# Patient Record
Sex: FEMALE | Race: WHITE | Employment: OTHER | URBAN - METROPOLITAN AREA
[De-identification: names, ages, dates, MRNs, and addresses within clinical notes are randomized per-mention and may not be internally consistent; named-entity substitution may affect disease eponyms.]

---

## 2017-09-05 ENCOUNTER — HOSPITAL ENCOUNTER (OUTPATIENT)
Dept: PHYSICAL THERAPY | Age: 77
Discharge: HOME | End: 2017-09-05

## 2017-09-05 ENCOUNTER — HOSPITAL ENCOUNTER (OUTPATIENT)
Dept: PREADMISSION TESTING | Age: 77
Discharge: HOME | End: 2017-09-05
Payer: MEDICARE

## 2017-09-05 VITALS
WEIGHT: 151 LBS | SYSTOLIC BLOOD PRESSURE: 160 MMHG | TEMPERATURE: 98.3 F | HEIGHT: 64 IN | DIASTOLIC BLOOD PRESSURE: 69 MMHG | BODY MASS INDEX: 25.78 KG/M2 | OXYGEN SATURATION: 97 % | RESPIRATION RATE: 18 BRPM

## 2017-09-05 LAB
ABO + RH BLD: NORMAL
ALBUMIN SERPL-MCNC: 3.9 G/DL (ref 3.5–5)
ALBUMIN/GLOB SERPL: 1 {RATIO} (ref 1.1–2.2)
ALP SERPL-CCNC: 99 U/L (ref 45–117)
ALT SERPL-CCNC: 22 U/L (ref 12–78)
ANION GAP SERPL CALC-SCNC: 5 MMOL/L (ref 5–15)
APPEARANCE UR: ABNORMAL
AST SERPL-CCNC: 20 U/L (ref 15–37)
ATRIAL RATE: 74 BPM
BACTERIA URNS QL MICRO: ABNORMAL /HPF
BILIRUB SERPL-MCNC: 0.3 MG/DL (ref 0.2–1)
BILIRUB UR QL: NEGATIVE
BLOOD GROUP ANTIBODIES SERPL: NORMAL
BUN SERPL-MCNC: 10 MG/DL (ref 6–20)
BUN/CREAT SERPL: 16 (ref 12–20)
CALCIUM SERPL-MCNC: 8.8 MG/DL (ref 8.5–10.1)
CALCULATED P AXIS, ECG09: 91 DEGREES
CALCULATED R AXIS, ECG10: 31 DEGREES
CALCULATED T AXIS, ECG11: 38 DEGREES
CHLORIDE SERPL-SCNC: 98 MMOL/L (ref 97–108)
CO2 SERPL-SCNC: 30 MMOL/L (ref 21–32)
COLOR UR: ABNORMAL
CREAT SERPL-MCNC: 0.62 MG/DL (ref 0.55–1.02)
DIAGNOSIS, 93000: NORMAL
EPITH CASTS URNS QL MICRO: ABNORMAL /LPF
ERYTHROCYTE [DISTWIDTH] IN BLOOD BY AUTOMATED COUNT: 13.7 % (ref 11.5–14.5)
EST. AVERAGE GLUCOSE BLD GHB EST-MCNC: 143 MG/DL
GLOBULIN SER CALC-MCNC: 3.9 G/DL (ref 2–4)
GLUCOSE SERPL-MCNC: 121 MG/DL (ref 65–100)
GLUCOSE UR STRIP.AUTO-MCNC: NEGATIVE MG/DL
HBA1C MFR BLD: 6.6 % (ref 4.2–6.3)
HCT VFR BLD AUTO: 34.7 % (ref 35–47)
HGB BLD-MCNC: 12 G/DL (ref 11.5–16)
HGB UR QL STRIP: NEGATIVE
HYALINE CASTS URNS QL MICRO: ABNORMAL /LPF (ref 0–5)
INR PPP: 1.1 (ref 0.9–1.1)
KETONES UR QL STRIP.AUTO: NEGATIVE MG/DL
LEUKOCYTE ESTERASE UR QL STRIP.AUTO: ABNORMAL
MCH RBC QN AUTO: 32.2 PG (ref 26–34)
MCHC RBC AUTO-ENTMCNC: 34.6 G/DL (ref 30–36.5)
MCV RBC AUTO: 93 FL (ref 80–99)
NITRITE UR QL STRIP.AUTO: NEGATIVE
P-R INTERVAL, ECG05: 166 MS
PH UR STRIP: 6.5 [PH] (ref 5–8)
PLATELET # BLD AUTO: 315 K/UL (ref 150–400)
POTASSIUM SERPL-SCNC: 3.9 MMOL/L (ref 3.5–5.1)
PROT SERPL-MCNC: 7.8 G/DL (ref 6.4–8.2)
PROT UR STRIP-MCNC: NEGATIVE MG/DL
PROTHROMBIN TIME: 10.8 SEC (ref 9–11.1)
Q-T INTERVAL, ECG07: 386 MS
QRS DURATION, ECG06: 74 MS
QTC CALCULATION (BEZET), ECG08: 428 MS
RBC # BLD AUTO: 3.73 M/UL (ref 3.8–5.2)
RBC #/AREA URNS HPF: ABNORMAL /HPF (ref 0–5)
SODIUM SERPL-SCNC: 133 MMOL/L (ref 136–145)
SP GR UR REFRACTOMETRY: 1.02 (ref 1–1.03)
SPECIMEN EXP DATE BLD: NORMAL
UA: UC IF INDICATED,UAUC: ABNORMAL
UROBILINOGEN UR QL STRIP.AUTO: 0.2 EU/DL (ref 0.2–1)
VENTRICULAR RATE, ECG03: 74 BPM
WBC # BLD AUTO: 7.9 K/UL (ref 3.6–11)
WBC URNS QL MICRO: >100 /HPF (ref 0–4)

## 2017-09-05 PROCEDURE — G8978 MOBILITY CURRENT STATUS: HCPCS

## 2017-09-05 PROCEDURE — 36415 COLL VENOUS BLD VENIPUNCTURE: CPT | Performed by: ORTHOPAEDIC SURGERY

## 2017-09-05 PROCEDURE — 87186 SC STD MICRODIL/AGAR DIL: CPT | Performed by: ORTHOPAEDIC SURGERY

## 2017-09-05 PROCEDURE — 83036 HEMOGLOBIN GLYCOSYLATED A1C: CPT | Performed by: ORTHOPAEDIC SURGERY

## 2017-09-05 PROCEDURE — G8979 MOBILITY GOAL STATUS: HCPCS

## 2017-09-05 PROCEDURE — 81001 URINALYSIS AUTO W/SCOPE: CPT | Performed by: ORTHOPAEDIC SURGERY

## 2017-09-05 PROCEDURE — G8980 MOBILITY D/C STATUS: HCPCS

## 2017-09-05 PROCEDURE — 93005 ELECTROCARDIOGRAM TRACING: CPT

## 2017-09-05 PROCEDURE — 97530 THERAPEUTIC ACTIVITIES: CPT

## 2017-09-05 PROCEDURE — 86900 BLOOD TYPING SEROLOGIC ABO: CPT | Performed by: ORTHOPAEDIC SURGERY

## 2017-09-05 PROCEDURE — 80053 COMPREHEN METABOLIC PANEL: CPT | Performed by: ORTHOPAEDIC SURGERY

## 2017-09-05 PROCEDURE — 97161 PT EVAL LOW COMPLEX 20 MIN: CPT

## 2017-09-05 PROCEDURE — 85027 COMPLETE CBC AUTOMATED: CPT | Performed by: ORTHOPAEDIC SURGERY

## 2017-09-05 PROCEDURE — 85610 PROTHROMBIN TIME: CPT | Performed by: ORTHOPAEDIC SURGERY

## 2017-09-05 PROCEDURE — 87077 CULTURE AEROBIC IDENTIFY: CPT | Performed by: ORTHOPAEDIC SURGERY

## 2017-09-05 PROCEDURE — 87086 URINE CULTURE/COLONY COUNT: CPT | Performed by: ORTHOPAEDIC SURGERY

## 2017-09-05 RX ORDER — ACETAMINOPHEN 500 MG
1000 TABLET ORAL ONCE
Status: CANCELLED | OUTPATIENT
Start: 2017-09-11 | End: 2017-09-11

## 2017-09-05 RX ORDER — CELECOXIB 200 MG/1
400 CAPSULE ORAL ONCE
Status: CANCELLED | OUTPATIENT
Start: 2017-09-11 | End: 2017-09-11

## 2017-09-05 RX ORDER — SODIUM CHLORIDE, SODIUM LACTATE, POTASSIUM CHLORIDE, CALCIUM CHLORIDE 600; 310; 30; 20 MG/100ML; MG/100ML; MG/100ML; MG/100ML
25 INJECTION, SOLUTION INTRAVENOUS CONTINUOUS
Status: CANCELLED | OUTPATIENT
Start: 2017-09-11

## 2017-09-05 RX ORDER — PREGABALIN 75 MG/1
75 CAPSULE ORAL ONCE
Status: CANCELLED | OUTPATIENT
Start: 2017-09-11 | End: 2017-09-11

## 2017-09-05 NOTE — PERIOP NOTES
Pre op labs faxed to Dr. Giuliana Jackson' office, noting Na 133 (nares results and urine culture pending), asking if any follow up indicated. Fax confirmed.

## 2017-09-05 NOTE — PERIOP NOTES
[facility]  Preoperative Instructions        Surgery Date 9/11/2017          Time of Arrival : to be called    1. On the day of your surgery, please report to the Surgical Services Registration Desk and sign in at your designated time. The Surgery Center is located to the right of the Emergency Room. 2. You must have someone with you to drive you home. You should not drive a car for 24 hours following surgery. Please make arrangements for a friend or family member to stay with you for the first 24 hours after your surgery. 3. Do not have anything to eat or drink (including water, gum, mints, coffee, juice) after midnight . ? This may not apply to medications prescribed by your physician. ?(Please note below the special instructions with medications to take the morning of your procedure.)    4. We recommend you do not drink any alcoholic beverages for 24 hours before and after your surgery. 5. Stop all Aspirin, non-steroidal anti-inflammatory drugs (i.e. Advil, Aleve), vitamins, and supplements?as directed by your surgeon's office. ? **If you are currently taking Plavix, Coumadin, or other blood-thinning agents, contact your surgeon for instructions. **    6. Wear comfortable clothes. Wear glasses instead of contacts. Do not bring any money or jewelry. Please bring picture ID, insurance card, and any prearranged co-payment or hospital payment. Do not wear make-up, particularly mascara the morning of your surgery. Do not wear nail polish, particularly if you are having foot /hand surgery. Wear your hair loose or down, no ponytails, buns, jairon pins or clips. All body piercings must be removed. Please shower with antibacterial soap for three consecutive days before and on the morning of surgery, but do not apply any lotions, powders or deodorants after the shower on the day of surgery. Please use a fresh towels after each shower.  Please sleep in clean clothes and change bed linens the night before surgery. Please do not shave for 48 hours prior to surgery. Shaving of the face is acceptable. 7. You should understand that if you do not follow these instructions your surgery may be cancelled. If your physical condition changes (I.e. fever, cold or flu) please contact your surgeon as soon as possible. 8. It is important that you be on time. If a situation occurs where you may be late, please call [phone] (OR Holding Area). 9. If you have any questions and or problems, please call [phone] (Pre-admission Testing). 10. Your surgery time may be subject to change. You will receive a phone call the evening prior if your time changes. 11.  If having outpatient surgery, you must have someone to drive you here, stay with you during the duration of your stay, and to drive you home at time of discharge. 12.   In an effort to improve the efficiency, privacy, and safety for all of our Pre-op patients visitors are not allowed in the Holding area. Once you arrive and are registered your family/visitors will be asked to remain in the waiting room. The Pre-op staff will get you from the Surgical Waiting Area and will explain to you and your family/visitors that the Pre-op phase is beginning. The staff will answer any questions and provide instructions for tracking of the patient, by use of the existing tracking number and color-coded status board in the waiting room. At this time the staff will also ask for your designated spokesperson information in the event that the physician or staff need to provide an update or obtain any pertinent information. The designated spokesperson will be notified if the physician needs to speak to family during the pre-operative phase. If at any time your family/visitors has questions or concerns they may approach the volunteer desk in the waiting area for assistance.          Special Instructions:    MEDICATIONS TO TAKE THE MORNING OF SURGERY WITH A SIP OF WATER: eye drops, tylenol if needed      I understand a pre-operative phone call will be made to verify my surgery time. In the event that I am not available, I give permission for a message to be left on my answering service and/or with another person?   Yes [phone]             ___________________      __________   _________    (Signature of Patient)             (Witness)                (Date and Time)

## 2017-09-05 NOTE — PROGRESS NOTES
[facility]  Physical Therapy Pre-surgery evaluation  [address]  [place], [address] [facility]    physical Therapy pre THR surgery EVALUATION  Patient: Esperanza Gracia [de-identified]78 y.o. 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 Pt is independent for mobility and ADLs at baseline. Lives with  who will provide support at discharge. Does have history of falls. Drives. Is retired. H/O R partial TKA and L TKA in 2015 and 2016 respectively. Discussed anticipated disposition to home with possible discharge within a 1 to 2 day time frame post-surgery. Patient and  in agreement. Pt will have support from  at discharge. GOALS: (Goals have been discussed and agreed upon with patient.)  DISCHARGE GOALS: Time Frame: 1 DAY  1. Patient will demonstrate increased strength, range of motion, and pain control via a home exercise program in order to minimize functional deficits in preparation for their upcoming surgery. This will be achieved by using education, demonstration and through the use of an informational handout including a home exercise program.  REHABILITATION POTENTIAL FOR STATED GOALS: Good     RECOMMENDATIONS AND PLANNED INTERVENTIONS: (Benefits and precautions of physical therapy have been discussed with the patient.)  1. Home Exercise Program  TREATMENT PLAN EFFECTIVE DATES: 9/5/2017 TO 9/5/2017  FREQUENCY/DURATION: Patient to continue to perform home exercise program at least twice daily until her surgery. SUBJECTIVE:   Patient stated It just gave out on me.     OBJECTIVE DATA SUMMARY:   HISTORY:    Past Medical History:   Diagnosis Date    Arthritis     Chronic pain     arthritic    Diabetes ([address]     Hypertension     Ill-defined condition     shingles 1.5 years ago 8/2014    Ill-defined condition     cholesterol    Other ill-defined conditions(799.89)     high cholesterol    Other ill-defined conditions(799.89)     shingles     Past Surgical History:   Procedure Laterality Date    COLONOSCOPY N/A 10/25/2016    COLONOSCOPY performed by Kwame Edwards MD at [facility] ENDOSCOPY    HX APPENDECTOMY      HX HYSTERECTOMY      HX ORTHOPAEDIC  6/18/15    RIGHT UNICONDYLAR VERSUS TOTAL KNEE ARTHROPLASTY, LEFT KNEE INJECTION    HX TONSILLECTOMY      HX TUBAL LIGATION      HX UROLOGICAL      bladder tacking     Prior Level of Function/Home Situation: Pt is independent for mobility and ADLs at baseline. Lives with  who will provide support at discharge. Does have history of falls. Drives. Is retired. H/O R partial TKA and L TKA in 2015 and 2016 respectively. Does ride stationary bike for exercise. Personal factors and/or comorbidities impacting plan of care: H/O R partial TKA and L TKA    Patient []   does  [x]   does not state signs/symptoms of shortness of breath/dyspnea on exertion/respiratory distress. Home Situation  Home Environment: Private residence  # Steps to Enter: 4  Rails to Enter: Yes  Hand Rails : Bilateral  Wheelchair Ramp: No  One/Two Story Residence: One story ([facility] with railings)  Living Alone: No  Support Systems: Spouse/Significant Other/Partner  Patient Expects to be Discharged to[de-identified] Private residence  Current DME Used/Available at Home: Cane, straight, Walker, rolling, Grab bars, Raised toilet seat, Adaptive dressing aides  Tub or Shower Type: Shower    EXAMINATION/PRESENTATION/DECISION MAKING:     ADLs (Current Functional Status):    Bathing/Showering:   [x] Independent  [] Requires Assistance from Someone  [] Sponge Bath Only   Ambulation:  [x] Independent  [] Walk Indoors Only  [] Walk Outdoors  [] Use Assistive Device  [] Use Wheelchair Only     Dressing:  [x] Independent    Requires Assistance from Someone for:  [] Sock/Shoes  [] Pants  [] Everything   Household Activities:  [] Routine house and yard work  [x] Light Housework Only  [] None       Critical Behavior:  Neurologic State: Alert     Cognition: Appropriate decision making, Appropriate for age attention/concentration, Appropriate safety awareness       Strength:    Strength: Within functional limits                    Tone & Sensation:   Tone: Normal              Sensation: Intact               Range Of Motion:  AROM: Within functional limits                       Coordination:  Coordination: Within functional limits    Functional Mobility:  Transfers:  Sit to Stand: Independent  Stand to Sit: Independent                       Balance:   Sitting: Intact  Standing: Intact  Ambulation/Gait Training:  Distance (ft): 40 Feet (ft)     Ambulation - Level of Assistance: Independent        Gait Abnormalities: Antalgic              Speed/Beverly: Slow  Step Length: Left shortened, Right shortened  Therapeutic Exercises:   The patient was educated in, has demonstrated, and has received written instructions to complete for their home exercise program per total hip replacement protocol. Functional Measure:  Lower Extremity Functional Scale (LEFS):      Score 24/80     Percentage of impairment CH  0% CI  1-19% CJ  20-39% CK  40-59% CL  60-79% CM  80-99% CN  100%   LEFS score:  0-80 80 64-79 47-63 31-46 16-30 1-15 0     Cutt-offs: None established  TKA and LILLIAN:   (Anitra et al, 2000)  MDC = 9 points   MCID = 9 points           G codes: In compliance with CMSs Claims Based Outcome Reporting, the following G-code set was chosen for this patient based on their primary functional limitation being treated: The outcome measure chosen to determine the severity of the functional limitation was the LEFS with a score of 24/80 which was correlated with the impairment scale.     ? Mobility - Walking and Moving Around:     - CURRENT STATUS: CL - 60%-79% impaired, limited or restricted    - GOAL STATUS: CL - 60%-79% impaired, limited or restricted    - D/C STATUS:  CL - 60%-79% impaired, limited or restricted       Pain:Pain Scale 1: Numeric (0 - 10)  Pain Intensity 1: 7  Pain Location 1: Hip  Pain Orientation 1: Right          Activity Tolerance: WNLs   Patient []   does  [x]   does not demonstrate signs/symptoms of shortness of breath/dyspnea on exertion/respiratory distress. COMMUNICATION/EDUCATION:   The patient was educated on:  [x]         Early post operative mobility is imperative to achieve a patient's desired outcomes and to restore biological function. [x]         Post operative hip precautions may/may not be applicable. These precautions are based on the patient's physician and the procedure(s) performed. [x]         Anterior hip precautions including:  ·       No hip extension  ·       No external rotation  ·       No crossing of the legs/midline    []         Posterior hip precautions including:  ·       No hip flexion >90 degrees  ·       No internal rotation  ·       No crossing of the legs/midline    The patients plan of care was discussed as follows:   [x]         The patient verbalized understanding of her plan in preparation for their upcoming surgery  [x]         The patient's  was present for this session  []        The patient reports that he/she does not have a  identified at this time  [x]         The  verbalized understanding of the education regarding the patient's upcoming surgery  [x]         Patient/family agree to work toward stated goals and plan of care. []         Patient understands intent and goals of therapy, but is neutral about his/her participation. []         Patient is unable to participate in goal setting and plan of care.       Thank you for this referral.  Brandon Ibarra, PT, DPT   Time Calculation: 25 mins

## 2017-09-06 LAB
BACTERIA SPEC CULT: NORMAL
BACTERIA SPEC CULT: NORMAL
SERVICE CMNT-IMP: NORMAL

## 2017-09-06 NOTE — PERIOP NOTES
Received fax from Dr. Tesha Cullen, no further orders at this time. Cultures to be faxed once resulted.

## 2017-09-07 LAB
BACTERIA SPEC CULT: ABNORMAL
CC UR VC: ABNORMAL
SERVICE CMNT-IMP: ABNORMAL

## 2017-09-08 RX ORDER — CIPROFLOXACIN 500 MG/1
500 TABLET ORAL 2 TIMES DAILY
COMMUNITY
Start: 2017-09-07 | End: 2017-09-12

## 2017-09-08 NOTE — PERIOP NOTES
Urine culture results treated by PCP, Dr. Bc Valladares. Patient prescribed Cipro 500 mg BID for 7 days starting on 9/7/17.

## 2017-09-08 NOTE — PERIOP NOTES
Pre-op call made and patient given TOA. Patient instructed may have 8 ozs. water up to 6:30 am and then NPO.  Patient verbalized understanding

## 2017-09-11 ENCOUNTER — HOSPITAL ENCOUNTER (INPATIENT)
Age: 77
LOS: 1 days | Discharge: HOME HEALTH | DRG: 470 | End: 2017-09-12
Attending: ORTHOPAEDIC SURGERY | Admitting: ORTHOPAEDIC SURGERY
Payer: MEDICARE

## 2017-09-11 ENCOUNTER — APPOINTMENT (OUTPATIENT)
Dept: GENERAL RADIOLOGY | Age: 77
DRG: 470 | End: 2017-09-11
Attending: ORTHOPAEDIC SURGERY
Payer: MEDICARE

## 2017-09-11 ENCOUNTER — ANESTHESIA (OUTPATIENT)
Dept: SURGERY | Age: 77
DRG: 470 | End: 2017-09-11
Payer: MEDICARE

## 2017-09-11 ENCOUNTER — ANESTHESIA EVENT (OUTPATIENT)
Dept: SURGERY | Age: 77
DRG: 470 | End: 2017-09-11
Payer: MEDICARE

## 2017-09-11 PROBLEM — M16.9 OSTEOARTHRITIS OF HIP: Status: ACTIVE | Noted: 2017-09-11

## 2017-09-11 LAB
ANION GAP BLD CALC-SCNC: 17 MMOL/L (ref 5–15)
BUN BLD-MCNC: 10 MG/DL (ref 9–20)
CA-I BLD-MCNC: 1.17 MMOL/L (ref 1.12–1.32)
CHLORIDE BLD-SCNC: 98 MMOL/L (ref 98–107)
CO2 BLD-SCNC: 24 MMOL/L (ref 21–32)
CREAT BLD-MCNC: 0.7 MG/DL (ref 0.6–1.3)
GLUCOSE BLD STRIP.AUTO-MCNC: 166 MG/DL (ref 65–100)
GLUCOSE BLD STRIP.AUTO-MCNC: 180 MG/DL (ref 65–100)
GLUCOSE BLD STRIP.AUTO-MCNC: 183 MG/DL (ref 65–100)
GLUCOSE BLD-MCNC: 161 MG/DL (ref 65–100)
HCT VFR BLD CALC: 38 % (ref 35–47)
HGB BLD-MCNC: 12.9 GM/DL (ref 11.5–16)
POTASSIUM BLD-SCNC: 3.9 MMOL/L (ref 3.5–5.1)
SERVICE CMNT-IMP: ABNORMAL
SODIUM BLD-SCNC: 134 MMOL/L (ref 136–145)

## 2017-09-11 PROCEDURE — 73501 X-RAY EXAM HIP UNI 1 VIEW: CPT

## 2017-09-11 PROCEDURE — 74011000250 HC RX REV CODE- 250: Performed by: PHYSICIAN ASSISTANT

## 2017-09-11 PROCEDURE — 77030014647 HC SEAL FBRN TISSL BAXT -D: Performed by: ORTHOPAEDIC SURGERY

## 2017-09-11 PROCEDURE — 77030020061 HC IV BLD WRMR ADMIN SET 3M -B: Performed by: ANESTHESIOLOGY

## 2017-09-11 PROCEDURE — 74011250637 HC RX REV CODE- 250/637: Performed by: PHYSICIAN ASSISTANT

## 2017-09-11 PROCEDURE — 3E0U33Z INTRODUCTION OF ANTI-INFLAMMATORY INTO JOINTS, PERCUTANEOUS APPROACH: ICD-10-PCS | Performed by: ORTHOPAEDIC SURGERY

## 2017-09-11 PROCEDURE — G8978 MOBILITY CURRENT STATUS: HCPCS

## 2017-09-11 PROCEDURE — 77030026438 HC STYL ET INTUB CARD -A: Performed by: NURSE ANESTHETIST, CERTIFIED REGISTERED

## 2017-09-11 PROCEDURE — 97162 PT EVAL MOD COMPLEX 30 MIN: CPT

## 2017-09-11 PROCEDURE — 77030018846 HC SOL IRR STRL H20 ICUM -A: Performed by: ORTHOPAEDIC SURGERY

## 2017-09-11 PROCEDURE — C1776 JOINT DEVICE (IMPLANTABLE): HCPCS | Performed by: ORTHOPAEDIC SURGERY

## 2017-09-11 PROCEDURE — 77030020782 HC GWN BAIR PAWS FLX 3M -B

## 2017-09-11 PROCEDURE — 76000 FLUOROSCOPY <1 HR PHYS/QHP: CPT

## 2017-09-11 PROCEDURE — 76210000006 HC OR PH I REC 0.5 TO 1 HR: Performed by: ORTHOPAEDIC SURGERY

## 2017-09-11 PROCEDURE — 74011000258 HC RX REV CODE- 258: Performed by: ORTHOPAEDIC SURGERY

## 2017-09-11 PROCEDURE — 8E0YXBZ COMPUTER ASSISTED PROCEDURE OF LOWER EXTREMITY: ICD-10-PCS | Performed by: ORTHOPAEDIC SURGERY

## 2017-09-11 PROCEDURE — 74011250636 HC RX REV CODE- 250/636: Performed by: PHYSICIAN ASSISTANT

## 2017-09-11 PROCEDURE — 77010033678 HC OXYGEN DAILY

## 2017-09-11 PROCEDURE — 3E0U3BZ INTRODUCTION OF ANESTHETIC AGENT INTO JOINTS, PERCUTANEOUS APPROACH: ICD-10-PCS | Performed by: ORTHOPAEDIC SURGERY

## 2017-09-11 PROCEDURE — 77030008467 HC STPLR SKN COVD -B: Performed by: ORTHOPAEDIC SURGERY

## 2017-09-11 PROCEDURE — G8979 MOBILITY GOAL STATUS: HCPCS

## 2017-09-11 PROCEDURE — 74011250637 HC RX REV CODE- 250/637: Performed by: ORTHOPAEDIC SURGERY

## 2017-09-11 PROCEDURE — 0SR90J9 REPLACEMENT OF RIGHT HIP JOINT WITH SYNTHETIC SUBSTITUTE, CEMENTED, OPEN APPROACH: ICD-10-PCS | Performed by: ORTHOPAEDIC SURGERY

## 2017-09-11 PROCEDURE — 77030018836 HC SOL IRR NACL ICUM -A: Performed by: ORTHOPAEDIC SURGERY

## 2017-09-11 PROCEDURE — 77030022704 HC SUT VLOC COVD -B: Performed by: ORTHOPAEDIC SURGERY

## 2017-09-11 PROCEDURE — 77030033138 HC SUT PGA STRATFX J&J -B: Performed by: ORTHOPAEDIC SURGERY

## 2017-09-11 PROCEDURE — 74011250636 HC RX REV CODE- 250/636

## 2017-09-11 PROCEDURE — 77030019908 HC STETH ESOPH SIMS -A: Performed by: NURSE ANESTHETIST, CERTIFIED REGISTERED

## 2017-09-11 PROCEDURE — 77030032490 HC SLV COMPR SCD KNE COVD -B: Performed by: ORTHOPAEDIC SURGERY

## 2017-09-11 PROCEDURE — 77030020788: Performed by: ORTHOPAEDIC SURGERY

## 2017-09-11 PROCEDURE — 77030035236 HC SUT PDS STRATFX BARB J&J -B: Performed by: ORTHOPAEDIC SURGERY

## 2017-09-11 PROCEDURE — 74011250636 HC RX REV CODE- 250/636: Performed by: ORTHOPAEDIC SURGERY

## 2017-09-11 PROCEDURE — 74011250636 HC RX REV CODE- 250/636: Performed by: ANESTHESIOLOGY

## 2017-09-11 PROCEDURE — 65270000029 HC RM PRIVATE

## 2017-09-11 PROCEDURE — 77030006789 HC BLD SAW OSC STRY -C: Performed by: ORTHOPAEDIC SURGERY

## 2017-09-11 PROCEDURE — 76010000162 HC OR TIME 1.5 TO 2 HR INTENSV-TIER 1: Performed by: ORTHOPAEDIC SURGERY

## 2017-09-11 PROCEDURE — 51798 US URINE CAPACITY MEASURE: CPT

## 2017-09-11 PROCEDURE — 77030034479 HC ADH SKN CLSR PRINEO J&J -B: Performed by: ORTHOPAEDIC SURGERY

## 2017-09-11 PROCEDURE — 77030011640 HC PAD GRND REM COVD -A: Performed by: ORTHOPAEDIC SURGERY

## 2017-09-11 PROCEDURE — 74011636637 HC RX REV CODE- 636/637: Performed by: ORTHOPAEDIC SURGERY

## 2017-09-11 PROCEDURE — 77030013079 HC BLNKT BAIR HGGR 3M -A: Performed by: ANESTHESIOLOGY

## 2017-09-11 PROCEDURE — 74011000250 HC RX REV CODE- 250: Performed by: ORTHOPAEDIC SURGERY

## 2017-09-11 PROCEDURE — 77030036722: Performed by: ORTHOPAEDIC SURGERY

## 2017-09-11 PROCEDURE — 77030003666 HC NDL SPINAL BD -A: Performed by: ORTHOPAEDIC SURGERY

## 2017-09-11 PROCEDURE — 76060000034 HC ANESTHESIA 1.5 TO 2 HR: Performed by: ORTHOPAEDIC SURGERY

## 2017-09-11 PROCEDURE — 77030008684 HC TU ET CUF COVD -B: Performed by: NURSE ANESTHETIST, CERTIFIED REGISTERED

## 2017-09-11 PROCEDURE — 74011000250 HC RX REV CODE- 250

## 2017-09-11 PROCEDURE — 80047 BASIC METABLC PNL IONIZED CA: CPT

## 2017-09-11 PROCEDURE — 82962 GLUCOSE BLOOD TEST: CPT

## 2017-09-11 PROCEDURE — 77030018723 HC ELCTRD BLD COVD -A: Performed by: ORTHOPAEDIC SURGERY

## 2017-09-11 DEVICE — IMPLANTABLE DEVICE: Type: IMPLANTABLE DEVICE | Site: HIP | Status: FUNCTIONAL

## 2017-09-11 DEVICE — IMPLANTABLE DEVICE
Type: IMPLANTABLE DEVICE | Site: HIP | Status: FUNCTIONAL
Brand: NOVATION

## 2017-09-11 DEVICE — IMPLANTABLE DEVICE
Type: IMPLANTABLE DEVICE | Site: HIP | Status: FUNCTIONAL
Brand: ALTEON

## 2017-09-11 DEVICE — LINER ACET PRSS FT HIP POROUS POLYETH MTL: Type: IMPLANTABLE DEVICE | Status: FUNCTIONAL

## 2017-09-11 RX ORDER — AMOXICILLIN 250 MG
1 CAPSULE ORAL 2 TIMES DAILY
Status: DISCONTINUED | OUTPATIENT
Start: 2017-09-11 | End: 2017-09-12 | Stop reason: HOSPADM

## 2017-09-11 RX ORDER — GLYCOPYRROLATE 0.2 MG/ML
INJECTION INTRAMUSCULAR; INTRAVENOUS AS NEEDED
Status: DISCONTINUED | OUTPATIENT
Start: 2017-09-11 | End: 2017-09-11 | Stop reason: HOSPADM

## 2017-09-11 RX ORDER — SUCCINYLCHOLINE CHLORIDE 20 MG/ML
INJECTION INTRAMUSCULAR; INTRAVENOUS AS NEEDED
Status: DISCONTINUED | OUTPATIENT
Start: 2017-09-11 | End: 2017-09-11 | Stop reason: HOSPADM

## 2017-09-11 RX ORDER — LATANOPROST 50 UG/ML
1 SOLUTION/ DROPS OPHTHALMIC
Status: DISCONTINUED | OUTPATIENT
Start: 2017-09-11 | End: 2017-09-12 | Stop reason: HOSPADM

## 2017-09-11 RX ORDER — HYDROMORPHONE HYDROCHLORIDE 2 MG/ML
INJECTION, SOLUTION INTRAMUSCULAR; INTRAVENOUS; SUBCUTANEOUS AS NEEDED
Status: DISCONTINUED | OUTPATIENT
Start: 2017-09-11 | End: 2017-09-11 | Stop reason: HOSPADM

## 2017-09-11 RX ORDER — SODIUM CHLORIDE 9 MG/ML
125 INJECTION, SOLUTION INTRAVENOUS CONTINUOUS
Status: DISPENSED | OUTPATIENT
Start: 2017-09-11 | End: 2017-09-12

## 2017-09-11 RX ORDER — TRANEXAMIC ACID 100 MG/ML
INJECTION, SOLUTION INTRAVENOUS
Status: DISPENSED
Start: 2017-09-11 | End: 2017-09-12

## 2017-09-11 RX ORDER — CEFAZOLIN SODIUM IN 0.9 % NACL 2 G/100 ML
2 PLASTIC BAG, INJECTION (ML) INTRAVENOUS EVERY 8 HOURS
Status: COMPLETED | OUTPATIENT
Start: 2017-09-11 | End: 2017-09-12

## 2017-09-11 RX ORDER — KETAMINE HYDROCHLORIDE 10 MG/ML
INJECTION, SOLUTION INTRAMUSCULAR; INTRAVENOUS AS NEEDED
Status: DISCONTINUED | OUTPATIENT
Start: 2017-09-11 | End: 2017-09-11 | Stop reason: HOSPADM

## 2017-09-11 RX ORDER — MIDAZOLAM HYDROCHLORIDE 1 MG/ML
INJECTION, SOLUTION INTRAMUSCULAR; INTRAVENOUS AS NEEDED
Status: DISCONTINUED | OUTPATIENT
Start: 2017-09-11 | End: 2017-09-11 | Stop reason: HOSPADM

## 2017-09-11 RX ORDER — HYDROMORPHONE HYDROCHLORIDE 1 MG/ML
0.2 INJECTION, SOLUTION INTRAMUSCULAR; INTRAVENOUS; SUBCUTANEOUS
Status: DISCONTINUED | OUTPATIENT
Start: 2017-09-11 | End: 2017-09-11 | Stop reason: HOSPADM

## 2017-09-11 RX ORDER — ACYCLOVIR 800 MG/1
800 TABLET ORAL
Status: DISCONTINUED | OUTPATIENT
Start: 2017-09-11 | End: 2017-09-12 | Stop reason: HOSPADM

## 2017-09-11 RX ORDER — TIMOLOL MALEATE 5 MG/ML
1 SOLUTION/ DROPS OPHTHALMIC DAILY
Status: DISCONTINUED | OUTPATIENT
Start: 2017-09-12 | End: 2017-09-12 | Stop reason: HOSPADM

## 2017-09-11 RX ORDER — MAGNESIUM SULFATE 100 %
4 CRYSTALS MISCELLANEOUS AS NEEDED
Status: DISCONTINUED | OUTPATIENT
Start: 2017-09-11 | End: 2017-09-11

## 2017-09-11 RX ORDER — PHENYLEPHRINE HCL IN 0.9% NACL 0.4MG/10ML
SYRINGE (ML) INTRAVENOUS AS NEEDED
Status: DISCONTINUED | OUTPATIENT
Start: 2017-09-11 | End: 2017-09-11 | Stop reason: HOSPADM

## 2017-09-11 RX ORDER — MAGNESIUM SULFATE 100 %
4 CRYSTALS MISCELLANEOUS AS NEEDED
Status: DISCONTINUED | OUTPATIENT
Start: 2017-09-11 | End: 2017-09-12 | Stop reason: HOSPADM

## 2017-09-11 RX ORDER — ASPIRIN 325 MG
325 TABLET, DELAYED RELEASE (ENTERIC COATED) ORAL 2 TIMES DAILY
Status: DISCONTINUED | OUTPATIENT
Start: 2017-09-11 | End: 2017-09-12 | Stop reason: HOSPADM

## 2017-09-11 RX ORDER — POLYETHYLENE GLYCOL 3350 17 G/17G
17 POWDER, FOR SOLUTION ORAL DAILY
Status: DISCONTINUED | OUTPATIENT
Start: 2017-09-12 | End: 2017-09-12 | Stop reason: HOSPADM

## 2017-09-11 RX ORDER — FENTANYL CITRATE 50 UG/ML
INJECTION, SOLUTION INTRAMUSCULAR; INTRAVENOUS AS NEEDED
Status: DISCONTINUED | OUTPATIENT
Start: 2017-09-11 | End: 2017-09-11 | Stop reason: HOSPADM

## 2017-09-11 RX ORDER — FENTANYL CITRATE 50 UG/ML
25 INJECTION, SOLUTION INTRAMUSCULAR; INTRAVENOUS
Status: DISCONTINUED | OUTPATIENT
Start: 2017-09-11 | End: 2017-09-11 | Stop reason: HOSPADM

## 2017-09-11 RX ORDER — PROPOFOL 10 MG/ML
INJECTION, EMULSION INTRAVENOUS AS NEEDED
Status: DISCONTINUED | OUTPATIENT
Start: 2017-09-11 | End: 2017-09-11 | Stop reason: HOSPADM

## 2017-09-11 RX ORDER — FAMOTIDINE 20 MG/1
20 TABLET, FILM COATED ORAL 2 TIMES DAILY
Status: DISCONTINUED | OUTPATIENT
Start: 2017-09-11 | End: 2017-09-12 | Stop reason: HOSPADM

## 2017-09-11 RX ORDER — OXYCODONE HYDROCHLORIDE 5 MG/1
10 TABLET ORAL
Status: DISCONTINUED | OUTPATIENT
Start: 2017-09-11 | End: 2017-09-12 | Stop reason: HOSPADM

## 2017-09-11 RX ORDER — DEXAMETHASONE SODIUM PHOSPHATE 4 MG/ML
10 INJECTION, SOLUTION INTRA-ARTICULAR; INTRALESIONAL; INTRAMUSCULAR; INTRAVENOUS; SOFT TISSUE ONCE
Status: DISCONTINUED | OUTPATIENT
Start: 2017-09-12 | End: 2017-09-12 | Stop reason: HOSPADM

## 2017-09-11 RX ORDER — NALOXONE HYDROCHLORIDE 0.4 MG/ML
0.4 INJECTION, SOLUTION INTRAMUSCULAR; INTRAVENOUS; SUBCUTANEOUS AS NEEDED
Status: DISCONTINUED | OUTPATIENT
Start: 2017-09-11 | End: 2017-09-12 | Stop reason: HOSPADM

## 2017-09-11 RX ORDER — SODIUM CHLORIDE, SODIUM LACTATE, POTASSIUM CHLORIDE, CALCIUM CHLORIDE 600; 310; 30; 20 MG/100ML; MG/100ML; MG/100ML; MG/100ML
25 INJECTION, SOLUTION INTRAVENOUS CONTINUOUS
Status: DISCONTINUED | OUTPATIENT
Start: 2017-09-11 | End: 2017-09-11 | Stop reason: HOSPADM

## 2017-09-11 RX ORDER — SODIUM CHLORIDE 0.9 % (FLUSH) 0.9 %
5-10 SYRINGE (ML) INJECTION EVERY 8 HOURS
Status: DISCONTINUED | OUTPATIENT
Start: 2017-09-11 | End: 2017-09-11 | Stop reason: HOSPADM

## 2017-09-11 RX ORDER — DEXTROSE MONOHYDRATE 100 MG/ML
125-250 INJECTION, SOLUTION INTRAVENOUS AS NEEDED
Status: DISCONTINUED | OUTPATIENT
Start: 2017-09-11 | End: 2017-09-12 | Stop reason: HOSPADM

## 2017-09-11 RX ORDER — DEXTROSE MONOHYDRATE 100 MG/ML
125-250 INJECTION, SOLUTION INTRAVENOUS AS NEEDED
Status: DISCONTINUED | OUTPATIENT
Start: 2017-09-11 | End: 2017-09-11

## 2017-09-11 RX ORDER — MORPHINE SULFATE 4 MG/ML
2 INJECTION, SOLUTION INTRAMUSCULAR; INTRAVENOUS
Status: ACTIVE | OUTPATIENT
Start: 2017-09-11 | End: 2017-09-12

## 2017-09-11 RX ORDER — DEXTROSE 50 % IN WATER (D50W) INTRAVENOUS SYRINGE
12.5-25 AS NEEDED
Status: DISCONTINUED | OUTPATIENT
Start: 2017-09-11 | End: 2017-09-11 | Stop reason: SDUPTHER

## 2017-09-11 RX ORDER — OXYCODONE HYDROCHLORIDE 5 MG/1
5 TABLET ORAL
Status: DISCONTINUED | OUTPATIENT
Start: 2017-09-11 | End: 2017-09-12 | Stop reason: HOSPADM

## 2017-09-11 RX ORDER — PREDNISOLONE ACETATE 10 MG/ML
1 SUSPENSION/ DROPS OPHTHALMIC DAILY
Status: DISCONTINUED | OUTPATIENT
Start: 2017-09-12 | End: 2017-09-12 | Stop reason: HOSPADM

## 2017-09-11 RX ORDER — DEXAMETHASONE SODIUM PHOSPHATE 4 MG/ML
INJECTION, SOLUTION INTRA-ARTICULAR; INTRALESIONAL; INTRAMUSCULAR; INTRAVENOUS; SOFT TISSUE AS NEEDED
Status: DISCONTINUED | OUTPATIENT
Start: 2017-09-11 | End: 2017-09-11 | Stop reason: HOSPADM

## 2017-09-11 RX ORDER — LIDOCAINE HYDROCHLORIDE 20 MG/ML
INJECTION, SOLUTION EPIDURAL; INFILTRATION; INTRACAUDAL; PERINEURAL AS NEEDED
Status: DISCONTINUED | OUTPATIENT
Start: 2017-09-11 | End: 2017-09-11 | Stop reason: HOSPADM

## 2017-09-11 RX ORDER — INSULIN LISPRO 100 [IU]/ML
INJECTION, SOLUTION INTRAVENOUS; SUBCUTANEOUS
Status: DISCONTINUED | OUTPATIENT
Start: 2017-09-11 | End: 2017-09-12 | Stop reason: HOSPADM

## 2017-09-11 RX ORDER — SODIUM CHLORIDE 0.9 % (FLUSH) 0.9 %
5-10 SYRINGE (ML) INJECTION AS NEEDED
Status: DISCONTINUED | OUTPATIENT
Start: 2017-09-11 | End: 2017-09-12 | Stop reason: HOSPADM

## 2017-09-11 RX ORDER — ATORVASTATIN CALCIUM 10 MG/1
10 TABLET, FILM COATED ORAL
Status: DISCONTINUED | OUTPATIENT
Start: 2017-09-11 | End: 2017-09-12 | Stop reason: HOSPADM

## 2017-09-11 RX ORDER — FACIAL-BODY WIPES
10 EACH TOPICAL DAILY PRN
Status: DISCONTINUED | OUTPATIENT
Start: 2017-09-13 | End: 2017-09-12 | Stop reason: HOSPADM

## 2017-09-11 RX ORDER — INSULIN LISPRO 100 [IU]/ML
INJECTION, SOLUTION INTRAVENOUS; SUBCUTANEOUS
Status: DISCONTINUED | OUTPATIENT
Start: 2017-09-11 | End: 2017-09-11 | Stop reason: SDUPTHER

## 2017-09-11 RX ORDER — ONDANSETRON 2 MG/ML
INJECTION INTRAMUSCULAR; INTRAVENOUS AS NEEDED
Status: DISCONTINUED | OUTPATIENT
Start: 2017-09-11 | End: 2017-09-11 | Stop reason: HOSPADM

## 2017-09-11 RX ORDER — VECURONIUM BROMIDE FOR INJECTION 1 MG/ML
INJECTION, POWDER, LYOPHILIZED, FOR SOLUTION INTRAVENOUS AS NEEDED
Status: DISCONTINUED | OUTPATIENT
Start: 2017-09-11 | End: 2017-09-11 | Stop reason: HOSPADM

## 2017-09-11 RX ORDER — ACETAMINOPHEN 500 MG
1000 TABLET ORAL ONCE
Status: COMPLETED | OUTPATIENT
Start: 2017-09-11 | End: 2017-09-11

## 2017-09-11 RX ORDER — SODIUM CHLORIDE 0.9 % (FLUSH) 0.9 %
5-10 SYRINGE (ML) INJECTION EVERY 8 HOURS
Status: DISCONTINUED | OUTPATIENT
Start: 2017-09-12 | End: 2017-09-12 | Stop reason: HOSPADM

## 2017-09-11 RX ORDER — ACETAMINOPHEN 325 MG/1
650 TABLET ORAL EVERY 6 HOURS
Status: DISCONTINUED | OUTPATIENT
Start: 2017-09-11 | End: 2017-09-12 | Stop reason: HOSPADM

## 2017-09-11 RX ORDER — DIPHENHYDRAMINE HCL 12.5MG/5ML
12.5 ELIXIR ORAL
Status: ACTIVE | OUTPATIENT
Start: 2017-09-11 | End: 2017-09-12

## 2017-09-11 RX ORDER — LIDOCAINE HYDROCHLORIDE 10 MG/ML
0.1 INJECTION, SOLUTION EPIDURAL; INFILTRATION; INTRACAUDAL; PERINEURAL AS NEEDED
Status: DISCONTINUED | OUTPATIENT
Start: 2017-09-11 | End: 2017-09-11 | Stop reason: HOSPADM

## 2017-09-11 RX ORDER — NEOSTIGMINE METHYLSULFATE 1 MG/ML
INJECTION INTRAVENOUS AS NEEDED
Status: DISCONTINUED | OUTPATIENT
Start: 2017-09-11 | End: 2017-09-11 | Stop reason: HOSPADM

## 2017-09-11 RX ORDER — SODIUM CHLORIDE 0.9 % (FLUSH) 0.9 %
5-10 SYRINGE (ML) INJECTION AS NEEDED
Status: DISCONTINUED | OUTPATIENT
Start: 2017-09-11 | End: 2017-09-11 | Stop reason: HOSPADM

## 2017-09-11 RX ORDER — PREGABALIN 75 MG/1
75 CAPSULE ORAL ONCE
Status: COMPLETED | OUTPATIENT
Start: 2017-09-11 | End: 2017-09-11

## 2017-09-11 RX ORDER — ONDANSETRON 2 MG/ML
4 INJECTION INTRAMUSCULAR; INTRAVENOUS
Status: DISCONTINUED | OUTPATIENT
Start: 2017-09-11 | End: 2017-09-12 | Stop reason: HOSPADM

## 2017-09-11 RX ORDER — ADHESIVE BANDAGE
30 BANDAGE TOPICAL DAILY PRN
Status: DISCONTINUED | OUTPATIENT
Start: 2017-09-12 | End: 2017-09-12 | Stop reason: HOSPADM

## 2017-09-11 RX ORDER — CELECOXIB 100 MG/1
200 CAPSULE ORAL DAILY
Status: DISCONTINUED | OUTPATIENT
Start: 2017-09-12 | End: 2017-09-12 | Stop reason: HOSPADM

## 2017-09-11 RX ORDER — CELECOXIB 100 MG/1
400 CAPSULE ORAL ONCE
Status: COMPLETED | OUTPATIENT
Start: 2017-09-11 | End: 2017-09-11

## 2017-09-11 RX ORDER — DIPHENHYDRAMINE HYDROCHLORIDE 50 MG/ML
12.5 INJECTION, SOLUTION INTRAMUSCULAR; INTRAVENOUS AS NEEDED
Status: DISCONTINUED | OUTPATIENT
Start: 2017-09-11 | End: 2017-09-11 | Stop reason: HOSPADM

## 2017-09-11 RX ADMIN — OXYCODONE HYDROCHLORIDE 10 MG: 5 TABLET ORAL at 16:31

## 2017-09-11 RX ADMIN — HYDROMORPHONE HYDROCHLORIDE 0.25 MG: 2 INJECTION, SOLUTION INTRAMUSCULAR; INTRAVENOUS; SUBCUTANEOUS at 13:36

## 2017-09-11 RX ADMIN — CEFAZOLIN 2 G: 10 INJECTION, POWDER, FOR SOLUTION INTRAVENOUS; PARENTERAL at 21:22

## 2017-09-11 RX ADMIN — PREGABALIN 75 MG: 75 CAPSULE ORAL at 09:47

## 2017-09-11 RX ADMIN — SODIUM CHLORIDE 125 ML/HR: 900 INJECTION, SOLUTION INTRAVENOUS at 21:33

## 2017-09-11 RX ADMIN — DEXAMETHASONE SODIUM PHOSPHATE 10 MG: 4 INJECTION, SOLUTION INTRA-ARTICULAR; INTRALESIONAL; INTRAMUSCULAR; INTRAVENOUS; SOFT TISSUE at 12:19

## 2017-09-11 RX ADMIN — DOCUSATE SODIUM AND SENNOSIDES 1 TABLET: 8.6; 5 TABLET, FILM COATED ORAL at 17:50

## 2017-09-11 RX ADMIN — Medication 40 MCG: at 13:11

## 2017-09-11 RX ADMIN — FENTANYL CITRATE 100 MCG: 50 INJECTION, SOLUTION INTRAMUSCULAR; INTRAVENOUS at 12:09

## 2017-09-11 RX ADMIN — VECURONIUM BROMIDE FOR INJECTION 5 MG: 1 INJECTION, POWDER, LYOPHILIZED, FOR SOLUTION INTRAVENOUS at 12:18

## 2017-09-11 RX ADMIN — CELECOXIB 400 MG: 200 CAPSULE ORAL at 09:47

## 2017-09-11 RX ADMIN — SODIUM CHLORIDE, SODIUM LACTATE, POTASSIUM CHLORIDE, AND CALCIUM CHLORIDE 25 ML/HR: 600; 310; 30; 20 INJECTION, SOLUTION INTRAVENOUS at 09:42

## 2017-09-11 RX ADMIN — ROPIVACAINE HYDROCHLORIDE: 5 INJECTION, SOLUTION EPIDURAL; INFILTRATION; PERINEURAL at 13:51

## 2017-09-11 RX ADMIN — Medication 120 MCG: at 12:17

## 2017-09-11 RX ADMIN — ONDANSETRON 4 MG: 2 INJECTION INTRAMUSCULAR; INTRAVENOUS at 12:50

## 2017-09-11 RX ADMIN — ATORVASTATIN CALCIUM 10 MG: 10 TABLET, FILM COATED ORAL at 21:21

## 2017-09-11 RX ADMIN — SODIUM CHLORIDE, SODIUM LACTATE, POTASSIUM CHLORIDE, AND CALCIUM CHLORIDE: 600; 310; 30; 20 INJECTION, SOLUTION INTRAVENOUS at 13:05

## 2017-09-11 RX ADMIN — FAMOTIDINE 20 MG: 20 TABLET ORAL at 17:50

## 2017-09-11 RX ADMIN — HYDROMORPHONE HYDROCHLORIDE 0.5 MG: 2 INJECTION, SOLUTION INTRAMUSCULAR; INTRAVENOUS; SUBCUTANEOUS at 13:28

## 2017-09-11 RX ADMIN — ACYCLOVIR 800 MG: 800 TABLET ORAL at 21:28

## 2017-09-11 RX ADMIN — TRANEXAMIC ACID 1000 MG: 100 INJECTION, SOLUTION INTRAVENOUS at 14:40

## 2017-09-11 RX ADMIN — KETAMINE HYDROCHLORIDE 30 MG: 10 INJECTION, SOLUTION INTRAMUSCULAR; INTRAVENOUS at 12:18

## 2017-09-11 RX ADMIN — HYDROMORPHONE HYDROCHLORIDE 0.25 MG: 2 INJECTION, SOLUTION INTRAMUSCULAR; INTRAVENOUS; SUBCUTANEOUS at 13:47

## 2017-09-11 RX ADMIN — GLYCOPYRROLATE 0.4 MG: 0.2 INJECTION INTRAMUSCULAR; INTRAVENOUS at 13:33

## 2017-09-11 RX ADMIN — ROPIVACAINE HYDROCHLORIDE: 5 INJECTION, SOLUTION EPIDURAL; INFILTRATION; PERINEURAL at 13:30

## 2017-09-11 RX ADMIN — OXYCODONE HYDROCHLORIDE 10 MG: 5 TABLET ORAL at 21:21

## 2017-09-11 RX ADMIN — LATANOPROST 1 DROP: 50 SOLUTION OPHTHALMIC at 21:28

## 2017-09-11 RX ADMIN — PROPOFOL 130 MG: 10 INJECTION, EMULSION INTRAVENOUS at 12:10

## 2017-09-11 RX ADMIN — LIDOCAINE HYDROCHLORIDE 80 MG: 20 INJECTION, SOLUTION EPIDURAL; INFILTRATION; INTRACAUDAL; PERINEURAL at 12:09

## 2017-09-11 RX ADMIN — ACETAMINOPHEN 1000 MG: 500 TABLET ORAL at 09:47

## 2017-09-11 RX ADMIN — SUCCINYLCHOLINE CHLORIDE 100 MG: 20 INJECTION INTRAMUSCULAR; INTRAVENOUS at 12:10

## 2017-09-11 RX ADMIN — MIDAZOLAM HYDROCHLORIDE 1 MG: 1 INJECTION, SOLUTION INTRAMUSCULAR; INTRAVENOUS at 11:56

## 2017-09-11 RX ADMIN — INSULIN LISPRO 2 UNITS: 100 INJECTION, SOLUTION INTRAVENOUS; SUBCUTANEOUS at 17:50

## 2017-09-11 RX ADMIN — NEOSTIGMINE METHYLSULFATE 2 MG: 1 INJECTION INTRAVENOUS at 13:33

## 2017-09-11 RX ADMIN — ACETAMINOPHEN 650 MG: 325 TABLET ORAL at 17:50

## 2017-09-11 RX ADMIN — GLYCOPYRROLATE 0.4 MG: 0.2 INJECTION INTRAMUSCULAR; INTRAVENOUS at 12:37

## 2017-09-11 RX ADMIN — SODIUM CHLORIDE 125 ML/HR: 900 INJECTION, SOLUTION INTRAVENOUS at 14:30

## 2017-09-11 RX ADMIN — REGULAR STRENGTH 325 MG: 325 TABLET ORAL at 17:50

## 2017-09-11 RX ADMIN — CEFAZOLIN 2 G: 10 INJECTION, POWDER, FOR SOLUTION INTRAVENOUS; PARENTERAL at 12:20

## 2017-09-11 NOTE — PROGRESS NOTES
Problem: Mobility Impaired (Adult and Pediatric)  Goal: *Acute Goals and Plan of Care (Insert Text)  Physical Therapy Goals  Initiated 9/11/2017    1. Patient will move from supine to sit and sit to supine , scoot up and down and roll side to side in bed with independence within 4 days. 2. Patient will perform sit to stand with modified independence within 4 days. 3. Patient will ambulate with modified independence for 150 feet with the least restrictive device within 4 days. 4. Patient will ascend/descend 4 stairs with 2 handrail(s) with modified independence within 4 days. 5. Patient will perform supine LE home exercise program per protocol with independence within 4 days. PHYSICAL THERAPY EVALUATION  Patient: Alfredo Das (78 y.o. female)  Date: 9/11/2017  Primary Diagnosis: BILATERAL HIP OSTEOARTHRITIS  Osteoarthritis of hip  Procedure(s) (LRB):  RIGHT TOTAL HIP ARTHROPLASTY WITH NAVIGATION ANTERIOR APPROACH, LEFT HIP INJECTION [employer] [employer]) (Right) Day of Surgery   Precautions:  Fall, WBAT      ASSESSMENT :  Based on the objective data described below, the patient presents with impaired functional mobility secondary to impaired balance, RLE numbness, decreased R hip ROM and strength, and decreased activity tolerance. Pt received supine in bed and agreeable to PT evaluation. Pt cleared by nursing for mobility. VSS throughout therapy session. Initially, pt reported no N/T in BLEs. She performed bed mobility with min A x 1 and with intact sitting balance while sitting EOB ~ 2 minutes. Upon sitting and putting her feet on the ground, she reported numbness in her R foot. Attempted standing x 2, however pt needed max A x 2 and was unable to achieve standing due to BLE weakness/knee buckling secondary to numbness in R thigh, which pt reported following standing trials. Pt was returned supine in bed and left with all needs met,  present, and RN aware following therapy session.  Further mobility deferred due to RLE numbness and inability to safely mobilize. Anticipate pt will progress well in acute PT and return home with family support, RW use, and HHPT. Patient will benefit from skilled intervention to address the above impairments. Patients rehabilitation potential is considered to be Good  Factors which may influence rehabilitation potential include:   [ ]         None noted  [ ]         Mental ability/status  [X]         Medical condition  [ ]         Home/family situation and support systems  [ ]         Safety awareness  [X]         Pain tolerance/management  [ ]         Other:        PLAN :  Recommendations and Planned Interventions:  [X]           Bed Mobility Training             [ ]    Neuromuscular Re-Education  [X]           Transfer Training                   [ ]    Orthotic/Prosthetic Training  [X]           Gait Training                         [ ]    Modalities  [X]           Therapeutic Exercises           [ ]    Edema Management/Control  [X]           Therapeutic Activities            [X]    Patient and Family Training/Education  [ ]           Other (comment):     Frequency/Duration: Patient will be followed by physical therapy  twice daily to address goals. Discharge Recommendations: Home Health  Further Equipment Recommendations for Discharge: TBD       SUBJECTIVE:   Patient stated My thigh feels numb.       OBJECTIVE DATA SUMMARY:   HISTORY:    Past Medical History:   Diagnosis Date    Arthritis      Chronic pain       arthritic    Diabetes ([facility] [address]      Hypertension      Ill-defined condition       shingles 1.5 years ago 8/2014    Other ill-defined conditions       high cholesterol    Other ill-defined conditions       shingles     Past Surgical History:   Procedure Laterality Date    COLONOSCOPY N/A 10/25/2016     COLONOSCOPY performed by Jennifer Akhtar MD at [facility] ENDOSCOPY    HX APPENDECTOMY        HX CATARACT REMOVAL Left      HX HYSTERECTOMY        HX KNEE REPLACEMENT Right       parital    HX KNEE REPLACEMENT Left      HX TONSILLECTOMY        HX TUBAL LIGATION        HX UROLOGICAL         bladder tacking     Prior Level of Function/Home Situation: Pt is independent for mobility and ADLs at baseline. Lives with  who will provide support at discharge. Does have history of falls. Drives. Is retired. H/O R partial TKA and L TKA in 2015 and 2016 respectively. Does ride stationary bike for exercise. Personal factors and/or comorbidities impacting plan of care:  H/O R partial TKA and L TKA     Home Situation  Home Environment: Private residence  # Steps to Enter: 4  Rails to Enter: Yes  Hand Rails : Bilateral  Wheelchair Ramp: No  One/Two Story Residence: One story  Living Alone: No  Support Systems: Spouse/Significant Other/Partner  Patient Expects to be Discharged to[de-identified] Private residence  Current DME Used/Available at Home: Cane, straight, Adaptive dressing aides, Grab bars, Walker, rolling  Tub or Shower Type: Shower     EXAMINATION/PRESENTATION/DECISION MAKING:   Critical Behavior:  Neurologic State: Alert  Orientation Level: Oriented X4  Cognition: Follows commands     Hearing: Auditory  Auditory Impairment: None  Hearing Aids/Status: Does not own  Skin:  R hip incision  Edema: None  Range Of Motion:  AROM: Generally decreased, functional           PROM: Generally decreased, functional           Strength:    Strength: Generally decreased, functional                    Tone & Sensation:   Tone: Normal              Sensation: Impaired (RLE)               Coordination:  Coordination: Within functional limits  Vision:      Functional Mobility:  Bed Mobility:  Rolling: Minimum assistance  Supine to Sit: Minimum assistance  Sit to Supine: Minimum assistance  Scooting: Minimum assistance  Transfers:  Sit to Stand: Maximum assistance;Assist x2 (unable to achieve standing d/t RLE numbness)                          Balance:   Sitting: Intact  Standing: Impaired; With support  Standing - Static: Poor  Standing - Dynamic : Poor  Ambulation/Gait Training:   Unable due to RLE numbness           Therapeutic Exercises:   Educated pt on performing ankle pumps 10x/hour. Functional Measure:  Barthel Index:      Bathin  Bladder: 10  Bowels: 10  Groomin  Dressin  Feeding: 10  Mobility: 0  Stairs: 0  Toilet Use: 0  Transfer (Bed to Chair and Back): 0  Total: 35         Barthel and G-code impairment scale:  Percentage of impairment CH  0% CI  1-19% CJ  20-39% CK  40-59% CL  60-79% CM  80-99% CN  100%   Barthel Score 0-100 100 99-80 79-60 59-40 20-39 1-19    0   Barthel Score 0-20 20 17-19 13-16 9-12 5-8 1-4 0      The Barthel ADL Index: Guidelines  1. The index should be used as a record of what a patient does, not as a record of what a patient could do. 2. The main aim is to establish degree of independence from any help, physical or verbal, however minor and for whatever reason. 3. The need for supervision renders the patient not independent. 4. A patient's performance should be established using the best available evidence. Asking the patient, friends/relatives and nurses are the usual sources, but direct observation and common sense are also important. However direct testing is not needed. 5. Usually the patient's performance over the preceding 24-48 hours is important, but occasionally longer periods will be relevant. 6. Middle categories imply that the patient supplies over 50 per cent of the effort. 7. Use of aids to be independent is allowed. Patty Jade., Barthel, D.W. ([id number]). Functional evaluation: the Barthel Index. [address] [facility] (14)2. WON Coppola Allene Guardian., Dahlia Garrido.Rishi, [address] [place] [address] Ave (). Measuring the change indisability after inpatient rehabilitation; comparison of the responsiveness of the Barthel Index and Functional Sanders Measure. Journal of Neurology, Neurosurgery, and Psychiatry, 66(4), 148-497.   PACO Cesar, Bryant Madrid M.A. (2004.) Assessment of post-stroke quality of life in cost-effectiveness studies: The usefulness of the Barthel Index and the EuroQoL-5D. Quality of Life Research, 13, 657-84         G codes: In compliance with CMSs Claims Based Outcome Reporting, the following G-code set was chosen for this patient based on their primary functional limitation being treated: The outcome measure chosen to determine the severity of the functional limitation was the Barthel Index with a score of 35/100 which was correlated with the impairment scale. · Mobility - Walking and Moving Around:               - CURRENT STATUS:    CL - 60%-79% impaired, limited or restricted               - GOAL STATUS:           CI - 1%-19% impaired, limited or restricted               - D/C STATUS:                       ---------------To be determined---------------      Physical Therapy Evaluation Charge Determination   History Examination Presentation Decision-Making   MEDIUM  Complexity : 1-2 comorbidities / personal factors will impact the outcome/ POC  HIGH Complexity : 4+ Standardized tests and measures addressing body structure, function, activity limitation and / or participation in recreation  MEDIUM Complexity : Evolving with changing characteristics  Other outcome measures Barthel Index MEDIUM      Based on the above components, the patient evaluation is determined to be of the following complexity level: MEDIUM     Pain:  Pain Scale 1: Numeric (0 - 10)  Pain Intensity 1: 3  Pain Location 1: Hip  Pain Orientation 1: Right;Left  Pain Description 1: Aching     Activity Tolerance:   Fair - VSS on RA; mild drowsiness; RLE numbness limited mobility  Please refer to the flowsheet for vital signs taken during this treatment.   After treatment:   [ ]         Patient left in no apparent distress sitting up in chair  [X]         Patient left in no apparent distress in bed  [X] Call bell left within reach  [X]         Nursing notified  [X]         Caregiver present  [ ]         Bed alarm activated      COMMUNICATION/EDUCATION:   The patients plan of care was discussed with: Physical Therapist and Registered Nurse.  [X]         Fall prevention education was provided and the patient/caregiver indicated understanding. [X]         Patient/family have participated as able in goal setting and plan of care. [X]         Patient/family agree to work toward stated goals and plan of care. [ ]         Patient understands intent and goals of therapy, but is neutral about his/her participation. [ ]         Patient is unable to participate in goal setting and plan of care.      Thank you for this referral.  Freddy Hampton, PT, DPT   Time Calculation: 13 mins

## 2017-09-11 NOTE — H&P
Date of Surgery Update:  Lyle Whitney was seen and examined on the day of surgery prior to the procedure. There were no significant clinical changes since the completion of the History and Physical.    Exam today prior to surgery showed no acute cardiac findings, no respiratory difficulty, and no abdominal complaints or pain. This patient is a candidate for TXA. Documentation of Medical Necessity:    Symptoms: pain with activity and at rest, antalgia, interferes with ADLs  Conservative Treatment: activity modification, multiple medications, injection  Physical Findings: limited painful ROM, antalgia on ambulation, crepitation  Imaging: significant OA, sclerosis and osteophytes    Indications:   Failure of conservative treatments with daily pain and functional limitations. Appropriate imaging demonstrating significant disease. Appropriate physical findings consistent with significant degenerative joint disease. All pertinent risks, benefits, and alternatives to operative management including continued conservative care were explained at length. The patient has elected to proceed with appropriately indicated and medically necessary total joint arthroplasty. They understand no guarantees can be given about the outcome.     Signed By: MANJIT Bettencourt     September 11, 2017 10:46 AM

## 2017-09-11 NOTE — H&P
Caryle Kaiser MD - Adult Reconstruction and Total Joint Replacement     Orthopaedic History and Physical        NAME: Marbella Mace       :  1940       MRN:  [id number]        Subjective:   Patient ID: Herminio Cabrera is a 78 y.o. female.     Chief Complaint: Pain of the Left Hip and Pain of the Right Hip     She endorses bilateral R>L hip pain. She localizes the pain in her groin. She has daily hip pain and pain at rest. She has tried prescription strength NSAIDs without improvement. Patient Active Problem List    Diagnosis Date Noted    Osteoarthrosis, localized, primary, knee 2016    Knee arthropathy 2015     Past Medical History:   Diagnosis Date    Arthritis     Chronic pain     arthritic    Diabetes ([facility] [address] [address]     Hypertension     Ill-defined condition     shingles 1.5 years ago 2014    Other ill-defined conditions     high cholesterol    Other ill-defined conditions     shingles      Past Surgical History:   Procedure Laterality Date    COLONOSCOPY N/A 10/25/2016    COLONOSCOPY performed by Norman Molina MD at [facility] ENDOSCOPY    HX APPENDECTOMY      HX CATARACT REMOVAL Left     HX HYSTERECTOMY      HX KNEE REPLACEMENT Right     parital    HX KNEE REPLACEMENT Left     HX TONSILLECTOMY      HX TUBAL LIGATION      HX UROLOGICAL      bladder tacking      Prior to Admission medications    Medication Sig Start Date End Date Taking? Authorizing Provider   ciprofloxacin HCl (CIPRO) 500 mg tablet Take 500 mg by mouth two (2) times a day. Indications: KLEBSIELLA URINARY TRACT INFECTION 17  Historical Provider   atorvastatin (LIPITOR) 10 mg tablet Take 10 mg by mouth nightly. Historical Provider   timolol (TIMOPTIC) 0.5 % ophthalmic solution Administer 1 Drop to both eyes daily. Historical Provider   acetaminophen (TYLENOL) 325 mg tablet Take 2 Tabs by mouth every six (6) hours.  6/19/15   Harjinder Tabor NP   latanoprost (XALATAN) 0.005 % ophthalmic solution Administer 1 Drop to both eyes nightly. Indications: OPEN ANGLE GLAUCOMA    Historical Provider   prednisoLONE acetate (PRED FORTE) 1 % ophthalmic suspension Administer 1 Drop to left eye daily. Indications: SEVERE OCULAR INFLAMMATION    Historical Provider   hydrochlorothiazide (HYDRODIURIL) 25 mg tablet Take 25 mg by mouth daily. Indications: EDEMA, HYPERTENSION    Historical Provider   metFORMIN (GLUCOPHAGE) 500 mg tablet Take 500 mg by mouth daily (with breakfast). Indications: TYPE 2 DIABETES MELLITUS    Historical Provider   lisinopril (PRINIVIL, ZESTRIL) 5 mg tablet Take 5 mg by mouth daily. Indications: HYPERTENSION    Historical Provider   acyclovir (ZOVIRAX) 800 mg tablet Take 800 mg by mouth nightly. Indications: shingles prevention    Historical Provider   multivitamin with iron tablet Take 1 Tab by mouth daily. Historical Provider     No Known Allergies   Social History   Substance Use Topics    Smoking status: Never Smoker    Smokeless tobacco: Never Used    Alcohol use No      Family History   Problem Relation Age of Onset    Cancer Father      leukemia    Stroke Sister         REVIEW OF SYSTEMS: A comprehensive review of systems was negative except for that written in the HPI. Objective:   Constitutional: She appears stated age. Pt is cooperative and is in no acute distress. Well nourished. Well developed. Body habitus is normal. Body mass index is 25.4 kg/(m^2). Gait is antalgic. Assistive devices: none. Eyes:  Sclera are nonicteric. Respiratory:  No labored breathing. Cardiovascular:  No marked edema. Well perfused extremities bilaterally. Skin:  No marked skin ulcers. No lymphedema or skin abnormalities. Neurological:  No marked sensory loss noted. Grossly neurovascularly intact. Both lower extremities are intact to distal sensory and motor function. Psychiatric: Alert and oriented x3.     MUSCULOSKELETAL: Lumbar spine is nonfocal. No paraspinal tenderness.  Painless trunk ROM.  Nontender at her trochanter bilaterally. No obvious LLD. 5/5 BLE strength. Reasonable hip flexion and extension bilaterally. She has about 15 degrees of internal rotation and no external rotation ROM, R hip. She has 15 degrees of internal rotation and about 20 degrees of external rotation ROM, L hip. Passive rotational ROM is painful bilaterally. She has a L TKA and R UKA that are benign. Well healed surgical knee incisions bilaterally. No knee swelling bilaterally. Normal knee ROM bilaterally.        Radiographs:        X-ray Hip Left 2+ Views ([id number])     Result Date: 8/31/2017  Supine. AP, Frog Lateral. Notes  Indication(s): L hip pain. Impression: She has severe degenerative changes throughout the L hip with obliteration of her joint space. Aspherical femoral head and acetabulum. Significant osteophyte formation with multiple subchondral cysts.      X-ray Hip Right 2+ Views ([id number])     Result Date: 8/31/2017  Supine. AP, Frog Lateral. Notes  Indication(s): R hip pain. Impression: She has severe degenerative changes throughout the R hip with obliteration of her joint space. Aspherical femoral head and acetabulum. Significant osteophyte formation with multiple subchondral cysts. Her R hip is slightly worse radiographically.          Assessment:      1. Primary osteoarthritis of left hip    2. Primary osteoarthritis of right hip    3. Status post total left knee replacement    4. S/P right unicompartmental knee replacement            Plan: At this time, I recommended a R LILLIAN with an intraarticular L hip corticosteroid injection. Conservative treatments including activity modification, medication, and steroid injections have not successfully relieved pain. Surgery was discussed at length with the pt today. We went over all the pertinent risks, benefits, and alternatives to the procedure. They understand no guarantees can be made about the outcome and they would like to proceed.  I discussed the pre-op total joint class and general recovery timeline with the pt. The pt understands the need for medical clearance.  We will plan for the R LILLIAN in 3-6 weeks.             Scribed for Dr. Olga Grossman by MANJIT Dia\

## 2017-09-11 NOTE — IP AVS SNAPSHOT
[address] [facility] 
[phone] Patient: Raisa Daniels MRN: [id number] JPR:[birth year] You are allergic to the following No active allergies Recent Documentation Height Weight Breastfeeding? BMI OB Status Smoking Status 1.626 m 67 kg No 25.35 kg/m2 Hysterectomy Never Smoker Emergency Contacts Name Discharge Info Relation Home Work Mobile Gautam Hand DISCHARGE CAREGIVER [3] Spouse [3] [phone] About your hospitalization You were admitted on:  September 11, 2017 You last received care in the:  [facility] You were discharged on:  September 12, 2017 Unit phone number:  [phone] Why you were hospitalized Your primary diagnosis was:  Not on File Your diagnoses also included:  Osteoarthritis Of Hip Providers Seen During Your Hospitalizations Provider Role Specialty Primary office phone Luis Becker MD Attending Provider Orthopedic Surgery [phone] Your Primary Care Physician (PCP) Primary Care Physician Office Phone Office Fax Alicia Joseph [phone] Follow-up Information Follow up With Details Comments [place]   [address] [address] 
[phone] [address] [place] [address] On 9/12/2017 This is the provider of your home health needs. If you do not hear from them within 24 hours post discharge please give them a call. [address] 
1st Floor [facility] [address] 
[phone] Current Discharge Medication List  
  
START taking these medications Dose & Instructions Dispensing Information Comments Morning Noon Evening Bedtime  
 aspirin delayed-release 325 mg tablet Your last dose was:     
   
Your next dose is:    
   
   
 Dose:  325 mg  
 Take 1 Tab by mouth two (2) times a day for 30 days. Quantity:  60 Tab Refills:  0  
     
   
   
   
  
 famotidine 20 mg tablet Commonly known as:  PEPCID Your last dose was: Your next dose is:    
   
   
 Dose:  20 mg Take 1 Tab by mouth two (2) times a day for 30 days. Quantity:  60 Tab Refills:  0  
     
   
   
   
  
 oxyCODONE IR 5 mg immediate release tablet Commonly known as:  Lynn Wilder Your last dose was: Your next dose is:    
   
   
 Dose:  5-10 mg Take 1-2 Tabs by mouth every three (3) hours as needed. Max Daily Amount: 80 mg.  
 Quantity:  80 Tab Refills:  0  
     
   
   
   
  
 polyethylene glycol 17 gram packet Commonly known as:  Jurgen All Your last dose was: Your next dose is:    
   
   
 Dose:  17 g Take 1 Packet by mouth daily as needed (constipation) for up to 15 days. Quantity:  15 Packet Refills:  0  
     
   
   
   
  
 senna-docusate 8.6-50 mg per tablet Commonly known as:  Jamir Ra Your last dose was: Your next dose is:    
   
   
 Dose:  1 Tab Take 1 Tab by mouth daily. Quantity:  30 Tab Refills:  0 CONTINUE these medications which have NOT CHANGED Dose & Instructions Dispensing Information Comments Morning Noon Evening Bedtime  
 acetaminophen 325 mg tablet Commonly known as:  TYLENOL Your last dose was: Your next dose is:    
   
   
 Dose:  650 mg Take 2 Tabs by mouth every six (6) hours for 14 days. Quantity:  112 Tab Refills:  0  
     
   
   
   
  
 acyclovir 800 mg tablet Commonly known as:  ZOVIRAX Your last dose was: Your next dose is:    
   
   
 Dose:  800 mg Take 800 mg by mouth nightly. Indications: shingles prevention Refills:  0  
     
   
   
   
  
 atorvastatin 10 mg tablet Commonly known as:  LIPITOR Your last dose was:     
   
Your next dose is:    
   
   
 Dose:  10 mg  
 Take 10 mg by mouth nightly. Refills:  0  
     
   
   
   
  
 hydroCHLOROthiazide 25 mg tablet Commonly known as:  HYDRODIURIL Your last dose was: Your next dose is:    
   
   
 Dose:  25 mg Take 25 mg by mouth daily. Indications: EDEMA, HYPERTENSION Refills:  0  
     
   
   
   
  
 latanoprost 0.005 % ophthalmic solution Commonly known as:  Nonnie Bucy Your last dose was: Your next dose is:    
   
   
 Dose:  1 Drop Administer 1 Drop to both eyes nightly. Indications: OPEN ANGLE GLAUCOMA Refills:  0  
     
   
   
   
  
 lisinopril 5 mg tablet Commonly known as:  Tildon Richard Your last dose was: Your next dose is:    
   
   
 Dose:  5 mg Take 5 mg by mouth daily. Indications: HYPERTENSION Refills:  0  
     
   
   
   
  
 metFORMIN 500 mg tablet Commonly known as:  GLUCOPHAGE Your last dose was: Your next dose is:    
   
   
 Dose:  500 mg Take 500 mg by mouth daily (with breakfast). Indications: TYPE 2 DIABETES MELLITUS Refills:  0  
     
   
   
   
  
 multivitamin with iron tablet Your last dose was: Your next dose is:    
   
   
 Dose:  1 Tab Take 1 Tab by mouth daily. Refills:  0  
     
   
   
   
  
 prednisoLONE acetate 1 % ophthalmic suspension Commonly known as:  PRED FORTE Your last dose was: Your next dose is:    
   
   
 Dose:  1 Drop Administer 1 Drop to left eye daily. Indications: SEVERE OCULAR INFLAMMATION Refills:  0  
     
   
   
   
  
 timolol 0.5 % ophthalmic solution Commonly known as:  TIMOPTIC Your last dose was: Your next dose is:    
   
   
 Dose:  1 Drop Administer 1 Drop to both eyes daily. Refills:  0 STOP taking these medications CIPRO 500 mg tablet Generic drug:  ciprofloxacin HCl Where to Get Your Medications Information on where to get these meds will be given to you by the nurse or doctor. ! Ask your nurse or doctor about these medications  
  acetaminophen 325 mg tablet  
 aspirin delayed-release 325 mg tablet  
 famotidine 20 mg tablet  
 oxyCODONE IR 5 mg immediate release tablet  
 polyethylene glycol 17 gram packet  
 senna-docusate 8.6-50 mg per tablet Discharge Instructions [facility] Surgery: Total Hip Replacement Surgeon:   Jess Mcgarry MD 
Surgery Date:  9/11/2017 ? To relieve pain: ? Use ice/gel packs. ? Put the ice pack directly over the wound, or anywhere you are hurting or swollen. ? To control pain and swelling, keep ice on regularly, especially after physical activity. ? The packs should stay cold for 3-4 hours. When it is not cold anymore, rotate with the packs in the freezer. ? Elevate your leg. This will also keep swelling down. ? Rest for at least 20 minutes between activity or exercises. ? To keep track of your pain medications, write down what you take and when you take it. ? The last dose of pain medication you got in the hospital was:    
 
Medication Dose Date & Time ? Choose your medications based on the pain scale below: ? To keep your pain under control, take Tylenol every 6 hours for 14 days - even if you feel like you dont need it. ? For mild to moderate pain (1-6 on pain scale), take one pain pill every 3-4 hours as needed. ? For severe pain (7-10 on pain scale), take two pain pills every 3-4 hours as needed. ? To prevent nausea, take your pain medications with food. Pain Scale ? As your pain lessens: 
 
? Slowly start taking less pain medication. You may do this by waiting longer between doses or by taking smaller doses. ? Stop using the pain medications as soon as you no longer need it, usually in 2-3 weeks. ? Aspirin ? To prevent blood clots, you will need to take Aspirin 325 mg twice a day for 30 days. ? To prevent stomach upset or bleeding: ? Do not take non-steroidal anti-inflammatory medications (Ibuprofen, Advil, Motrin, Naproxen, etc.) ? Take Pepcid 20 mg twice a day, or a similar home medication, while you are taking a blood thinner. OPSITE (Honeycomb dressing) ? Keep your clear, waterproof dressing in place for 5-7 days after your leave the hospital. 
 
? If you are still having drainage, you will need to change your dressing in 5-7 days. You will be given one extra dressing to use at home. ? If there is no more drainage from the wound, you may leave it open to the air. OPSITE DRESSING INSTRUCTIONS PRINEO DRESSING INSTRUCTIONS ? Loi Sly is a type of skin glue and mesh that is keeping your wound together. ? Leave this covering alone. Do not peel it off.  
 
? Do not apply oils or lotions over it. ? You may shower with this dressing but do not soak it. Gently pat your wound dry when you get out of the shower. ? DO NOTs: 
? Do not rub your surgical wound ? Do not put lotion or oils on your wound. ? Do not take a tub bath or go swimming until your doctor says it is ok. 
 
? You may shower with this dressing over your wound. ? After showering pat the dressing dry. ? If you have staples a home health nurse will remove them in about 10 days. ? To increase and promote healing: 
 
? Stop Smoking (or at least cut back on 
     Smoking). ? Eat a well-balanced diet (high in protein 
     and vitamin C). ? If you have a poor appetite, drink Ensure, Glucerna, or [place] Instant Breakfast for the next 30 days.  
 
? If you are diabetic, you should control your blood  
      sugars to prevent infection and help your wound  
      to heal. 
               
 
 
 
 ? To prevent constipation, stay active and drink plenty of fluid. ? While using pain medications, you should also take stool softeners and laxatives, such as Pericolace 
     and Miralax. ? If you are having too many bowel Movements, then you may need 
     to stop taking the laxatives. ? You should have a bowel movement 3-4 days  
     after surgery and then at least every other day while 
     on pain medication. ? To improve your recovery, you must be active! ? Use your walker and take short walks (in your home). about every 2 hours during the day. ? Try to increase how far you walk each day. ? You can put as much weight on your leg as you can tolerate while walking. ? Home health physical therapy will come to your 
      home a few times per week to teach you how to 
      get out of bed, to safely walk in your home, and 
      to do your exercises. ? NO DRIVING until your surgeon tells you it is ok. 
 
? You can return to work when cleared by a physician. ? Please call your physician immediately if you have: 
 
? Constant bleeding from your wound. ? Increasing redness or swelling around your wound (Some warmth, bruising and swelling is normal). ? Change in wound drainage (increase in amount, color, or bad smell). ? Change in mental status (unusual behavior ? Temperature over 101.5 degrees Fahrenheit ? Pain or redness in the calf (back of your lower leg  see picture) ? Increased swelling of the thigh, ankle, calf, or foot. ? Emergency: CALL 911 if you have: 
 
? Shortness of breath ? Chest pain when you cough or taking a deep breath ? Please call your surgeons office at [phone]  for a follow up appointment. _ 
? You should call as soon as you get home or the next day after discharge. Ask to make an appointment in 2 weeks. ? If you have questions or concerns during normal business hours, you may reach Dr. Yue Sousa' Team at [phone]. Discharge Orders None [employer] Announcement We are excited to announce that we are making your provider's discharge notes available to you in [employer]. You will see these notes when they are completed and signed by the physician that discharged you from your recent hospital stay. If you have any questions or concerns about any information you see in [employer], please call the Health Information Department where you were seen or reach out to your Primary Care Provider for more information about your plan of care. Introducing [facility]! [address] introduces [employer] patient portal. Now you can access parts of your medical record, email your doctor's office, and request medication refills online. 1. In your internet browser, go to [email or web address] 2. Click on the First Time User? Click Here link in the Sign In box. You will see the New Member Sign Up page. 3. Enter your [employer] Access Code exactly as it appears below. You will not need to use this code after youve completed the sign-up process. If you do not sign up before the expiration date, you must request a new code. · [employer] Access Code: [email or web address] Expires: 11/29/2017  4:13 PM 
 
4. Enter the last four digits of your Social Security Number (xxxx) and Date of Birth (mm/dd/yyyy) as indicated and click Submit. You will be taken to the next sign-up page. 5. Create a [employer] ID. This will be your [employer] login ID and cannot be changed, so think of one that is secure and easy to remember. 6. Create a [employer] password. You can change your password at any time. 7. Enter your Password Reset Question and Answer. This can be used at a later time if you forget your password. 8. Enter your e-mail address. You will receive e-mail notification when new information is available in [address] 9. Click Sign Up. You can now view and download portions of your medical record. 10. Click the Download Summary menu link to download a portable copy of your medical information. If you have questions, please visit the Frequently Asked Questions section of the [employer] website. Remember, [employer] is NOT to be used for urgent needs. For medical emergencies, dial 911. Now available from your iPhone and Android! General Information Please provide this summary of care documentation to your next provider. Patient Signature:  ____________________________________________________________ Date:  ____________________________________________________________  
  
Marielena Floresamento Provider Signature:  ____________________________________________________________ Date:  ____________________________________________________________

## 2017-09-11 NOTE — ANESTHESIA PREPROCEDURE EVALUATION
Anesthetic History   No history of anesthetic complications            Review of Systems / Medical History  Patient summary reviewed, nursing notes reviewed and pertinent labs reviewed    Pulmonary  Within defined limits                 Neuro/Psych   Within defined limits           Cardiovascular    Hypertension: well controlled          Hyperlipidemia    Exercise tolerance: >4 METS     GI/Hepatic/Renal  Within defined limits              Endo/Other    Diabetes: well controlled, type 2    Arthritis     Other Findings              Physical Exam    Airway  Mallampati: I  TM Distance: > 6 cm  Neck ROM: normal range of motion   Mouth opening: Normal     Cardiovascular  Regular rate and rhythm,  S1 and S2 normal,  no murmur, click, rub, or gallop             Dental  No notable dental hx       Pulmonary  Breath sounds clear to auscultation               Abdominal  GI exam deferred       Other Findings            Anesthetic Plan    ASA: 3  Anesthesia type: general          Induction: Intravenous  Anesthetic plan and risks discussed with: Patient

## 2017-09-11 NOTE — PERIOP NOTES
Pt  in waiting area updated of pt status, informed of ortho bed assignment at this time, Michael Pantoja RN to update him as soon as bed is posted

## 2017-09-11 NOTE — ANESTHESIA POSTPROCEDURE EVALUATION
Post-Anesthesia Evaluation and Assessment    Patient: Kimmie Justice MRN: [id number]  SSN: [id number]    YOB: 1940  Age: 78 y.o. Sex: female       Cardiovascular Function/Vital Signs  Visit Vitals    /49    Pulse (!) 55    Temp 36.4 °C (97.5 °F)    Resp 10    Ht 5' 4\" (1.626 m)    Wt 67 kg (147 lb 11.3 oz)    SpO2 99%    BMI 25.35 kg/m2       Patient is status post general anesthesia for Procedure(s):  RIGHT TOTAL HIP ARTHROPLASTY WITH NAVIGATION ANTERIOR APPROACH, LEFT HIP INJECTION Chantelle Standing Solution). Nausea/Vomiting: None    Postoperative hydration reviewed and adequate. Pain:  Pain Scale 1: Numeric (0 - 10) (09/11/17 1430)  Pain Intensity 1: 0 (09/11/17 1430)   Managed    Neurological Status:   Neuro (WDL): Within Defined Limits (09/11/17 0948)  Neuro  Neurologic State: Alert (09/11/17 6099)  Orientation Level: Oriented X4 (09/11/17 9407)  Cognition: Appropriate decision making; Appropriate for age attention/concentration; Appropriate safety awareness (09/11/17 0948)  Speech: Clear (09/11/17 0948)  LUE Motor Response: Purposeful (09/11/17 0948)  LLE Motor Response: Purposeful (09/11/17 0948)  RUE Motor Response: Purposeful (09/11/17 0948)  RLE Motor Response: Purposeful (09/11/17 0948)   At baseline    Mental Status and Level of Consciousness: Arousable    Pulmonary Status:   O2 Device: Nasal cannula (09/11/17 1430)   Adequate oxygenation and airway patent    Complications related to anesthesia: None    Post-anesthesia assessment completed.  No concerns    Signed By: Barry Rojas MD     September 11, 2017

## 2017-09-11 NOTE — PERIOP NOTES
1400-Handoff Report from Operating Room to PACU    Report received from [facility] and Jules Agosto CRNA regarding Get Abrams. Surgeon(s):  Jose Maria Anglin MD  And Procedure(s) (LRB):  RIGHT TOTAL HIP ARTHROPLASTY WITH NAVIGATION ANTERIOR APPROACH, LEFT HIP INJECTION Doneen Patience Solution) (Right)  confirmed   with allergies and dressings discussed. Anesthesia type, drugs, patient history, complications, estimated blood loss, vital signs, intake and output, and last pain medication, lines, reversal medications and temperature were reviewed. 1520-TRANSFER - OUT REPORT:    Verbal report given to Naveen Keenan RN(name) on Get Abrams  being transferred to gen surg(unit) for routine post - op       Report consisted of patients Situation, Background, Assessment and   Recommendations(SBAR). Information from the following report(s) SBAR, Kardex, OR Summary, Procedure Summary, Intake/Output, MAR and Recent Results was reviewed with the receiving nurse. Opportunity for questions and clarification was provided.       Patient transported with:   O2 @ 2 liters  Tech     updated on pt's room number

## 2017-09-12 ENCOUNTER — HOME HEALTH ADMISSION (OUTPATIENT)
Dept: HOME HEALTH SERVICES | Facility: HOME HEALTH | Age: 77
End: 2017-09-12
Payer: MEDICARE

## 2017-09-12 VITALS
SYSTOLIC BLOOD PRESSURE: 163 MMHG | HEIGHT: 64 IN | OXYGEN SATURATION: 96 % | DIASTOLIC BLOOD PRESSURE: 72 MMHG | BODY MASS INDEX: 25.22 KG/M2 | TEMPERATURE: 98.2 F | WEIGHT: 147.71 LBS | HEART RATE: 86 BPM | RESPIRATION RATE: 16 BRPM

## 2017-09-12 LAB
ANION GAP SERPL CALC-SCNC: 7 MMOL/L (ref 5–15)
BUN SERPL-MCNC: 11 MG/DL (ref 6–20)
BUN/CREAT SERPL: 22 (ref 12–20)
CALCIUM SERPL-MCNC: 8.1 MG/DL (ref 8.5–10.1)
CHLORIDE SERPL-SCNC: 101 MMOL/L (ref 97–108)
CO2 SERPL-SCNC: 24 MMOL/L (ref 21–32)
CREAT SERPL-MCNC: 0.49 MG/DL (ref 0.55–1.02)
GLUCOSE BLD STRIP.AUTO-MCNC: 171 MG/DL (ref 65–100)
GLUCOSE BLD STRIP.AUTO-MCNC: 193 MG/DL (ref 65–100)
GLUCOSE SERPL-MCNC: 167 MG/DL (ref 65–100)
HGB BLD-MCNC: 10 G/DL (ref 11.5–16)
POTASSIUM SERPL-SCNC: 4.6 MMOL/L (ref 3.5–5.1)
SERVICE CMNT-IMP: ABNORMAL
SERVICE CMNT-IMP: ABNORMAL
SODIUM SERPL-SCNC: 132 MMOL/L (ref 136–145)

## 2017-09-12 PROCEDURE — 97116 GAIT TRAINING THERAPY: CPT

## 2017-09-12 PROCEDURE — 36415 COLL VENOUS BLD VENIPUNCTURE: CPT | Performed by: PHYSICIAN ASSISTANT

## 2017-09-12 PROCEDURE — 82962 GLUCOSE BLOOD TEST: CPT

## 2017-09-12 PROCEDURE — 97535 SELF CARE MNGMENT TRAINING: CPT

## 2017-09-12 PROCEDURE — 74011636637 HC RX REV CODE- 636/637: Performed by: ORTHOPAEDIC SURGERY

## 2017-09-12 PROCEDURE — 74011250637 HC RX REV CODE- 250/637: Performed by: PHYSICIAN ASSISTANT

## 2017-09-12 PROCEDURE — 74011250636 HC RX REV CODE- 250/636: Performed by: PHYSICIAN ASSISTANT

## 2017-09-12 PROCEDURE — 51798 US URINE CAPACITY MEASURE: CPT

## 2017-09-12 PROCEDURE — 80048 BASIC METABOLIC PNL TOTAL CA: CPT | Performed by: PHYSICIAN ASSISTANT

## 2017-09-12 PROCEDURE — 77010033678 HC OXYGEN DAILY

## 2017-09-12 PROCEDURE — 97165 OT EVAL LOW COMPLEX 30 MIN: CPT

## 2017-09-12 PROCEDURE — 85018 HEMOGLOBIN: CPT | Performed by: PHYSICIAN ASSISTANT

## 2017-09-12 RX ORDER — AMOXICILLIN 250 MG
1 CAPSULE ORAL DAILY
Qty: 30 TAB | Refills: 0 | Status: SHIPPED | OUTPATIENT
Start: 2017-09-12 | End: 2018-01-08

## 2017-09-12 RX ORDER — POLYETHYLENE GLYCOL 3350 17 G/17G
17 POWDER, FOR SOLUTION ORAL
Qty: 15 PACKET | Refills: 0 | Status: SHIPPED | OUTPATIENT
Start: 2017-09-12 | End: 2017-09-27

## 2017-09-12 RX ORDER — ASPIRIN 325 MG
325 TABLET, DELAYED RELEASE (ENTERIC COATED) ORAL 2 TIMES DAILY
Qty: 60 TAB | Refills: 0 | Status: SHIPPED | OUTPATIENT
Start: 2017-09-12 | End: 2017-10-12

## 2017-09-12 RX ORDER — ACETAMINOPHEN 325 MG/1
650 TABLET ORAL EVERY 6 HOURS
Qty: 112 TAB | Refills: 0 | Status: SHIPPED | OUTPATIENT
Start: 2017-09-12 | End: 2017-09-26

## 2017-09-12 RX ORDER — OXYCODONE HYDROCHLORIDE 5 MG/1
5-10 TABLET ORAL
Qty: 80 TAB | Refills: 0 | Status: SHIPPED | OUTPATIENT
Start: 2017-09-12 | End: 2018-01-08

## 2017-09-12 RX ORDER — FAMOTIDINE 20 MG/1
20 TABLET, FILM COATED ORAL 2 TIMES DAILY
Qty: 60 TAB | Refills: 0 | Status: SHIPPED | OUTPATIENT
Start: 2017-09-12 | End: 2017-10-12

## 2017-09-12 RX ADMIN — CEFAZOLIN 2 G: 10 INJECTION, POWDER, FOR SOLUTION INTRAVENOUS; PARENTERAL at 05:21

## 2017-09-12 RX ADMIN — INSULIN LISPRO 2 UNITS: 100 INJECTION, SOLUTION INTRAVENOUS; SUBCUTANEOUS at 09:06

## 2017-09-12 RX ADMIN — POLYETHYLENE GLYCOL 3350 17 G: 17 POWDER, FOR SOLUTION ORAL at 09:03

## 2017-09-12 RX ADMIN — OXYCODONE HYDROCHLORIDE 10 MG: 5 TABLET ORAL at 09:03

## 2017-09-12 RX ADMIN — ACETAMINOPHEN 650 MG: 325 TABLET ORAL at 13:26

## 2017-09-12 RX ADMIN — DOCUSATE SODIUM AND SENNOSIDES 1 TABLET: 8.6; 5 TABLET, FILM COATED ORAL at 09:03

## 2017-09-12 RX ADMIN — REGULAR STRENGTH 325 MG: 325 TABLET ORAL at 09:03

## 2017-09-12 RX ADMIN — ACETAMINOPHEN 650 MG: 325 TABLET ORAL at 05:20

## 2017-09-12 RX ADMIN — Medication 10 ML: at 06:29

## 2017-09-12 RX ADMIN — CELECOXIB 200 MG: 100 CAPSULE ORAL at 09:03

## 2017-09-12 RX ADMIN — OXYCODONE HYDROCHLORIDE 10 MG: 5 TABLET ORAL at 01:07

## 2017-09-12 RX ADMIN — OXYCODONE HYDROCHLORIDE 10 MG: 5 TABLET ORAL at 05:21

## 2017-09-12 RX ADMIN — OXYCODONE HYDROCHLORIDE 10 MG: 5 TABLET ORAL at 13:07

## 2017-09-12 RX ADMIN — INSULIN LISPRO 2 UNITS: 100 INJECTION, SOLUTION INTRAVENOUS; SUBCUTANEOUS at 13:07

## 2017-09-12 RX ADMIN — ACETAMINOPHEN 650 MG: 325 TABLET ORAL at 01:07

## 2017-09-12 RX ADMIN — FAMOTIDINE 20 MG: 20 TABLET ORAL at 09:03

## 2017-09-12 RX ADMIN — OXYCODONE HYDROCHLORIDE 10 MG: 5 TABLET ORAL at 16:05

## 2017-09-12 RX ADMIN — SODIUM CHLORIDE 125 ML/HR: 900 INJECTION, SOLUTION INTRAVENOUS at 06:28

## 2017-09-12 NOTE — ROUTINE PROCESS
Primary Nurse Dane Majano RN and Rayo Hanna RN performed a dual skin assessment on this patient No impairment noted. Scars. Edin score is 19.

## 2017-09-12 NOTE — PROGRESS NOTES
Problem: Mobility Impaired (Adult and Pediatric)  Goal: *Acute Goals and Plan of Care (Insert Text)  Physical Therapy Goals  Initiated 9/11/2017    1. Patient will move from supine to sit and sit to supine , scoot up and down and roll side to side in bed with independence within 4 days. 2. Patient will perform sit to stand with modified independence within 4 days. 3. Patient will ambulate with modified independence for 150 feet with the least restrictive device within 4 days. 4. Patient will ascend/descend 4 stairs with 2 handrail(s) with modified independence within 4 days. 5. Patient will perform supine LE home exercise program per protocol with independence within 4 days. PHYSICAL THERAPY TREATMENT  Patient: Wali Linares (78 y.o. female)  Date: 9/12/2017  Diagnosis: BILATERAL HIP OSTEOARTHRITIS  Osteoarthritis of hip <principal problem not specified>  Procedure(s) (LRB):  RIGHT TOTAL HIP ARTHROPLASTY WITH NAVIGATION ANTERIOR APPROACH, LEFT HIP INJECTION Princess Promise Solution) (Right) 1 Day Post-Op  Precautions: Fall, WBAT  ASSESSMENT:  Patient received supine in bed and eager to participate in therapy. Patient tolerated session well and making good progress towards goals. Patient completed supine to sit with standby assist, sit<>stand with RW with standby assist and increased gait distance ambulated. Patient completed stair training 4 steps x 2 with CGA and verbal cues for proper technique. Pt required reinforcement during stair training, but able to complete second trial without cues. Patient is cleared from a PT standpoint to d/c home with HHPT. Progression toward goals:  [X]      Improving appropriately and progressing toward goals  [ ]      Improving slowly and progressing toward goals  [ ]      Not making progress toward goals and plan of care will be adjusted       PLAN:  Patient continues to benefit from skilled intervention to address the above impairments.   Continue treatment per established plan of care.  Discharge Recommendations:  Home Health  Further Equipment Recommendations for Discharge:  Pt owns RW       SUBJECTIVE:   \"I am ready to go home\"      OBJECTIVE DATA SUMMARY:   Functional Mobility Training:  Bed Mobility:     Supine to Sit: Independent              Transfers:  Sit to Stand: Stand-by asssistance  Stand to Sit: Stand-by asssistance                             Ambulation/Gait Training:  Distance (ft): 250 Feet (ft)  Assistive Device: Gait belt;Walker, rolling  Ambulation - Level of Assistance: Stand-by asssistance;Contact guard assistance        Gait Abnormalities: Decreased step clearance        Base of Support: Narrowed     Speed/Beverly: Pace decreased (<100 feet/min)  Step Length: Right shortened;Left shortened                               Stairs:  Number of Stairs Trained: 8 (4 x 2)  Stairs - Level of Assistance: Contact guard assistance  Rail Use: Both     Therapeutic Exercises:   Exercises performed per protocol. See morning treatment note for description. Pain:                    Activity Tolerance:   Good. VSS  Please refer to the flowsheet for vital signs taken during this treatment.   After treatment:   [X] Patient left in no apparent distress sitting up in chair  [ ] Patient left in no apparent distress in bed  [X] Call bell left within reach  [X] Nursing notified  [X] Caregiver present  [ ] Bed alarm activated      COMMUNICATION/COLLABORATION:   The patients plan of care was discussed with: Occupational Therapist and Registered Nurse     Arpita Selby, PT, DPT   Time Calculation: 15 mins

## 2017-09-12 NOTE — PROGRESS NOTES
Ortho / Neurosurgery NP Note    POD# 1  s/p RIGHT TOTAL HIP ARTHROPLASTY WITH NAVIGATION ANTERIOR APPROACH, LEFT HIP INJECTION Marek Left Solution)     Pt resting in bed. No complaints. Pain well controlled. Aware to ask for PRN pain medications    VSS Afebrile. Voiding status: straight cath last night, needs to void today    Labs  Lab Results   Component Value Date/Time    HGB 10.0 09/12/2017 05:36 AM      Lab Results   Component Value Date/Time    INR 1.1 09/05/2017 02:17 PM        Body mass index is 25.35 kg/(m^2). : A BMI > 30 is classified as obesity and > 40 is classified as morbid obesity. Dressing c.d.i  Cryotherapy in place over incision  Calves soft and supple; No pain with passive stretch  Sensation and motor intact  SCDs for mechanical DVT proph while in bed     PLAN:  1) PT BID, attempted yesterday but leg numb  2) Aspirin 325 mg PO BID for DVT Prophylaxis   3) GI Prophylaxis    4) Plan d/c home with  when voids and clears PT.     Geoff Mantilla NP    Addendum:  + void in [facility]  Pt states she completed her course of Cipro prior to surgery (Johnnie morning)  Geoff Mantilla NP

## 2017-09-12 NOTE — ROUTINE PROCESS
Bedside and Verbal shift change report given to Blossom Vang (oncoming nurse) by Mikel Hu (offgoing nurse). Report included the following information SBAR, Kardex, Intake/Output, MAR and Recent Results.

## 2017-09-12 NOTE — DISCHARGE SUMMARY
Ortho Discharge Summary    Patient ID:  Mara Rosas  [id number]  female  78 y.o.  1940    Admit date: 9/11/2017    Discharge date: 9/12/2017    Admitting Physician: Torsten Everett MD     Consulting Physician(s):   Treatment Team: Attending Provider: Torsten Everett MD; Utilization Review: Sang Alston RN    Date of Surgery:   9/11/2017     Preoperative Diagnosis:  BILATERAL HIP OSTEOARTHRITIS    Postoperative Diagnosis:   BILATERAL HIP OSTEOARTHRITIS    Procedure(s):     RIGHT TOTAL HIP ARTHROPLASTY WITH NAVIGATION ANTERIOR APPROACH, LEFT HIP INJECTION Margrett Rosamond Solution)     Anesthesia Type:   General     Surgeon: Torsten Everett MD                            HPI:  Pt is a 78 y.o. female who has a history of [address]  with pain and limitations of activities of daily living who presents at this time for a right LILLIAN following the failure of conservative management. PMH:   Past Medical History:   Diagnosis Date    Arthritis     Chronic pain     arthritic    Diabetes ([facility] [address]     Hypertension     Ill-defined condition     shingles 1.5 years ago 8/2014    Other ill-defined conditions     high cholesterol    Other ill-defined conditions     shingles       Body mass index is 25.35 kg/(m^2). : A BMI > 30 is classified as obesity and > 40 is classified as morbid obesity. Medications upon admission :   Prior to Admission Medications   Prescriptions Last Dose Informant Patient Reported? Taking?   acetaminophen (TYLENOL) 325 mg tablet Not Taking at Unknown time  Yes No   Sig: Take 2 Tabs by mouth every six (6) hours. acyclovir (ZOVIRAX) 800 mg tablet 9/10/2017 at Unknown time  Yes Yes   Sig: Take 800 mg by mouth nightly. Indications: shingles prevention   atorvastatin (LIPITOR) 10 mg tablet 9/10/2017 at Unknown time  Yes Yes   Sig: Take 10 mg by mouth nightly. ciprofloxacin HCl (CIPRO) 500 mg tablet 9/10/2017  Yes No   Sig: Take 500 mg by mouth two (2) times a day.  Indications: KLEBSIELLA URINARY TRACT INFECTION   hydrochlorothiazide (HYDRODIURIL) 25 mg tablet 9/10/2017 at Unknown time  Yes Yes   Sig: Take 25 mg by mouth daily. Indications: EDEMA, HYPERTENSION   latanoprost (XALATAN) 0.005 % ophthalmic solution 9/10/2017 at Unknown time  Yes Yes   Sig: Administer 1 Drop to both eyes nightly. Indications: OPEN ANGLE GLAUCOMA   lisinopril (PRINIVIL, ZESTRIL) 5 mg tablet 9/10/2017 at Unknown time  Yes Yes   Sig: Take 5 mg by mouth daily. Indications: HYPERTENSION   metFORMIN (GLUCOPHAGE) 500 mg tablet 9/10/2017 at Unknown time  Yes Yes   Sig: Take 500 mg by mouth daily (with breakfast). Indications: TYPE 2 DIABETES MELLITUS   multivitamin with iron tablet 9/10/2017 at Unknown time  Yes Yes   Sig: Take 1 Tab by mouth daily. prednisoLONE acetate (PRED FORTE) 1 % ophthalmic suspension 9/11/2017 at Unknown time  Yes Yes   Sig: Administer 1 Drop to left eye daily. Indications: SEVERE OCULAR INFLAMMATION   timolol (TIMOPTIC) 0.5 % ophthalmic solution 9/11/2017 at Unknown time  Yes Yes   Sig: Administer 1 Drop to both eyes daily. Facility-Administered Medications: None        Allergies:  No Known Allergies     Hospital Course: The patient underwent surgery. Complications:  None; patient tolerated the procedure well. Was taken to the PACU in stable condition and then transferred to the ortho floor. Perioperative Antibiotics:  Ancef     Postoperative Pain Management:  Oxycodone      DVT Prophylaxis: Aspirin 325    Postoperative transfusions:    Number of units banked PRBCs =   none     Post Op complications: none    Hemoglobin at discharge:    Lab Results   Component Value Date/Time    HGB 10.0 (L) 09/12/2017 05:36 AM    INR 1.1 09/05/2017 02:17 PM       Dressing was changed on POD # 1. Incision - clean, dry and intact. No significant erythema or swelling. Neurovascular exam found to be within normal limits. Wound appears to be healing without any evidence of infection.  Pt had a HVAC drain that was removed on POD# 1. Physical Therapy started on the day following surgery and participated in bed mobility, transfers and ambulation. Gait:  Gait  Base of Support: Narrowed  Speed/Beverly: Pace decreased (<100 feet/min)  Step Length: Right shortened, Left shortened  Gait Abnormalities: Decreased step clearance  Ambulation - Level of Assistance: Stand-by asssistance, Contact guard assistance  Distance (ft): 250 Feet (ft)  Assistive Device: Gait belt, Walker, rolling  Rail Use: Both  Stairs - Level of Assistance: Contact guard assistance  Number of Stairs Trained: 8 (4 x 2)                   Discharged to: Home. Condition on Discharge:   stable    Discharge instructions:  - Anticoagulate with Aspirin 325 BID  - Take pain medications as prescribed  - Resume pre hospital diet      - Discharge activity: activity as tolerated  - Ambulate with Walker;    - Weight bearing status WBAT  - Wound Care Keep wound clean and dry. See discharge instruction sheet.  - Staples to be removed 10 days after surgery            -DISCHARGE MEDICATION LIST     Current Discharge Medication List      START taking these medications    Details   aspirin delayed-release 325 mg tablet Take 1 Tab by mouth two (2) times a day for 30 days. Qty: 60 Tab, Refills: 0      famotidine (PEPCID) 20 mg tablet Take 1 Tab by mouth two (2) times a day for 30 days. Qty: 60 Tab, Refills: 0      oxyCODONE IR (ROXICODONE) 5 mg immediate release tablet Take 1-2 Tabs by mouth every three (3) hours as needed. Max Daily Amount: 80 mg.  Qty: 80 Tab, Refills: 0      polyethylene glycol (MIRALAX) 17 gram packet Take 1 Packet by mouth daily as needed (constipation) for up to 15 days. Qty: 15 Packet, Refills: 0      senna-docusate (PERICOLACE) 8.6-50 mg per tablet Take 1 Tab by mouth daily.   Qty: 30 Tab, Refills: 0         CONTINUE these medications which have CHANGED    Details   acetaminophen (TYLENOL) 325 mg tablet Take 2 Tabs by mouth every six (6) hours for 14 days. Qty: 112 Tab, Refills: 0         CONTINUE these medications which have NOT CHANGED    Details   atorvastatin (LIPITOR) 10 mg tablet Take 10 mg by mouth nightly. timolol (TIMOPTIC) 0.5 % ophthalmic solution Administer 1 Drop to both eyes daily. latanoprost (XALATAN) 0.005 % ophthalmic solution Administer 1 Drop to both eyes nightly. Indications: OPEN ANGLE GLAUCOMA      prednisoLONE acetate (PRED FORTE) 1 % ophthalmic suspension Administer 1 Drop to left eye daily. Indications: SEVERE OCULAR INFLAMMATION      hydrochlorothiazide (HYDRODIURIL) 25 mg tablet Take 25 mg by mouth daily. Indications: EDEMA, HYPERTENSION      metFORMIN (GLUCOPHAGE) 500 mg tablet Take 500 mg by mouth daily (with breakfast). Indications: TYPE 2 DIABETES MELLITUS      lisinopril (PRINIVIL, ZESTRIL) 5 mg tablet Take 5 mg by mouth daily. Indications: HYPERTENSION      acyclovir (ZOVIRAX) 800 mg tablet Take 800 mg by mouth nightly. Indications: shingles prevention      multivitamin with iron tablet Take 1 Tab by mouth daily. STOP taking these medications       ciprofloxacin HCl (CIPRO) 500 mg tablet Comments:   Reason for Stopping:            per medical continuation form      -Follow up in office in 2 weeks      Signed:  Kateryna Talamantes.  Yi Brooke, MSN, ACNP, ONP-C  Orthopaedic Nurse Practitioner    9/12/2017  2:39 PM

## 2017-09-12 NOTE — PROGRESS NOTES
Problem: Mobility Impaired (Adult and Pediatric)  Goal: *Acute Goals and Plan of Care (Insert Text)  Physical Therapy Goals  Initiated 9/11/2017    1. Patient will move from supine to sit and sit to supine , scoot up and down and roll side to side in bed with independence within 4 days. 2. Patient will perform sit to stand with modified independence within 4 days. 3. Patient will ambulate with modified independence for 150 feet with the least restrictive device within 4 days. 4. Patient will ascend/descend 4 stairs with 2 handrail(s) with modified independence within 4 days. 5. Patient will perform supine LE home exercise program per protocol with independence within 4 days. PHYSICAL THERAPY TREATMENT  Patient: Kain Preciado (78 y.o. female)  Date: 9/12/2017  Diagnosis: BILATERAL HIP OSTEOARTHRITIS  Osteoarthritis of hip <principal problem not specified>  Procedure(s) (LRB):  RIGHT TOTAL HIP ARTHROPLASTY WITH NAVIGATION ANTERIOR APPROACH, LEFT HIP INJECTION Ulysees Bald Solution) (Right) 1 Day Post-Op  Precautions: Fall, WBAT      ASSESSMENT:  Patient received supine in bed and eager to participate in therapy. Patient tolerated session well and making good progress towards goals. Patient completed supine to sit with standby assist, sit<>stand with RW with standby assist/CGA. Patient increased gait distance ambulate with RW with CGA and demonstrated step to gait pattern, fair gait speed, no LOB. Will assess stair negotiation this afternoon in prep for discharge home with HHPT. Will notify ortho NP if patient clears stairs today. Progression toward goals:  [X]      Improving appropriately and progressing toward goals  [ ]      Improving slowly and progressing toward goals  [ ]      Not making progress toward goals and plan of care will be adjusted       PLAN:  Patient continues to benefit from skilled intervention to address the above impairments. Continue treatment per established plan of care.   Discharge Recommendations:  Home Health  Further Equipment Recommendations for Discharge:  Pt owns RW       SUBJECTIVE:   Patient stated I don't want to over do it so I can't go home.       OBJECTIVE DATA SUMMARY:   Critical Behavior:  Neurologic State: Alert  Orientation Level: Oriented X4  Cognition: Follows commands     Functional Mobility Training:  Bed Mobility:     Supine to Sit: Stand-by asssistance              Transfers:  Sit to Stand: Stand-by asssistance;Contact guard assistance  Stand to Sit: Stand-by asssistance                             Balance:  Sitting: Intact  Standing: Intact; With support  Ambulation/Gait Training:  Distance (ft): 100 Feet (ft)  Assistive Device: Gait belt;Walker, rolling  Ambulation - Level of Assistance: Contact guard assistance        Gait Abnormalities: Decreased step clearance        Base of Support: Narrowed     Speed/Beverly: Pace decreased (<100 feet/min)  Step Length: Right shortened;Left shortened                 Pain:  Pain Scale 1: Numeric (0 - 10)  Pain Intensity 1: 8  Pain Location 1: Hip  Pain Orientation 1: Right  Pain Description 1: Aching  Pain Intervention(s) 1: Medication (see MAR); Cold pack  Activity Tolerance:   Good. VSS  Please refer to the flowsheet for vital signs taken during this treatment.   After treatment:   [X] Patient left in no apparent distress sitting up in chair  [ ] Patient left in no apparent distress in bed  [X] Call bell left within reach  [X] Nursing notified  [ ] Caregiver present  [ ] Bed alarm activated      COMMUNICATION/COLLABORATION:   The patients plan of care was discussed with: Occupational Therapist and Registered Nurse     Patrick Masterson, PT, DPT   Time Calculation: 18 mins

## 2017-09-12 NOTE — ROUTINE PROCESS
Bedside and Verbal shift change report given to Cassandra De Guzman RN (oncoming nurse) by Amrik Pimentel RN (offgoing nurse). Report included the following information SBAR, Kardex, Intake/Output, MAR, Recent Results and Med Rec Status.

## 2017-09-12 NOTE — PROGRESS NOTES
Discharge instructions discussed with the patient. IV removed. Prescriptions provided. Extra dressing provided. Questions answered. Patient discharged by GRAY Torre.

## 2017-09-12 NOTE — ROUTINE PROCESS
Bladder scanned patient. She has 825 ml in her bladder. Paging on call MANJIT Jackson. He wants to wait a while longer to see if the patient has the urge to void before catheterization. Will continue to monitor patient for discomfort/urgency.

## 2017-09-12 NOTE — PROGRESS NOTES
Pt is a 77 y/o  female admitted with bilateral hip OA. Pt resides with her spouse and is independent to ADL's and IADL's to include driving. Pt has access to a RW and had HH in the past.  [facility] Referral sent and they have accepted. Pt spouse to transport home today at discharge. Info entered on pt AVS.    Care Management Interventions  PCP Verified by CM: Yes  Mode of Transport at Discharge:  Other (see comment) (Pt family to transport)  Transition of Care Consult (CM Consult): [address]: Yes  Discharge Durable Medical Equipment: No  Physical Therapy Consult: Yes  Occupational Therapy Consult: Yes  Current Support Network: Lives with Spouse  Confirm Follow Up Transport: Self  Plan discussed with Pt/Family/Caregiver: Yes  Freedom of Choice Offered: Yes  Discharge Location  Discharge Placement: Home with home health    SAUL Goldsmith  Ext [phone]

## 2017-09-12 NOTE — CDMP QUERY
Please clarify if this patient is being treated/managed for:    =>Mild Hyponatremia in the setting of Serum ; Receiving IVFs /h  =>Other Explanation of clinical findings  =>Unable to Determine (no explanation of clinical findings)    The medical record reflects the following clinical findings, treatment, and risk factors:    Risk Factors:     Clinical Indicators: 9/12  (Preop 9/5 )     Treatment: Monitor labs; NS /h    Please clarify and document your clinical opinion in the progress notes and discharge summary including the definitive and/or presumptive diagnosis, (suspected or probable), related to the above clinical findings. Please include clinical findings supporting your diagnosis.     Thank you,  James Segal, [place], [place] [phone]

## 2017-09-12 NOTE — DISCHARGE INSTRUCTIONS
[facility]  Surgery: Total Hip Replacement  Surgeon:   Sandra Moyer MD  Surgery Date:  9/11/2017     To relieve pain:   Use ice/gel packs.  Put the ice pack directly over the wound, or anywhere you are hurting or swollen.  To control pain and swelling, keep ice on regularly, especially after physical activity.  The packs should stay cold for 3-4 hours. When it is not cold anymore, rotate with the packs in the freezer.  Elevate your leg. This will also keep swelling down.  Rest for at least 20 minutes between activity or exercises.  To keep track of your pain medications, write down what you take and when you take it.  The last dose of pain medication you got in the hospital was:       Medication    Dose    Date & Time             Choose your medications based on the pain scale below:     To keep your pain under control, take Tylenol every 6 hours for 14 days - even if you feel like you dont need it.  For mild to moderate pain (1-6 on pain scale), take one pain pill every 3-4 hours as needed.  For severe pain (7-10 on pain scale), take two pain pills every 3-4 hours as needed.  To prevent nausea, take your pain medications with food. Pain Scale                   As your pain lessens:     Slowly start taking less pain medication. You may do this by waiting longer between doses or by taking smaller doses.  Stop using the pain medications as soon as you no longer need it, usually in 2-3 weeks.  Aspirin   To prevent blood clots, you will need to take Aspirin 325 mg twice a day for 30 days.  To prevent stomach upset or bleeding:   Do not take non-steroidal anti-inflammatory medications (Ibuprofen, Advil, Motrin, Naproxen, etc.)    Take Pepcid 20 mg twice a day, or a similar home medication, while you are taking a blood thinner.              OPSITE (Honeycomb dressing)     Keep your clear, waterproof dressing in place for 5-7 days after your leave the hospital.     If you are still having drainage, you will need to change your dressing in 5-7 days. You will be given one extra dressing to use at home.  If there is no more drainage from the wound, you may leave it open to the air. OPSITE DRESSING INSTRUCTIONS                  PRINEO DRESSING INSTRUCTIONS       Prineo is a type of skin glue and mesh that is keeping your wound together.  Leave this covering alone. Do not peel it off.  Do not apply oils or lotions over it.  You may shower with this dressing but do not soak it. Gently pat your wound dry when you get out of the shower.  DO NOTs:   Do not rub your surgical wound   Do not put lotion or oils on your wound.  Do not take a tub bath or go swimming until your doctor says it is ok.  You may shower with this dressing over your wound.  After showering pat the dressing dry.  If you have staples a home health nurse will remove them in about 10 days.  To increase and promote healing:     Stop Smoking (or at least cut back on       Smoking).  Eat a well-balanced diet (high in protein       and vitamin C).  If you have a poor appetite, drink Ensure, Glucerna, or [place] Instant Breakfast for the next 30 days.  If you are diabetic, you should control your blood         sugars to prevent infection and help your wound         to heal.                         To prevent constipation, stay active and drink plenty of fluid.  While using pain medications, you should also take stool softeners and laxatives, such as Pericolace       and Miralax.  If you are having too many bowel       Movements, then you may need       to stop taking the laxatives.      You should have a bowel movement 3-4 days        after surgery and then at least every other day while       on pain medication.  To improve your recovery, you must be active!  Use your walker and take short walks         (in your home). about every 2 hours during the day.  Try to increase how far you walk each day.  You can put as much weight on your leg as you can tolerate while walking.  Home health physical therapy will come to your        home a few times per week to teach you how to        get out of bed, to safely walk in your home, and        to do your exercises.  NO DRIVING until your surgeon tells you it is ok.  You can return to work when cleared by a physician.  Please call your physician immediately if you have:     Constant bleeding from your wound.  Increasing redness or swelling around your wound (Some warmth, bruising and swelling is normal).  Change in wound drainage (increase in amount, color, or bad smell).  Change in mental status (unusual behavior   Temperature over 101.5 degrees Fahrenheit      Pain or redness in the calf (back of your lower leg - see picture)     Increased swelling of the thigh, ankle, calf, or foot.  Emergency: CALL 911 if you have:     Shortness of breath     Chest pain when you cough or taking a deep breath     Please call your surgeons office at [phone]  for a follow up appointment. _   You should call as soon as you get home or the next day after discharge. Ask to make an appointment in 2 weeks.  If you have questions or concerns during normal business hours, you may reach Dr. Robbin Cool' Team at [phone].

## 2017-09-12 NOTE — PROGRESS NOTES
Occupational Therapy Goals  Initiated 9/12/2017  1. Patient will perform lower body ADLs with AE modified independence using AE within 7 day(s). 2. Patient will perform upper body ADLs standing 5 mins without fatigue or LOB with modified independence within 7 day(s). 3. Patient will perform toilet transfer with modified independence within 7 day(s). 4. Patient will perform all aspects of toileting with modified independence within 7 day(s). 5. Patient will gather materials for ADL task with least restrictive DME and modified independence within 7 days. Occupational Therapy EVALUATION  Patient: Vern Cushing (71 y.o. female)  Date: 9/12/2017  Primary Diagnosis: BILATERAL HIP OSTEOARTHRITIS  Osteoarthritis of hip  Procedure(s) (LRB):  RIGHT TOTAL HIP ARTHROPLASTY WITH NAVIGATION ANTERIOR APPROACH, LEFT HIP INJECTION Arva Balint Solution) (Right) 1 Day Post-Op   Precautions:   Fall, WBAT    ASSESSMENT :  Based on the objective data described below, the patient presents with decreased R hip strength and ROM, impaired functional mobility and slight functional decline in ADLs s/p R LILLIAN, anterior approach. Pt was SBA and additional time for supine to sit, completed sit to stand with CGA progressing to SBA for sit <> stand transfers, toilet transfers. Pt did require verbal cues for hand placement. Pt issued hip kit due to decreased functional reach R LE, educated on use of hip kit items. Pt completed full body dressing with supervision and cues. Expect to progress well, will likely need one OT session follow up to ensure safety with RW use during ADL tasks. Recommend HHPT, pt owns a RW. Patient will benefit from skilled intervention to address the above impairments.   Patients rehabilitation potential is considered to be Good  Factors which may influence rehabilitation potential include:   [x]             None noted  []             Mental ability/status  []             Medical condition  []             Home/family situation and support systems  []             Safety awareness  []             Pain tolerance/management  []             Other:      PLAN :  Recommendations and Planned Interventions:  [x]               Self Care Training                  [x]        Therapeutic Activities  [x]               Functional Mobility Training    []        Cognitive Retraining  [x]               Therapeutic Exercises           [x]        Endurance Activities  [x]               Balance Training                   []        Neuromuscular Re-Education  []               Visual/Perceptual Training     [x]   Home Safety Training  []               Patient Education                 [x]        Family Training/Education  []               Other (comment):    Frequency/Duration: Patient will be followed by occupational therapy 5 times a week to address goals. Discharge Recommendations: HHPT  Further Equipment Recommendations for Discharge: none- own a RW     SUBJECTIVE:   Patient stated so what do I do next?     OBJECTIVE DATA SUMMARY:   HISTORY:   Past Medical History:   Diagnosis Date    Arthritis     Chronic pain     arthritic    Diabetes ([facility] [address]     Hypertension     Ill-defined condition     shingles 1.5 years ago 8/2014    Other ill-defined conditions     high cholesterol    Other ill-defined conditions     shingles     Past Surgical History:   Procedure Laterality Date    COLONOSCOPY N/A 10/25/2016    COLON[facility] Ruddy [facility]ill [facility] at [facility] ENDOSCOPY    HX APPENDECTOMY      HX CATARACT REMOVAL Left     HX HYSTERECTOMY      HX KNEE REPLACEMENT Right     parital    HX KNEE REPLACEMENT Left     HX TONSILLECTOMY      HX TUBAL LIGATION      HX UROLOGICAL      bladder tacking       Prior Level of Function/Home Situation: lives with , difficulty with socks at baseline.  Has reacher at home  Expanded or extensive additional review of patient history:     Home Situation  Home Environment: Private residence  # Steps to Enter: 4  Rails to [employer]: Yes  Hand Rails : Bilateral  Wheelchair Ramp: No  One/Two Story Residence: One story  Living Alone: No  Support Systems: Spouse/Significant Other/Partner  Patient Expects to be Discharged to[de-identified] Private residence  Current DME Used/Available at Home: Cane, straight, Adaptive dressing aides, Walker, rolling  Tub or Shower Type: Shower  [x]  Right hand dominant   []  Left hand dominant    EXAMINATION OF PERFORMANCE DEFICITS:  Cognitive/Behavioral Status:  Neurologic State: Alert  Orientation Level: Oriented X4  Cognition: Follows commands  Perception: Appears intact  Perseveration: No perseveration noted  Safety/Judgement: Awareness of environment; Fall prevention;Home safety;Good awareness of safety precautions; Insight into deficits    Skin: R hip bandage C/D/I    Edema: R hip, ice placed end of session    Hearing: Auditory  Auditory Impairment: None  Hearing Aids/Status: Does not own    Vision/Perceptual:    Tracking: Able to track stimulus in all quadrants w/o difficulty                                Range of Motion:  B UEs WFL, decreased R hip s/p LILLIAN                            Strength:  Generally decreased, functional                    Coordination:     Fine Motor Skills-Upper: Left Intact; Right Intact    Gross Motor Skills-Upper: Left Intact; Right Intact    Tone & Sensation:  Intact, denied numbness to R foot                            Balance:  Sitting: Intact  Standing: Intact; With support    Functional Mobility and Transfers for ADLs:  Bed Mobility:  Supine to Sit: Stand-by asssistance    Transfers:  Sit to Stand: Stand-by asssistance;Contact guard assistance  Stand to Sit: Stand-by asssistance  Toilet Transfer : Stand-by asssistance    ADL Assessment:  Feeding: Independent    Oral Facial Hygiene/Grooming: Stand-by assistance    Bathing: Minimum assistance    Upper Body Dressing: Supervision    Lower Body Dressing: Stand-by assistance; Additional time; Adaptive equipment    Toileting: Stand by assistance                ADL Intervention and task modifications:    Patient instructed and indicated understanding when bathing do not submerge wound in water, stand to shower or sponge bathe. Avoid pools, hot tubs until cleared by MD. Jarek Conteh is water proof so pat dry area after showering. Patient recalled don/doff R LE 1st/last without cues. Patient instructed and indicated understanding technique of don all clothing sitting prior to standing, doff all clothing to knees standing, then sit to doff off knees - feet for fall prevention, pain management, energy conservation. Pt used sock aid and dressing stick to assist with donning and doffing socks. Grooming  Washing Hands: Stand-by assistance (cues to square RW off to sink surface)              Upper Body Dressing Assistance  Pullover Shirt: Supervision/set-up    Lower Body Dressing Assistance  Underpants: Supervision/set-up  Pants With Elastic Waist: Supervision/set-up  Position Performed: Seated in chair  Adaptive Equipment Used: Dressing stick; Sock aid; Walker         Cognitive Retraining  Safety/Judgement: Awareness of environment; Fall prevention;Home safety;Good awareness of safety precautions; Insight into deficits      Functional Measure:  Barthel Index:    Bathin  Bladder: 10  Bowels: 10  Groomin  Dressin  Feeding: 10  Mobility: 10  Stairs: 0  Toilet Use: 5  Transfer (Bed to Chair and Back): 10  Total: 65       Barthel and G-code impairment scale:  Percentage of impairment CH  0% CI  1-19% CJ  20-39% CK  40-59% CL  60-79% CM  80-99% CN  100%   Barthel Score 0-100 100 99-80 79-60 59-40 20-39 1-19   0   Barthel Score 0-20 20 17-19 13-16 9-12 5-8 1-4 0      The Barthel ADL Index: Guidelines  1. The index should be used as a record of what a patient does, not as a record of what a patient could do. 2. The main aim is to establish degree of independence from any help, physical or verbal, however minor and for whatever reason.   3. The need for supervision renders the patient not independent. 4. A patient's performance should be established using the best available evidence. Asking the patient, friends/relatives and nurses are the usual sources, but direct observation and common sense are also important. However direct testing is not needed. 5. Usually the patient's performance over the preceding 24-48 hours is important, but occasionally longer periods will be relevant. 6. Middle categories imply that the patient supplies over 50 per cent of the effort. 7. Use of aids to be independent is allowed. Gen Bonds., Barthel, DBarbaraW. (9505). Functional evaluation: the Barthel Index. [address] [facility] (14)2. David Almaraz bartolo WON Flores, Clay Vargas., Josiane Herring., Adam Caba, [facility] (1999). Measuring the change indisability after inpatient rehabilitation; comparison of the responsiveness of the Barthel Index and Functional [place] Measure. Journal of Neurology, Neurosurgery, and Psychiatry, 66(4), 537-372. PACO Enciso, GUILHERME Jarrett, & Stanley Hwang M.A. (2004.) Assessment of post-stroke quality of life in cost-effectiveness studies: The usefulness of the Barthel Index and the EuroQoL-5D. Quality of Life Research, 13, 479-87         G codes: In compliance with CMSs Claims Based Outcome Reporting, the following G-code set was chosen for this patient based on their primary functional limitation being treated: The outcome measure chosen to determine the severity of the functional limitation was the barthel index with a score of 65/100 which was correlated with the impairment scale. ?  Self Care:     - CURRENT STATUS: CJ - 20%-39% impaired, limited or restricted    - GOAL STATUS: CI - 1%-19% impaired, limited or restricted    - D/C STATUS:  ---------------To be determined---------------       Occupational Therapy Evaluation Charge Determination   History Examination Decision-Making   LOW Complexity : Brief history review  LOW Complexity : 1-3 performance deficits relating to physical, cognitive , or psychosocial skils that result in activity limitations and / or participation restrictions  LOW Complexity : No comorbidities that affect functional and no verbal or physical assistance needed to complete eval tasks       Based on the above components, the patient evaluation is determined to be of the following complexity level: LOW   Pain:Pain Scale 1: Numeric (0 - 10)  Pain Intensity 1: 8  Pain Location 1: Hip  Pain Orientation 1: Right  Pain Description 1: Aching  Pain Intervention(s) 1: Medication (see MAR); Cold pack  Activity Tolerance:   VSS  Please refer to the flowsheet for vital signs taken during this treatment. After treatment:   [x] Patient left in no apparent distress sitting up in chair  [] Patient left in no apparent distress in bed  [x] Call bell left within reach  [x] Nursing notified  [] Caregiver present  [] Bed alarm activated    COMMUNICATION/EDUCATION:   The patients plan of care was discussed with: Physical Therapist and Registered Nurse. [x] Home safety education was provided and the patient/caregiver indicated understanding. [x] Patient/family have participated as able in goal setting and plan of care. [x] Patient/family agree to work toward stated goals and plan of care. [] Patient understands intent and goals of therapy, but is neutral about his/her participation. [] Patient is unable to participate in goal setting and plan of care. This patients plan of care is appropriate for delegation to [facility].     Thank you for this referral.  Annel Barron, OT

## 2017-09-13 ENCOUNTER — HOME CARE VISIT (OUTPATIENT)
Dept: HOME HEALTH SERVICES | Facility: HOME HEALTH | Age: 77
End: 2017-09-13
Payer: MEDICARE

## 2017-09-13 ENCOUNTER — HOME CARE VISIT (OUTPATIENT)
Dept: SCHEDULING | Facility: HOME HEALTH | Age: 77
End: 2017-09-13
Payer: MEDICARE

## 2017-09-13 VITALS
HEART RATE: 89 BPM | OXYGEN SATURATION: 97 % | DIASTOLIC BLOOD PRESSURE: 77 MMHG | SYSTOLIC BLOOD PRESSURE: 160 MMHG | TEMPERATURE: 98.4 F | RESPIRATION RATE: 15 BRPM

## 2017-09-13 PROCEDURE — 3331090002 HH PPS REVENUE DEBIT

## 2017-09-13 PROCEDURE — 400013 HH SOC

## 2017-09-13 PROCEDURE — G0151 HHCP-SERV OF PT,EA 15 MIN: HCPCS

## 2017-09-13 PROCEDURE — 3331090001 HH PPS REVENUE CREDIT

## 2017-09-14 PROCEDURE — 3331090002 HH PPS REVENUE DEBIT

## 2017-09-14 PROCEDURE — 3331090001 HH PPS REVENUE CREDIT

## 2017-09-15 ENCOUNTER — HOME CARE VISIT (OUTPATIENT)
Dept: SCHEDULING | Facility: HOME HEALTH | Age: 77
End: 2017-09-15
Payer: MEDICARE

## 2017-09-15 VITALS
SYSTOLIC BLOOD PRESSURE: 153 MMHG | HEART RATE: 83 BPM | RESPIRATION RATE: 14 BRPM | OXYGEN SATURATION: 96 % | TEMPERATURE: 97.8 F | DIASTOLIC BLOOD PRESSURE: 82 MMHG

## 2017-09-15 PROCEDURE — 3331090001 HH PPS REVENUE CREDIT

## 2017-09-15 PROCEDURE — G0151 HHCP-SERV OF PT,EA 15 MIN: HCPCS

## 2017-09-15 PROCEDURE — 3331090002 HH PPS REVENUE DEBIT

## 2017-09-15 NOTE — OP NOTES
Jose Armando Hamilton MD - Adult Reconstruction and Total Joint Replacement    Vern Cushing - MRN [id number] - : 1940 (78 y.o.)      Date: 2017    Pre-operative Diagnosis: Right Hip DJD     Post-operative Diagnosis: same     Procedure:  (1) Right Total Hip Arthroplasty, Direct Anterior Approach    (2) Intraoperative Fluoroscopy    (3) Imageless Computer Navigation     Implants Used:     Implant Name Type Inv. Item Serial No.  Lot No. LRB No. Used Action   SHELL ACET M-HLE 54MM HUYEN -- INTEGRIP REV - [id number]  SHELL ACET M-HLE 54MM HUYEN -- INTEGRIP REV [id number] [employer] N/A Right 1 Implanted   novation crown cup connexion gxl , neutral liner group 2, 36mm I.D.   [id number]  N/A Right 1 Implanted   alteon bone screw 6.5 diameter x 25 mm length   [id number]  N/A Right 1 Implanted   STEM TAPR SO HA  SZ 12 -- NOVATION - [id number]  STEM TAPR SO HA  12 -- NOVATION [id number] [employer] N/A Right 1 Implanted   SmartSet MV bone cement   N/A  [id number] Right 1 Implanted   HEAD FEM COCR 36MM -3.5 -- OFFSET  - [id number]   HEAD FEM COCR 36MM -3.5 -- OFFSET  [id number] [employer] N/A Right 1 Implanted       Anesthesia: GETA + local    Surgeon: Jose Armando Hamilton     Assist: MANJIT Donovan    EBL: 300cc     Drains: none     Specimens: none     Complications: none     Condition: stable to PACU     Brief History: Longstanding hip pain, unresponsive to conservative treatment. The risks, benefits, and alternatives to total hip replacement were thoroughly explained. The patient understood no guarantees could be given about the outcome of the procedure and wished to proceed. Description of Procedure: After being identified in the preoperative holding area and having their operative site marked, the patient was brought back to the operating room where they underwent anesthesia to good effect. They were  placed in the supine position with a bump under the hip.  The operative extremity was then prepped and draped in the usual fashion using sterile technique. Preoperative antibiotics had been administered. An appropriate time-out was performed. We began by placing the pelvic tracker with two percutaneous Shanz pins into the ipsilateral ilium. The pelvis was then registered with the navigation system. A curvilinear incision was made 2 fingerbreadths lateral and distal to the ASIS. The skin flaps were developed down to the tensor fascia. This was divided sharply. Blunt dissection was taken medially over the tensor muscle. Retractors were placed superior and inferior to the femoral neck. The anterior fat pad was debrided. Circumflex vessels were identified and cauterized. A provisional neck cut was performed. The head was removed from the acetabulum. We then excised the labrum. We sequentially reamed for the acetabular shell. This was placed in the appropriate abduction and version. Position was confirmed by fluoroscopy and navigation. The liner was then impacted into the locking mechanism. We then turned our attention to the femur. Elevators were used to gain access to the proximal femur. Soft tissue releases were performed as necessary. The lateralizer was utilized. We then sequentially broached up to the final size trial. We placed a trial neck and head and had excellent restoration of leg length and offset with good soft tissue balance. We selected these as our final implants. Final femur was cemented, head placed, and the hip was reduced after thorough lavage. Restoration of appropriate leg length was confirmed by navigation. The tensor fascia was closed. Periarticular injection was performed. Skin closure was performed in layers. A sterile dressing was applied. The patient was awakened, moved to the stretcher, and taken to the recovery room in stable condition. At the conclusion of the procedure, all counts were correct. There were no immediate complications.     Additional Procedure:   Date: 9/11/2017    Preoperative diagnosis: BILATERAL HIP OSTEOARTHRITIS    Postoperative diagnosis: BILATERAL HIP OSTEOARTHRITIS    Procedure performed: Procedure(s):  RIGHT TOTAL HIP ARTHROPLASTY WITH NAVIGATION ANTERIOR APPROACH, LEFT HIP INJECTION Marek Left Solution)    Anesthesia: General    Surgeon(s) and Role:     * Tammy Calderon MD - Primary    Assistant: MANJIT Orantes      This describes the use of intraoperative fluoroscopy. Fluoroscopic imaging was utilized in orthogonal planes as well as using live technique for all phases of the procedure, described separately in the operative report, including hardware placement.     Tammy Calderon MD

## 2017-09-15 NOTE — OP NOTES
Tammy Calderon MD - Adult Reconstruction and Total Joint Replacement    Nesha Sharma - MRN [id number] - : 1940 (78 y.o.)      Date: 2017    POSTOPERATIVE DIAGNOSIS: Hip degenerative joint disease. OPERATIONS:   1. Left intraarticular hip joint injection (CPT 80291). 2. Fluoroscopic needle guidance (CPT 69139). INJECTION:  8cc Ropivicaine + 2cc (80mg) Depo-Medrol    PROCEDURE IN DETAIL:  Informed consent was obtained. Operative site was marked in the holding area. The patient was then taken to the procedure room and placed in a supine position on the table. Skin was prepped with alcohol  in a sterile fashion. A time-out was performed. Using fluoroscopy, the hip was examined. Using an anterolateral approach, 20 gauge 5-inch spinal needle was directed with intermittent fluoroscopic guidance into the joint capsule. The injection mixture was then injected during live fluoroscopy after confirming correct needle placement. Distension of the joint was noted. The needle was then removed from the skin. Band-Aid was placed over the skin entry site. There were no apparent complications. The patient tolerated the procedure well.     Tammy Calderon MD

## 2017-09-16 PROCEDURE — 3331090002 HH PPS REVENUE DEBIT

## 2017-09-16 PROCEDURE — 3331090001 HH PPS REVENUE CREDIT

## 2017-09-17 PROCEDURE — 3331090001 HH PPS REVENUE CREDIT

## 2017-09-17 PROCEDURE — 3331090002 HH PPS REVENUE DEBIT

## 2017-09-18 PROCEDURE — 3331090001 HH PPS REVENUE CREDIT

## 2017-09-18 PROCEDURE — 3331090002 HH PPS REVENUE DEBIT

## 2017-09-19 ENCOUNTER — HOME CARE VISIT (OUTPATIENT)
Dept: SCHEDULING | Facility: HOME HEALTH | Age: 77
End: 2017-09-19
Payer: MEDICARE

## 2017-09-19 PROCEDURE — 3331090001 HH PPS REVENUE CREDIT

## 2017-09-19 PROCEDURE — 3331090002 HH PPS REVENUE DEBIT

## 2017-09-19 PROCEDURE — G0151 HHCP-SERV OF PT,EA 15 MIN: HCPCS

## 2017-09-20 VITALS
SYSTOLIC BLOOD PRESSURE: 154 MMHG | RESPIRATION RATE: 14 BRPM | HEART RATE: 83 BPM | TEMPERATURE: 98.7 F | DIASTOLIC BLOOD PRESSURE: 71 MMHG | OXYGEN SATURATION: 98 %

## 2017-09-20 PROCEDURE — 3331090001 HH PPS REVENUE CREDIT

## 2017-09-20 PROCEDURE — 3331090002 HH PPS REVENUE DEBIT

## 2017-09-21 ENCOUNTER — HOME CARE VISIT (OUTPATIENT)
Dept: SCHEDULING | Facility: HOME HEALTH | Age: 77
End: 2017-09-21
Payer: MEDICARE

## 2017-09-21 VITALS
HEART RATE: 74 BPM | OXYGEN SATURATION: 97 % | RESPIRATION RATE: 15 BRPM | TEMPERATURE: 98.5 F | DIASTOLIC BLOOD PRESSURE: 77 MMHG | SYSTOLIC BLOOD PRESSURE: 136 MMHG

## 2017-09-21 PROCEDURE — G0151 HHCP-SERV OF PT,EA 15 MIN: HCPCS

## 2017-09-21 PROCEDURE — 3331090001 HH PPS REVENUE CREDIT

## 2017-09-21 PROCEDURE — 3331090002 HH PPS REVENUE DEBIT

## 2017-09-21 PROCEDURE — 3331090003 HH PPS REVENUE ADJ

## 2017-09-22 PROCEDURE — 3331090002 HH PPS REVENUE DEBIT

## 2017-09-22 PROCEDURE — 3331090001 HH PPS REVENUE CREDIT

## 2017-09-23 PROCEDURE — 3331090002 HH PPS REVENUE DEBIT

## 2017-09-23 PROCEDURE — 3331090001 HH PPS REVENUE CREDIT

## 2017-09-24 PROCEDURE — 3331090002 HH PPS REVENUE DEBIT

## 2017-09-24 PROCEDURE — 3331090001 HH PPS REVENUE CREDIT

## 2017-09-25 PROCEDURE — 3331090002 HH PPS REVENUE DEBIT

## 2017-09-25 PROCEDURE — 3331090001 HH PPS REVENUE CREDIT

## 2017-09-26 PROCEDURE — 3331090002 HH PPS REVENUE DEBIT

## 2017-09-26 PROCEDURE — 3331090001 HH PPS REVENUE CREDIT

## 2017-09-27 PROCEDURE — 3331090001 HH PPS REVENUE CREDIT

## 2017-09-27 PROCEDURE — 3331090002 HH PPS REVENUE DEBIT

## 2017-09-28 PROCEDURE — 3331090002 HH PPS REVENUE DEBIT

## 2017-09-28 PROCEDURE — 3331090001 HH PPS REVENUE CREDIT

## 2017-09-29 PROCEDURE — 3331090002 HH PPS REVENUE DEBIT

## 2017-09-29 PROCEDURE — 3331090001 HH PPS REVENUE CREDIT

## 2017-09-30 PROCEDURE — 3331090002 HH PPS REVENUE DEBIT

## 2017-09-30 PROCEDURE — 3331090001 HH PPS REVENUE CREDIT

## 2018-01-08 ENCOUNTER — HOSPITAL ENCOUNTER (OUTPATIENT)
Dept: PREADMISSION TESTING | Age: 78
Discharge: HOME | End: 2018-01-08
Attending: ORTHOPAEDIC SURGERY
Payer: MEDICARE

## 2018-01-08 VITALS
BODY MASS INDEX: 26.2 KG/M2 | HEART RATE: 78 BPM | RESPIRATION RATE: 20 BRPM | DIASTOLIC BLOOD PRESSURE: 76 MMHG | OXYGEN SATURATION: 99 % | WEIGHT: 153.44 LBS | SYSTOLIC BLOOD PRESSURE: 176 MMHG | HEIGHT: 64 IN | TEMPERATURE: 98.6 F

## 2018-01-08 LAB
ABO + RH BLD: NORMAL
ALBUMIN SERPL-MCNC: 4 G/DL (ref 3.5–5)
ALBUMIN/GLOB SERPL: 1.1 {RATIO} (ref 1.1–2.2)
ALP SERPL-CCNC: 102 U/L (ref 45–117)
ALT SERPL-CCNC: 25 U/L (ref 12–78)
ANION GAP SERPL CALC-SCNC: 2 MMOL/L (ref 5–15)
APPEARANCE UR: ABNORMAL
AST SERPL-CCNC: 21 U/L (ref 15–37)
BACTERIA URNS QL MICRO: ABNORMAL /HPF
BILIRUB SERPL-MCNC: 0.5 MG/DL (ref 0.2–1)
BILIRUB UR QL: NEGATIVE
BLOOD GROUP ANTIBODIES SERPL: NORMAL
BUN SERPL-MCNC: 13 MG/DL (ref 6–20)
BUN/CREAT SERPL: 22 (ref 12–20)
CALCIUM SERPL-MCNC: 9.1 MG/DL (ref 8.5–10.1)
CHLORIDE SERPL-SCNC: 98 MMOL/L (ref 97–108)
CO2 SERPL-SCNC: 32 MMOL/L (ref 21–32)
COLOR UR: ABNORMAL
CREAT SERPL-MCNC: 0.6 MG/DL (ref 0.55–1.02)
EPITH CASTS URNS QL MICRO: ABNORMAL /LPF
ERYTHROCYTE [DISTWIDTH] IN BLOOD BY AUTOMATED COUNT: 18.4 % (ref 11.5–14.5)
EST. AVERAGE GLUCOSE BLD GHB EST-MCNC: 157 MG/DL
GLOBULIN SER CALC-MCNC: 3.6 G/DL (ref 2–4)
GLUCOSE SERPL-MCNC: 104 MG/DL (ref 65–100)
GLUCOSE UR STRIP.AUTO-MCNC: NEGATIVE MG/DL
HBA1C MFR BLD: 7.1 % (ref 4.2–6.3)
HCT VFR BLD AUTO: 37.4 % (ref 35–47)
HGB BLD-MCNC: 12.3 G/DL (ref 11.5–16)
HGB UR QL STRIP: NEGATIVE
HYALINE CASTS URNS QL MICRO: ABNORMAL /LPF (ref 0–5)
INR PPP: 1.1 (ref 0.9–1.1)
KETONES UR QL STRIP.AUTO: NEGATIVE MG/DL
LEUKOCYTE ESTERASE UR QL STRIP.AUTO: ABNORMAL
MCH RBC QN AUTO: 26.8 PG (ref 26–34)
MCHC RBC AUTO-ENTMCNC: 32.9 G/DL (ref 30–36.5)
MCV RBC AUTO: 81.5 FL (ref 80–99)
NITRITE UR QL STRIP.AUTO: POSITIVE
PH UR STRIP: 7.5 [PH] (ref 5–8)
PLATELET # BLD AUTO: 295 K/UL (ref 150–400)
POTASSIUM SERPL-SCNC: 4.5 MMOL/L (ref 3.5–5.1)
PROT SERPL-MCNC: 7.6 G/DL (ref 6.4–8.2)
PROT UR STRIP-MCNC: NEGATIVE MG/DL
PROTHROMBIN TIME: 10.8 SEC (ref 9–11.1)
RBC # BLD AUTO: 4.59 M/UL (ref 3.8–5.2)
RBC #/AREA URNS HPF: ABNORMAL /HPF (ref 0–5)
SODIUM SERPL-SCNC: 132 MMOL/L (ref 136–145)
SP GR UR REFRACTOMETRY: 1.01 (ref 1–1.03)
SPECIMEN EXP DATE BLD: NORMAL
UA: UC IF INDICATED,UAUC: ABNORMAL
UROBILINOGEN UR QL STRIP.AUTO: 1 EU/DL (ref 0.2–1)
WBC # BLD AUTO: 8.2 K/UL (ref 3.6–11)
WBC URNS QL MICRO: >100 /HPF (ref 0–4)

## 2018-01-08 PROCEDURE — 81001 URINALYSIS AUTO W/SCOPE: CPT | Performed by: ORTHOPAEDIC SURGERY

## 2018-01-08 PROCEDURE — 86900 BLOOD TYPING SEROLOGIC ABO: CPT | Performed by: ORTHOPAEDIC SURGERY

## 2018-01-08 PROCEDURE — 36415 COLL VENOUS BLD VENIPUNCTURE: CPT | Performed by: ORTHOPAEDIC SURGERY

## 2018-01-08 PROCEDURE — 85610 PROTHROMBIN TIME: CPT | Performed by: ORTHOPAEDIC SURGERY

## 2018-01-08 PROCEDURE — 83036 HEMOGLOBIN GLYCOSYLATED A1C: CPT | Performed by: ORTHOPAEDIC SURGERY

## 2018-01-08 PROCEDURE — 80053 COMPREHEN METABOLIC PANEL: CPT | Performed by: ORTHOPAEDIC SURGERY

## 2018-01-08 PROCEDURE — 87186 SC STD MICRODIL/AGAR DIL: CPT | Performed by: ORTHOPAEDIC SURGERY

## 2018-01-08 PROCEDURE — 87086 URINE CULTURE/COLONY COUNT: CPT | Performed by: ORTHOPAEDIC SURGERY

## 2018-01-08 PROCEDURE — 87077 CULTURE AEROBIC IDENTIFY: CPT | Performed by: ORTHOPAEDIC SURGERY

## 2018-01-08 PROCEDURE — 85027 COMPLETE CBC AUTOMATED: CPT | Performed by: ORTHOPAEDIC SURGERY

## 2018-01-08 RX ORDER — NAPROXEN 500 MG/1
500 TABLET ORAL 2 TIMES DAILY WITH MEALS
Status: ON HOLD | COMMUNITY
End: 2018-01-16

## 2018-01-08 RX ORDER — CEFAZOLIN SODIUM 1 G/3ML
2 INJECTION, POWDER, FOR SOLUTION INTRAMUSCULAR; INTRAVENOUS ONCE
Status: CANCELLED | OUTPATIENT
Start: 2018-01-15 | End: 2018-01-15

## 2018-01-08 NOTE — PERIOP NOTES
[facility]  Preoperative Instructions        Surgery Date 01/15/18        Time of Arrival to be called    1. On the day of your surgery, please report to the Surgical Services Registration Desk and sign in at your designated time. The Surgery Center is located to the right of the Emergency Room. 2. You must have someone with you to drive you home. You should not drive a car for 24 hours following surgery. Please make arrangements for a friend or family member to stay with you for the first 24 hours after your surgery. 3. Do not have anything to eat or drink (including water, gum, mints, coffee, juice) after midnight ?01/14/18? Lela Cabrera ? This may not apply to medications prescribed by your physician. ?(Please note below the special instructions with medications to take the morning of your procedure.)    4. We recommend you do not drink any alcoholic beverages for 24 hours before and after your surgery. 5. Contact your surgeons office for instructions on the following medications: non-steroidal anti-inflammatory drugs (i.e. Advil, Aleve), vitamins, and supplements. (Some surgeons will want you to stop these medications prior to surgery and others may allow you to take them)  **If you are currently taking Plavix, Coumadin, Aspirin and/or other blood-thinning agents, contact your surgeon for instructions. ** Your surgeon will partner with the physician prescribing these medications to determine if it is safe to stop or if you need to continue taking. Please do not stop taking these medications without instructions from your surgeon    6. Wear comfortable clothes. Wear glasses instead of contacts. Do not bring any money or jewelry. Please bring picture ID, insurance card, and any prearranged co-payment or hospital payment. Do not wear make-up, particularly mascara the morning of your surgery. Do not wear nail polish, particularly if you are having foot /hand surgery.   Wear your hair loose or down, no ponytails, buns, jairon pins or clips. All body piercings must be removed. Please shower with antibacterial soap for three consecutive days before and on the morning of surgery, but do not apply any lotions, powders or deodorants after the shower on the day of surgery. Please use a fresh towels after each shower. Please sleep in clean clothes and change bed linens the night before surgery. Please do not shave for 48 hours prior to surgery. Shaving of the face is acceptable. 7. You should understand that if you do not follow these instructions your surgery may be cancelled. If your physical condition changes (I.e. fever, cold or flu) please contact your surgeon as soon as possible. 8. It is important that you be on time. If a situation occurs where you may be late, please call [phone] (OR Holding Area). 9. If you have any questions and or problems, please call [phone] (Pre-admission Testing). 10. Your surgery time may be subject to change. You will receive a phone call the evening prior if your time changes. 11.  If having outpatient surgery, you must have someone to drive you here, stay with you during the duration of your stay, and to drive you home at time of discharge. 12.   In an effort to improve the efficiency, privacy, and safety for all of our Pre-op patients visitors are not allowed in the Holding area. Once you arrive and are registered your family/visitors will be asked to remain in the waiting room. The Pre-op staff will get you from the Surgical Waiting Area and will explain to you and your family/visitors that the Pre-op phase is beginning. The staff will answer any questions and provide instructions for tracking of the patient, by use of the existing tracking number and color-coded status board in the waiting room.   At this time the staff will also ask for your designated spokesperson information in the event that the physician or staff need to provide an update or obtain any pertinent information. The designated spokesperson will be notified if the physician needs to speak to family during the pre-operative phase. If at any time your family/visitors has questions or concerns they may approach the volunteer desk in the waiting area for assistance. Special Instructions:May have up to 8 ounces of water after midnight but stop drinking 2 hours prior to your time of arrival to hospital.May use eye drops morning of surgery. MEDICATIONS TO TAKE THE MORNING OF SURGERY WITH A SIP OF WATER:none      I understand a pre-operative phone call will be made to verify my surgery time. In the event that I am not available, I give permission for a message to be left on my answering service and/or with another person? {yes [phone]         ___________________      __________   _________    (Signature of Patient)             (Witness)                (Date and Time)Preventing Infections Before  and After  Your Surgery  IMPORTANT INSTRUCTIONS    Please read and follow these instructions carefully. Every Night for Three (3) nights before your surgery:  1. Shower with an antibacterial soap, such as Dial, or the soap provided at your preassessment appointment. A shower is better than a bath for cleaning your skin. 2. If needed, ask someone to help you reach all areas of your body. Dont forget to clean your belly button with every shower. The night before your surgery:  1. On the night before your surgery, shower with an antibacterial soap, such as Dial, or the soap provided at your preassessment appointment. 2. With one packet of Hibiclens in hand, turn water off.  3. Apply Hibiclens antiseptic skin cleanser with a clean, freshly washed washcloth. ? Gently apply to your body from chin to toes (except the genital area) and especially the area(s) where your incision(s) will be. ? Leave Hibiclens on your skin for at least 20 seconds.     CAUTION: If needed, Hibiclens may be used to clean the folds of skin of the legs (such as in the area of the groin) and on your buttocks and hips. However, do not use Hibiclens above the neck or in the genital area (your bottom) or put inside any area of your body. 4. Turn the water back on and rinse. 5. Dry gently with a clean, freshly washed towel. 6. After your shower, do not use any powder, deodorant, perfumes or lotion. 7. Use clean, freshly washed towels and washcloths every time you shower. 8. Wear clean, freshly washed pajamas to bed the night before surgery. 9. Sleep on clean, freshly washed sheets. 10. Do not allow pets to sleep in your bed with you. The Morning of your surgery:  1. Shower again thoroughly with an antibacterial soap, such as Dial or the soap provided at your preassessment appointment. If needed, ask someone for help to reach all areas of your body. Dont forget to clean your belly button! Rinse. 2. Dry gently with a clean, freshly washed towel. 3. After your shower, do not use any powder, deodorant, perfumes or lotion prior to surgery. 4. Put on clean, freshly washed clothing. Tips to help prevent infections after your surgery:  1. Protect your surgical wound from germs:  ? Hand washing is the most important thing you and your caregivers can do to prevent infections. ? Keep your bandage clean and dry! ? Do not touch your surgical wound. 2. Use clean, freshly washed towels and washcloths every time you shower; do not share bath linens with others. 3. Until your surgical wound is healed, wear clothing and sleep on bed linens each day that are clean and freshly washed. 4. Do not allow pets to sleep in your bed with you or touch your surgical wound. 5. Do not smoke  smoking delays wound healing. This may be a good time to stop smoking. 6. If you have diabetes, it is important for you to manage your blood sugar levels properly before your surgery as well as after your surgery. Poorly managed blood sugar levels slow down wound healing and prevent you from healing completely. Incentive Spirometer        Using the incentive spirometer helps expand the small air sacs of your lungs, helps you breathe deeply, and helps improve your lung function. Use your incentive spirometer twice a day (10 breaths each time) prior to surgery. How to Use Your Incentive Spirometer:  1. Hold the incentive spirometer in an upright position. 2. Breathe out as usual.   3. Place the mouthpiece in your mouth and seal your lips tightly around it. 4. Take a deep breath. Breathe in slowly and as deeply as possible. Keep the blue flow rate guide between the arrows. 5. Hold your breath as long as possible. Then exhale slowly and allow the piston to fall to the bottom of the column. 6. Rest for a few seconds and repeat steps one through five at least 10 times. PAT Tidal Volume___1500_______________  x_____2___________  Date___01/18/18____________________    Kina Band THE INCENTIVE SPIROMETER WITH YOU TO THE HOSPITAL ON THE DAY OF YOUR SURGERY. Opportunity given to ask and answer questions as well as to observe return demonstration.     Patient signature_____________________________    Witness____Li Juan RN________________________

## 2018-01-09 LAB
BACTERIA SPEC CULT: NORMAL
BACTERIA SPEC CULT: NORMAL
SERVICE CMNT-IMP: NORMAL

## 2018-01-10 LAB
BACTERIA SPEC CULT: ABNORMAL
CC UR VC: ABNORMAL
SERVICE CMNT-IMP: ABNORMAL

## 2018-01-10 RX ORDER — CEPHALEXIN 500 MG/1
500 CAPSULE ORAL 2 TIMES DAILY
COMMUNITY
Start: 2018-01-10 | End: 2018-01-16

## 2018-01-10 RX ORDER — CEPHALEXIN 500 MG/1
500 CAPSULE ORAL 2 TIMES DAILY
Qty: 14 CAP | Refills: 0 | Status: SHIPPED | OUTPATIENT
Start: 2018-01-10 | End: 2018-01-16

## 2018-01-10 NOTE — ADVANCED PRACTICE NURSE
Pt advised to drink electrolyte solution such as Gatorade/Powerade 50/50 solution for Na level. Will recheck POC chem 8 on DOS.

## 2018-01-10 NOTE — ADVANCED PRACTICE NURSE
Urine cx results received. Pt notified of results and need for tx. Denies fever, chills, myalgias, dysuria or frequency. Rx for Keflex escribed to pharmacy of choice. Elizabeth at Dr. Cross Nurse' office notified.

## 2018-01-10 NOTE — PROGRESS NOTES
Order received, pt recently received prehab education in Sept of 2017 and had a total joint. Does not need another education/evaluation session from PT at this time. Will sign off.

## 2018-01-12 NOTE — H&P
Robin Saeed MD - Adult Reconstruction and Total Joint Replacement     Orthopaedic History and Physical        NAME: Omar Vivas       :  1940       MRN:  [id number]            Subjective:   Patient ID: Rosalind Rodas is a 78 y.o. female.     Surgery: Recent R LILLIAN      Overall, the pt is doing well 2 months out from her R LILLIAN. She has been working with PT regularly. She is not using assistive devices and routinely does whatever she feels like. She denies significant R hip pain. She endorses arthritic L hip pain. This is her primary limitation at this point and bothers her daily. She has longstanding hx of L hip pain. Her corticosteroid injection given at the time of the surgery has worn off.      Patient Active Problem List    Diagnosis Date Noted    Osteoarthritis of hip 2017    Osteoarthrosis, localized, primary, knee 2016    Knee arthropathy 2015     Past Medical History:   Diagnosis Date    Arthritis     Chronic pain     arthritic    Diabetes ([address]     Hypertension     Ill-defined condition     shingles 1.5 years ago 2014    Other ill-defined conditions(799.89)     high cholesterol    Other ill-defined conditions(799.89)     shingles      Past Surgical History:   Procedure Laterality Date    COLONOSCOPY N/A 10/25/2016    COLONOS[facility]isses Momin [facility] at [facility] ENDOSCOPY    HX APPENDECTOMY      HX CATARACT REMOVAL Left     HX HYSTERECTOMY      HX KNEE REPLACEMENT Right     parital    HX KNEE REPLACEMENT Left     HX ORTHOPAEDIC      right hip replacement    HX TONSILLECTOMY      HX TUBAL LIGATION      HX UROLOGICAL      bladder tacking      Prior to Admission medications    Medication Sig Start Date End Date Taking? Authorizing Provider   cephALEXin (KEFLEX) 500 mg capsule Take 1 Cap by mouth two (2) times a day for 7 days.  Indications: E. COLI URINARY TRACT INFECTION 1/10/18 1/17/18  Kristen Montoya NP   cephALEXin (KEFLEX) 500 mg capsule Take 500 mg by mouth two (2) times a day. Indications: E. COLI URINARY TRACT INFECTION 1/10/18 1/17/18  Historical Provider   naproxen (NAPROSYN) 500 mg tablet Take 500 mg by mouth two (2) times daily (with meals). Historical Provider   atorvastatin (LIPITOR) 10 mg tablet Take 10 mg by mouth nightly. Historical Provider   timolol (TIMOPTIC) 0.5 % ophthalmic solution Administer 1 Drop to both eyes daily. Historical Provider   latanoprost (XALATAN) 0.005 % ophthalmic solution Administer 1 Drop to both eyes nightly. Indications: OPEN ANGLE GLAUCOMA    Historical Provider   prednisoLONE acetate (PRED FORTE) 1 % ophthalmic suspension Administer 1 Drop to left eye daily. Indications: SEVERE OCULAR INFLAMMATION    Historical Provider   hydrochlorothiazide (HYDRODIURIL) 25 mg tablet Take 25 mg by mouth daily. Indications: EDEMA, HYPERTENSION    Historical Provider   metFORMIN (GLUCOPHAGE) 500 mg tablet Take 500 mg by mouth daily (with breakfast). Indications: TYPE 2 DIABETES MELLITUS    Historical Provider   lisinopril (PRINIVIL, ZESTRIL) 5 mg tablet Take 5 mg by mouth daily. Indications: HYPERTENSION    Historical Provider   acyclovir (ZOVIRAX) 800 mg tablet Take 800 mg by mouth nightly. Indications: shingles prevention    Historical Provider   multivitamin with iron tablet Take 1 Tab by mouth daily. Historical Provider     No Known Allergies   Social History   Substance Use Topics    Smoking status: Never Smoker    Smokeless tobacco: Never Used    Alcohol use No      Family History   Problem Relation Age of Onset    Cancer Father      leukemia    Stroke Sister         REVIEW OF SYSTEMS: A comprehensive review of systems was negative except for that written in the HPI. Objective:   Pt is cooperative and is in no acute distress. Well nourished. Well developed. Incision is benign. No instability. Mild edema. Motor & sensory exam is intact. No signs or symptoms of DVT or infection.  Gait is antalgic around her L hip with no assistive devices. Painful limited ROM of her L hip. No LLD and no contracture.      Radiographs:       X-ray Hip Right 2+ Views (03241)     Result Date: 11/6/2017  AP, Frog Lateral. Notes  Indication(s): R LILLIAN. Impression: Films show the pt's R LILLIAN prosthesis in good clinical alignment with no evidence of loosening or wear.      Previous films of her L hip revealed severe degenerative changes and joint space narrowing in the L hip. Aspherical femoral head and acetabulum. Assessment:   Patient is doing well s/p R LILLIAN  Severe L hip OA.         Plan:   Continue to work on range of motion and strengthening. She already has dental prophylaxis. I also recommended a L LILLIAN. Conservative treatments including activity modification, medication, and steroid injections have not successfully relieved pain. Surgery was discussed at length with the pt today. We went over all the pertinent risks, benefits, and alternatives to the procedure. They understand no guarantees can be made about the outcome and they would like to proceed. I discussed the pre-op total joint class and general recovery timeline with the pt. The pt understands the need for medical clearance. We will plan for the L LILLIAN in 2 months.  I prescribed her naproxen to be taken regularly until then.             Scribed for Mary Mcclellan PA-C by MANJIT Sandoval

## 2018-01-12 NOTE — PERIOP NOTES
Pre-op call made and patient given TOA. Patient instructed may have up to 8 ounces of water up to 4am and then NPO. Patient verbalized understanding.

## 2018-01-15 ENCOUNTER — APPOINTMENT (OUTPATIENT)
Dept: GENERAL RADIOLOGY | Age: 78
DRG: 470 | End: 2018-01-15
Attending: ORTHOPAEDIC SURGERY
Payer: MEDICARE

## 2018-01-15 ENCOUNTER — HOSPITAL ENCOUNTER (INPATIENT)
Age: 78
LOS: 1 days | Discharge: HOME HEALTH | DRG: 470 | End: 2018-01-16
Attending: ORTHOPAEDIC SURGERY | Admitting: ORTHOPAEDIC SURGERY
Payer: MEDICARE

## 2018-01-15 ENCOUNTER — ANESTHESIA (OUTPATIENT)
Dept: SURGERY | Age: 78
DRG: 470 | End: 2018-01-15
Payer: MEDICARE

## 2018-01-15 ENCOUNTER — ANESTHESIA EVENT (OUTPATIENT)
Dept: SURGERY | Age: 78
DRG: 470 | End: 2018-01-15
Payer: MEDICARE

## 2018-01-15 DIAGNOSIS — Z96.642 STATUS POST TOTAL REPLACEMENT OF LEFT HIP: Primary | ICD-10-CM

## 2018-01-15 PROBLEM — Z96.649 S/P HIP REPLACEMENT: Status: ACTIVE | Noted: 2018-01-15

## 2018-01-15 LAB
ANION GAP BLD CALC-SCNC: 14 MMOL/L (ref 5–15)
ANION GAP BLD CALC-SCNC: 18 MMOL/L (ref 5–15)
BUN BLD-MCNC: 10 MG/DL (ref 9–20)
BUN BLD-MCNC: 14 MG/DL (ref 9–20)
CA-I BLD-MCNC: 1.04 MMOL/L (ref 1.12–1.32)
CA-I BLD-MCNC: 1.13 MMOL/L (ref 1.12–1.32)
CHLORIDE BLD-SCNC: 99 MMOL/L (ref 98–107)
CHLORIDE BLD-SCNC: 99 MMOL/L (ref 98–107)
CO2 BLD-SCNC: 24 MMOL/L (ref 21–32)
CO2 BLD-SCNC: 27 MMOL/L (ref 21–32)
CREAT BLD-MCNC: 0.5 MG/DL (ref 0.6–1.3)
CREAT BLD-MCNC: 0.5 MG/DL (ref 0.6–1.3)
GLUCOSE BLD STRIP.AUTO-MCNC: 152 MG/DL (ref 65–100)
GLUCOSE BLD STRIP.AUTO-MCNC: 171 MG/DL (ref 65–100)
GLUCOSE BLD STRIP.AUTO-MCNC: 215 MG/DL (ref 65–100)
GLUCOSE BLD-MCNC: 159 MG/DL (ref 65–100)
GLUCOSE BLD-MCNC: 159 MG/DL (ref 65–100)
HCT VFR BLD CALC: 40 % (ref 35–47)
HCT VFR BLD CALC: 41 % (ref 35–47)
HGB BLD-MCNC: 13.6 GM/DL (ref 11.5–16)
HGB BLD-MCNC: 13.9 GM/DL (ref 11.5–16)
POTASSIUM BLD-SCNC: 3.8 MMOL/L (ref 3.5–5.1)
POTASSIUM BLD-SCNC: 6.8 MMOL/L (ref 3.5–5.1)
SERVICE CMNT-IMP: ABNORMAL
SODIUM BLD-SCNC: 133 MMOL/L (ref 136–145)
SODIUM BLD-SCNC: 136 MMOL/L (ref 136–145)

## 2018-01-15 PROCEDURE — 8E0WXBF COMPUTER ASSISTED PROCEDURE OF TRUNK REGION, WITH FLUOROSCOPY: ICD-10-PCS | Performed by: ORTHOPAEDIC SURGERY

## 2018-01-15 PROCEDURE — 77030035236 HC SUT PDS STRATFX BARB J&J -B: Performed by: ORTHOPAEDIC SURGERY

## 2018-01-15 PROCEDURE — 77030026438 HC STYL ET INTUB CARD -A: Performed by: ANESTHESIOLOGY

## 2018-01-15 PROCEDURE — 76001 XR FLUOROSCOPY OVER 60 MINUTES: CPT

## 2018-01-15 PROCEDURE — 77030019908 HC STETH ESOPH SIMS -A: Performed by: ANESTHESIOLOGY

## 2018-01-15 PROCEDURE — 74011250636 HC RX REV CODE- 250/636

## 2018-01-15 PROCEDURE — 77030011640 HC PAD GRND REM COVD -A: Performed by: ORTHOPAEDIC SURGERY

## 2018-01-15 PROCEDURE — 80047 BASIC METABLC PNL IONIZED CA: CPT

## 2018-01-15 PROCEDURE — 73501 X-RAY EXAM HIP UNI 1 VIEW: CPT

## 2018-01-15 PROCEDURE — 77030020788: Performed by: ORTHOPAEDIC SURGERY

## 2018-01-15 PROCEDURE — 77030003666 HC NDL SPINAL BD -A: Performed by: ORTHOPAEDIC SURGERY

## 2018-01-15 PROCEDURE — 76010000162 HC OR TIME 1.5 TO 2 HR INTENSV-TIER 1: Performed by: ORTHOPAEDIC SURGERY

## 2018-01-15 PROCEDURE — 74011250637 HC RX REV CODE- 250/637: Performed by: PHYSICIAN ASSISTANT

## 2018-01-15 PROCEDURE — 74011636637 HC RX REV CODE- 636/637: Performed by: NURSE PRACTITIONER

## 2018-01-15 PROCEDURE — 77030018723 HC ELCTRD BLD COVD -A: Performed by: ORTHOPAEDIC SURGERY

## 2018-01-15 PROCEDURE — 76060000034 HC ANESTHESIA 1.5 TO 2 HR: Performed by: ORTHOPAEDIC SURGERY

## 2018-01-15 PROCEDURE — C1776 JOINT DEVICE (IMPLANTABLE): HCPCS | Performed by: ORTHOPAEDIC SURGERY

## 2018-01-15 PROCEDURE — 77030036722: Performed by: ORTHOPAEDIC SURGERY

## 2018-01-15 PROCEDURE — 97116 GAIT TRAINING THERAPY: CPT

## 2018-01-15 PROCEDURE — 74011000258 HC RX REV CODE- 258

## 2018-01-15 PROCEDURE — 77030006789 HC BLD SAW OSC STRY -C: Performed by: ORTHOPAEDIC SURGERY

## 2018-01-15 PROCEDURE — 74011250636 HC RX REV CODE- 250/636: Performed by: ORTHOPAEDIC SURGERY

## 2018-01-15 PROCEDURE — 97161 PT EVAL LOW COMPLEX 20 MIN: CPT

## 2018-01-15 PROCEDURE — 74011000258 HC RX REV CODE- 258: Performed by: ORTHOPAEDIC SURGERY

## 2018-01-15 PROCEDURE — 77030033138 HC SUT PGA STRATFX J&J -B: Performed by: ORTHOPAEDIC SURGERY

## 2018-01-15 PROCEDURE — 74011000250 HC RX REV CODE- 250

## 2018-01-15 PROCEDURE — 77030018846 HC SOL IRR STRL H20 ICUM -A: Performed by: ORTHOPAEDIC SURGERY

## 2018-01-15 PROCEDURE — 82962 GLUCOSE BLOOD TEST: CPT

## 2018-01-15 PROCEDURE — 0SRB0JA REPLACEMENT OF LEFT HIP JOINT WITH SYNTHETIC SUBSTITUTE, UNCEMENTED, OPEN APPROACH: ICD-10-PCS | Performed by: ORTHOPAEDIC SURGERY

## 2018-01-15 PROCEDURE — 77030014647 HC SEAL FBRN TISSL BAXT -D: Performed by: ORTHOPAEDIC SURGERY

## 2018-01-15 PROCEDURE — 74011250636 HC RX REV CODE- 250/636: Performed by: ANESTHESIOLOGY

## 2018-01-15 PROCEDURE — 74011000250 HC RX REV CODE- 250: Performed by: PHYSICIAN ASSISTANT

## 2018-01-15 PROCEDURE — 77030032490 HC SLV COMPR SCD KNE COVD -B: Performed by: ORTHOPAEDIC SURGERY

## 2018-01-15 PROCEDURE — 74011000250 HC RX REV CODE- 250: Performed by: ORTHOPAEDIC SURGERY

## 2018-01-15 PROCEDURE — 74011250637 HC RX REV CODE- 250/637: Performed by: ORTHOPAEDIC SURGERY

## 2018-01-15 PROCEDURE — 77030034479 HC ADH SKN CLSR PRINEO J&J -B: Performed by: ORTHOPAEDIC SURGERY

## 2018-01-15 PROCEDURE — 77010033678 HC OXYGEN DAILY

## 2018-01-15 PROCEDURE — 76210000016 HC OR PH I REC 1 TO 1.5 HR: Performed by: ORTHOPAEDIC SURGERY

## 2018-01-15 PROCEDURE — 77030008684 HC TU ET CUF COVD -B: Performed by: ANESTHESIOLOGY

## 2018-01-15 PROCEDURE — 77030018836 HC SOL IRR NACL ICUM -A: Performed by: ORTHOPAEDIC SURGERY

## 2018-01-15 PROCEDURE — 77030022704 HC SUT VLOC COVD -B: Performed by: ORTHOPAEDIC SURGERY

## 2018-01-15 PROCEDURE — 65270000029 HC RM PRIVATE

## 2018-01-15 PROCEDURE — 74011250636 HC RX REV CODE- 250/636: Performed by: PHYSICIAN ASSISTANT

## 2018-01-15 DEVICE — IMPLANTABLE DEVICE
Type: IMPLANTABLE DEVICE | Site: HIP | Status: FUNCTIONAL
Brand: ALTEON

## 2018-01-15 DEVICE — IMPLANTABLE DEVICE
Type: IMPLANTABLE DEVICE | Site: HIP | Status: FUNCTIONAL
Brand: NOVATION

## 2018-01-15 DEVICE — LINER ACET PRSS FT HIP POROUS POLYETH MTL: Type: IMPLANTABLE DEVICE | Site: HIP | Status: FUNCTIONAL

## 2018-01-15 RX ORDER — PREGABALIN 75 MG/1
75 CAPSULE ORAL ONCE
Status: COMPLETED | OUTPATIENT
Start: 2018-01-15 | End: 2018-01-15

## 2018-01-15 RX ORDER — FENTANYL CITRATE 50 UG/ML
INJECTION, SOLUTION INTRAMUSCULAR; INTRAVENOUS AS NEEDED
Status: DISCONTINUED | OUTPATIENT
Start: 2018-01-15 | End: 2018-01-15 | Stop reason: HOSPADM

## 2018-01-15 RX ORDER — SUCCINYLCHOLINE CHLORIDE 20 MG/ML
INJECTION INTRAMUSCULAR; INTRAVENOUS AS NEEDED
Status: DISCONTINUED | OUTPATIENT
Start: 2018-01-15 | End: 2018-01-15 | Stop reason: HOSPADM

## 2018-01-15 RX ORDER — ACETAMINOPHEN 325 MG/1
650 TABLET ORAL EVERY 6 HOURS
Status: DISCONTINUED | OUTPATIENT
Start: 2018-01-15 | End: 2018-01-16 | Stop reason: HOSPADM

## 2018-01-15 RX ORDER — DIPHENHYDRAMINE HCL 12.5MG/5ML
12.5 ELIXIR ORAL
Status: DISCONTINUED | OUTPATIENT
Start: 2018-01-15 | End: 2018-01-16 | Stop reason: HOSPADM

## 2018-01-15 RX ORDER — SODIUM CHLORIDE 0.9 % (FLUSH) 0.9 %
5-10 SYRINGE (ML) INJECTION AS NEEDED
Status: DISCONTINUED | OUTPATIENT
Start: 2018-01-15 | End: 2018-01-15 | Stop reason: HOSPADM

## 2018-01-15 RX ORDER — LIDOCAINE HYDROCHLORIDE 20 MG/ML
INJECTION, SOLUTION EPIDURAL; INFILTRATION; INTRACAUDAL; PERINEURAL AS NEEDED
Status: DISCONTINUED | OUTPATIENT
Start: 2018-01-15 | End: 2018-01-15 | Stop reason: HOSPADM

## 2018-01-15 RX ORDER — PREDNISOLONE ACETATE 10 MG/ML
1 SUSPENSION/ DROPS OPHTHALMIC DAILY
Status: DISCONTINUED | OUTPATIENT
Start: 2018-01-15 | End: 2018-01-16 | Stop reason: HOSPADM

## 2018-01-15 RX ORDER — ASPIRIN 325 MG
325 TABLET, DELAYED RELEASE (ENTERIC COATED) ORAL 2 TIMES DAILY
Status: DISCONTINUED | OUTPATIENT
Start: 2018-01-15 | End: 2018-01-16 | Stop reason: HOSPADM

## 2018-01-15 RX ORDER — DEXAMETHASONE SODIUM PHOSPHATE 4 MG/ML
INJECTION, SOLUTION INTRA-ARTICULAR; INTRALESIONAL; INTRAMUSCULAR; INTRAVENOUS; SOFT TISSUE AS NEEDED
Status: DISCONTINUED | OUTPATIENT
Start: 2018-01-15 | End: 2018-01-15 | Stop reason: HOSPADM

## 2018-01-15 RX ORDER — ROCURONIUM BROMIDE 10 MG/ML
INJECTION, SOLUTION INTRAVENOUS AS NEEDED
Status: DISCONTINUED | OUTPATIENT
Start: 2018-01-15 | End: 2018-01-15 | Stop reason: HOSPADM

## 2018-01-15 RX ORDER — PROPOFOL 10 MG/ML
INJECTION, EMULSION INTRAVENOUS AS NEEDED
Status: DISCONTINUED | OUTPATIENT
Start: 2018-01-15 | End: 2018-01-15 | Stop reason: HOSPADM

## 2018-01-15 RX ORDER — HYDROMORPHONE HYDROCHLORIDE 1 MG/ML
0.2 INJECTION, SOLUTION INTRAMUSCULAR; INTRAVENOUS; SUBCUTANEOUS
Status: DISCONTINUED | OUTPATIENT
Start: 2018-01-15 | End: 2018-01-15 | Stop reason: HOSPADM

## 2018-01-15 RX ORDER — TRANEXAMIC ACID 100 MG/ML
INJECTION, SOLUTION INTRAVENOUS
Status: DISPENSED
Start: 2018-01-15 | End: 2018-01-15

## 2018-01-15 RX ORDER — CEFAZOLIN SODIUM/WATER 2 G/20 ML
2 SYRINGE (ML) INTRAVENOUS ONCE
Status: COMPLETED | OUTPATIENT
Start: 2018-01-15 | End: 2018-01-15

## 2018-01-15 RX ORDER — TIMOLOL MALEATE 5 MG/ML
1 SOLUTION/ DROPS OPHTHALMIC DAILY
Status: DISCONTINUED | OUTPATIENT
Start: 2018-01-15 | End: 2018-01-16 | Stop reason: HOSPADM

## 2018-01-15 RX ORDER — ACYCLOVIR 800 MG/1
800 TABLET ORAL
Status: DISCONTINUED | OUTPATIENT
Start: 2018-01-15 | End: 2018-01-16 | Stop reason: HOSPADM

## 2018-01-15 RX ORDER — SODIUM CHLORIDE 0.9 % (FLUSH) 0.9 %
5-10 SYRINGE (ML) INJECTION AS NEEDED
Status: DISCONTINUED | OUTPATIENT
Start: 2018-01-15 | End: 2018-01-16 | Stop reason: HOSPADM

## 2018-01-15 RX ORDER — SODIUM CHLORIDE 9 MG/ML
INJECTION, SOLUTION INTRAVENOUS
Status: COMPLETED
Start: 2018-01-15 | End: 2018-01-15

## 2018-01-15 RX ORDER — PHENYLEPHRINE HCL IN 0.9% NACL 0.4MG/10ML
SYRINGE (ML) INTRAVENOUS AS NEEDED
Status: DISCONTINUED | OUTPATIENT
Start: 2018-01-15 | End: 2018-01-15 | Stop reason: HOSPADM

## 2018-01-15 RX ORDER — SODIUM CHLORIDE, SODIUM LACTATE, POTASSIUM CHLORIDE, CALCIUM CHLORIDE 600; 310; 30; 20 MG/100ML; MG/100ML; MG/100ML; MG/100ML
25 INJECTION, SOLUTION INTRAVENOUS CONTINUOUS
Status: DISCONTINUED | OUTPATIENT
Start: 2018-01-15 | End: 2018-01-15 | Stop reason: HOSPADM

## 2018-01-15 RX ORDER — ONDANSETRON 2 MG/ML
4 INJECTION INTRAMUSCULAR; INTRAVENOUS
Status: DISCONTINUED | OUTPATIENT
Start: 2018-01-15 | End: 2018-01-16 | Stop reason: HOSPADM

## 2018-01-15 RX ORDER — ATORVASTATIN CALCIUM 10 MG/1
10 TABLET, FILM COATED ORAL
Status: DISCONTINUED | OUTPATIENT
Start: 2018-01-15 | End: 2018-01-16 | Stop reason: HOSPADM

## 2018-01-15 RX ORDER — EPHEDRINE SULFATE 50 MG/ML
INJECTION, SOLUTION INTRAVENOUS AS NEEDED
Status: DISCONTINUED | OUTPATIENT
Start: 2018-01-15 | End: 2018-01-15 | Stop reason: HOSPADM

## 2018-01-15 RX ORDER — ONDANSETRON 2 MG/ML
INJECTION INTRAMUSCULAR; INTRAVENOUS AS NEEDED
Status: DISCONTINUED | OUTPATIENT
Start: 2018-01-15 | End: 2018-01-15 | Stop reason: HOSPADM

## 2018-01-15 RX ORDER — LIDOCAINE HYDROCHLORIDE 10 MG/ML
0.1 INJECTION, SOLUTION EPIDURAL; INFILTRATION; INTRACAUDAL; PERINEURAL AS NEEDED
Status: DISCONTINUED | OUTPATIENT
Start: 2018-01-15 | End: 2018-01-15 | Stop reason: HOSPADM

## 2018-01-15 RX ORDER — SODIUM CHLORIDE, SODIUM LACTATE, POTASSIUM CHLORIDE, CALCIUM CHLORIDE 600; 310; 30; 20 MG/100ML; MG/100ML; MG/100ML; MG/100ML
INJECTION, SOLUTION INTRAVENOUS
Status: DISCONTINUED | OUTPATIENT
Start: 2018-01-15 | End: 2018-01-15 | Stop reason: HOSPADM

## 2018-01-15 RX ORDER — OXYCODONE HYDROCHLORIDE 5 MG/1
10 TABLET ORAL
Status: DISCONTINUED | OUTPATIENT
Start: 2018-01-15 | End: 2018-01-16 | Stop reason: HOSPADM

## 2018-01-15 RX ORDER — CEFAZOLIN SODIUM/WATER 2 G/20 ML
2 SYRINGE (ML) INTRAVENOUS EVERY 8 HOURS
Status: COMPLETED | OUTPATIENT
Start: 2018-01-15 | End: 2018-01-16

## 2018-01-15 RX ORDER — SODIUM CHLORIDE 0.9 % (FLUSH) 0.9 %
5-10 SYRINGE (ML) INJECTION EVERY 8 HOURS
Status: DISCONTINUED | OUTPATIENT
Start: 2018-01-16 | End: 2018-01-16 | Stop reason: HOSPADM

## 2018-01-15 RX ORDER — SODIUM CHLORIDE 0.9 % (FLUSH) 0.9 %
5-10 SYRINGE (ML) INJECTION EVERY 8 HOURS
Status: DISCONTINUED | OUTPATIENT
Start: 2018-01-15 | End: 2018-01-15 | Stop reason: HOSPADM

## 2018-01-15 RX ORDER — ADHESIVE BANDAGE
30 BANDAGE TOPICAL DAILY PRN
Status: DISCONTINUED | OUTPATIENT
Start: 2018-01-16 | End: 2018-01-16 | Stop reason: HOSPADM

## 2018-01-15 RX ORDER — KETOROLAC TROMETHAMINE 30 MG/ML
15 INJECTION, SOLUTION INTRAMUSCULAR; INTRAVENOUS EVERY 6 HOURS
Status: COMPLETED | OUTPATIENT
Start: 2018-01-15 | End: 2018-01-16

## 2018-01-15 RX ORDER — NALOXONE HYDROCHLORIDE 0.4 MG/ML
0.4 INJECTION, SOLUTION INTRAMUSCULAR; INTRAVENOUS; SUBCUTANEOUS AS NEEDED
Status: DISCONTINUED | OUTPATIENT
Start: 2018-01-15 | End: 2018-01-16 | Stop reason: HOSPADM

## 2018-01-15 RX ORDER — INSULIN LISPRO 100 [IU]/ML
INJECTION, SOLUTION INTRAVENOUS; SUBCUTANEOUS
Status: DISCONTINUED | OUTPATIENT
Start: 2018-01-15 | End: 2018-01-16 | Stop reason: HOSPADM

## 2018-01-15 RX ORDER — CEFAZOLIN SODIUM 1 G/3ML
2 INJECTION, POWDER, FOR SOLUTION INTRAMUSCULAR; INTRAVENOUS ONCE
Status: DISCONTINUED | OUTPATIENT
Start: 2018-01-15 | End: 2018-01-15 | Stop reason: SDUPTHER

## 2018-01-15 RX ORDER — FENTANYL CITRATE 50 UG/ML
25 INJECTION, SOLUTION INTRAMUSCULAR; INTRAVENOUS
Status: DISCONTINUED | OUTPATIENT
Start: 2018-01-15 | End: 2018-01-15 | Stop reason: HOSPADM

## 2018-01-15 RX ORDER — FAMOTIDINE 20 MG/1
20 TABLET, FILM COATED ORAL 2 TIMES DAILY
Status: DISCONTINUED | OUTPATIENT
Start: 2018-01-15 | End: 2018-01-16 | Stop reason: HOSPADM

## 2018-01-15 RX ORDER — POLYETHYLENE GLYCOL 3350 17 G/17G
17 POWDER, FOR SOLUTION ORAL DAILY
Status: DISCONTINUED | OUTPATIENT
Start: 2018-01-15 | End: 2018-01-16 | Stop reason: HOSPADM

## 2018-01-15 RX ORDER — FACIAL-BODY WIPES
10 EACH TOPICAL DAILY PRN
Status: DISCONTINUED | OUTPATIENT
Start: 2018-01-17 | End: 2018-01-16 | Stop reason: HOSPADM

## 2018-01-15 RX ORDER — HYDROMORPHONE HYDROCHLORIDE 2 MG/ML
0.5 INJECTION, SOLUTION INTRAMUSCULAR; INTRAVENOUS; SUBCUTANEOUS
Status: DISCONTINUED | OUTPATIENT
Start: 2018-01-15 | End: 2018-01-16 | Stop reason: HOSPADM

## 2018-01-15 RX ORDER — DEXAMETHASONE SODIUM PHOSPHATE 4 MG/ML
10 INJECTION, SOLUTION INTRA-ARTICULAR; INTRALESIONAL; INTRAMUSCULAR; INTRAVENOUS; SOFT TISSUE ONCE
Status: COMPLETED | OUTPATIENT
Start: 2018-01-16 | End: 2018-01-16

## 2018-01-15 RX ORDER — CELECOXIB 200 MG/1
400 CAPSULE ORAL ONCE
Status: COMPLETED | OUTPATIENT
Start: 2018-01-15 | End: 2018-01-15

## 2018-01-15 RX ORDER — ACETAMINOPHEN 500 MG
1000 TABLET ORAL ONCE
Status: COMPLETED | OUTPATIENT
Start: 2018-01-15 | End: 2018-01-15

## 2018-01-15 RX ORDER — NEOSTIGMINE METHYLSULFATE 1 MG/ML
INJECTION INTRAVENOUS AS NEEDED
Status: DISCONTINUED | OUTPATIENT
Start: 2018-01-15 | End: 2018-01-15 | Stop reason: HOSPADM

## 2018-01-15 RX ORDER — LATANOPROST 50 UG/ML
1 SOLUTION/ DROPS OPHTHALMIC
Status: DISCONTINUED | OUTPATIENT
Start: 2018-01-15 | End: 2018-01-16 | Stop reason: HOSPADM

## 2018-01-15 RX ORDER — HYDROMORPHONE HYDROCHLORIDE 1 MG/ML
0.5 INJECTION, SOLUTION INTRAMUSCULAR; INTRAVENOUS; SUBCUTANEOUS
Status: DISCONTINUED | OUTPATIENT
Start: 2018-01-15 | End: 2018-01-15 | Stop reason: SDUPTHER

## 2018-01-15 RX ORDER — HYDROMORPHONE HYDROCHLORIDE 2 MG/ML
INJECTION, SOLUTION INTRAMUSCULAR; INTRAVENOUS; SUBCUTANEOUS AS NEEDED
Status: DISCONTINUED | OUTPATIENT
Start: 2018-01-15 | End: 2018-01-15 | Stop reason: HOSPADM

## 2018-01-15 RX ORDER — DEXTROSE 50 % IN WATER (D50W) INTRAVENOUS SYRINGE
12.5-25 AS NEEDED
Status: DISCONTINUED | OUTPATIENT
Start: 2018-01-15 | End: 2018-01-16 | Stop reason: HOSPADM

## 2018-01-15 RX ORDER — DIPHENHYDRAMINE HYDROCHLORIDE 50 MG/ML
12.5 INJECTION, SOLUTION INTRAMUSCULAR; INTRAVENOUS AS NEEDED
Status: DISCONTINUED | OUTPATIENT
Start: 2018-01-15 | End: 2018-01-15 | Stop reason: HOSPADM

## 2018-01-15 RX ORDER — SODIUM CHLORIDE 9 MG/ML
125 INJECTION, SOLUTION INTRAVENOUS CONTINUOUS
Status: DISPENSED | OUTPATIENT
Start: 2018-01-15 | End: 2018-01-16

## 2018-01-15 RX ORDER — GLYCOPYRROLATE 0.2 MG/ML
INJECTION INTRAMUSCULAR; INTRAVENOUS AS NEEDED
Status: DISCONTINUED | OUTPATIENT
Start: 2018-01-15 | End: 2018-01-15 | Stop reason: HOSPADM

## 2018-01-15 RX ORDER — OXYCODONE HYDROCHLORIDE 5 MG/1
5 TABLET ORAL
Status: DISCONTINUED | OUTPATIENT
Start: 2018-01-15 | End: 2018-01-16 | Stop reason: HOSPADM

## 2018-01-15 RX ORDER — MAGNESIUM SULFATE 100 %
4 CRYSTALS MISCELLANEOUS AS NEEDED
Status: DISCONTINUED | OUTPATIENT
Start: 2018-01-15 | End: 2018-01-16 | Stop reason: HOSPADM

## 2018-01-15 RX ORDER — AMOXICILLIN 250 MG
1 CAPSULE ORAL 2 TIMES DAILY
Status: DISCONTINUED | OUTPATIENT
Start: 2018-01-15 | End: 2018-01-16 | Stop reason: HOSPADM

## 2018-01-15 RX ADMIN — SODIUM CHLORIDE, SODIUM LACTATE, POTASSIUM CHLORIDE, CALCIUM CHLORIDE: 600; 310; 30; 20 INJECTION, SOLUTION INTRAVENOUS at 07:49

## 2018-01-15 RX ADMIN — Medication 80 MCG: at 08:15

## 2018-01-15 RX ADMIN — DOCUSATE SODIUM AND SENNOSIDES 1 TABLET: 8.6; 5 TABLET, FILM COATED ORAL at 13:43

## 2018-01-15 RX ADMIN — NEOSTIGMINE METHYLSULFATE 4 MG: 1 INJECTION INTRAVENOUS at 09:20

## 2018-01-15 RX ADMIN — OXYCODONE HYDROCHLORIDE 5 MG: 5 TABLET ORAL at 13:43

## 2018-01-15 RX ADMIN — TRANEXAMIC ACID 1000 MG: 100 INJECTION, SOLUTION INTRAVENOUS at 10:05

## 2018-01-15 RX ADMIN — Medication 80 MCG: at 09:10

## 2018-01-15 RX ADMIN — GLYCOPYRROLATE 0.6 MG: 0.2 INJECTION INTRAMUSCULAR; INTRAVENOUS at 09:20

## 2018-01-15 RX ADMIN — FAMOTIDINE 20 MG: 20 TABLET, FILM COATED ORAL at 13:43

## 2018-01-15 RX ADMIN — Medication 40 MCG: at 08:08

## 2018-01-15 RX ADMIN — Medication 40 MCG: at 08:09

## 2018-01-15 RX ADMIN — ONDANSETRON 4 MG: 2 INJECTION INTRAMUSCULAR; INTRAVENOUS at 09:15

## 2018-01-15 RX ADMIN — DEXAMETHASONE SODIUM PHOSPHATE 4 MG: 4 INJECTION, SOLUTION INTRA-ARTICULAR; INTRALESIONAL; INTRAMUSCULAR; INTRAVENOUS; SOFT TISSUE at 08:37

## 2018-01-15 RX ADMIN — POLYETHYLENE GLYCOL 3350 17 G: 17 POWDER, FOR SOLUTION ORAL at 13:43

## 2018-01-15 RX ADMIN — Medication 40 MCG: at 08:05

## 2018-01-15 RX ADMIN — HYDROMORPHONE HYDROCHLORIDE 0.4 MG: 2 INJECTION, SOLUTION INTRAMUSCULAR; INTRAVENOUS; SUBCUTANEOUS at 09:30

## 2018-01-15 RX ADMIN — ACETAMINOPHEN 650 MG: 325 TABLET ORAL at 13:43

## 2018-01-15 RX ADMIN — ACYCLOVIR 800 MG: 800 TABLET ORAL at 22:22

## 2018-01-15 RX ADMIN — Medication 2 G: at 08:17

## 2018-01-15 RX ADMIN — FENTANYL CITRATE 25 MCG: 50 INJECTION, SOLUTION INTRAMUSCULAR; INTRAVENOUS at 10:45

## 2018-01-15 RX ADMIN — PREGABALIN 75 MG: 75 CAPSULE ORAL at 07:27

## 2018-01-15 RX ADMIN — EPHEDRINE SULFATE 10 MG: 50 INJECTION, SOLUTION INTRAVENOUS at 08:36

## 2018-01-15 RX ADMIN — ACETAMINOPHEN 1000 MG: 500 TABLET ORAL at 07:27

## 2018-01-15 RX ADMIN — PROPOFOL 100 MG: 10 INJECTION, EMULSION INTRAVENOUS at 07:56

## 2018-01-15 RX ADMIN — LATANOPROST 1 DROP: 50 SOLUTION OPHTHALMIC at 22:23

## 2018-01-15 RX ADMIN — CELECOXIB 400 MG: 200 CAPSULE ORAL at 07:27

## 2018-01-15 RX ADMIN — ROCURONIUM BROMIDE 40 MG: 10 INJECTION, SOLUTION INTRAVENOUS at 08:05

## 2018-01-15 RX ADMIN — SODIUM CHLORIDE 125 ML/HR: 900 INJECTION, SOLUTION INTRAVENOUS at 09:40

## 2018-01-15 RX ADMIN — FENTANYL CITRATE 25 MCG: 50 INJECTION, SOLUTION INTRAMUSCULAR; INTRAVENOUS at 10:36

## 2018-01-15 RX ADMIN — ATORVASTATIN CALCIUM 10 MG: 10 TABLET, FILM COATED ORAL at 22:12

## 2018-01-15 RX ADMIN — REGULAR STRENGTH 325 MG: 325 TABLET ORAL at 13:43

## 2018-01-15 RX ADMIN — OXYCODONE HYDROCHLORIDE 5 MG: 5 TABLET ORAL at 18:39

## 2018-01-15 RX ADMIN — Medication 40 MCG: at 08:12

## 2018-01-15 RX ADMIN — PROPOFOL 20 MG: 10 INJECTION, EMULSION INTRAVENOUS at 09:20

## 2018-01-15 RX ADMIN — Medication 2 G: at 16:24

## 2018-01-15 RX ADMIN — GLYCOPYRROLATE 0.1 MG: 0.2 INJECTION INTRAMUSCULAR; INTRAVENOUS at 08:34

## 2018-01-15 RX ADMIN — OXYCODONE HYDROCHLORIDE 5 MG: 5 TABLET ORAL at 22:22

## 2018-01-15 RX ADMIN — PROPOFOL 40 MG: 10 INJECTION, EMULSION INTRAVENOUS at 07:57

## 2018-01-15 RX ADMIN — FENTANYL CITRATE 50 MCG: 50 INJECTION, SOLUTION INTRAMUSCULAR; INTRAVENOUS at 08:45

## 2018-01-15 RX ADMIN — KETOROLAC TROMETHAMINE 15 MG: 30 INJECTION, SOLUTION INTRAMUSCULAR at 18:39

## 2018-01-15 RX ADMIN — SODIUM CHLORIDE 125 ML/HR: 9 INJECTION, SOLUTION INTRAVENOUS at 09:40

## 2018-01-15 RX ADMIN — LIDOCAINE HYDROCHLORIDE 60 MG: 20 INJECTION, SOLUTION EPIDURAL; INFILTRATION; INTRACAUDAL; PERINEURAL at 07:56

## 2018-01-15 RX ADMIN — ROPIVACAINE HYDROCHLORIDE: 5 INJECTION, SOLUTION EPIDURAL; INFILTRATION; PERINEURAL at 09:10

## 2018-01-15 RX ADMIN — FENTANYL CITRATE 50 MCG: 50 INJECTION, SOLUTION INTRAMUSCULAR; INTRAVENOUS at 07:54

## 2018-01-15 RX ADMIN — ROCURONIUM BROMIDE 10 MG: 10 INJECTION, SOLUTION INTRAVENOUS at 08:20

## 2018-01-15 RX ADMIN — SUCCINYLCHOLINE CHLORIDE 100 MG: 20 INJECTION INTRAMUSCULAR; INTRAVENOUS at 07:56

## 2018-01-15 RX ADMIN — INSULIN LISPRO 3 UNITS: 100 INJECTION, SOLUTION INTRAVENOUS; SUBCUTANEOUS at 16:24

## 2018-01-15 RX ADMIN — SODIUM CHLORIDE, SODIUM LACTATE, POTASSIUM CHLORIDE, AND CALCIUM CHLORIDE 25 ML/HR: 600; 310; 30; 20 INJECTION, SOLUTION INTRAVENOUS at 07:26

## 2018-01-15 NOTE — IP AVS SNAPSHOT
[address] [facility] 
[phone] Patient: Nida Lane MRN: [id number] MARIE:[birth year] About your hospitalization You were admitted on:  January 15, 2018 You last received care in the:  [facility] 3 ORTHOPEDICS You were discharged on:  January 16, 2018 Why you were hospitalized Your primary diagnosis was:  Not on File Your diagnoses also included:  Status Post Total Replacement Of Left Hip Follow-up Information Follow up With Details Comments Contact Info Daljit Hampton MD Schedule an appointment as soon as possible for a visit in 2 weeks  [address] [place] [address] [address] [facility] 
[phone] Discharge Orders None A check laith indicates which time of day the medication should be taken. My Medications START taking these medications Instructions Each Dose to Equal  
 Morning Noon Evening Bedtime  
 acetaminophen 325 mg tablet Commonly known as:  TYLENOL Your last dose was: Your next dose is: Take 2 Tabs by mouth every six (6) hours for 14 days. 650 mg  
    
   
   
   
  
 aspirin delayed-release 325 mg tablet Your last dose was: Your next dose is: Take 1 Tab by mouth two (2) times a day for 30 days. 325 mg  
    
   
   
   
  
 famotidine 20 mg tablet Commonly known as:  PEPCID Your last dose was: Your next dose is: Take 1 Tab by mouth two (2) times a day for 30 days. 20 mg  
    
   
   
   
  
 oxyCODONE IR 5 mg immediate release tablet Commonly known as:  Cecelia Bradford Your last dose was: Your next dose is: Take 1-2 Tabs by mouth every four (4) hours as needed. Max Daily Amount: 60 mg.  
 5-10 mg CHANGE how you take these medications Instructions Each Dose to Equal  
 Morning Noon Evening Bedtime naproxen 500 mg tablet Commonly known as:  NAPROSYN What changed:  additional instructions Your last dose was: Your next dose is: Take 1 Tab by mouth two (2) times daily (with meals). Hold for two weeks. 500 mg CONTINUE taking these medications Instructions Each Dose to Equal  
 Morning Noon Evening Bedtime  
 acyclovir 800 mg tablet Commonly known as:  ZOVIRAX Your last dose was: Your next dose is: Take 800 mg by mouth nightly. Indications: shingles prevention 800 mg  
    
   
   
   
  
 atorvastatin 10 mg tablet Commonly known as:  LIPITOR Your last dose was: Your next dose is: Take 10 mg by mouth nightly. 10 mg  
    
   
   
   
  
 hydroCHLOROthiazide 25 mg tablet Commonly known as:  HYDRODIURIL Your last dose was: Your next dose is: Take 25 mg by mouth daily. Indications: EDEMA, HYPERTENSION  
 25 mg  
    
   
   
   
  
 latanoprost 0.005 % ophthalmic solution Commonly known as:  David Wooten Your last dose was: Your next dose is:    
   
   
 Administer 1 Drop to both eyes nightly. Indications: OPEN ANGLE GLAUCOMA  
 1 Drop  
    
   
   
   
  
 lisinopril 5 mg tablet Commonly known as:  Ryder Calderon Your last dose was: Your next dose is: Take 5 mg by mouth daily. Indications: HYPERTENSION  
 5 mg  
    
   
   
   
  
 metFORMIN 500 mg tablet Commonly known as:  GLUCOPHAGE Your last dose was: Your next dose is: Take 500 mg by mouth daily (with breakfast). Indications: TYPE 2 DIABETES MELLITUS  
 500 mg  
    
   
   
   
  
 multivitamin with iron tablet Your last dose was: Your next dose is: Take 1 Tab by mouth daily. 1 Tab  
    
   
   
   
  
 prednisoLONE acetate 1 % ophthalmic suspension Commonly known as:  PRED FORTE Your last dose was: Your next dose is:    
   
   
 Administer 1 Drop to left eye daily. Indications: SEVERE OCULAR INFLAMMATION  
 1 Drop  
    
   
   
   
  
 timolol 0.5 % ophthalmic solution Commonly known as:  TIMOPTIC Your last dose was: Your next dose is:    
   
   
 Administer 1 Drop to both eyes daily. 1 Drop STOP taking these medications   
 cephALEXin 500 mg capsule Commonly known as:  Branda Valdivia KEFLEX 500 mg capsule Generic drug:  cephALEXin Where to Get Your Medications Information on where to get these meds will be given to you by the nurse or doctor. ! Ask your nurse or doctor about these medications  
  acetaminophen 325 mg tablet  
 aspirin delayed-release 325 mg tablet  
 famotidine 20 mg tablet  
 oxyCODONE IR 5 mg immediate release tablet Discharge Instructions Dilmadra Barry [facility] Surgery: Total Hip Replacement Surgeon:   Tylor Carey MD 
Surgery Date:  1/15/2018 ? To relieve pain: ? Use ice/gel packs. ? Put the ice pack directly over the wound, or anywhere you are hurting or swollen. ? To control pain and swelling, keep ice on regularly, especially after physical activity. ? The packs should stay cold for 3-4 hours. When it is not cold anymore, rotate with the packs in the freezer. ? Elevate your leg. This will also keep swelling down. ? Rest for at least 20 minutes between activity or exercises. ? To keep track of your pain medications, write down what you take and when you take it. ? The last dose of pain medication you got in the hospital was:    
 
Medication Dose Date & Time ? Choose your medications based on the pain scale below: ? To keep your pain under control, take Tylenol every 6 hours for 14 days - even if you feel like you dont need it.   
  
? For mild to moderate pain (1-6 on pain scale), take one pain pill every 3-4 hours as needed. ? For severe pain (7-10 on pain scale), take two pain pills every 3-4 hours as needed. ? To prevent nausea, take your pain medications with food. Pain Scale ? As your pain lessens: 
 
? Slowly start taking less pain medication. You may do this by waiting longer between doses or by taking smaller doses. ? Stop using the pain medications as soon as you no longer need it, usually in 2-3 weeks. ? Aspirin ? To prevent blood clots, you will need to take Aspirin 325 mg twice a day for 30 days. ? To prevent stomach upset or bleeding: ? Do not take non-steroidal anti-inflammatory medications (Ibuprofen, Advil, Motrin, Naproxen, etc.) ? Take Pepcid 20 mg twice a day, or a similar home medication, while you are taking a blood thinner. OPSITE (Honeycomb dressing) ? Keep your clear, waterproof dressing in place for 5-7 days after your leave the hospital. 
 
? If you are still having drainage, you will need to change your dressing in 5-7 days. You will be given one extra dressing to use at home. ? If there is no more drainage from the wound, you may leave it open to the air. OPSITE DRESSING INSTRUCTIONS PRINEO DRESSING INSTRUCTIONS ? Sharlotte Player is a type of skin glue and mesh that is keeping your wound together. ? Leave this covering alone. Do not peel it off.  
 
? Do not apply oils or lotions over it. ? You may shower with this dressing but do not soak it. Gently pat your wound dry when you get out of the shower. ? DO NOTs: 
? Do not rub your surgical wound ? Do not put lotion or oils on your wound. ? Do not take a tub bath or go swimming until your doctor says it is ok. 
 
? You may shower with this dressing over your wound. ? After showering pat the dressing dry. ? If you have staples a home health nurse will remove them in about 10 days. ? To increase and promote healing: 
 
? Stop Smoking (or at least cut back on 
     Smoking). ? Eat a well-balanced diet (high in protein 
     and vitamin C). ? If you have a poor appetite, drink Ensure, Glucerna, or [place] Instant Breakfast for the next 30 days. ? If you are diabetic, you should control your blood  
      sugars to prevent infection and help your wound  
      to heal. 
               
 
 
 
? To prevent constipation, stay active and drink plenty of fluid. ? While using pain medications, you should also take stool softeners and laxatives, such as Pericolace 
     and Miralax. ? If you are having too many bowel Movements, then you may need 
     to stop taking the laxatives. ? You should have a bowel movement 3-4 days  
     after surgery and then at least every other day while 
     on pain medication. ? To improve your recovery, you must be active! ? Use your walker and take short walks (in your home). about every 2 hours during the day. ? Try to increase how far you walk each day. ? You can put as much weight on your leg as you can tolerate while walking. WBAT   
 
? Home health physical therapy will come to your 
     home the day after you leave the hospital.  The 
     therapist will visit about 4 times over the next couple of 
     weeks to teach you how to get out of bed, to safely walk 
     in your home, and to do your exercises. ? NO DRIVING until your surgeon tells you it is ok. 
 
? You can return to work when cleared by a physician. ? Please call your physician immediately if you have: 
 
? Constant bleeding from your wound. ? Increasing redness or swelling around your wound (Some warmth, bruising and swelling is normal). ? Change in wound drainage (increase in amount, color, or bad smell). ? Change in mental status (unusual behavior ? Temperature over 101.5 degrees Fahrenheit ? Pain or redness in the calf (back of your lower leg  see picture) ? Increased swelling of the thigh, ankle, calf, or foot. ? Emergency: CALL 911 if you have: 
 
? Shortness of breath ? Chest pain when you cough or taking a deep breath ? Please call your surgeons office at [phone] for a follow up appointment. _ 
? You should call as soon as you get home or the next day after discharge. Ask to make an appointment in 2 weeks. ? If you have questions or concerns during normal business hours, you may reach Dr. Trinh Worrell' Team at [phone]. Process Relations Announcement We are excited to announce that we are making your provider's discharge notes available to you in Process Relations. You will see these notes when they are completed and signed by the physician that discharged you from your recent hospital stay. If you have any questions or concerns about any information you see in Process Relations, please call the Health Information Department where you were seen or reach out to your Primary Care Provider for more information about your plan of care. Introducing [facility]! [facility] introduces Process Relations patient portal. Now you can access parts of your medical record, email your doctor's office, and request medication refills online. 1. In your internet browser, go to [email or web address] 2. Click on the First Time User? Click Here link in the Sign In box. You will see the New Member Sign Up page. 3. Enter your Process Relations Access Code exactly as it appears below. You will not need to use this code after youve completed the sign-up process. If you do not sign up before the expiration date, you must request a new code. · Process Relations Access Code: [email or web address] Expires: 4/7/2018  1:12 PM 
 
4.  Enter the last four digits of your Social Security Number (xxxx) and Date of Birth (mm/dd/yyyy) as indicated and click Submit. You will be taken to the next sign-up page. 5. Create a Desigual ID. This will be your Desigual login ID and cannot be changed, so think of one that is secure and easy to remember. 6. Create a Desigual password. You can change your password at any time. 7. Enter your Password Reset Question and Answer. This can be used at a later time if you forget your password. 8. Enter your e-mail address. You will receive e-mail notification when new information is available in [address] 9. Click Sign Up. You can now view and download portions of your medical record. 10. Click the Download Summary menu link to download a portable copy of your medical information. If you have questions, please visit the Frequently Asked Questions section of the Desigual website. Remember, Desigual is NOT to be used for urgent needs. For medical emergencies, dial 911. Now available from your iPhone and Android! Providers Seen During Your Hospitalization Provider Specialty Primary office phone Antione Carney MD Orthopedic Surgery [phone] Your Primary Care Physician (PCP) Primary Care Physician Office Phone Office Fax Alicia Yap [phone] You are allergic to the following No active allergies Recent Documentation Height Weight BMI OB Status Smoking Status 1.626 m 69 kg 26.11 kg/m2 Hysterectomy Never Smoker Emergency Contacts Name Discharge Info Relation Home Work Mobile Gautam Hand DISCHARGE CAREGIVER [3] Spouse [3] [phone] Patient Belongings The following personal items are in your possession at time of discharge: 
  Dental Appliances: None  Visual Aid: Glasses      Home Medications: None   Jewelry: None  Clothing: Undergarments, Pants, Shirt, Footwear, Socks    Other Valuables: Eyeglasses  Personal Items Sent to Safe: declined Please provide this summary of care documentation to your next provider. Signatures-by signing, you are acknowledging that this After Visit Summary has been reviewed with you and you have received a copy. Patient Signature:  ____________________________________________________________ Date:  ____________________________________________________________  
  
Marlyse Leaks Provider Signature:  ____________________________________________________________ Date:  ____________________________________________________________

## 2018-01-15 NOTE — ANESTHESIA POSTPROCEDURE EVALUATION
Post-Anesthesia Evaluation and Assessment    Patient: Cortney  MRN: [id number]  SSN: [id number]    YOB: 1940  Age: 78 y.o. Sex: female       Cardiovascular Function/Vital Signs  Visit Vitals    /52    Pulse 61    Temp 36.4 °C (97.6 °F)    Resp 12    Ht 5' 4\" (1.626 m)    Wt 69 kg (152 lb 1.9 oz)    SpO2 96%    BMI 26.11 kg/m2       Patient is status post general anesthesia for Procedure(s):  LEFT TOTAL HIP ARTHROPLASTY ANTERIOR APPROACH WITH NAVIGATION. Nausea/Vomiting: None    Postoperative hydration reviewed and adequate. Pain:  Pain Scale 1: (P) Numeric (0 - 10) (01/15/18 0983)  Pain Intensity 1: (P) 0 (01/15/18 0963)   Managed    Neurological Status:   Neuro (WDL): Within Defined Limits (01/15/18 4005)   At baseline    Mental Status and Level of Consciousness: Arousable    Pulmonary Status:   O2 Device: Nasal cannula (01/15/18 0940)   Adequate oxygenation and airway patent    Complications related to anesthesia: None    Post-anesthesia assessment completed.  No concerns    Signed By: Jesse Fonseca MD     January 15, 2018

## 2018-01-15 NOTE — OP NOTES
Philippe Ferrer MD - Adult Reconstruction and Total Joint Replacement    Deepika Flynn - MRN [id number] - : 1940 (78 y.o.)      Date: 1/15/2018    Pre-operative Diagnosis: Left Hip DJD     Post-operative Diagnosis: same     Procedure:  (1) Left Total Hip Arthroplasty, Direct Anterior Approach    (2) Intraoperative Fluoroscopy    (3) Imageless Computer Navigation     Implants Used:     Implant Name Type Inv. Item Serial No.  Lot No. LRB No. Used Action   SHELL ACET M-HLE 56MM GRA -- INTEGRIP REV - [id number]  SHELL ACET M-HLE 56MM GRA -- INTEGRIP REV [id number] [employer] NA Left 1 Implanted   alteon bone screw   [id number]  NA Left 1 Implanted   LINER GXL NTRL CUP 36MM GRP3 --  - [id number]  LINER GXL NTRL CUP 36MM GRP3 --  [id number] [employer] NA Left 1 Implanted   STEM TAPR SO HA  12 -- NOVATION - [id number]  STEM TAPR SO HA  12 -- NOVATION [id number] [employer] NA Left 1 Implanted   CoCr Femoral head     [id number]   NA Left 1 Implanted       Anesthesia: GETA + local    Surgeon: Philippe Ferrer     Assist: MANJIT Berumen    EBL: 300cc     Drains: none     Specimens: none     Complications: none     Condition: stable to PACU     Brief History: Longstanding hip pain, unresponsive to conservative treatment. The risks, benefits, and alternatives to total hip replacement were thoroughly explained. The patient understood no guarantees could be given about the outcome of the procedure and wished to proceed. Description of Procedure: After being identified in the preoperative holding area and having their operative site marked, the patient was brought back to the operating room where they underwent anesthesia to good effect. They were  placed in the supine position with a bump under the hip. The operative extremity was then prepped and draped in the usual fashion using sterile technique. Preoperative antibiotics had been administered. An appropriate time-out was performed.  We began by placing the pelvic tracker with two percutaneous Shanz pins into the ipsilateral ilium. The pelvis was then registered with the navigation system. An incision was made 2 fingerbreadths lateral and distal to the ASIS. The skin flaps were developed down to the tensor fascia. This was divided sharply. Blunt dissection was taken medially over the tensor muscle. Retractors were placed superior and inferior to the femoral neck. The anterior fat pad was debrided. Circumflex vessels were identified and cauterized. A provisional neck cut was performed. The head was removed from the acetabulum. We then excised the labrum. We sequentially reamed for the acetabular shell. This was placed in the appropriate abduction and version. Position was confirmed by navigation and fluoroscopy . The liner was then impacted into the locking mechanism. We then turned our attention to the femur. Elevators were used to gain access to the proximal femur. Soft tissue releases were performed as necessary. The lateralizer was utilized. We then sequentially broached up to the final size trial. We placed a trial neck and head and had excellent restoration of leg length and offset with good soft tissue balance. We selected these as our final implants. Final components were inserted and the hip was reduced after thorough lavage. Restoration of appropriate leg length was confirmed by navigation and fluoroscopy. We again irrigated. The tensor fascia was closed. Periarticular injection was performed. Skin closure was performed in layers. A sterile dressing was applied. The patient was awakened, moved to the stretcher, and taken to the recovery room in stable condition. At the conclusion of the procedure, all counts were correct. There were no immediate complications.     Additional Procedure:   Date: 1/15/2018    Preoperative diagnosis: LEFT HIP OA    Postoperative diagnosis: LEFT HIP OA    Procedure performed: Procedure(s):  LEFT TOTAL HIP ARTHROPLASTY ANTERIOR APPROACH WITH NAVIGATION    Anesthesia: General    Surgeon(s) and Role:     * Lucy Larson MD - Primary    Assistant: MANJIT Neff      This describes the use of intraoperative fluoroscopy. Fluoroscopic imaging was utilized in orthogonal planes as well as using live technique for all phases of the procedure, described separately in the operative report, including hardware placement.     Lucy Larson MD

## 2018-01-15 NOTE — DISCHARGE INSTRUCTIONS
[facility]se [facility]  Surgery: Total Hip Replacement  Surgeon:   Zoraida Valverde MD  Surgery Date:  1/15/2018     To relieve pain:   Use ice/gel packs.  Put the ice pack directly over the wound, or anywhere you are hurting or swollen.  To control pain and swelling, keep ice on regularly, especially after physical activity.  The packs should stay cold for 3-4 hours. When it is not cold anymore, rotate with the packs in the freezer.  Elevate your leg. This will also keep swelling down.  Rest for at least 20 minutes between activity or exercises.  To keep track of your pain medications, write down what you take and when you take it.  The last dose of pain medication you got in the hospital was:       Medication    Dose    Date & Time             Choose your medications based on the pain scale below:     To keep your pain under control, take Tylenol every 6 hours for 14 days - even if you feel like you dont need it.  For mild to moderate pain (1-6 on pain scale), take one pain pill every 3-4 hours as needed.  For severe pain (7-10 on pain scale), take two pain pills every 3-4 hours as needed.  To prevent nausea, take your pain medications with food. Pain Scale                   As your pain lessens:     Slowly start taking less pain medication. You may do this by waiting longer between doses or by taking smaller doses.  Stop using the pain medications as soon as you no longer need it, usually in 2-3 weeks.  Aspirin   To prevent blood clots, you will need to take Aspirin 325 mg twice a day for 30 days.  To prevent stomach upset or bleeding:   Do not take non-steroidal anti-inflammatory medications (Ibuprofen, Advil, Motrin, Naproxen, etc.)    Take Pepcid 20 mg twice a day, or a similar home medication, while you are taking a blood thinner.              OPSITE (Honeycomb dressing)     Keep your clear, waterproof dressing in place for 5-7 days after your leave the hospital.     If you are still having drainage, you will need to change your dressing in 5-7 days. You will be given one extra dressing to use at home.  If there is no more drainage from the wound, you may leave it open to the air. OPSITE DRESSING INSTRUCTIONS                  PRINEO DRESSING INSTRUCTIONS      Prineo is a type of skin glue and mesh that is keeping your wound together.  Leave this covering alone. Do not peel it off.  Do not apply oils or lotions over it.  You may shower with this dressing but do not soak it. Gently pat your wound dry when you get out of the shower.  DO NOTs:   Do not rub your surgical wound   Do not put lotion or oils on your wound.  Do not take a tub bath or go swimming until your doctor says it is ok.  You may shower with this dressing over your wound.  After showering pat the dressing dry.  If you have staples a home health nurse will remove them in about 10 days.  To increase and promote healing:     Stop Smoking (or at least cut back on       Smoking).  Eat a well-balanced diet (high in protein       and vitamin C).  If you have a poor appetite, drink Ensure, Glucerna, or Pinch Instant Breakfast for the next 30 days.  If you are diabetic, you should control your blood         sugars to prevent infection and help your wound         to heal.                         To prevent constipation, stay active and drink plenty of fluid.  While using pain medications, you should also take stool softeners and laxatives, such as Pericolace       and Miralax.  If you are having too many bowel       Movements, then you may need       to stop taking the laxatives.      You should have a bowel movement 3-4 days        after surgery and then at least every other day while       on pain medication.  To improve your recovery, you must be active!  Use your walker and take short walks         (in your home). about every 2 hours during the day.  Try to increase how far you walk each day.  You can put as much weight on your leg as you can tolerate while walking. [facility] physical therapy will come to your       home the day after you leave the hospital.  The       therapist will visit about 4 times over the next couple of       weeks to teach you how to get out of bed, to safely walk       in your home, and to do your exercises.  NO DRIVING until your surgeon tells you it is ok.  You can return to work when cleared by a physician.  Please call your physician immediately if you have:     Constant bleeding from your wound.  Increasing redness or swelling around your wound (Some warmth, bruising and swelling is normal).  Change in wound drainage (increase in amount, color, or bad smell).  Change in mental status (unusual behavior   Temperature over 101.5 degrees Fahrenheit      Pain or redness in the calf (back of your lower leg - see picture)     Increased swelling of the thigh, ankle, calf, or foot.  Emergency: CALL 911 if you have:     Shortness of breath     Chest pain when you cough or taking a deep breath     Please call your surgeons office at [phone] for a follow up appointment. _   You should call as soon as you get home or the next day after discharge. Ask to make an appointment in 2 weeks.  If you have questions or concerns during normal business hours, you may reach Dr. Chio Samano' Team at [phone].

## 2018-01-15 NOTE — ANESTHESIA PREPROCEDURE EVALUATION
Anesthetic History   No history of anesthetic complications            Review of Systems / Medical History  Patient summary reviewed, nursing notes reviewed and pertinent labs reviewed    Pulmonary  Within defined limits                 Neuro/Psych   Within defined limits           Cardiovascular    Hypertension: well controlled          Hyperlipidemia    Exercise tolerance: >4 METS     GI/Hepatic/Renal               Comments: Recent UTI Endo/Other    Diabetes: well controlled, type 2    Arthritis     Other Findings            Physical Exam    Airway  Mallampati: I  TM Distance: > 6 cm  Neck ROM: normal range of motion   Mouth opening: Normal     Cardiovascular  Regular rate and rhythm,  S1 and S2 normal,  no murmur, click, rub, or gallop             Dental  No notable dental hx       Pulmonary  Breath sounds clear to auscultation               Abdominal  GI exam deferred       Other Findings            Anesthetic Plan    ASA: 3  Anesthesia type: general          Induction: Intravenous  Anesthetic plan and risks discussed with: Patient

## 2018-01-15 NOTE — IP AVS SNAPSHOT
[address] [facility] 
[phone] Patient: Li Sharma MRN: [id number] [id number] A check laith indicates which time of day the medication should be taken. My Medications START taking these medications Instructions Each Dose to Equal  
 Morning Noon Evening Bedtime  
 acetaminophen 325 mg tablet Commonly known as:  TYLENOL Your last dose was: Your next dose is: Take 2 Tabs by mouth every six (6) hours for 14 days. 650 mg  
    
   
   
   
  
 aspirin delayed-release 325 mg tablet Your last dose was: Your next dose is: Take 1 Tab by mouth two (2) times a day for 30 days. 325 mg  
    
   
   
   
  
 famotidine 20 mg tablet Commonly known as:  PEPCID Your last dose was: Your next dose is: Take 1 Tab by mouth two (2) times a day for 30 days. 20 mg  
    
   
   
   
  
 oxyCODONE IR 5 mg immediate release tablet Commonly known as:  Gerda Mulligan Your last dose was: Your next dose is: Take 1-2 Tabs by mouth every four (4) hours as needed. Max Daily Amount: 60 mg.  
 5-10 mg CHANGE how you take these medications Instructions Each Dose to Equal  
 Morning Noon Evening Bedtime  
 naproxen 500 mg tablet Commonly known as:  NAPROSYN What changed:  additional instructions Your last dose was: Your next dose is: Take 1 Tab by mouth two (2) times daily (with meals). Hold for two weeks. 500 mg CONTINUE taking these medications Instructions Each Dose to Equal  
 Morning Noon Evening Bedtime  
 acyclovir 800 mg tablet Commonly known as:  ZOVIRAX Your last dose was: Your next dose is: Take 800 mg by mouth nightly. Indications: shingles prevention  800 mg  
    
   
   
   
  
 atorvastatin 10 mg tablet Commonly known as:  LIPITOR Your last dose was: Your next dose is: Take 10 mg by mouth nightly. 10 mg  
    
   
   
   
  
 hydroCHLOROthiazide 25 mg tablet Commonly known as:  HYDRODIURIL Your last dose was: Your next dose is: Take 25 mg by mouth daily. Indications: EDEMA, HYPERTENSION  
 25 mg  
    
   
   
   
  
 latanoprost 0.005 % ophthalmic solution Commonly known as:  Mejia Shave Your last dose was: Your next dose is:    
   
   
 Administer 1 Drop to both eyes nightly. Indications: OPEN ANGLE GLAUCOMA  
 1 Drop  
    
   
   
   
  
 lisinopril 5 mg tablet Commonly known as:  Josie Brands Your last dose was: Your next dose is: Take 5 mg by mouth daily. Indications: HYPERTENSION  
 5 mg  
    
   
   
   
  
 metFORMIN 500 mg tablet Commonly known as:  GLUCOPHAGE Your last dose was: Your next dose is: Take 500 mg by mouth daily (with breakfast). Indications: TYPE 2 DIABETES MELLITUS  
 500 mg  
    
   
   
   
  
 multivitamin with iron tablet Your last dose was: Your next dose is: Take 1 Tab by mouth daily. 1 Tab  
    
   
   
   
  
 prednisoLONE acetate 1 % ophthalmic suspension Commonly known as:  PRED FORTE Your last dose was: Your next dose is:    
   
   
 Administer 1 Drop to left eye daily. Indications: SEVERE OCULAR INFLAMMATION  
 1 Drop  
    
   
   
   
  
 timolol 0.5 % ophthalmic solution Commonly known as:  TIMOPTIC Your last dose was: Your next dose is:    
   
   
 Administer 1 Drop to both eyes daily. 1 Drop STOP taking these medications   
 cephALEXin 500 mg capsule Commonly known as:  Ryann Mckenna KEFLEX 500 mg capsule Generic drug:  cephALEXin Where to Get Your Medications Information on where to get these meds will be given to you by the nurse or doctor. ! Ask your nurse or doctor about these medications  
  acetaminophen 325 mg tablet  
 aspirin delayed-release 325 mg tablet  
 famotidine 20 mg tablet  
 oxyCODONE IR 5 mg immediate release tablet

## 2018-01-15 NOTE — H&P
Date of Surgery Update:  Dannie Lua was seen and examined on the day of surgery prior to the procedure. There were no significant clinical changes since the completion of the History and Physical.    Exam today prior to surgery showed no acute cardiac findings, no respiratory difficulty, and no abdominal complaints or pain. Dannie Lua is admitted as an inpatient because of the need for intensive therapy and post anesthesia monitoring. This patient is a candidate for TXA. Documentation of Medical Necessity:    Symptoms: pain with activity and at rest, antalgia, interferes with ADLs  Conservative Treatment: activity modification, multiple medications, injection  Physical Findings:  pain at joint line, antalgia on ambulation, crepitation  Imaging: significant OA, sclerosis and osteophytes    Indications:   Failure of conservative treatments with daily pain and functional limitations. Appropriate imaging demonstrating significant disease. Appropriate physical findings consistent with significant degenerative joint disease. All pertinent risks, benefits, and alternatives to operative management including continued conservative care were explained at length. The patient has elected to proceed with appropriately indicated and medically necessary total joint arthroplasty. They understand no guarantees can be given about the outcome.     Signed By: MANJIT Naidu     January 15, 2018 7:33 AM

## 2018-01-15 NOTE — PERIOP NOTES
TRANSFER - OUT REPORT:    Verbal report given to Elena NETTLES RN(name) on [facility] [place]  being transferred to [facility](unit) for routine post - op       Report consisted of patients Situation, Background, Assessment and   Recommendations(SBAR). Information from the following report(s) SBAR, OR Summary, Intake/Output and MAR was reviewed with the receiving nurse. Opportunity for questions and clarification was provided.       Patient transported with:   O2 @ 2 liters  Tech

## 2018-01-15 NOTE — PROGRESS NOTES
Ortho/ NeuroSurgery NP Note    POD# 0  s/p LEFT TOTAL HIP ARTHROPLASTY ANTERIOR APPROACH WITH NAVIGATION     Pt resting in bed. No complaints. A&O x4   VSS Afebrile. Patient has had something to eat. No nausea. Most Recent Labs:   Lab Results   Component Value Date/Time    HGB 12.3 01/08/2018 01:25 PM    Hemoglobin A1c 7.1 01/08/2018 01:25 PM       Sodium low pre-op. Better on day of surgery. MRSA Screen Pre-op Negative  U/A Screen Pre-Op Positive E. Coli - Keflex prescribed. Patient completed. Body mass index is 26.11 kg/(m^2). BMI greater than 30 is classified as obesity and greater than 40 is classified as morbid obesity. STOP BANG Score: 2  Incentive Spirometer Pre-Op Volume: Not recorded. Social History: No significant history. Casiano out. Patient needs to void today. Dressing c.d.i    Calves soft and supple; No pain with passive stretch  Sensation and motor intact    Plan:  1) PT BID starting today  2) Fiona-op Antibiotics Ancef  3) Aspirin 325 mg PO BID for DVT Prophylaxis  4) Pepcid for GI Prophylaxis   5) Discharge plans to home with her .      Stephanie Oneill NP

## 2018-01-15 NOTE — PROGRESS NOTES
Problem: Mobility Impaired (Adult and Pediatric)  Goal: *Acute Goals and Plan of Care (Insert Text)  Physical Therapy Goals  Initiated 1/15/2018    1. Patient will move from supine to sit and sit to supine  in bed with modified independence within 4 days. 2. Patient will perform sit to stand with modified independence within 4 days. 3. Patient will ambulate with modified independence for 250 feet with the least restrictive device within 4 days. 4. Patient will ascend/descend 5 stairs with bilateral handrail(s) with modified independence within 4 days. 5. Patient will perform total hip home exercise program per protocol with modified independence within 4 days. physical Therapy EVALUATION  Patient: Reese Novoa (78 y.o. female)  Date: 1/15/2018  Primary Diagnosis: LEFT HIP OA  S/P hip replacement  Procedure(s) (LRB):  LEFT TOTAL HIP ARTHROPLASTY ANTERIOR APPROACH WITH NAVIGATION (Left) Day of Surgery   Precautions: WBAT, Falls    ASSESSMENT :  Based on the objective data described below, the patient presents with decreased functional mobility, and impaired balance and gait s/p left total hip arthroplasty. Pt received supine and agreeable to participating in therapy. Pt reports independence with ADL's prior to admit. Pt resides in 1 story home with 5 VIGNESH and bilateral rails with supportive . Currently pt is not in pain and is modified independent with bed mobility. Pt requires Aarti with transferring sit<>stand with RW. Upon standing pt demonstrates mild trunk sway and poor control of LE during stance--requiring Aarti to maintain balance. Pt ambulates 30' with rolling walker and Aarti and demonstrates 1 additional mild LOB, but able to correct with physical cues. Pt demonstrates decreased gait speed and step to pattern on the right. Pt demonstrates appropriate hemodynamic response to exercise and reports no symptoms of exercise intolerance.  Recommend DC home with HHPT to maximize mobility progression. Patient will benefit from skilled intervention to address the above impairments. Patients rehabilitation potential is considered to be Good  Factors which may influence rehabilitation potential include:   []         None noted  []         Mental ability/status  [x]         Medical condition  []         Home/family situation and support systems  []         Safety awareness  []         Pain tolerance/management  []         Other:      PLAN :  Recommendations and Planned Interventions:  [x]           Bed Mobility Training             [x]    Neuromuscular Re-Education  [x]           Transfer Training                   []    Orthotic/Prosthetic Training  [x]           Gait Training                         []    Modalities  [x]           Therapeutic Exercises           []    Edema Management/Control  [x]           Therapeutic Activities            []    Patient and Family Training/Education  []           Other (comment):    Frequency/Duration: Patient will be followed by physical therapy  twice daily to address goals. Discharge Recommendations: Home Health  Further Equipment Recommendations for Discharge: none     SUBJECTIVE:   Patient stated What do I have to do to go home tomorrow.     OBJECTIVE DATA SUMMARY:   HISTORY:    Past Medical History:   Diagnosis Date    Arthritis     Chronic pain     arthritic    Diabetes ([facility] [address]     Hypertension     Ill-defined condition     shingles 1.5 years ago 8/2014    Other ill-defined conditions(799.89)     high cholesterol    Other ill-defined conditions(799.89)     shingles     Past Surgical History:   Procedure Laterality Date    COLONOSCOPY N/A 10/25/2016    COLONOSCOPY performed by Luciana Dumont [facility] at [facility] ENDOSCOPY    HX APPENDECTOMY      HX CATARACT REMOVAL Left     HX HYSTERECTOMY      HX KNEE REPLACEMENT Right     parital    HX KNEE REPLACEMENT Left     HX ORTHOPAEDIC      right hip replacement    HX TONSILLECTOMY      HX TUBAL LIGATION  HX UROLOGICAL      bladder tacking     Prior Level of Function/Home Situation: Independent with ADL's; R LILLIAN in 9/2017  Personal factors and/or comorbidities impacting plan of care:     Home Situation  Home Environment: Private residence  # Steps to Enter: 5  Rails to Enter: Yes  Hand Rails : Bilateral  Wheelchair Ramp: No  One/Two Story Residence: One story  Living Alone: No  Support Systems: Spouse/Significant Other/Partner  Patient Expects to be Discharged to[de-identified] Private residence  Current DME Used/Available at Home: Maralyn Cheadle, straight, Walker, rolling  Tub or Shower Type: Shower    EXAMINATION/PRESENTATION/DECISION MAKING:   Critical Behavior:  Neurologic State: Alert  Orientation Level: Oriented X4  Cognition: Appropriate for age attention/concentration    Range Of Motion:  AROM: Generally decreased, functional           PROM: Generally decreased, functional           Strength:    Strength: Generally decreased, functional                    Functional Mobility:  Bed Mobility:  Rolling: Modified independent  Supine to Sit: Contact guard assistance  Sit to Supine: Contact guard assistance  Scooting: Modified independent  Transfers:  Sit to Stand: Minimum assistance  Stand to Sit: Minimum assistance                       Balance:   Sitting: Intact  Standing: Intact; With support  Ambulation/Gait Training:  Distance (ft): 30 Feet (ft)  Assistive Device: Gait belt;Walker, rolling  Ambulation - Level of Assistance: Minimal assistance        Gait Abnormalities: Antalgic; Path deviations;Decreased step clearance              Speed/Beverly: Pace decreased (<100 feet/min); Shuffled  Step Length: Right shortened                         Functional Measure:  Tinetti test:    Sitting Balance: 1  Arises: 1  Attempts to Rise: 0  Immediate Standing Balance: 0  Standing Balance: 1  Nudged: 0  Eyes Closed: 0  Turn 360 Degrees - Continuous/Discontinuous: 0  Turn 360 Degrees - Steady/Unsteady: 1  Sitting Down: 1  Balance Score: 5  Indication of Gait: 0  R Step Length/Height: 0  L Step Length/Height: 1  R Foot Clearance: 1  L Foot Clearance: 1  Step Symmetry: 0  Step Continuity: 0  Path: 1  Trunk: 0  Walking Time: 0  Gait Score: 4  Total Score: 9       Tinetti Test and G-code impairment scale:  Percentage of Impairment CH    0%   CI    1-19% CJ    20-39% CK    40-59% CL    60-79% CM    80-99% CN     100%   Tinetti  Score 0-28 28 23-27 17-22 12-16 6-11 1-5 0       Tinetti Tool Score Risk of Falls  <19 = High Fall Risk  19-24 = Moderate Fall Risk  25-28 = Low Fall Risk  Tinetti ME. Performance-Oriented Assessment of Mobility Problems in Elderly Patients. [facility] 66; [id number]. (Scoring Description: PT Bulletin Feb. 10, 1993)    Older adults: Reenathalia Terry et al, 2009; n = 1000 [facility] elderly evaluated with ABC, REMY, ADL, and IADL)  · Mean REMY score for males aged 69-68 years = 26.21(3.40)  · Mean REMY score for females age 69-68 years = 25.16(4.30)  · Mean REMY score for males over 80 years = 23.29(6.02)  · Mean REMY score for females over 80 years = 17.20(8.32)         G codes: In compliance with CMSs Claims Based Outcome Reporting, the following G-code set was chosen for this patient based on their primary functional limitation being treated: The outcome measure chosen to determine the severity of the functional limitation was the Tinetti with a score of 9/28 which was correlated with the impairment scale. Based on the above components, the patient evaluation is determined to be of the following complexity level: LOW     Pain:  Pain Scale 1: Numeric (0 - 10)  Pain Intensity 1: 0  Pain Location 1: Hip; Incisional  Pain Orientation 1: Left  Pain Description 1: Aching  Pain Intervention(s) 1: Ice  Activity Tolerance:   Ambulates 30' with RW and Aarti  Please refer to the flowsheet for vital signs taken during this treatment.   After treatment:   []         Patient left in no apparent distress sitting up in chair  [x]         Patient left in no apparent distress in bed  [x]         Call bell left within reach  [x]         Nursing notified  [x]         Caregiver present  []         Bed alarm activated    COMMUNICATION/EDUCATION:   The patients plan of care was discussed with: Physical Therapist and Registered Nurse. [x]         Fall prevention education was provided and the patient/caregiver indicated understanding. [x]         Patient/family have participated as able in goal setting and plan of care. []         Patient/family agree to work toward stated goals and plan of care. []         Patient understands intent and goals of therapy, but is neutral about his/her participation. []         Patient is unable to participate in goal setting and plan of care.     Thank you for this referral.  Gagandeep Cornelius, PT, DPT   Time Calculation: 21 mins

## 2018-01-15 NOTE — PERIOP NOTES
Handoff Report from Operating Room to PACU    Report received from [address] [place] [address] and [facility] regarding Omar Vivas. Surgeon(s):  Robin Saeed MD  And Procedure(s) (LRB):  LEFT TOTAL HIP ARTHROPLASTY ANTERIOR APPROACH WITH NAVIGATION (Left)  confirmed   with allergies, dressings and local anesthetic discussed. Anesthesia type, drugs, patient history, complications, estimated blood loss, vital signs, intake and output, and last pain medication, lines, reversal medications and temperature were reviewed.

## 2018-01-16 VITALS
BODY MASS INDEX: 25.97 KG/M2 | HEIGHT: 64 IN | RESPIRATION RATE: 18 BRPM | OXYGEN SATURATION: 97 % | HEART RATE: 77 BPM | SYSTOLIC BLOOD PRESSURE: 152 MMHG | TEMPERATURE: 98.1 F | WEIGHT: 152.12 LBS | DIASTOLIC BLOOD PRESSURE: 83 MMHG

## 2018-01-16 LAB
ANION GAP SERPL CALC-SCNC: 5 MMOL/L (ref 5–15)
BUN SERPL-MCNC: 11 MG/DL (ref 6–20)
BUN/CREAT SERPL: 20 (ref 12–20)
CALCIUM SERPL-MCNC: 8.3 MG/DL (ref 8.5–10.1)
CHLORIDE SERPL-SCNC: 98 MMOL/L (ref 97–108)
CO2 SERPL-SCNC: 27 MMOL/L (ref 21–32)
CREAT SERPL-MCNC: 0.56 MG/DL (ref 0.55–1.02)
GLUCOSE BLD STRIP.AUTO-MCNC: 133 MG/DL (ref 65–100)
GLUCOSE BLD STRIP.AUTO-MCNC: 224 MG/DL (ref 65–100)
GLUCOSE SERPL-MCNC: 133 MG/DL (ref 65–100)
HGB BLD-MCNC: 9.2 G/DL (ref 11.5–16)
POTASSIUM SERPL-SCNC: 4 MMOL/L (ref 3.5–5.1)
SERVICE CMNT-IMP: ABNORMAL
SERVICE CMNT-IMP: ABNORMAL
SODIUM SERPL-SCNC: 130 MMOL/L (ref 136–145)

## 2018-01-16 PROCEDURE — 74011250636 HC RX REV CODE- 250/636: Performed by: PHYSICIAN ASSISTANT

## 2018-01-16 PROCEDURE — 74011636637 HC RX REV CODE- 636/637: Performed by: NURSE PRACTITIONER

## 2018-01-16 PROCEDURE — G8988 SELF CARE GOAL STATUS: HCPCS | Performed by: OCCUPATIONAL THERAPIST

## 2018-01-16 PROCEDURE — 85018 HEMOGLOBIN: CPT | Performed by: PHYSICIAN ASSISTANT

## 2018-01-16 PROCEDURE — 97535 SELF CARE MNGMENT TRAINING: CPT | Performed by: OCCUPATIONAL THERAPIST

## 2018-01-16 PROCEDURE — 82962 GLUCOSE BLOOD TEST: CPT

## 2018-01-16 PROCEDURE — 36415 COLL VENOUS BLD VENIPUNCTURE: CPT | Performed by: PHYSICIAN ASSISTANT

## 2018-01-16 PROCEDURE — 80048 BASIC METABOLIC PNL TOTAL CA: CPT | Performed by: PHYSICIAN ASSISTANT

## 2018-01-16 PROCEDURE — G8987 SELF CARE CURRENT STATUS: HCPCS | Performed by: OCCUPATIONAL THERAPIST

## 2018-01-16 PROCEDURE — G8989 SELF CARE D/C STATUS: HCPCS | Performed by: OCCUPATIONAL THERAPIST

## 2018-01-16 PROCEDURE — 97116 GAIT TRAINING THERAPY: CPT

## 2018-01-16 PROCEDURE — 74011250637 HC RX REV CODE- 250/637: Performed by: PHYSICIAN ASSISTANT

## 2018-01-16 PROCEDURE — 97165 OT EVAL LOW COMPLEX 30 MIN: CPT | Performed by: OCCUPATIONAL THERAPIST

## 2018-01-16 PROCEDURE — 74011000250 HC RX REV CODE- 250: Performed by: PHYSICIAN ASSISTANT

## 2018-01-16 RX ORDER — ACETAMINOPHEN 325 MG/1
650 TABLET ORAL EVERY 6 HOURS
Qty: 112 TAB | Refills: 0 | Status: SHIPPED | OUTPATIENT
Start: 2018-01-16 | End: 2018-01-30

## 2018-01-16 RX ORDER — FAMOTIDINE 20 MG/1
20 TABLET, FILM COATED ORAL 2 TIMES DAILY
Qty: 60 TAB | Refills: 0 | Status: SHIPPED | OUTPATIENT
Start: 2018-01-16 | End: 2018-02-15

## 2018-01-16 RX ORDER — ASPIRIN 325 MG
325 TABLET, DELAYED RELEASE (ENTERIC COATED) ORAL 2 TIMES DAILY
Qty: 60 TAB | Refills: 0 | Status: SHIPPED | OUTPATIENT
Start: 2018-01-16 | End: 2018-02-15

## 2018-01-16 RX ORDER — OXYCODONE HYDROCHLORIDE 5 MG/1
5-10 TABLET ORAL
Qty: 80 TAB | Refills: 0 | Status: SHIPPED | OUTPATIENT
Start: 2018-01-16

## 2018-01-16 RX ORDER — NAPROXEN 500 MG/1
500 TABLET ORAL 2 TIMES DAILY WITH MEALS
Status: SHIPPED | COMMUNITY
Start: 2018-01-16

## 2018-01-16 RX ADMIN — KETOROLAC TROMETHAMINE 15 MG: 30 INJECTION, SOLUTION INTRAMUSCULAR at 11:34

## 2018-01-16 RX ADMIN — DOCUSATE SODIUM AND SENNOSIDES 1 TABLET: 8.6; 5 TABLET, FILM COATED ORAL at 08:11

## 2018-01-16 RX ADMIN — OXYCODONE HYDROCHLORIDE 5 MG: 5 TABLET ORAL at 11:33

## 2018-01-16 RX ADMIN — ACETAMINOPHEN 650 MG: 325 TABLET ORAL at 07:08

## 2018-01-16 RX ADMIN — ACETAMINOPHEN 650 MG: 325 TABLET ORAL at 11:33

## 2018-01-16 RX ADMIN — TIMOLOL MALEATE 1 DROP: 5 SOLUTION/ DROPS OPHTHALMIC at 08:12

## 2018-01-16 RX ADMIN — ACETAMINOPHEN 650 MG: 325 TABLET ORAL at 00:16

## 2018-01-16 RX ADMIN — INSULIN LISPRO 3 UNITS: 100 INJECTION, SOLUTION INTRAVENOUS; SUBCUTANEOUS at 11:34

## 2018-01-16 RX ADMIN — DEXAMETHASONE SODIUM PHOSPHATE 10 MG: 4 INJECTION, SOLUTION INTRAMUSCULAR; INTRAVENOUS at 08:11

## 2018-01-16 RX ADMIN — KETOROLAC TROMETHAMINE 15 MG: 30 INJECTION, SOLUTION INTRAMUSCULAR at 06:53

## 2018-01-16 RX ADMIN — OXYCODONE HYDROCHLORIDE 5 MG: 5 TABLET ORAL at 07:11

## 2018-01-16 RX ADMIN — KETOROLAC TROMETHAMINE 15 MG: 30 INJECTION, SOLUTION INTRAMUSCULAR at 00:17

## 2018-01-16 RX ADMIN — REGULAR STRENGTH 325 MG: 325 TABLET ORAL at 08:11

## 2018-01-16 RX ADMIN — Medication 10 ML: at 06:55

## 2018-01-16 RX ADMIN — FAMOTIDINE 20 MG: 20 TABLET, FILM COATED ORAL at 08:11

## 2018-01-16 RX ADMIN — POLYETHYLENE GLYCOL 3350 17 G: 17 POWDER, FOR SOLUTION ORAL at 08:11

## 2018-01-16 RX ADMIN — PREDNISOLONE ACETATE 1 DROP: 10 SUSPENSION/ DROPS OPHTHALMIC at 08:12

## 2018-01-16 RX ADMIN — Medication 2 G: at 00:19

## 2018-01-16 NOTE — PROGRESS NOTES
Occupational Therapy  Orders received and medical record reviewed. Pt participated in OT Evaluation and was issued a kit of adaptive aids (at her request), educated in using equipment, home safety and fall prevention as well as maintaining good hip alignment and avoiding extreme ROM of surgical hip during adls and IADls. Pt needed cues and initially physical assistance to align hip/knee/ankle during seated tasks. Educated pt on safe use of RW during adls and IADls. Pt verbalized understanding. Pt's  was present to assist in reinforcing education provided this date. Pt anticipates discharge soon. No equipment nor OT needs at this time.

## 2018-01-16 NOTE — DISCHARGE SUMMARY
Ortho Discharge Summary    Patient ID:  Warren Echevarria  [id number]  female  78 y.o.  1940    Admit date: 1/15/2018    Discharge date: 1/16/2018    Admitting Physician: Sonal De Leon MD     Consulting Physician(s):   Treatment Team: Attending Provider: Sonal De Leon MD; Utilization Review: Kati Bell RN    Date of Surgery:   1/15/2018     Preoperative Diagnosis:  LEFT HIP OA    Postoperative Diagnosis:   LEFT HIP OA    Procedure(s):     LEFT TOTAL HIP ARTHROPLASTY ANTERIOR APPROACH WITH NAVIGATION     Anesthesia Type:   General     Surgeon: Sonal De Leon MD                            HPI:  Pt is a 78 y.o. female who has a history of LEFT HIP OA  with pain and limitations of activities of daily living who presents at this time for a  left LILLIAN following the failure of conservative management. PMH:   Past Medical History:   Diagnosis Date    Arthritis     Chronic pain     arthritic    Diabetes ([facility] [address]     Hypertension     Ill-defined condition     shingles 1.5 years ago 8/2014    Other ill-defined conditions(799.89)     high cholesterol    Other ill-defined conditions(799.89)     shingles       Body mass index is 26.11 kg/(m^2). BMI greater than 30 is classified as obesity. Medications upon admission :   Prior to Admission Medications   Prescriptions Last Dose Informant Patient Reported? Taking?   acyclovir (ZOVIRAX) 800 mg tablet 1/8/2018  Yes No   Sig: Take 800 mg by mouth nightly. Indications: shingles prevention   atorvastatin (LIPITOR) 10 mg tablet 1/8/2018  Yes No   Sig: Take 10 mg by mouth nightly. cephALEXin (KEFLEX) 500 mg capsule 1/14/2018 at Unknown time  No Yes   Sig: Take 1 Cap by mouth two (2) times a day for 7 days. Indications: E. COLI URINARY TRACT INFECTION   cephALEXin (KEFLEX) 500 mg capsule   Yes No   Sig: Take 500 mg by mouth two (2) times a day.  Indications: E. COLI URINARY TRACT INFECTION   hydrochlorothiazide (HYDRODIURIL) 25 mg tablet 1/14/2018 at 0800  Yes Yes   Sig: Take 25 mg by mouth daily. Indications: EDEMA, HYPERTENSION   latanoprost (XALATAN) 0.005 % ophthalmic solution 1/14/2018 at 2100  Yes Yes   Sig: Administer 1 Drop to both eyes nightly. Indications: OPEN ANGLE GLAUCOMA   lisinopril (PRINIVIL, ZESTRIL) 5 mg tablet 1/14/2018 at 0800  Yes Yes   Sig: Take 5 mg by mouth daily. Indications: HYPERTENSION   metFORMIN (GLUCOPHAGE) 500 mg tablet 1/14/2018 at 0800  Yes Yes   Sig: Take 500 mg by mouth daily (with breakfast). Indications: TYPE 2 DIABETES MELLITUS   multivitamin with iron tablet 1/8/2018  Yes No   Sig: Take 1 Tab by mouth daily. naproxen (NAPROSYN) 500 mg tablet   Yes Yes   Sig: Take 1 Tab by mouth two (2) times daily (with meals). Hold for two weeks. prednisoLONE acetate (PRED FORTE) 1 % ophthalmic suspension 1/14/2018 at 0800  Yes Yes   Sig: Administer 1 Drop to left eye daily. Indications: SEVERE OCULAR INFLAMMATION   timolol (TIMOPTIC) 0.5 % ophthalmic solution 1/14/2018 at 0810  Yes Yes   Sig: Administer 1 Drop to both eyes daily. Facility-Administered Medications: None        Allergies:  No Known Allergies     Hospital Course: The patient underwent surgery. Intra-operative complications: None; patient tolerated the procedure well. Was taken to the PACU in stable condition and then transferred to the ortho floor. Perioperative Antibiotics:  Ancef     Postoperative Pain Management:  Oxycodone     DVT Prophylaxis: Aspirin 325 mg PO BID for thirty days. Postoperative transfusions:    Number of units banked PRBCs =   none     Post Op complications: none    Hemoglobin at discharge:    Lab Results   Component Value Date/Time    HGB 9.2 (L) 01/16/2018 02:43 AM    INR 1.1 01/08/2018 01:25 PM       Dressing - clean, dry and intact. No significant erythema or swelling. Neurovascular exam found to be within normal limits. Wound appears to be healing without any evidence of infection.       Physical Therapy started on the day following surgery and participated in bed mobility, transfers and ambulation. Gait:  Gait  Speed/Beverly: Pace decreased (<100 feet/min), Shuffled  Step Length: Right shortened  Gait Abnormalities: Antalgic, Path deviations, Decreased step clearance  Ambulation - Level of Assistance: Minimal assistance  Distance (ft): 30 Feet (ft)  Assistive Device: Gait belt, Walker, rolling                   Discharged to: Home with     Condition on Discharge:   stable    Discharge instructions:  - Anticoagulate with Aspirin 325 mg PO BID for thirty days. - Take pain medications as prescribed  - Resume pre hospital diet      - Discharge activity: activity as tolerated  - Ambulate with Walker;    - Weight bearing status WBAT  - Wound Care Keep wound clean and dry. See discharge instruction sheet.  - Staples, if present, to be removed 10 days after surgery            -DISCHARGE MEDICATION LIST     Current Discharge Medication List      START taking these medications    Details   acetaminophen (TYLENOL) 325 mg tablet Take 2 Tabs by mouth every six (6) hours for 14 days. Qty: 112 Tab, Refills: 0      aspirin delayed-release 325 mg tablet Take 1 Tab by mouth two (2) times a day for 30 days. Qty: 60 Tab, Refills: 0      famotidine (PEPCID) 20 mg tablet Take 1 Tab by mouth two (2) times a day for 30 days. Qty: 60 Tab, Refills: 0      oxyCODONE IR (ROXICODONE) 5 mg immediate release tablet Take 1-2 Tabs by mouth every four (4) hours as needed. Max Daily Amount: 60 mg.  Qty: 80 Tab, Refills: 0    Associated Diagnoses: Status post total replacement of left hip         CONTINUE these medications which have CHANGED    Details   naproxen (NAPROSYN) 500 mg tablet Take 1 Tab by mouth two (2) times daily (with meals). Hold for two weeks. CONTINUE these medications which have NOT CHANGED    Details   timolol (TIMOPTIC) 0.5 % ophthalmic solution Administer 1 Drop to both eyes daily.       latanoprost (XALATAN) 0.005 % ophthalmic solution Administer 1 Drop to both eyes nightly. Indications: OPEN ANGLE GLAUCOMA      prednisoLONE acetate (PRED FORTE) 1 % ophthalmic suspension Administer 1 Drop to left eye daily. Indications: SEVERE OCULAR INFLAMMATION      hydrochlorothiazide (HYDRODIURIL) 25 mg tablet Take 25 mg by mouth daily. Indications: EDEMA, HYPERTENSION      metFORMIN (GLUCOPHAGE) 500 mg tablet Take 500 mg by mouth daily (with breakfast). Indications: TYPE 2 DIABETES MELLITUS      lisinopril (PRINIVIL, ZESTRIL) 5 mg tablet Take 5 mg by mouth daily. Indications: HYPERTENSION      atorvastatin (LIPITOR) 10 mg tablet Take 10 mg by mouth nightly. acyclovir (ZOVIRAX) 800 mg tablet Take 800 mg by mouth nightly. Indications: shingles prevention      multivitamin with iron tablet Take 1 Tab by mouth daily.          STOP taking these medications       cephALEXin (KEFLEX) 500 mg capsule Comments:   Reason for Stopping:         cephALEXin (KEFLEX) 500 mg capsule Comments:   Reason for Stopping:            per medical continuation form      -Follow up in office in 2 weeks      Signed:  Krunal Hansen  MSN, APRN, FNP-C, [facility]  Orthopaedic Nurse Practitioner    1/16/2018  10:09 AM

## 2018-01-16 NOTE — PROGRESS NOTES
Problem: Mobility Impaired (Adult and Pediatric)  Goal: *Acute Goals and Plan of Care (Insert Text)  Physical Therapy Goals  Initiated 1/15/2018    1. Patient will move from supine to sit and sit to supine  in bed with modified independence within 4 days. 2. Patient will perform sit to stand with modified independence within 4 days. 3. Patient will ambulate with modified independence for 250 feet with the least restrictive device within 4 days. 4. Patient will ascend/descend 5 stairs with bilateral handrail(s) with modified independence within 4 days. 5. Patient will perform total hip home exercise program per protocol with modified independence within 4 days. physical Therapy TREATMENT  Patient: Yamilka Taylor (78 y.o. female)  Date: 1/16/2018  Diagnosis: LEFT HIP OA  S/P hip replacement <principal problem not specified>  Procedure(s) (LRB):  LEFT TOTAL HIP ARTHROPLASTY ANTERIOR APPROACH WITH NAVIGATION (Left) 1 Day Post-Op  Precautions:    Chart, physical therapy assessment, plan of care and goals were reviewed. ASSESSMENT:  Pt cleared by nurse to mobilize. Pt received in bed supine. Pt agreeable to therapy. Pt performed supine to sit at [facility]. Pt performed sit to stand transfer at [facility]. Pt ambulated 300ft with RW at [facility]. Pt requiring cueing to stay close to walker and to slow down for safety. Pt able to correct. Pt educated on keep foot in neutral at all times and to prevent extreme movements. Pt with ;uderstading. Pt ascended and descended 8 stairs at [facility] with cueing for sequencing. Pt performed car transfer at supervision with no difficulty. Pt educated and performed exercises from hand out. Pt moving well with no safety concerns. From physical therapy stand point pt cleared for discharge. Pt will benefit from HHPT to improve strength and safe mobility.    Progression toward goals:  [x]      Improving appropriately and progressing toward goals  []      Improving slowly and progressing toward goals  [] Not making progress toward goals and plan of care will be adjusted     PLAN:  Patient continues to benefit from skilled intervention to address the above impairments. Continue treatment per established plan of care. Discharge Recommendations:  Home Health  Further Equipment Recommendations for Discharge:  none     SUBJECTIVE:   \" I don't have much pain at all. \"    OBJECTIVE DATA SUMMARY:   Functional Mobility Training:  Bed Mobility:     Supine to Sit: Stand-by asssistance               Transfers:  Sit to Stand: Contact guard assistance  Stand to Sit: Contact guard assistance                             Ambulation/Gait Training:  Distance (ft): 300 Feet (ft)  Assistive Device: Gait belt;Walker, rolling  Ambulation - Level of Assistance: Contact guard assistance;Stand-by asssistance        Gait Abnormalities: Antalgic;Decreased step clearance        Base of Support: Narrowed     Speed/Beverly: Pace decreased (<100 feet/min)  Step Length: Right shortened;Left shortened                Stairs:  Number of Stairs Trained: 8  Stairs - Level of Assistance: Contact guard assistance  Rail Use: Both  Therapeutic Exercises:   Exercises performed per protocol. Quad sets x5  Hip abduction x5  Glute sets x5  Ankle pumps x5  LAQ x5      Pain:  Pain Scale 1: Numeric (0 - 10)  Pain Intensity 1: 0              Activity Tolerance:   Pt moving well with no safety concerns.    After treatment:   [x] Patient left in no apparent distress sitting up  in chair  [] Patient left in no apparent distress in bed  [x] Call bell left within reach  [x] Nursing notified  [x] Caregiver present  [] Bed alarm activated    COMMUNICATION/COLLABORATION:   The patients plan of care was discussed with: Occupational Therapist and Registered Nurse    Mayra Velazquez   Time Calculation: 14 mins

## 2018-01-16 NOTE — PROGRESS NOTES
Ortho/ NeuroSurgery NP Note    s/p LEFT TOTAL HIP ARTHROPLASTY ANTERIOR APPROACH WITH NAVIGATION on 1/15/2018     Pt resting in bed. No complaints. Pain well controlled. Has used minimal pain med but received this morning in preparation for therapy. VSS Afebrile. Casiano removed. Patient is voiding in adequate amounts. Labs  Lab Results   Component Value Date/Time    HGB 9.2 01/16/2018 02:43 AM        Body mass index is 26.11 kg/(m^2). BMI greater than 30 is classified as obesity and greater than 40 is classified as morbid obesity. Dressing c.d.i, cryotherapy  Calves soft and supple; No pain with passive stretch  Sensation and motor intact  SCDs for mechanical DVT proph while in bed     PLAN:  1) PT BID  2) Aspirin 325 mg PO BID for DVT Prophylaxis  3) Pepcid for GI Prophylaxis. 4) Plan d/c to home likely today once clears therapy.     Marcelino Ch NP

## 2018-01-16 NOTE — PROGRESS NOTES
Pt being discharged home by her . Discharge instructions reviewed with pt. All questions were answered. Medication prescriptions as well as medication information sheets given to patient and . IV removed, pressure held and dressing applied. Pt given 2 opsite dressings to take home in case of drainage. Pt taken to main lobby via wheelchair.

## 2018-01-17 NOTE — PROGRESS NOTES
Pt is a 79 y/o  female admitted with  Left total hip arthroplasty. Pt is independent to ADL's and IADL's. Pt will need HH at discharge. She had access to a RW at discharge. Pt chose [facility] for her [facility] provider. Referral sent and they have accepted. Pt spouse to transport at discharge. Info entered on pt AVS.    Care Management Interventions  PCP Verified by CM: Yes  Mode of Transport at Discharge:  Other (see comment) (Pt spouse to transport at discharge)  Transition of Care Consult (CM Consult): [address]: No  Reason Outside [place]: Physician referred to specific agency  Discharge Durable Medical Equipment: No  Physical Therapy Consult: Yes  Occupational Therapy Consult: Yes  Current Support Network: Lives with Spouse  Confirm Follow Up Transport: Family  Discharge Location  Discharge Placement: Home with home health    SAUL Lehman  Ext [phone]

## 2022-03-19 PROBLEM — M16.9 OSTEOARTHRITIS OF HIP: Status: ACTIVE | Noted: 2017-09-11

## 2022-03-19 PROBLEM — Z96.642 STATUS POST TOTAL REPLACEMENT OF LEFT HIP: Status: ACTIVE | Noted: 2018-01-15

## 2023-03-07 ENCOUNTER — APPOINTMENT (OUTPATIENT)
Dept: CT IMAGING | Age: 83
End: 2023-03-07
Attending: STUDENT IN AN ORGANIZED HEALTH CARE EDUCATION/TRAINING PROGRAM
Payer: MEDICARE

## 2023-03-07 ENCOUNTER — APPOINTMENT (OUTPATIENT)
Dept: ULTRASOUND IMAGING | Age: 83
End: 2023-03-07
Attending: INTERNAL MEDICINE
Payer: MEDICARE

## 2023-03-07 ENCOUNTER — APPOINTMENT (OUTPATIENT)
Dept: GENERAL RADIOLOGY | Age: 83
End: 2023-03-07
Attending: STUDENT IN AN ORGANIZED HEALTH CARE EDUCATION/TRAINING PROGRAM
Payer: MEDICARE

## 2023-03-07 ENCOUNTER — HOSPITAL ENCOUNTER (INPATIENT)
Age: 83
LOS: 11 days | Discharge: NURSING, REHAB OR LONG-TERM CARE | End: 2023-03-18
Attending: STUDENT IN AN ORGANIZED HEALTH CARE EDUCATION/TRAINING PROGRAM | Admitting: INTERNAL MEDICINE
Payer: MEDICARE

## 2023-03-07 DIAGNOSIS — A41.9 SEPSIS WITH ACUTE RENAL FAILURE WITHOUT SEPTIC SHOCK, DUE TO UNSPECIFIED ORGANISM, UNSPECIFIED ACUTE RENAL FAILURE TYPE (HCC): Primary | ICD-10-CM

## 2023-03-07 DIAGNOSIS — R00.0 WIDE-COMPLEX TACHYCARDIA: ICD-10-CM

## 2023-03-07 DIAGNOSIS — N17.9 SEPSIS WITH ACUTE RENAL FAILURE WITHOUT SEPTIC SHOCK, DUE TO UNSPECIFIED ORGANISM, UNSPECIFIED ACUTE RENAL FAILURE TYPE (HCC): Primary | ICD-10-CM

## 2023-03-07 DIAGNOSIS — E87.5 ACUTE HYPERKALEMIA: ICD-10-CM

## 2023-03-07 DIAGNOSIS — N17.9 AKI (ACUTE KIDNEY INJURY) (HCC): ICD-10-CM

## 2023-03-07 DIAGNOSIS — E11.65 INADEQUATELY CONTROLLED DIABETES MELLITUS (HCC): ICD-10-CM

## 2023-03-07 DIAGNOSIS — J90 PLEURAL EFFUSION EXUDATIVE: ICD-10-CM

## 2023-03-07 DIAGNOSIS — R65.20 SEPSIS WITH ACUTE RENAL FAILURE WITHOUT SEPTIC SHOCK, DUE TO UNSPECIFIED ORGANISM, UNSPECIFIED ACUTE RENAL FAILURE TYPE (HCC): Primary | ICD-10-CM

## 2023-03-07 DIAGNOSIS — R74.01 TRANSAMINITIS: ICD-10-CM

## 2023-03-07 DIAGNOSIS — I24.9 ACS (ACUTE CORONARY SYNDROME) (HCC): ICD-10-CM

## 2023-03-07 LAB
ALBUMIN SERPL-MCNC: 3.8 G/DL (ref 3.5–5)
ALBUMIN/GLOB SERPL: 1.1 (ref 1.1–2.2)
ALP SERPL-CCNC: 176 U/L (ref 45–117)
ALT SERPL-CCNC: >3500 U/L (ref 12–78)
ANION GAP SERPL CALC-SCNC: 15 MMOL/L (ref 5–15)
ANION GAP SERPL CALC-SCNC: 17 MMOL/L (ref 5–15)
APAP SERPL-MCNC: 16 UG/ML (ref 10–30)
APPEARANCE UR: ABNORMAL
AST SERPL-CCNC: >2000 U/L (ref 15–37)
BACTERIA URNS QL MICRO: ABNORMAL /HPF
BASOPHILS # BLD: 0 K/UL (ref 0–0.1)
BASOPHILS NFR BLD: 0 % (ref 0–1)
BILIRUB SERPL-MCNC: 1.7 MG/DL (ref 0.2–1)
BILIRUB UR QL CFM: NEGATIVE
BNP SERPL-MCNC: 5172 PG/ML
BUN SERPL-MCNC: 41 MG/DL (ref 6–20)
BUN SERPL-MCNC: 42 MG/DL (ref 6–20)
BUN/CREAT SERPL: 23 (ref 12–20)
BUN/CREAT SERPL: 27 (ref 12–20)
CALCIUM SERPL-MCNC: 9.2 MG/DL (ref 8.5–10.1)
CALCIUM SERPL-MCNC: 9.2 MG/DL (ref 8.5–10.1)
CHLORIDE SERPL-SCNC: 92 MMOL/L (ref 97–108)
CHLORIDE SERPL-SCNC: 98 MMOL/L (ref 97–108)
CK SERPL-CCNC: 386 U/L (ref 26–192)
CO2 SERPL-SCNC: 16 MMOL/L (ref 21–32)
CO2 SERPL-SCNC: 16 MMOL/L (ref 21–32)
COLOR UR: ABNORMAL
CREAT SERPL-MCNC: 1.52 MG/DL (ref 0.55–1.02)
CREAT SERPL-MCNC: 1.81 MG/DL (ref 0.55–1.02)
DIFFERENTIAL METHOD BLD: ABNORMAL
EOSINOPHIL # BLD: 0 K/UL (ref 0–0.4)
EOSINOPHIL NFR BLD: 0 % (ref 0–7)
EPITH CASTS URNS QL MICRO: ABNORMAL /LPF
ERYTHROCYTE [DISTWIDTH] IN BLOOD BY AUTOMATED COUNT: 16.1 % (ref 11.5–14.5)
GLOBULIN SER CALC-MCNC: 3.6 G/DL (ref 2–4)
GLUCOSE SERPL-MCNC: 187 MG/DL (ref 65–100)
GLUCOSE SERPL-MCNC: 212 MG/DL (ref 65–100)
GLUCOSE UR STRIP.AUTO-MCNC: NEGATIVE MG/DL
HCT VFR BLD AUTO: 36.3 % (ref 35–47)
HGB BLD-MCNC: 11 G/DL (ref 11.5–16)
HGB UR QL STRIP: ABNORMAL
IMM GRANULOCYTES # BLD AUTO: 0.2 K/UL (ref 0–0.04)
IMM GRANULOCYTES NFR BLD AUTO: 1 % (ref 0–0.5)
INR PPP: 2.3 (ref 0.9–1.1)
KETONES UR QL STRIP.AUTO: ABNORMAL MG/DL
LACTATE BLD-SCNC: 11.75 MMOL/L (ref 0.4–2)
LACTATE SERPL-SCNC: 9.2 MMOL/L (ref 0.4–2)
LEUKOCYTE ESTERASE UR QL STRIP.AUTO: ABNORMAL
LYMPHOCYTES # BLD: 1.8 K/UL (ref 0.8–3.5)
LYMPHOCYTES NFR BLD: 10 % (ref 12–49)
MAGNESIUM SERPL-MCNC: 1.9 MG/DL (ref 1.6–2.4)
MCH RBC QN AUTO: 26.9 PG (ref 26–34)
MCHC RBC AUTO-ENTMCNC: 30.3 G/DL (ref 30–36.5)
MCV RBC AUTO: 88.8 FL (ref 80–99)
MONOCYTES # BLD: 0.8 K/UL (ref 0–1)
MONOCYTES NFR BLD: 5 % (ref 5–13)
NEUTS SEG # BLD: 14.8 K/UL (ref 1.8–8)
NEUTS SEG NFR BLD: 84 % (ref 32–75)
NITRITE UR QL STRIP.AUTO: NEGATIVE
NRBC # BLD: 0 K/UL (ref 0–0.01)
NRBC BLD-RTO: 0 PER 100 WBC
PH UR STRIP: 5 (ref 5–8)
PLATELET # BLD AUTO: 266 K/UL (ref 150–400)
PMV BLD AUTO: 11 FL (ref 8.9–12.9)
POTASSIUM SERPL-SCNC: 5 MMOL/L (ref 3.5–5.1)
POTASSIUM SERPL-SCNC: 5.7 MMOL/L (ref 3.5–5.1)
PROT SERPL-MCNC: 7.4 G/DL (ref 6.4–8.2)
PROT UR STRIP-MCNC: 100 MG/DL
PROTHROMBIN TIME: 23 SEC (ref 9–11.1)
RBC # BLD AUTO: 4.09 M/UL (ref 3.8–5.2)
RBC #/AREA URNS HPF: ABNORMAL /HPF (ref 0–5)
SALICYLATES SERPL-MCNC: <1.7 MG/DL (ref 2.8–20)
SODIUM SERPL-SCNC: 125 MMOL/L (ref 136–145)
SODIUM SERPL-SCNC: 129 MMOL/L (ref 136–145)
SP GR UR REFRACTOMETRY: 1.02
TROPONIN I SERPL HS-MCNC: 140 NG/L (ref 0–51)
TROPONIN I SERPL HS-MCNC: 141 NG/L (ref 0–51)
UA: UC IF INDICATED,UAUC: ABNORMAL
UROBILINOGEN UR QL STRIP.AUTO: 1 EU/DL (ref 0.2–1)
WBC # BLD AUTO: 17.6 K/UL (ref 3.6–11)
WBC URNS QL MICRO: ABNORMAL /HPF (ref 0–4)

## 2023-03-07 PROCEDURE — 83605 ASSAY OF LACTIC ACID: CPT

## 2023-03-07 PROCEDURE — 93005 ELECTROCARDIOGRAM TRACING: CPT

## 2023-03-07 PROCEDURE — 74011636637 HC RX REV CODE- 636/637: Performed by: STUDENT IN AN ORGANIZED HEALTH CARE EDUCATION/TRAINING PROGRAM

## 2023-03-07 PROCEDURE — 87086 URINE CULTURE/COLONY COUNT: CPT

## 2023-03-07 PROCEDURE — 74011000258 HC RX REV CODE- 258: Performed by: STUDENT IN AN ORGANIZED HEALTH CARE EDUCATION/TRAINING PROGRAM

## 2023-03-07 PROCEDURE — 36415 COLL VENOUS BLD VENIPUNCTURE: CPT

## 2023-03-07 PROCEDURE — 74011250636 HC RX REV CODE- 250/636: Performed by: INTERNAL MEDICINE

## 2023-03-07 PROCEDURE — 83036 HEMOGLOBIN GLYCOSYLATED A1C: CPT

## 2023-03-07 PROCEDURE — 96365 THER/PROPH/DIAG IV INF INIT: CPT

## 2023-03-07 PROCEDURE — 74011000250 HC RX REV CODE- 250: Performed by: STUDENT IN AN ORGANIZED HEALTH CARE EDUCATION/TRAINING PROGRAM

## 2023-03-07 PROCEDURE — 96375 TX/PRO/DX INJ NEW DRUG ADDON: CPT

## 2023-03-07 PROCEDURE — 87040 BLOOD CULTURE FOR BACTERIA: CPT

## 2023-03-07 PROCEDURE — 65610000006 HC RM INTENSIVE CARE

## 2023-03-07 PROCEDURE — 80053 COMPREHEN METABOLIC PANEL: CPT

## 2023-03-07 PROCEDURE — 83880 ASSAY OF NATRIURETIC PEPTIDE: CPT

## 2023-03-07 PROCEDURE — 80179 DRUG ASSAY SALICYLATE: CPT

## 2023-03-07 PROCEDURE — 0202U NFCT DS 22 TRGT SARS-COV-2: CPT

## 2023-03-07 PROCEDURE — 99285 EMERGENCY DEPT VISIT HI MDM: CPT

## 2023-03-07 PROCEDURE — 74176 CT ABD & PELVIS W/O CONTRAST: CPT

## 2023-03-07 PROCEDURE — 80074 ACUTE HEPATITIS PANEL: CPT

## 2023-03-07 PROCEDURE — 74011250636 HC RX REV CODE- 250/636: Performed by: NURSE PRACTITIONER

## 2023-03-07 PROCEDURE — 80143 DRUG ASSAY ACETAMINOPHEN: CPT

## 2023-03-07 PROCEDURE — 71250 CT THORAX DX C-: CPT

## 2023-03-07 PROCEDURE — 82010 KETONE BODYS QUAN: CPT

## 2023-03-07 PROCEDURE — 85610 PROTHROMBIN TIME: CPT

## 2023-03-07 PROCEDURE — 82550 ASSAY OF CK (CPK): CPT

## 2023-03-07 PROCEDURE — 81001 URINALYSIS AUTO W/SCOPE: CPT

## 2023-03-07 PROCEDURE — 85025 COMPLETE CBC W/AUTO DIFF WBC: CPT

## 2023-03-07 PROCEDURE — 71045 X-RAY EXAM CHEST 1 VIEW: CPT

## 2023-03-07 PROCEDURE — 84484 ASSAY OF TROPONIN QUANT: CPT

## 2023-03-07 PROCEDURE — 74011250636 HC RX REV CODE- 250/636: Performed by: STUDENT IN AN ORGANIZED HEALTH CARE EDUCATION/TRAINING PROGRAM

## 2023-03-07 PROCEDURE — 83735 ASSAY OF MAGNESIUM: CPT

## 2023-03-07 PROCEDURE — 76705 ECHO EXAM OF ABDOMEN: CPT

## 2023-03-07 RX ORDER — SODIUM CHLORIDE 0.9 % (FLUSH) 0.9 %
5-10 SYRINGE (ML) INJECTION AS NEEDED
Status: DISCONTINUED | OUTPATIENT
Start: 2023-03-07 | End: 2023-03-16

## 2023-03-07 RX ORDER — CALCIUM GLUCONATE 98 MG/ML
1 INJECTION, SOLUTION INTRAVENOUS ONCE
Status: COMPLETED | OUTPATIENT
Start: 2023-03-07 | End: 2023-03-07

## 2023-03-07 RX ORDER — SENNOSIDES 8.6 MG/1
1 TABLET ORAL DAILY PRN
Status: DISCONTINUED | OUTPATIENT
Start: 2023-03-07 | End: 2023-03-12

## 2023-03-07 RX ORDER — POLYETHYLENE GLYCOL 3350 17 G/17G
17 POWDER, FOR SOLUTION ORAL DAILY PRN
Status: DISCONTINUED | OUTPATIENT
Start: 2023-03-07 | End: 2023-03-12

## 2023-03-07 RX ORDER — VANCOMYCIN/0.9 % SOD CHLORIDE 1.5G/250ML
1500 PLASTIC BAG, INJECTION (ML) INTRAVENOUS
Status: COMPLETED | OUTPATIENT
Start: 2023-03-07 | End: 2023-03-07

## 2023-03-07 RX ORDER — FUROSEMIDE 10 MG/ML
40 INJECTION INTRAMUSCULAR; INTRAVENOUS ONCE
Status: COMPLETED | OUTPATIENT
Start: 2023-03-07 | End: 2023-03-07

## 2023-03-07 RX ORDER — FUROSEMIDE 10 MG/ML
80 INJECTION INTRAMUSCULAR; INTRAVENOUS ONCE
Status: COMPLETED | OUTPATIENT
Start: 2023-03-07 | End: 2023-03-07

## 2023-03-07 RX ORDER — CALCIUM CHLORIDE INJECTION 100 MG/ML
INJECTION, SOLUTION INTRAVENOUS
Status: DISCONTINUED
Start: 2023-03-07 | End: 2023-03-07 | Stop reason: WASHOUT

## 2023-03-07 RX ORDER — SODIUM CHLORIDE 0.9 % (FLUSH) 0.9 %
5-10 SYRINGE (ML) INJECTION AS NEEDED
Status: DISCONTINUED | OUTPATIENT
Start: 2023-03-07 | End: 2023-03-18 | Stop reason: HOSPADM

## 2023-03-07 RX ORDER — ONDANSETRON 2 MG/ML
4 INJECTION INTRAMUSCULAR; INTRAVENOUS
Status: DISCONTINUED | OUTPATIENT
Start: 2023-03-07 | End: 2023-03-18 | Stop reason: HOSPADM

## 2023-03-07 RX ORDER — SODIUM CHLORIDE 0.9 % (FLUSH) 0.9 %
5-40 SYRINGE (ML) INJECTION AS NEEDED
Status: DISCONTINUED | OUTPATIENT
Start: 2023-03-07 | End: 2023-03-16

## 2023-03-07 RX ORDER — NOREPINEPHRINE BITARTRATE/D5W 8 MG/250ML
0-16 PLASTIC BAG, INJECTION (ML) INTRAVENOUS
Status: DISCONTINUED | OUTPATIENT
Start: 2023-03-07 | End: 2023-03-07

## 2023-03-07 RX ADMIN — FUROSEMIDE 80 MG: 10 INJECTION, SOLUTION INTRAMUSCULAR; INTRAVENOUS at 22:29

## 2023-03-07 RX ADMIN — VANCOMYCIN HYDROCHLORIDE 1500 MG: 10 INJECTION, POWDER, LYOPHILIZED, FOR SOLUTION INTRAVENOUS at 18:44

## 2023-03-07 RX ADMIN — CALCIUM GLUCONATE 1 G: 98 INJECTION, SOLUTION INTRAVENOUS at 17:56

## 2023-03-07 RX ADMIN — Medication 10 UNITS: at 18:32

## 2023-03-07 RX ADMIN — SODIUM CHLORIDE 1000 ML: 9 INJECTION, SOLUTION INTRAVENOUS at 17:57

## 2023-03-07 RX ADMIN — SODIUM CHLORIDE 944 ML: 9 INJECTION, SOLUTION INTRAVENOUS at 19:45

## 2023-03-07 RX ADMIN — PIPERACILLIN AND TAZOBACTAM 3.38 G: 3; .375 INJECTION, POWDER, FOR SOLUTION INTRAVENOUS at 17:57

## 2023-03-07 RX ADMIN — DEXTROSE MONOHYDRATE 250 ML: 100 INJECTION, SOLUTION INTRAVENOUS at 18:05

## 2023-03-07 RX ADMIN — ONDANSETRON 4 MG: 2 INJECTION INTRAMUSCULAR; INTRAVENOUS at 19:36

## 2023-03-07 RX ADMIN — FUROSEMIDE 40 MG: 10 INJECTION, SOLUTION INTRAMUSCULAR; INTRAVENOUS at 20:57

## 2023-03-07 NOTE — Clinical Note
TRANSFER - OUT REPORT:     Verbal report given to: Binh Perkins RN. Report consisted of patient's Situation, Background, Assessment and   Recommendations(SBAR). Opportunity for questions and clarification was provided. Patient transported with a Registered Nurse. Patient transported to: u, [phone].

## 2023-03-07 NOTE — ED PROVIDER NOTES
[facility] 2 CRITICAL CARE 2  EMERGENCY DEPARTMENT ENCOUNTER       Pt Name: Oj Zepeda  MRN: [id number]  [place] 1940  Date of evaluation: 3/7/2023  Provider: Caremn Adams MD   PCP: Alin Andre MD  Note Started: 6:37 PM 3/7/23     CHIEF COMPLAINT       Chief Complaint   Patient presents with    Abnormal Lab Results     MD Harris sent patient for low WBC counts and one other which the patient cannot recall        HISTORY OF PRESENT ILLNESS: 1 or more elements      History From: patient, History limited by: none     Oj Zepeda is a [de-identified] y.o. female presenting with abnormal lab results. She notes she has not been able to eat or drink anything because of persistent vomiting for the past many days. She denies any abdominal pain. She notes she feels very dry in her mouth. Denies any headache or dizziness. Denies any chest pain or shortness of breath. Denies any urinary changes, burning or urgency. Note she has not a bowel movement in many days and feels constipated. Notes she has history of  section. No heart disease. Denies drinking. Please See [facility] for Additional Details of the HPI/PMH  Nursing Notes were all reviewed and agreed with or any disagreements were addressed in the HPI. REVIEW OF SYSTEMS        Positives and Pertinent negatives as per HPI.     PAST HISTORY     Past Medical History:  Past Medical History:   Diagnosis Date    Arthritis     Chronic pain     arthritic    Diabetes ([address]     Hypertension     Ill-defined condition     shingles 1.5 years ago 2014    Other ill-defined conditions(799.89)     high cholesterol    Other ill-defined conditions(799.89)     shingles       Past Surgical History:  Past Surgical History:   Procedure Laterality Date    COLONOSCOPY N/A 10/25/2016    COLONOSCOPY performed by Mikaela Quiroz MD at [facility] ENDOSCOPY    HX APPENDECTOMY      HX CATARACT REMOVAL Left     HX HYSTERECTOMY      HX KNEE REPLACEMENT Right     parital    HX KNEE REPLACEMENT Left     HX ORTHOPAEDIC      right hip replacement    HX TONSILLECTOMY      HX TUBAL LIGATION      HX UROLOGICAL      bladder tacking       Family History:  Family History   Problem Relation Age of Onset    Cancer Father         leukemia    Stroke Sister        Social History:  Social History     Tobacco Use    Smoking status: Never    Smokeless tobacco: Never   Substance Use Topics    Alcohol use: No    Drug use: No       Allergies:  No Known Allergies    CURRENT MEDICATIONS      Current Discharge Medication List        CONTINUE these medications which have NOT CHANGED    Details   dorzolamide (TRUSOPT) 2 % ophthalmic solution INSTILL 1 DROP IN BOTH EYES TWICE DAILY      naproxen (NAPROSYN) 500 mg tablet Take 1 Tab by mouth two (2) times daily (with meals). Hold for two weeks. atorvastatin (LIPITOR) 10 mg tablet Take 10 mg by mouth nightly. timolol (TIMOPTIC) 0.5 % ophthalmic solution Administer 1 Drop to both eyes daily. latanoprost (XALATAN) 0.005 % ophthalmic solution Administer 1 Drop to both eyes nightly. Indications: OPEN ANGLE GLAUCOMA      prednisoLONE acetate (PRED FORTE) 1 % ophthalmic suspension Administer 1 Drop to left eye daily. Indications: SEVERE OCULAR INFLAMMATION      metFORMIN (GLUCOPHAGE) 500 mg tablet Take 1,000 mg by mouth two (2) times daily (with meals). Indications: type 2 diabetes mellitus      lisinopril (PRINIVIL, ZESTRIL) 5 mg tablet Take 5 mg by mouth daily. Indications: HYPERTENSION      acyclovir (ZOVIRAX) 800 mg tablet Take 800 mg by mouth nightly. Indications: shingles prevention      multivitamin with iron tablet Take 1 Tab by mouth daily.              SCREENINGS               No data recorded         PHYSICAL EXAM      ED Triage Vitals   ED Encounter Vitals Group      BP 03/07/23 1603 (!) 142/59      Pulse (Heart Rate) 03/07/23 1603 94      Resp Rate 03/07/23 1603 18      Temp 03/07/23 1603 97.5 °F (36.4 °C)      Temp src --       O2 Sat (%) 03/07/23 1603 98 %      Weight 03/07/23 1604 142 lb 13.7 oz      Height 03/07/23 1604 5' 3\"        Physical Exam  Vitals and nursing note reviewed. Constitutional:       Appearance: Normal appearance. HENT:      Head: Normocephalic and atraumatic. Mouth/Throat:      Mouth: Mucous membranes are dry. Eyes:      Extraocular Movements: Extraocular movements intact. Pupils: Pupils are equal, round, and reactive to light. Cardiovascular:      Rate and Rhythm: Regular rhythm. Tachycardia present. Pulmonary:      Effort: Pulmonary effort is normal.      Breath sounds: Normal breath sounds. Abdominal:      General: Abdomen is flat. There is no distension. Tenderness: There is no abdominal tenderness. There is no guarding. Musculoskeletal:         General: No swelling or deformity. Normal range of motion. Cervical back: Normal range of motion. Skin:     General: Skin is warm. Neurological:      General: No focal deficit present. Mental Status: She is alert and oriented to person, place, and time.    Psychiatric:         Mood and Affect: Mood normal.         Behavior: Behavior normal.        DIAGNOSTIC RESULTS   LABS:     Recent Results (from the past 12 hour(s))   EKG, 12 LEAD, SUBSEQUENT    Collection Time: 03/08/23  4:49 AM   Result Value Ref Range    Ventricular Rate 177 BPM    Atrial Rate 147 BPM    QRS Duration 142 ms    Q-T Interval 280 ms    QTC Calculation (Bezet) 480 ms    Calculated R Axis -40 degrees    Calculated T Axis 155 degrees    Diagnosis       Atrial fibrillation with rapid ventricular response  Left axis deviation  Left bundle branch block  Abnormal ECG  No previous ECGs available     TROPONIN-HIGH SENSITIVITY    Collection Time: 03/08/23  5:16 AM   Result Value Ref Range    Troponin-High Sensitivity 126 (HH) 0 - 51 ng/L   LACTIC ACID    Collection Time: 03/08/23  8:52 AM   Result Value Ref Range    Lactic acid 5.3 (HH) 0.4 - 2.0 MMOL/L   PROTHROMBIN TIME + INR Collection Time: 03/08/23  8:52 AM   Result Value Ref Range    INR 2.3 (H) 0.9 - 1.1      Prothrombin time 22.6 (H) 9.0 - 11.1 sec   PTT    Collection Time: 03/08/23  8:52 AM   Result Value Ref Range    aPTT 22.8 22.1 - 31.0 sec    aPTT, therapeutic range     58.0 - 77.0 SECS   OSMOLALITY, SERUM/PLASMA    Collection Time: 03/08/23  8:52 AM   Result Value Ref Range    Osmolality, serum/plasma 302 mOsm/kg H2O   GLUCOSE, POC    Collection Time: 03/08/23 12:15 PM   Result Value Ref Range    Glucose (POC) 231 (H) 65 - 117 mg/dL    Performed by Kaylee Srinivasan RN    ECHO ADULT COMPLETE    Collection Time: 03/08/23  2:49 PM   Result Value Ref Range    IVSd 0.7 0.6 - 0.9 cm    LVIDd 5.2 3.9 - 5.3 cm    LVIDs 3.8 cm    LVOT Diameter 2.0 cm    LVPWd 0.8 0.6 - 0.9 cm    LVOT Peak Gradient 5 mmHg    LVOT Mean Gradient 3 mmHg    LVOT SV 73.6 ml    LVOT Peak Velocity 1.2 m/s    LVOT VTI 22.8 cm    LA Diameter 3.5 cm    LA Volume 4C 33 22 - 52 mL    AV Area by Peak Velocity 2.1 cm2    AV Area by VTI 2.3 cm2    AV Peak Gradient 13 mmHg    AV Mean Gradient 7 mmHg    AV Peak Velocity 1.8 m/s    AV Mean Velocity 1.3 m/s    AV VTI 31.9 cm    MR Peak Gradient 33 mmHg    MR Peak Velocity 2.1 m/s    MV A Velocity 0.80 m/s    MV E Wave Deceleration Time 159.2 ms    MV E Velocity 1.10 m/s    LV E' Lateral Velocity 5 cm/s    LV E' Septal Velocity 4 cm/s    PV Peak Gradient 4 mmHg    PV Max Velocity 1.0 m/s    TAPSE 1.9 1.7 cm    TR Peak Gradient 26 mmHg    TR Max Velocity 2.54 m/s        EKG: If performed, independent interpretation documented below in the MDM section     RADIOLOGY:  Non-plain film images such as CT, Ultrasound and MRI are read by the radiologist. Plain radiographic images are visualized and preliminarily interpreted by the ED Provider with the findings documented in the MDM section.      Interpretation per the Radiologist below, if available at the time of this note:     CT CHEST WO CONT    Result Date: 3/7/2023  EXAM: CT ABD PELV WO CONT, CT CHEST WO CONT INDICATION: sepsis workup, transaminitis, intractable vomitting COMPARISON: None ORAL CONTRAST: None TECHNIQUE: Axial images were obtained through the chest, abdomen and pelvis. Coronal and sagittal reformats were generated. CT dose reduction was achieved through use of a standardized protocol tailored for this examination and automatic exposure control for dose modulation. FINDINGS: CHEST WALL: No mass or axillary lymphadenopathy. THYROID: No nodule. MEDIASTINUM: No mass or lymphadenopathy. YANDEL: No mass or lymphadenopathy. THORACIC AORTA: No dissection or aneurysm. MAIN PULMONARY ARTERY: Normal in caliber. TRACHEA/BRONCHI: Patent. ESOPHAGUS: Fluid filled esophagus, no esophageal mucosal thickening. This is likely sequela of recent vomiting HEART: Cardiomegaly. Coronary artery calcium: present PLEURA: Large right and moderate left pleural effusions. LUNGS: Bibasilar atelectasis. Scattered mosaic attenuation, with interlobular septal thickening, and multiple small groundglass nodules. LIVER: Noncirrhotic liver morphology. BILIARY TREE: The gallbladder is severely distended, although without wall thickening or pericholecystic fluid. CBD is not dilated. SPLEEN: within normal limits. PANCREAS: No mass or ductal dilatation. ADRENALS: Unremarkable. KIDNEYS: No mass, calculus, or hydronephrosis. STOMACH: Unremarkable. SMALL BOWEL: No dilatation or wall thickening. COLON: No dilatation or wall thickening. Extensive diverticulosis without evidence of diverticulitis. APPENDIX: Not visualized. PERITONEUM: No ascites or pneumoperitoneum. RETROPERITONEUM: No lymphadenopathy or aortic aneurysm. REPRODUCTIVE ORGANS: Status post hysterectomy. URINARY BLADDER: No mass or calculus. BONES: No destructive bone lesion. ABDOMINAL WALL: No mass or hernia. ADDITIONAL COMMENTS: N/A     1.  Congestive heart failure, with large right and moderate left pleural effusions, bibasilar atelectasis, and pulmonary edema. 2. Multiple small pulmonary nodules. These may be a component of pulmonary edema, recommend follow-up chest CT after acute issues are resolved. 3. Prominent distended gallbladder, without other findings of acute cholecystitis. CT ABD PELV WO CONT    Result Date: 3/7/2023  EXAM: CT ABD PELV WO CONT, CT CHEST WO CONT INDICATION: sepsis workup, transaminitis, intractable vomitting COMPARISON: None ORAL CONTRAST: None TECHNIQUE: Axial images were obtained through the chest, abdomen and pelvis. Coronal and sagittal reformats were generated. CT dose reduction was achieved through use of a standardized protocol tailored for this examination and automatic exposure control for dose modulation. FINDINGS: CHEST WALL: No mass or axillary lymphadenopathy. THYROID: No nodule. MEDIASTINUM: No mass or lymphadenopathy. YANDEL: No mass or lymphadenopathy. THORACIC AORTA: No dissection or aneurysm. MAIN PULMONARY ARTERY: Normal in caliber. TRACHEA/BRONCHI: Patent. ESOPHAGUS: Fluid filled esophagus, no esophageal mucosal thickening. This is likely sequela of recent vomiting HEART: Cardiomegaly. Coronary artery calcium: present PLEURA: Large right and moderate left pleural effusions. LUNGS: Bibasilar atelectasis. Scattered mosaic attenuation, with interlobular septal thickening, and multiple small groundglass nodules. LIVER: Noncirrhotic liver morphology. BILIARY TREE: The gallbladder is severely distended, although without wall thickening or pericholecystic fluid. CBD is not dilated. SPLEEN: within normal limits. PANCREAS: No mass or ductal dilatation. ADRENALS: Unremarkable. KIDNEYS: No mass, calculus, or hydronephrosis. STOMACH: Unremarkable. SMALL BOWEL: No dilatation or wall thickening. COLON: No dilatation or wall thickening. Extensive diverticulosis without evidence of diverticulitis. APPENDIX: Not visualized. PERITONEUM: No ascites or pneumoperitoneum. RETROPERITONEUM: No lymphadenopathy or aortic aneurysm.  REPRODUCTIVE ORGANS: Status post hysterectomy. URINARY BLADDER: No mass or calculus. BONES: No destructive bone lesion. ABDOMINAL WALL: No mass or hernia. ADDITIONAL COMMENTS: N/A     1. Congestive heart failure, with large right and moderate left pleural effusions, bibasilar atelectasis, and pulmonary edema. 2. Multiple small pulmonary nodules. These may be a component of pulmonary edema, recommend follow-up chest CT after acute issues are resolved. 3. Prominent distended gallbladder, without other findings of acute cholecystitis. US ABD LTD    Result Date: 3/7/2023  ULTRASOUND OF THE RIGHT UPPER QUADRANT INDICATION: acute N/V with LFTs unreadable. COMPARISON: CT abdomen same day. TECHNIQUE: Sonography of the right upper quadrant was performed. FINDINGS: GALLBLADDER: The gallbladder is markedly distended without wall thickening, but gallbladder volume is measured at 173 cc. Intraluminal calculi are numerous and dependent. Small ascites in the upper abdomen. COMMON DUCT:  the duct is difficult to visualize sonographically. LIVER: Normal echogenicity. No focal hepatic mass or intrahepatic biliary dilatation is shown. PANCREAS: The visualized portions of the pancreas are normal. RIGHT KIDNEY: 11.3 cm in length. No hydronephrosis, shadowing calculus, or contour-deforming renal mass. Incidentally noted right pleural effusion. 1. Marked gallbladder distention with cholelithiasis but no wall thickening. 2. No biliary ductal dilatation is documented. 3. Small ascites. 4. Right pleural effusion. XR CHEST PORT    Result Date: 3/7/2023  EXAM:  XR CHEST PORT INDICATION: Evaluate for infiltrate COMPARISON: none TECHNIQUE: 1816 hours portable chest AP view FINDINGS: Atherosclerotic calcifications of the thoracic aorta with mild tortuosity is shown. Mediastinal and hilar contours are otherwise normal. The cardiac silhouette is within normal limits. The pulmonary vasculature is within normal limits.  Small bilateral pleural effusions, larger on the right, are noted. No pulmonary consolidation is evident. The bones are severely osteopenic. Small right greater than left bilateral pleural effusions. PROCEDURES   Unless otherwise noted below, none  Procedures     CRITICAL CARE TIME   I have spent 76 minutes of critical care time in evaluating and treating this patient. This includes time spent at bedside, time with family and decision makers, documentation, review of labs and imaging, and/or consultation with specialists. It does not include time spent on separately billed procedures. This patient presents with a critical illness or injury that acutely impairs one or more vital organ systems such that there is a high probability of imminent or life threatening deterioration in the patient's condition. This case involved decision making of high complexity to assess, manipulate, and support vital organ system failure and/or to prevent further life threatening deterioration of the patient's condition. Failure to initiate these interventions on an urgent basis would likely result in sudden, clinically significant or life threatening deterioration in the patient's condition.     Abnormal findings supporting critical care: cardiac arrhytmia, sepsis  Interventions to support critical care: antibiotics, supervision of care, chart review, admission  Failure to intervene may result in: cardiac arrest, death      EMERGENCY DEPARTMENT COURSE and DIFFERENTIAL DIAGNOSIS/MDM   Vitals:    Vitals:    03/08/23 1330 03/08/23 1333 03/08/23 1400 03/08/23 1430   BP: (!) 124/56  (!) 127/53 (!) 127/58   Pulse: 89  89 90   Resp: 17  14 13   Temp:       SpO2:   96% 100%   Weight:       Height:  5' 3\" (1.6 m)          Patient was given the following medications:  Medications   sodium chloride (NS) flush 5-10 mL (has no administration in time range)   dextrose 10 % infusion 125-250 mL (has no administration in time range)   sodium chloride (NS) flush 5-10 mL (has no administration in time range)   sodium chloride (NS) flush 5-40 mL (has no administration in time range)   ondansetron (ZOFRAN) injection 4 mg (4 mg IntraVENous Given 3/7/23 1936)   senna (SENOKOT) tablet 8.6 mg (has no administration in time range)   polyethylene glycol (MIRALAX) packet 17 g (has no administration in time range)   amiodarone (NEXTERONE) 150 mg in dextrose 5% 100 ml 150 mg/100 mL (1.5 mg/mL) bolus premix (  Canceled Entry 3/8/23 0500)   bumetanide (BUMEX) injection 2 mg (2 mg IntraVENous Given 3/8/23 1434)   DOBUTamine (DOBUTREX) 500 mg/250 mL (2,000 mcg/mL) infusion (5 mcg/kg/min × 64.8 kg IntraVENous Rate Change 3/8/23 1100)   nitroglycerin (NITROBID) 2 % ointment 0.5 Inch (0.5 Inches Topical Given 3/8/23 0914)   alcohol 62% (NOZIN) nasal  1 Ampule (1 Ampule Topical Missed 3/8/23 1000)   balsam peru-castor oiL (VENELEX) ointment ( Topical Given 3/8/23 1159)   thiamine (B-1) 200 mg in 0.9% sodium chloride 50 mL IVPB (has no administration in time range)   cefTRIAXone (ROCEPHIN) 1 g in 0.9% sodium chloride (MBP/ADV) 50 mL MBP (1 g IntraVENous New Bag 3/8/23 1125)   metroNIDAZOLE (FLAGYL) IVPB premix 500 mg (500 mg IntraVENous New Bag 3/8/23 1205)   latanoprost (XALATAN) 0.005 % ophthalmic solution 1 Drop (has no administration in time range)   timolol (TIMOPTIC) 0.5 % ophthalmic solution 1 Drop (has no administration in time range)   dorzolamide (TRUSOPT) 2 % ophthalmic solution 1 Drop (has no administration in time range)   prednisoLONE acetate (PRED FORTE) 1 % ophthalmic suspension 1 Drop (has no administration in time range)   insulin lispro (HUMALOG) injection ( SubCUTAneous Canceled Entry 3/8/23 1400)   glucose chewable tablet 16 g (has no administration in time range)   glucagon (GLUCAGEN) injection 1 mg (has no administration in time range)   dextrose 10% infusion 0-250 mL (has no administration in time range)   sodium chloride 0.9 % bolus infusion 1,000 mL (0 mL IntraVENous Stopped 3/7/23 2050)     Followed by   sodium chloride 0.9 % bolus infusion 944 mL (0 mL IntraVENous Stopped 3/7/23 2050)   piperacillin-tazobactam (ZOSYN) 3.375 g in 0.9% sodium chloride (MBP/ADV) 100 mL MBP (0 g IntraVENous IV Completed 3/7/23 1926)   vancomycin (VANCOCIN) 1500 mg in  ml infusion (0 mg IntraVENous Transfusion Completed 3/7/23 2130)   calcium gluconate injection 1 g (1 g IntraVENous Given 3/7/23 1756)   insulin regular (NOVOLIN R, HUMULIN R) injection 10 Units (10 Units IntraVENous Given 3/7/23 1832)   dextrose 10 % infusion 250 mL (0 mL IntraVENous IV Completed 3/7/23 1926)   furosemide (LASIX) injection 40 mg (40 mg IntraVENous Given 3/7/23 2057)   furosemide (LASIX) injection 80 mg (80 mg IntraVENous Given 3/7/23 2229)   amiodarone (NEXTERONE) 150 mg in dextrose 5% 100 ml bolus premix 150 mg (0 mg IntraVENous Transfusion Completed 3/8/23 0404)   amiodarone (NEXTERONE) 150 mg in dextrose 5% 100 ml bolus premix 150 mg (0 mg IntraVENous Transfusion Completed 3/8/23 0422)   magnesium sulfate 2 g/50 ml IVPB (premix or compounded) (0 g IntraVENous IV Completed 3/8/23 0700)   metoprolol (LOPRESSOR) injection 2.5 mg (2.5 mg IntraVENous Given 3/8/23 0539)       Medical Decision Making  [de-identified] female with history of diabetes presents with abnormal lab results and vomiting. On arrival she has stable blood pressure to proxy 142/59. She is afebrile. Generally well-appearing with a benign abdominal exam.  During my exam her heart rate notably jumped from approximate 100-1 50. On review of her cardiac monitor that she is on for her notable tachycardia it appears to be a wide-complex tachycardia with a rate of 147. I do appreciate P waves before every QRS complex. Her blood pressure remained normal during this and her mentation was normal as well. DDx for her includes sepsis, septic shock, arrhythmia, hyperkalemia, OLGA, SBO, pneumonia, UTI, pyelonephritis.   Sepsis alert called immediately upon arrival.  Reviewed her initial lab work notable for transaminitis greater than can be measured, lactate of 11, notable leukocytosis. Urine has some signs of infection but given the amount of endorgan damage she is encouraged is unlikely to be the source. We will proceed with noncontrasted imaging of the abdomen and the chest to evaluate for any infectious etiology. Cannot use contrast given her GFR. She is notably hyperkalemic as well. Possible cause of her abrupt arrhythmia. As such patient immediately given 1 g of calcium gluconate as well as insulin dextrose. This seemed to help her rhythm after approximately 10 minutes. Now appears to be in a normal sinus rhythm at a rate of approximately 120 upon my interpretation of her cardiac monitor that she is on for her sepsis. We will empirically give vancomycin and Zosyn as antibiotics, cultures and will need admission to the ICU given her laboratory abnormalities, arrhythmias. [id number] [facility] ED SEPSIS NOTE:     6:44 PM The patient now meets criteria for: Severe Sepsis    Fluid resuscitation with: 30 mL/kg crystalloid bolus  Due to concern for rapidly advancing infection and deterioration of patient's condition, antibiotics are started STAT and cultures ordered. Amount and/or Complexity of Data Reviewed  Labs: ordered. Radiology: ordered and independent interpretation performed. ECG/medicine tests: ordered and independent interpretation performed. Risk  OTC drugs. Prescription drug management. Decision regarding hospitalization. Reviewed her images, notable for small effusions on CXR    I've performed a sepsis reassessment of the patient's clinical volume status and tissue perfusion at time 8:29 PM           FINAL IMPRESSION     1. Sepsis with acute renal failure without septic shock, due to unspecified organism, unspecified acute renal failure type ([address]    2. OLGA (acute kidney injury) ([address]    3. Acute hyperkalemia    4. Transaminitis    5. Wide-complex tachycardia          DISPOSITION/PLAN   Jocelynn Malloy  results have been reviewed with her. She has been counseled regarding her diagnosis, treatment, and plan. She verbally conveys understanding and agreement of the signs, symptoms, diagnosis, treatment and prognosis and additionally agrees to follow up as discussed. She also agrees with the care-plan and conveys that all of her questions have been answered. CLINICAL IMPRESSION    Admit Note: Pt is being admitted by the ICU Dr. Mariann Cosby. The results of their tests and reason(s) for their admission have been discussed with pt and/or available family. They convey agreement and understanding for the need to be admitted and for the admission diagnosis. PATIENT REFERRED TO:  Follow-up Information    None           DISCHARGE MEDICATIONS:  Current Discharge Medication List            DISCONTINUED MEDICATIONS:  Current Discharge Medication List          I am the Primary Clinician of Record. Nazario Ding MD (electronically signed)    (Please note that parts of this dictation were completed with voice recognition software. Quite often unanticipated grammatical, syntax, homophones, and other interpretive errors are inadvertently transcribed by the computer software. Please disregards these errors.  Please excuse any errors that have escaped final proofreading.)

## 2023-03-07 NOTE — Clinical Note
Pt calm, no complaints of pain or shortness of breath, sats 80's on 8 L nasal cannula, increased to NRB, informed Dr. Emili Harrison

## 2023-03-07 NOTE — H&P
[facility]    ICU TEAM H & P Note    Name: Margarette Pantoja   : 1940   MRN: [id number]   Date: 3/7/2023        Subjective:     Reason for ICU Admission: sepsis     HPI: per patient, EMR, colleague report  Margarette Pantoja is a [de-identified] y.o. female with PMH s/f T2DM (A1c 7.1, 2018), HTN, chronic pain/arthritis who presented to the ED with N/V, anorexia x 2 days. In ED, workup notable for marked and varied lab abnmls, including LA 11, WBC 17, Na 125, K 5.7, CO2 16, AG 17, Cr 1.8, AST/ALT unreadably high, alkphos 176, Tbili 1.7. Pan-scan without contrast pending per ED physician. Given 2 L IVF in ED and started on antibiotics. ICU consulted for admission. Upon my arrival to bedside, pt awake and alert, /97, HR . Finishing first L of IVF. C/o nausea and no appetite. Will be admitted to the ICU for further management. Arrived to ICU after CT scans. Pt now SOB with RR in the 30s requiring 4 L NC. Bedside ultrasound of lungs and echo done. Lungs with bilateral B lines. Bedside echo significant for dilated LV with reduced systolic function. Possible wall motion abnormalities but a little hard to determine bc pt's HR still in the 130s. Official Echo ordered for the morning. First troponin only 141 but will resend in 3 hours to r/o NSTEMI. Denies chest pain. Past Medical History:      has a past medical history of Arthritis, Chronic pain, Diabetes ([address] Hypertension, Ill-defined condition, Other ill-defined conditions(799.89), and Other ill-defined conditions(799.89). Past Surgical History:      has a past surgical history that includes hx tubal ligation; hx hysterectomy; hx urological; hx appendectomy; hx tonsillectomy; colonoscopy (N/A, 10/25/2016); hx cataract removal (Left); hx knee replacement (Right); hx knee replacement (Left); and hx orthopaedic. Home Medications:     Prior to Admission medications    Medication Sig Start Date End Date Taking?  Authorizing Provider naproxen (NAPROSYN) 500 mg tablet Take 1 Tab by mouth two (2) times daily (with meals). Hold for two weeks. 18   Rosalva Salgado NP   oxyCODONE IR (ROXICODONE) 5 mg immediate release tablet Take 1-2 Tabs by mouth every four (4) hours as needed. Max Daily Amount: 60 mg. 18   Rosalva Salgado NP   atorvastatin (LIPITOR) 10 mg tablet Take 10 mg by mouth nightly. Provider, Historical   timolol (TIMOPTIC) 0.5 % ophthalmic solution Administer 1 Drop to both eyes daily. Provider, Historical   latanoprost (XALATAN) 0.005 % ophthalmic solution Administer 1 Drop to both eyes nightly. Indications: OPEN ANGLE GLAUCOMA    Provider, Historical   prednisoLONE acetate (PRED FORTE) 1 % ophthalmic suspension Administer 1 Drop to left eye daily. Indications: SEVERE OCULAR INFLAMMATION    Provider, Historical   hydrochlorothiazide (HYDRODIURIL) 25 mg tablet Take 25 mg by mouth daily. Indications: EDEMA, HYPERTENSION    Provider, Historical   metFORMIN (GLUCOPHAGE) 500 mg tablet Take 500 mg by mouth daily (with breakfast). Indications: TYPE 2 DIABETES MELLITUS    Provider, Historical   lisinopril (PRINIVIL, ZESTRIL) 5 mg tablet Take 5 mg by mouth daily. Indications: HYPERTENSION    Provider, Historical   acyclovir (ZOVIRAX) 800 mg tablet Take 800 mg by mouth nightly. Indications: shingles prevention    Provider, Historical   multivitamin with iron tablet Take 1 Tab by mouth daily.     Provider, Historical       Allergies/Social/Family History:     No Known Allergies   Social History     Tobacco Use    Smoking status: Never    Smokeless tobacco: Never   Substance Use Topics    Alcohol use: No      Family History   Problem Relation Age of Onset    Cancer Father         leukemia    Stroke Sister        Objective:   Vital Signs:  Visit Vitals  BP (!) 142/59   Pulse 94   Temp 97.5 °F (36.4 °C)   Resp 18   Ht 5' 3\" (1.6 m)   Wt 64.8 kg (142 lb 13.7 oz)   SpO2 98%   BMI 25.31 kg/m²        Temp (24hrs), Av.5 °F (36.4 °C), Min:97.5 °F (36.4 °C), Max:97.5 °F (36.4 °C)      Intake/Output:   No intake or output data in the 24 hours ending 03/07/23 1841    Physical Exam:  Elderly patient, lying in bed, NAD  Alert, oriented, NICOLAS  Tachypneic and labored RR, bilateral crackles, symmetric chest rise  Reg rhythm with tachycardia, no heave/rub  Peripheral pulses intact  Abd soft, nontender  Skin warm, dry  Calm and cooperative    LABS AND  DATA:   Personally reviewed    MEDS:   Personally reviewed      ICU Assessment/ Comprehensive Plan of Care:     Randall Rivera Is a [de-identified] y.o. female with pmh of DM2 who is admitted 03/07/23 for acute liver failure, tachycardia, CHF and severe sepsis . NEURO  - no acute issue    CARDIAC  # CHF exacerbation- -initially did not c/o any respiratory symptoms; however, after getting 1.5 L of IVF pt developed SOB and new oxygen requirement  #. Tachyarrhythmia  #. H/o HTN  - hold home antiHTNs (hctz, ACEi), statin  - monitor for need for antiarrhythmics, but ideally, treatment of other issues will self-resolve  -CT chest significant for cardiomegaly, bilateral pleural effusions and pulmonary edema  -Bedside ultrasound of lungs and echo done. Lungs with bilateral B lines. Bedside echo significant for dilated LV with reduced systolic function. Possible wall motion abnormalities but a little hard to determine bc pt's HR still in the 130s.    -official echo ordered for the morning  -40 mg lasix now and monitor U/O, if no response with in an hour will give 80mg lasix  -first troponin 141, will repeat 3 hours later to r/o NSTEMI  -pro-BNP prior to IVF 5,172.  -EKG showed ST at 133, no ST elevations  -denies chest pain      RESPIRATORY  #Acute hypoxic respiratory failure due to Pulmonary edema- initially presented w/o respiratory symptoms; however, developed s/o volume overload after 1.5 L IVF  -NC for goal SPO2>92%, currently doing well on 4 L  -40 mg lasix , may redose pending urine out put response  -CT chest significant for cardiomegaly, bilateral pleural effusions and pulmonary edema  -hold off on further IVF      RENAL  #. Elevated Scr  #. AGMA  - unknown baseline, presumed OLGA, hypovolemic vs ATN vs med-induced vs other  - volume resus in ED (received about 1.5 L of the 2 L ordered before showing s/o overload)  - beta-hydroxy now  - repeat labs early this evening to monitor for trend  - low threshold to consult Nephrology in setting of hyperK  -first lactic acid 11, send repeat      GASTROINTESTINAL  #. Nausea, vomiting  #. Significant transaminitis  - unclear source: ischemic (hypovolemic), viral, bacterial, hepatic, pancreatic, med-induced, etc  - multiple labs now, including hep panel, tox screen, lipase  - CT abdomen showed Prominent distended gallbladder, without other findings of acute  cholecystitis. No other abnormalities seen in the abdomen  - will add RUQUS with Dopplers to eval hepatic vasc  - NPO  - RD eval for nutrition status  - closely monitor plts, coags       HEMATOLOGIC  #. Mild normocytic anemia  - unknown baseline   - no indication for intervention      ID  #. Concern for infection  - unclear source  - scans pending  - given localizing s/s, not unreasonable to cover for abd source though true bacterial sources in intact abdomens are more rare here  - zosyn for now      ENDOCRINE  - accuchecks      Mobility: Bedrest  PT/OT:  not yet appropriate        DVT Prophylaxis: SCDs  U - Ulcer Prophylaxis: not indicated, but may consider acid-reducer given sxs  G - Glycemic Control: Insulin prn  B - Bowel Regimen: not indicated    DISPOSITION/COMMUNICATION  Discussed Plan of Care/Code Status: Full     Stay in ICU    CRITICAL CARE CONSULTANT NOTE  I had a face to face encounter with the patient, reviewed and interpreted patient data including clinical events, labs, images, vital signs, I/O's, and examined patient.   I have discussed the case and the plan and management of the patient's care with the consulting services, the bedside nurses and the respiratory therapist.      NOTE OF PERSONAL INVOLVEMENT IN CARE   This patient has a high probability of imminent, clinically significant deterioration, which requires the highest level of preparedness to intervene urgently. I participated in the decision-making and personally managed or directed the management of the following life and organ supporting interventions that required my frequent assessment to treat or prevent imminent deterioration. I personally spent 60 minutes of critical care time. This is time spent at this critically ill patient's bedside actively involved in patient care as well as the coordination of care. This does not include any procedural time which has been billed separately.     Jose Randle NP  Critical care medicine  3/7/2023

## 2023-03-08 ENCOUNTER — APPOINTMENT (OUTPATIENT)
Dept: NON INVASIVE DIAGNOSTICS | Age: 83
End: 2023-03-08
Attending: NURSE PRACTITIONER
Payer: MEDICARE

## 2023-03-08 LAB
ALBUMIN SERPL-MCNC: 3 G/DL (ref 3.5–5)
ALBUMIN SERPL-MCNC: 3.7 G/DL (ref 3.5–5)
ALBUMIN/GLOB SERPL: 1 (ref 1.1–2.2)
ALBUMIN/GLOB SERPL: 1.1 (ref 1.1–2.2)
ALP SERPL-CCNC: 158 U/L (ref 45–117)
ALP SERPL-CCNC: 182 U/L (ref 45–117)
ALT SERPL-CCNC: >3500 U/L (ref 12–78)
ALT SERPL-CCNC: >3500 U/L (ref 12–78)
AMMONIA PLAS-SCNC: 52 UMOL/L
AMPHET UR QL SCN: NEGATIVE
AMYLASE SERPL-CCNC: 49 U/L (ref 25–115)
ANION GAP SERPL CALC-SCNC: 12 MMOL/L (ref 5–15)
ANION GAP SERPL CALC-SCNC: 9 MMOL/L (ref 5–15)
APTT PPP: 22.8 SEC (ref 22.1–31)
AST SERPL-CCNC: >2000 U/L (ref 15–37)
AST SERPL-CCNC: >2000 U/L (ref 15–37)
ATRIAL RATE: 133 BPM
ATRIAL RATE: 98 BPM
B PERT DNA SPEC QL NAA+PROBE: NOT DETECTED
B-OH-BUTYR SERPL-SCNC: 1.24 MMOL/L
BACTERIA SPEC CULT: NORMAL
BARBITURATES UR QL SCN: NEGATIVE
BASOPHILS # BLD: 0 K/UL (ref 0–0.1)
BASOPHILS NFR BLD: 0 % (ref 0–1)
BENZODIAZ UR QL: NEGATIVE
BILIRUB SERPL-MCNC: 0.9 MG/DL (ref 0.2–1)
BILIRUB SERPL-MCNC: 1.1 MG/DL (ref 0.2–1)
BORDETELLA PARAPERTUSSIS PCR, BORPAR: NOT DETECTED
BUN SERPL-MCNC: 43 MG/DL (ref 6–20)
BUN SERPL-MCNC: 48 MG/DL (ref 6–20)
BUN/CREAT SERPL: 30 (ref 12–20)
BUN/CREAT SERPL: 40 (ref 12–20)
C PNEUM DNA SPEC QL NAA+PROBE: NOT DETECTED
CALCIUM SERPL-MCNC: 7.8 MG/DL (ref 8.5–10.1)
CALCIUM SERPL-MCNC: 8.7 MG/DL (ref 8.5–10.1)
CALCULATED R AXIS, ECG10: -28 DEGREES
CALCULATED R AXIS, ECG10: 1 DEGREES
CALCULATED T AXIS, ECG11: 116 DEGREES
CALCULATED T AXIS, ECG11: 142 DEGREES
CANNABINOIDS UR QL SCN: NEGATIVE
CHLORIDE SERPL-SCNC: 95 MMOL/L (ref 97–108)
CHLORIDE SERPL-SCNC: 98 MMOL/L (ref 97–108)
CO2 SERPL-SCNC: 20 MMOL/L (ref 21–32)
CO2 SERPL-SCNC: 24 MMOL/L (ref 21–32)
COCAINE UR QL SCN: NEGATIVE
CREAT SERPL-MCNC: 1.21 MG/DL (ref 0.55–1.02)
CREAT SERPL-MCNC: 1.42 MG/DL (ref 0.55–1.02)
DIAGNOSIS, 93000: NORMAL
DIAGNOSIS, 93000: NORMAL
DIFFERENTIAL METHOD BLD: ABNORMAL
DRUG SCRN COMMENT,DRGCM: NORMAL
EOSINOPHIL # BLD: 0 K/UL (ref 0–0.4)
EOSINOPHIL NFR BLD: 0 % (ref 0–7)
ERYTHROCYTE [DISTWIDTH] IN BLOOD BY AUTOMATED COUNT: 15.9 % (ref 11.5–14.5)
EST. AVERAGE GLUCOSE BLD GHB EST-MCNC: 148 MG/DL
FLUAV SUBTYP SPEC NAA+PROBE: NOT DETECTED
FLUBV RNA SPEC QL NAA+PROBE: NOT DETECTED
GLOBULIN SER CALC-MCNC: 3 G/DL (ref 2–4)
GLOBULIN SER CALC-MCNC: 3.5 G/DL (ref 2–4)
GLUCOSE BLD STRIP.AUTO-MCNC: 197 MG/DL (ref 65–117)
GLUCOSE BLD STRIP.AUTO-MCNC: 231 MG/DL (ref 65–117)
GLUCOSE SERPL-MCNC: 214 MG/DL (ref 65–100)
GLUCOSE SERPL-MCNC: 234 MG/DL (ref 65–100)
HADV DNA SPEC QL NAA+PROBE: NOT DETECTED
HAV IGM SER QL: NONREACTIVE
HBA1C MFR BLD: 6.8 % (ref 4–5.6)
HBV CORE IGM SER QL: NONREACTIVE
HBV SURFACE AG SER QL: <0.1 INDEX
HBV SURFACE AG SER QL: NEGATIVE
HCOV 229E RNA SPEC QL NAA+PROBE: NOT DETECTED
HCOV HKU1 RNA SPEC QL NAA+PROBE: NOT DETECTED
HCOV NL63 RNA SPEC QL NAA+PROBE: NOT DETECTED
HCOV OC43 RNA SPEC QL NAA+PROBE: NOT DETECTED
HCT VFR BLD AUTO: 34.8 % (ref 35–47)
HCV AB SERPL QL IA: NONREACTIVE
HGB BLD-MCNC: 11.2 G/DL (ref 11.5–16)
HMPV RNA SPEC QL NAA+PROBE: NOT DETECTED
HPIV1 RNA SPEC QL NAA+PROBE: NOT DETECTED
HPIV2 RNA SPEC QL NAA+PROBE: NOT DETECTED
HPIV3 RNA SPEC QL NAA+PROBE: NOT DETECTED
HPIV4 RNA SPEC QL NAA+PROBE: NOT DETECTED
IMM GRANULOCYTES # BLD AUTO: 0.1 K/UL (ref 0–0.04)
IMM GRANULOCYTES NFR BLD AUTO: 1 % (ref 0–0.5)
INR PPP: 2 (ref 0.9–1.1)
INR PPP: 2.3 (ref 0.9–1.1)
LACTATE SERPL-SCNC: 1.6 MMOL/L (ref 0.4–2)
LACTATE SERPL-SCNC: 4.6 MMOL/L (ref 0.4–2)
LACTATE SERPL-SCNC: 5.3 MMOL/L (ref 0.4–2)
LIPASE SERPL-CCNC: 259 U/L (ref 73–393)
LYMPHOCYTES # BLD: 1.8 K/UL (ref 0.8–3.5)
LYMPHOCYTES NFR BLD: 11 % (ref 12–49)
M PNEUMO DNA SPEC QL NAA+PROBE: NOT DETECTED
MAGNESIUM SERPL-MCNC: 1.7 MG/DL (ref 1.6–2.4)
MAGNESIUM SERPL-MCNC: 1.9 MG/DL (ref 1.6–2.4)
MCH RBC QN AUTO: 27.9 PG (ref 26–34)
MCHC RBC AUTO-ENTMCNC: 32.2 G/DL (ref 30–36.5)
MCV RBC AUTO: 86.8 FL (ref 80–99)
METHADONE UR QL: NEGATIVE
MONOCYTES # BLD: 0.8 K/UL (ref 0–1)
MONOCYTES NFR BLD: 5 % (ref 5–13)
NEUTS SEG # BLD: 14.1 K/UL (ref 1.8–8)
NEUTS SEG NFR BLD: 83 % (ref 32–75)
NRBC # BLD: 0 K/UL (ref 0–0.01)
NRBC BLD-RTO: 0 PER 100 WBC
OPIATES UR QL: NEGATIVE
OSMOLALITY SERPL: 302 MOSM/KG H2O
P-R INTERVAL, ECG05: 120 MS
PCP UR QL: NEGATIVE
PHOSPHATE SERPL-MCNC: 4.5 MG/DL (ref 2.6–4.7)
PLATELET # BLD AUTO: 232 K/UL (ref 150–400)
PMV BLD AUTO: 11.3 FL (ref 8.9–12.9)
POTASSIUM SERPL-SCNC: 3.4 MMOL/L (ref 3.5–5.1)
POTASSIUM SERPL-SCNC: 4.6 MMOL/L (ref 3.5–5.1)
PROCALCITONIN SERPL-MCNC: 0.41 NG/ML
PROT SERPL-MCNC: 6 G/DL (ref 6.4–8.2)
PROT SERPL-MCNC: 7.2 G/DL (ref 6.4–8.2)
PROTHROMBIN TIME: 19.8 SEC (ref 9–11.1)
PROTHROMBIN TIME: 22.6 SEC (ref 9–11.1)
Q-T INTERVAL, ECG07: 348 MS
Q-T INTERVAL, ECG07: 356 MS
QRS DURATION, ECG06: 134 MS
QRS DURATION, ECG06: 136 MS
QTC CALCULATION (BEZET), ECG08: 529 MS
QTC CALCULATION (BEZET), ECG08: 548 MS
RBC # BLD AUTO: 4.01 M/UL (ref 3.8–5.2)
RSV RNA SPEC QL NAA+PROBE: NOT DETECTED
RV+EV RNA SPEC QL NAA+PROBE: NOT DETECTED
SARS-COV-2 RNA RESP QL NAA+PROBE: NOT DETECTED
SERVICE CMNT-IMP: ABNORMAL
SERVICE CMNT-IMP: ABNORMAL
SERVICE CMNT-IMP: NORMAL
SODIUM SERPL-SCNC: 127 MMOL/L (ref 136–145)
SODIUM SERPL-SCNC: 131 MMOL/L (ref 136–145)
SP1: NORMAL
SP2: NORMAL
SP3: NORMAL
THERAPEUTIC RANGE,PTTT: NORMAL SECS (ref 58–77)
TROPONIN I SERPL HS-MCNC: 126 NG/L (ref 0–51)
VENTRICULAR RATE, ECG03: 133 BPM
VENTRICULAR RATE, ECG03: 149 BPM
WBC # BLD AUTO: 16.8 K/UL (ref 3.6–11)

## 2023-03-08 PROCEDURE — 93005 ELECTROCARDIOGRAM TRACING: CPT

## 2023-03-08 PROCEDURE — 74011250637 HC RX REV CODE- 250/637: Performed by: INTERNAL MEDICINE

## 2023-03-08 PROCEDURE — 83690 ASSAY OF LIPASE: CPT

## 2023-03-08 PROCEDURE — 74011000258 HC RX REV CODE- 258: Performed by: INTERNAL MEDICINE

## 2023-03-08 PROCEDURE — 85025 COMPLETE CBC W/AUTO DIFF WBC: CPT

## 2023-03-08 PROCEDURE — 74011000250 HC RX REV CODE- 250: Performed by: INTERNAL MEDICINE

## 2023-03-08 PROCEDURE — 74011000258 HC RX REV CODE- 258: Performed by: NURSE PRACTITIONER

## 2023-03-08 PROCEDURE — 85730 THROMBOPLASTIN TIME PARTIAL: CPT

## 2023-03-08 PROCEDURE — 83605 ASSAY OF LACTIC ACID: CPT

## 2023-03-08 PROCEDURE — 80307 DRUG TEST PRSMV CHEM ANLYZR: CPT

## 2023-03-08 PROCEDURE — 93306 TTE W/DOPPLER COMPLETE: CPT

## 2023-03-08 PROCEDURE — 74011636637 HC RX REV CODE- 636/637: Performed by: INTERNAL MEDICINE

## 2023-03-08 PROCEDURE — 83735 ASSAY OF MAGNESIUM: CPT

## 2023-03-08 PROCEDURE — 82140 ASSAY OF AMMONIA: CPT

## 2023-03-08 PROCEDURE — 80053 COMPREHEN METABOLIC PANEL: CPT

## 2023-03-08 PROCEDURE — 82962 GLUCOSE BLOOD TEST: CPT

## 2023-03-08 PROCEDURE — 84100 ASSAY OF PHOSPHORUS: CPT

## 2023-03-08 PROCEDURE — 80320 DRUG SCREEN QUANTALCOHOLS: CPT

## 2023-03-08 PROCEDURE — 82150 ASSAY OF AMYLASE: CPT

## 2023-03-08 PROCEDURE — 74011250636 HC RX REV CODE- 250/636: Performed by: INTERNAL MEDICINE

## 2023-03-08 PROCEDURE — 36415 COLL VENOUS BLD VENIPUNCTURE: CPT

## 2023-03-08 PROCEDURE — 74011000250 HC RX REV CODE- 250

## 2023-03-08 PROCEDURE — 84145 PROCALCITONIN (PCT): CPT

## 2023-03-08 PROCEDURE — 74011250636 HC RX REV CODE- 250/636: Performed by: NURSE PRACTITIONER

## 2023-03-08 PROCEDURE — 65610000006 HC RM INTENSIVE CARE

## 2023-03-08 PROCEDURE — 85610 PROTHROMBIN TIME: CPT

## 2023-03-08 PROCEDURE — 74011250636 HC RX REV CODE- 250/636

## 2023-03-08 PROCEDURE — 77010033678 HC OXYGEN DAILY

## 2023-03-08 PROCEDURE — 83930 ASSAY OF BLOOD OSMOLALITY: CPT

## 2023-03-08 PROCEDURE — 84484 ASSAY OF TROPONIN QUANT: CPT

## 2023-03-08 RX ORDER — DEXTROSE MONOHYDRATE 100 MG/ML
0-250 INJECTION, SOLUTION INTRAVENOUS AS NEEDED
Status: DISCONTINUED | OUTPATIENT
Start: 2023-03-08 | End: 2023-03-13

## 2023-03-08 RX ORDER — DOBUTAMINE HYDROCHLORIDE 200 MG/100ML
0-10 INJECTION INTRAVENOUS
Status: DISCONTINUED | OUTPATIENT
Start: 2023-03-08 | End: 2023-03-08

## 2023-03-08 RX ORDER — METRONIDAZOLE 500 MG/100ML
500 INJECTION, SOLUTION INTRAVENOUS EVERY 8 HOURS
Status: DISCONTINUED | OUTPATIENT
Start: 2023-03-08 | End: 2023-03-09

## 2023-03-08 RX ORDER — METOPROLOL TARTRATE 5 MG/5ML
2.5 INJECTION INTRAVENOUS ONCE
Status: COMPLETED | OUTPATIENT
Start: 2023-03-08 | End: 2023-03-08

## 2023-03-08 RX ORDER — DIGOXIN 0.25 MG/ML
65 INJECTION INTRAMUSCULAR; INTRAVENOUS ONCE
Status: COMPLETED | OUTPATIENT
Start: 2023-03-09 | End: 2023-03-09

## 2023-03-08 RX ORDER — INSULIN LISPRO 100 [IU]/ML
INJECTION, SOLUTION INTRAVENOUS; SUBCUTANEOUS EVERY 6 HOURS
Status: DISCONTINUED | OUTPATIENT
Start: 2023-03-08 | End: 2023-03-10

## 2023-03-08 RX ORDER — LATANOPROST 50 UG/ML
1 SOLUTION/ DROPS OPHTHALMIC EVERY EVENING
Status: DISCONTINUED | OUTPATIENT
Start: 2023-03-08 | End: 2023-03-18 | Stop reason: HOSPADM

## 2023-03-08 RX ORDER — TIMOLOL MALEATE 5 MG/ML
1 SOLUTION/ DROPS OPHTHALMIC DAILY
Status: DISCONTINUED | OUTPATIENT
Start: 2023-03-08 | End: 2023-03-18 | Stop reason: HOSPADM

## 2023-03-08 RX ORDER — ADENOSINE 3 MG/ML
INJECTION, SOLUTION INTRAVENOUS
Status: COMPLETED
Start: 2023-03-08 | End: 2023-03-08

## 2023-03-08 RX ORDER — DILTIAZEM HYDROCHLORIDE 5 MG/ML
10 INJECTION INTRAVENOUS ONCE
Status: COMPLETED | OUTPATIENT
Start: 2023-03-08 | End: 2023-03-08

## 2023-03-08 RX ORDER — BUMETANIDE 0.25 MG/ML
2 INJECTION INTRAMUSCULAR; INTRAVENOUS EVERY 8 HOURS
Status: DISCONTINUED | OUTPATIENT
Start: 2023-03-08 | End: 2023-03-09

## 2023-03-08 RX ORDER — DORZOLAMIDE HCL 20 MG/ML
SOLUTION/ DROPS OPHTHALMIC
COMMUNITY
Start: 2023-02-27

## 2023-03-08 RX ORDER — POTASSIUM CHLORIDE 7.45 MG/ML
10 INJECTION INTRAVENOUS
Status: COMPLETED | OUTPATIENT
Start: 2023-03-08 | End: 2023-03-08

## 2023-03-08 RX ORDER — DIGOXIN 0.25 MG/ML
250 INJECTION INTRAMUSCULAR; INTRAVENOUS ONCE
Status: COMPLETED | OUTPATIENT
Start: 2023-03-08 | End: 2023-03-08

## 2023-03-08 RX ORDER — MAGNESIUM SULFATE HEPTAHYDRATE 40 MG/ML
2 INJECTION, SOLUTION INTRAVENOUS ONCE
Status: COMPLETED | OUTPATIENT
Start: 2023-03-08 | End: 2023-03-08

## 2023-03-08 RX ORDER — MILRINONE LACTATE 0.2 MG/ML
0-.75 INJECTION, SOLUTION INTRAVENOUS
Status: DISCONTINUED | OUTPATIENT
Start: 2023-03-08 | End: 2023-03-09

## 2023-03-08 RX ORDER — METOPROLOL TARTRATE 5 MG/5ML
INJECTION INTRAVENOUS
Status: COMPLETED
Start: 2023-03-08 | End: 2023-03-08

## 2023-03-08 RX ORDER — BALSAM PERU/CASTOR OIL
OINTMENT (GRAM) TOPICAL 2 TIMES DAILY
Status: DISCONTINUED | OUTPATIENT
Start: 2023-03-08 | End: 2023-03-18 | Stop reason: HOSPADM

## 2023-03-08 RX ORDER — PREDNISOLONE ACETATE 10 MG/ML
1 SUSPENSION/ DROPS OPHTHALMIC DAILY
Status: DISCONTINUED | OUTPATIENT
Start: 2023-03-08 | End: 2023-03-18 | Stop reason: HOSPADM

## 2023-03-08 RX ORDER — DORZOLAMIDE HCL 20 MG/ML
1 SOLUTION/ DROPS OPHTHALMIC 2 TIMES DAILY
Status: DISCONTINUED | OUTPATIENT
Start: 2023-03-08 | End: 2023-03-18 | Stop reason: HOSPADM

## 2023-03-08 RX ORDER — IBUPROFEN 200 MG
4 TABLET ORAL AS NEEDED
Status: DISCONTINUED | OUTPATIENT
Start: 2023-03-08 | End: 2023-03-12

## 2023-03-08 RX ORDER — ADENOSINE 3 MG/ML
6 INJECTION, SOLUTION INTRAVENOUS ONCE
Status: COMPLETED | OUTPATIENT
Start: 2023-03-08 | End: 2023-03-08

## 2023-03-08 RX ADMIN — METOPROLOL TARTRATE 2.5 MG: 5 INJECTION, SOLUTION INTRAVENOUS at 05:39

## 2023-03-08 RX ADMIN — Medication 1 AMPULE: at 20:33

## 2023-03-08 RX ADMIN — POTASSIUM CHLORIDE 10 MEQ: 7.46 INJECTION, SOLUTION INTRAVENOUS at 19:49

## 2023-03-08 RX ADMIN — SODIUM CHLORIDE 5 MG/HR: 900 INJECTION, SOLUTION INTRAVENOUS at 20:19

## 2023-03-08 RX ADMIN — NITROGLYCERIN 0.5 INCH: 20 OINTMENT TOPICAL at 09:14

## 2023-03-08 RX ADMIN — ADENOSINE 6 MG: 3 INJECTION, SOLUTION INTRAVENOUS at 16:47

## 2023-03-08 RX ADMIN — DIGOXIN 250 MCG: 250 INJECTION, SOLUTION INTRAMUSCULAR; INTRAVENOUS at 17:47

## 2023-03-08 RX ADMIN — BUMETANIDE 2 MG: 0.25 INJECTION INTRAMUSCULAR; INTRAVENOUS at 14:34

## 2023-03-08 RX ADMIN — METOPROLOL TARTRATE 2.5 MG: 5 INJECTION INTRAVENOUS at 05:39

## 2023-03-08 RX ADMIN — CASTOR OIL AND BALSAM, PERU: 788; 87 OINTMENT TOPICAL at 20:33

## 2023-03-08 RX ADMIN — CEFTRIAXONE 1 G: 1 INJECTION, POWDER, FOR SOLUTION INTRAMUSCULAR; INTRAVENOUS at 11:25

## 2023-03-08 RX ADMIN — METOPROLOL TARTRATE 2.5 MG: 5 INJECTION, SOLUTION INTRAVENOUS at 16:49

## 2023-03-08 RX ADMIN — AMIODARONE HYDROCHLORIDE 1 MG/MIN: 50 INJECTION, SOLUTION INTRAVENOUS at 04:05

## 2023-03-08 RX ADMIN — AMIODARONE HYDROCHLORIDE 150 MG: 1.5 INJECTION, SOLUTION INTRAVENOUS at 04:09

## 2023-03-08 RX ADMIN — MAGNESIUM SULFATE HEPTAHYDRATE 2 G: 40 INJECTION, SOLUTION INTRAVENOUS at 04:47

## 2023-03-08 RX ADMIN — AMIODARONE HYDROCHLORIDE 0.5 MG/MIN: 50 INJECTION, SOLUTION INTRAVENOUS at 09:53

## 2023-03-08 RX ADMIN — BUMETANIDE 2 MG: 0.25 INJECTION INTRAMUSCULAR; INTRAVENOUS at 09:01

## 2023-03-08 RX ADMIN — AMIODARONE HYDROCHLORIDE 150 MG: 1.5 INJECTION, SOLUTION INTRAVENOUS at 03:54

## 2023-03-08 RX ADMIN — CASTOR OIL AND BALSAM, PERU: 788; 87 OINTMENT TOPICAL at 11:59

## 2023-03-08 RX ADMIN — DOBUTAMINE HYDROCHLORIDE 2.5 MCG/KG/MIN: 200 INJECTION INTRAVENOUS at 09:09

## 2023-03-08 RX ADMIN — LATANOPROST 1 DROP: 50 SOLUTION OPHTHALMIC at 20:30

## 2023-03-08 RX ADMIN — DORZOLAMIDE HYDROCHLORIDE 1 DROP: 20 SOLUTION/ DROPS OPHTHALMIC at 20:30

## 2023-03-08 RX ADMIN — METRONIDAZOLE 500 MG: 500 INJECTION, SOLUTION INTRAVENOUS at 20:02

## 2023-03-08 RX ADMIN — METOPROLOL TARTRATE 2.5 MG: 5 INJECTION, SOLUTION INTRAVENOUS at 17:00

## 2023-03-08 RX ADMIN — DILTIAZEM HYDROCHLORIDE 10 MG: 5 INJECTION, SOLUTION INTRAVENOUS at 18:40

## 2023-03-08 RX ADMIN — THIAMINE HYDROCHLORIDE 200 MG: 100 INJECTION, SOLUTION INTRAMUSCULAR; INTRAVENOUS at 21:53

## 2023-03-08 RX ADMIN — MILRINONE LACTATE IN DEXTROSE 0.38 MCG/KG/MIN: 200 INJECTION, SOLUTION INTRAVENOUS at 18:08

## 2023-03-08 RX ADMIN — POTASSIUM CHLORIDE 10 MEQ: 7.46 INJECTION, SOLUTION INTRAVENOUS at 18:43

## 2023-03-08 RX ADMIN — PHENYLEPHRINE HYDROCHLORIDE 30 MCG/MIN: 10 INJECTION INTRAVENOUS at 17:05

## 2023-03-08 RX ADMIN — THIAMINE HYDROCHLORIDE 200 MG: 100 INJECTION, SOLUTION INTRAMUSCULAR; INTRAVENOUS at 15:37

## 2023-03-08 RX ADMIN — METRONIDAZOLE 500 MG: 500 INJECTION, SOLUTION INTRAVENOUS at 12:05

## 2023-03-08 RX ADMIN — SODIUM CHLORIDE 3.38 G: 900 INJECTION INTRAVENOUS at 02:15

## 2023-03-08 RX ADMIN — Medication 2 UNITS: at 18:06

## 2023-03-08 NOTE — PROGRESS NOTES
0345- Called to bedside bc pt's 's. EKG being done. Upon my arrival pt's HR 170s-200's a fib RVR. Amioderone bolus and drip ordered. Pt denies complaints and BP is stable. After first amio bolus pt's HR still 140s-170s, second amio bolus ordered. BP still stable. Will continue to monitor.     Cely Knapp NP  [facility]  3/8/23

## 2023-03-08 NOTE — PROGRESS NOTES
1920: TRANSFER - IN REPORT:    Verbal report received from Luciana(name) on [facility] [place]  being received from ED(unit) for routine progression of care      Report consisted of patients Situation, Background, Assessment and   Recommendations(SBAR). Information from the following report(s) SBAR, Kardex, ED Summary, Procedure Summary, Intake/Output, MAR, Recent Results, Med Rec Status, and Cardiac Rhythm ST  was reviewed with the receiving nurse. Opportunity for questions and clarification was provided. Assessment completed upon patients arrival to unit and care assumed. 1930: Call received from [facility] in ED that CT is ready for pt once picked up from ED.    1940: Slime Coburn NP notified that CT still needs to be completed, Slime Coburn NP is on way to see pt at this time, abd ultrasound in process, then pt can be transported to [address] [place] [address] Rd: Pt picked up from ED 19 and transported to CT on monitor with writer and PCT. 2050: PT now in room CCU 2534, o2 sats during transport were 97%, now 83% pt placed on 4L NC    2100: Admission assessment complete, see flow sheet for details    2254: Slime Coburn NP notified of critical lab results Trop 140 and lactic 9.2. Okay to stop trending troponin and repeat lactic at 0200    0000: reassessment complete, no acute changes     0200: repeat lactic and AM labs drawn and sent to lab    0343: pt heart rate in 190s-200s BP stable, pt is asymptomatic, EKG in process, Jake YANG notified. 0349: Pt in fib w RVR, amio bolus and gtt ordered    0354: Amio bolus given    0404: amio bolus complete heart rate remains in 140s to 170s, orders for second amio bolus    0409: second amio bolus infusing.     0573: second amio bolus complete, orders for 2 g magnesium and to draw repeat lactic and troponin at 0500.    0535: pt HR remains in 150s, CELIA Joseph notified, orders for 2.5mg metoprolol    0550: pt converted to NSR    0700: Bedside and Verbal shift change report given to Tigist Johnson (oncoming nurse) by Sandra Potter RN (offgoing nurse). Report included the following information SBAR, Kardex, ED Summary, Procedure Summary, Intake/Output, MAR, Recent Results, Med Rec Status, and Cardiac Rhythm NSR .

## 2023-03-08 NOTE — CONSULTS
LIZ [facility] JENNIFER [facility]  [address] [facility], [address] [place] [address]  [phone]  [email or web address]    CARDIOLOGY INITIAL EVALUATION     Date of  Admission: 3/7/2023  5:21 PM     Admission type:Emergency    Referral for: Heart failure and atrial fibrillation     Subjective:     Margarette Pantoja is a [de-identified] y.o. female admitted for Sepsis ([facility] [address] [A41.9]. 60-year-old female female with prior history of essential hypertension was seen at her primary care doctor's office for weakness and referred to the emergency room. In the hospital, the patient was noted to have severe lactic acidosis, atrial fibrillation with rapid ventricular response, acute heart failure on bedside echocardiogram with low ejection fraction, and chest imaging revealing bilateral pleural effusions (right more than left). The patient was initiated on intravenous diuretics. She is also noted to have elevated liver enzymes. The patient was seen and examined at the bedside. She still feels weak.       Patient Active Problem List    Diagnosis Date Noted    Sepsis ([facility] [address] 03/07/2023    Status post total replacement of left hip 01/15/2018    Osteoarthritis of hip 09/11/2017    Osteoarthrosis, localized, primary, knee 01/14/2016    Knee arthropathy 06/18/2015      Megan Colón MD  Past Medical History:   Diagnosis Date    Arthritis     Chronic pain     arthritic    Diabetes ([facility] [address]     Hypertension     Ill-defined condition     shingles 1.5 years ago 8/2014    Other ill-defined conditions(799.89)     high cholesterol    Other ill-defined conditions(799.89)     shingles      Social History     Socioeconomic History    Marital status:    Tobacco Use    Smoking status: Never    Smokeless tobacco: Never   Substance and Sexual Activity    Alcohol use: No    Drug use: No     No Known Allergies   Family History   Problem Relation Age of Onset    Cancer Father         leukemia    Stroke Sister       Current Facility-Administered Medications   Medication Dose Route Frequency    [Held by provider] bumetanide (BUMEX) injection 2 mg  2 mg IntraVENous Q8H    nitroglycerin (NITROBID) 2 % ointment 0.5 Inch  0.5 Inch Topical BID    alcohol 62% (NOZIN) nasal  1 Ampule  1 Ampule Topical Q12H    balsam peru-castor oiL (VENELEX) ointment   Topical BID    thiamine (B-1) 200 mg in 0.9% sodium chloride 50 mL IVPB  200 mg IntraVENous Q8H    cefTRIAXone (ROCEPHIN) 1 g in 0.9% sodium chloride (MBP/ADV) 50 mL MBP  1 g IntraVENous Q24H    metroNIDAZOLE (FLAGYL) IVPB premix 500 mg  500 mg IntraVENous Q8H    latanoprost (XALATAN) 0.005 % ophthalmic solution 1 Drop  1 Drop Both Eyes QPM    timolol (TIMOPTIC) 0.5 % ophthalmic solution 1 Drop  1 Drop Both Eyes DAILY    dorzolamide (TRUSOPT) 2 % ophthalmic solution 1 Drop  1 Drop Both Eyes BID    prednisoLONE acetate (PRED FORTE) 1 % ophthalmic suspension 1 Drop  1 Drop Left Eye DAILY    insulin lispro (HUMALOG) injection   SubCUTAneous Q6H    glucose chewable tablet 16 g  4 Tablet Oral PRN    glucagon (GLUCAGEN) injection 1 mg  1 mg IntraMUSCular PRN    dextrose 10% infusion 0-250 mL  0-250 mL IntraVENous PRN    PHENYLephrine (VIRY-SYNEPHRINE) 30 mg in 0.9% sodium chloride 250 mL infusion   mcg/min IntraVENous TITRATE    milrinone (PRIMACOR) 20 MG/100 ML D5W infusion  0-0.75 mcg/kg/min IntraVENous TITRATE    digoxin (LANOXIN) injection 250 mcg  250 mcg IntraVENous ONCE    [START ON 3/9/2023] digoxin (LANOXIN) injection 65 mcg  65 mcg IntraVENous ONCE    sodium chloride (NS) flush 5-10 mL  5-10 mL IntraVENous PRN    dextrose 10 % infusion 125-250 mL  125-250 mL IntraVENous PRN    sodium chloride (NS) flush 5-10 mL  5-10 mL IntraVENous PRN    sodium chloride (NS) flush 5-40 mL  5-40 mL IntraVENous PRN    ondansetron (ZOFRAN) injection 4 mg  4 mg IntraVENous Q6H PRN    senna (SENOKOT) tablet 8.6 mg  1 Tablet Oral DAILY PRN    polyethylene glycol (MIRALAX) packet 17 g  17 g Oral DAILY PRN          Review of Symptoms:  Constitutional: As mentioned in HPI   eyes: negative  Ears, nose, mouth, throat, and face: negative  Respiratory: negative  Cardiovascular: As mentioned above  Gastrointestinal: negative  Genitourinary:negative  Musculoskeletal:negative  Neurological: negative  Behvioral/Psych: negative  Endocrine: negative            Objective:      Visit Vitals  /75 (BP 1 Location: Right lower arm)   Pulse (!) 155   Temp 98.3 °F (36.8 °C)   Resp 22   Ht 5' 3\" (1.6 m)   Wt 64.4 kg (142 lb)   SpO2 94%   Breastfeeding No   BMI 25.15 kg/m²       Physical Assessment:   General Appearance:  alert, cooperative, well nourished, well developed; appears stated age, appears fatigued  Eyes: sclera anicteric  Mouth/Throat: moist mucous membranes; oral pharynx clear  Neck: supple; no JVD (limited exam) or bruit  Pulmonary:  clear to auscultation bilaterally; good effort  Cardiovascular: regular rate and rhythm; no murmur, click, rub, or gallop  Abdomen: soft, non-tender, non-distended; bowel sounds normal  Musculoskeletal: no swelling or deformity; moves all extremities  Extremities: no edema  Skin: warm and dry  Neuro: grossly normal  Psych: normal mood and affect given the setting      Data Review:   Recent Labs     03/08/23  0219 03/07/23  1614   WBC 16.8* 17.6*   HGB 11.2* 11.0*   HCT 34.8* 36.3    266     Recent Labs     03/08/23  0852 03/08/23  0219 03/07/23  2217 03/07/23  1904 03/07/23  1614   NA  --  127* 129*  --  125*   K  --  4.6 5.0  --  5.7*   CL  --  95* 98  --  92*   CO2  --  20* 16*  --  16*   GLU  --  214* 187*  --  212*   BUN  --  43* 41*  --  42*   CREA  --  1.42* 1.52*  --  1.81*   CA  --  8.7 9.2  --  9.2   MG  --  1.7  --   --  1.9   PHOS  --  4.5  --   --   --    ALB  --  3.7  --   --  3.8   TBILI  --  1.1*  --   --  1.7*   ALT  --  >3,500*  --   --  >3,500*   INR 2.3*  --   --  2.3*  --        Recent Labs     03/08/23  0516 03/07/23  2217 03/07/23  1904   TROPHS 126* 140* 141*   CPK  --   --  386*         Intake/Output Summary (Last 24 hours) at 3/8/2023 1730  Last data filed at 3/8/2023 1705  Gross per 24 hour   Intake 1309.07 ml   Output 2420 ml   Net -1110.93 ml        Cardiographics    Telemetry: Atrial fibrillation with a rate in the 150s  ECG: Atrial fibrillation with rapid ventricular response  Echocardiogram: Wet read suggestive of reduced left ventricular ejection fraction, no severe valvular abnormalities         Assessment:       Active Problems:    Sepsis ([address] (3/7/2023)         Plan:   Acute decompensated heart failure  Atrial fibrillation with rapid ventricular response  Congestive hepatopathy, hyponatremia  Pleural effusion  Shock on phenylephrine    Recommend digoxin loading intravenously carefully given elevated creatinine level. Seemingly normal stroke-volume by echocardiography with collapsible inferior vena cava. Clear lungs on my examination anteriorly. Consider giving back some fluids for effective intra-arterial volume depletion. Recommend work-up for infectious and other possible contributors to shock. Thank you for allowing us to participate in the care of this patient. We will follow.     Signed By: Billy Saavedra MD     March 8, 2023

## 2023-03-08 NOTE — ED NOTES
Assumed care of pt at this time. Pt arrives with reports of low wbc and her doctor sent her here for a workup. Pt on monitor x3 with call bell in reach. 1830: Pt resting with call bell in reach. Pt famiyl at bedside. Pt remains on monitor x3 with call bell in reach. 1920:   eTRANSFER SBAR NOTE    IP UNIT CALLED NOTE IS READY: Yes Spoke to [facility]    IF there are questions Call transferring nurse (your name) Catherine Siddiqi at phone # [phone]    SITUATION/BACKGROUND:    Patient is being transferred to [facility], Room# [phone]    Patient's Chief Complaint on arrival to ED was abnormal lab and is admitted for sepsis with acute renal failure without septic shock. CODE STATUS: Full Code    VITAL SIGNS (MUST BE WITHIN 1 HOUR OF TRANSFER TO IP UNIT:    Visit Vitals  BP (!) 158/97   Pulse (!) 127   Temp 97.5 °F (36.4 °C)   Resp 23   Ht 5' 3\" (1.6 m)   Wt 64.8 kg (142 lb 13.7 oz)   SpO2 96%   BMI 25.31 kg/m²         ISOLATION/PRECAUTIONS: Yes  ISOLATION TYPE: droplet    Called outstanding consults: Yes     Are there sign and held orders that need to be released? No   Are all STAT orders completed: Yes    STAT labs collected: Yes  REPEAT LACTIC ACID DUE? Yes TIME DUE: 2000    Are there any titrating drips? No   If so what? N/a    The following personal items will be sent with the patient during transfer to the floor: All valuables:   ITEM:    ITEM:    ITEM:           ASSESSMENT:    CIWA Assessment: No  Last Score: n/a    NEURO:   NIH SCORE:        ANURAG SCREENING:          NEURO ASSESSMENT:        Is patient impulsive? No   Is patient oriented? Yes   Do they follow commands? Yes  Is the patient ambulatory? Yes Device need cane/walker    FALL RISK? Yes   Is the [place] 1 Assessment completed? Yes  INTERVENTIONS: call before OOB    INTEGUMENTARY:   IS THE PATIENT UNDRESSED? Yes  WOUNDS PRESENT? No  On admit to ED n/a  ARE THE WOUNDS DOCUMENTED? N/a    RESPIRATORY:   Is patient on Oxygen?  No    OXYGEN:      CARDIAC:   Is cardiac monitoring ordered? Yes Last Rhythm: sinus tach  Patient to transfer with tele box on? Yes  Is patient using a LIFE VEST? No     LINE ACCESS:   Peripheral IV 23 Left Antecubital (Active)        /GI:   CONTINENT BOWEL/BLADDER? Yes  URINARY OUTPUT: voiding   Written Order for Casiano Cath? No   CHRONIC OR ACUTE? N/a   If CHRONIC, is it 1days old, was it changed prior to specimen collection? N/a  WAS UA WITH REFLEX SENT TO LAB? N/a IF NO,  COLLECT AND SEND PRIOR TO TRANSPORT TO INPATIENT AREA    RESTRAINTS IN USE: No      IS DOCUMENTATION COMPLETE: n/a  Is there a current Order? N/a When does it ?  N/a    Additional details as Needed:

## 2023-03-08 NOTE — PROGRESS NOTES
Comprehensive Nutrition Assessment    Type and Reason for Visit: Initial, Consult    Nutrition Recommendations/Plan:   Advance diet as medically able per MD  RD to add PO supplements as diet advances  Agree with thiamine supplementation for now     Malnutrition Assessment:  Malnutrition Status: At risk for malnutrition (specify) (03/08/23 9775)    Context:  Acute illness     Findings of the 6 clinical characteristics of malnutrition:   Energy Intake:  Mild decrease in energy intake (specify) (only admits to poor appetite x 48h PTA due to n/v)  Weight Loss:  No significant weight loss (per pt report)     Body Fat Loss:  No significant body fat loss,     Muscle Mass Loss:  No significant muscle mass loss,    Fluid Accumulation:   ,     Strength:  Not performed     Nutrition Assessment:  Pt admitted with sepsis. PMH: HTN, DM. Consult received, chart reviewed. Pt sleeping but awoke to knock on the door. No family in the room to share in hx. Pt denies poor appetite or intake outside of the 48h PTA in which she was having n/v, she reports nausea is better but that her mouth is very dry. Pt also denies any recent wt loss, reports clothing has been fitting normally although she admits her energy level has declined. She is being worked up for likely undiagnosed heart failure. Also concern for acute liver failure, LFT's very elevated. Lactic 5.3, thiamine being supplemented. BG in the 200's. Imaging shows a distended gallbladder but no signs of cholecystitis, also shows extensive diverticulosis. Pt only interested in ice chips at this time, asked RN who reports he plans to clarify with Intensivist if she can have them. Will continue to follow pt closely and add PO supplements as diet advances given she is high risk for malnutrition development.    Wt Readings from Last 10 Encounters:   03/07/23 64.8 kg (142 lb 13.7 oz)   01/15/18 69 kg (152 lb 1.9 oz)   01/08/18 69.6 kg (153 lb 7 oz)   09/11/17 67 kg (147 lb 11.3 oz)   09/05/17 68.5 kg (151 lb)   10/25/16 67.6 kg (149 lb)   01/14/16 70.3 kg (155 lb)   01/05/16 71.3 kg (157 lb 3 oz)   06/18/15 75 kg (165 lb 5 oz)   06/03/15 76.6 kg (168 lb 14 oz)            Nutrition Related Findings:    Meds: bumex, rocephin, flagyl, thiamine. BM PTA Wound Type: None    Current Nutrition Intake & Therapies:  Average Meal Intake: NPO     DIET NPO    Anthropometric Measures:  Height: 5' 3\" (160 cm)  Ideal Body Weight (IBW): 115 lbs (52 kg)     Current Body Wt:  64.8 kg (142 lb 13.7 oz), 124.2 % IBW. Standing scale  Current BMI (kg/m2): 25.3                          BMI Category: Normal weight (BMI 22.0-24.9) age over 72    Estimated Daily Nutrient Needs:  Energy Requirements Based On: Formula  Weight Used for Energy Requirements: Current  Energy (kcal/day):    Weight Used for Protein Requirements: Current  Protein (g/day): 65-78g (1-1.2gPro/kg)  Method Used for Fluid Requirements: Other (comment)  Fluid (ml/day): per MD    Nutrition Diagnosis:   Inadequate protein-energy intake related to altered GI function, catabolic illness as evidenced by NPO or clear liquid status due to medical condition    Nutrition Interventions:   Food and/or Nutrient Delivery: Start oral diet  Nutrition Education/Counseling: No recommendations at this time  Coordination of Nutrition Care: Continue to monitor while inpatient, Interdisciplinary rounds       Goals:     Goals: Initiate PO diet, by next RD assessment       Nutrition Monitoring and Evaluation:   Behavioral-Environmental Outcomes: None identified  Food/Nutrient Intake Outcomes: Diet advancement/tolerance  Physical Signs/Symptoms Outcomes: Biochemical data, Nutrition focused physical findings, Skin, Weight, GI status, Fluid status or edema, Hemodynamic status    Discharge Planning:     Too soon to determine    Catrachito Correia RD, [facility]  Contact: ext [phone]

## 2023-03-08 NOTE — PROGRESS NOTES
[facility]    ICU TEAM Progress Note    Name: Rebbecca Moritz   : 1940   MRN: [id number]   Date: 3/8/2023           ICU Assessment & Plan of [address] Is a [de-identified] y.o. female with pmh of DM2 who is admitted 23 for N/V, lactic acidosis, and concern for new diagnosis heart failure. NEURO  - no acute issue       CARDIAC  #. Acute exacerbation of ?new diagnosis HFrEF  #. AF RVR  #. Mildly elevated trop  #. H/o HTN  - TTE pending  - will start scheduled bumex 2q8 for goal net neg 1-2L/24h  - dobutamine to aid inotropy   - NTG paste to aid pulm vasc  - trop likely demand, now stable/down; stop trending  - converted -- will stop amio gtt        RESPIRATORY  #. Acute hypoxic respiratory failure   #. Pulmonary edema  #. Pulmonary effusions R>L  - BiPAP prn WOB, hypoxia and qhs  - will attempt diuresis first, before attempting more invasive thora        RENAL  #. Elevated SCr  #. AGMA, lactic + ketoacidosis, improving  #. Hypervolemic hyponatremia  - in setting of volume overload, elevated Cr more likely acutely reduced perfusion (eg cardiorenal) vs chronic vs toxicity  - improving with slight net neg since yesterday  - continue diuresis with bumex as above  - may reveal further CKD, but have to prioritize volume removal  - LA downtrending        GASTROINTESTINAL  #. Nausea, vomiting  #. Significant transaminitis  - acute tox and hep panel neg  - no cirrhotic morphology on CT, enlarged GB but no freddy on RUQUS (no comment on vasc)  - most likely etio at this point appears to be cardiohepatic / severe liver congestion   - diuresis as above, close repeat of LFTs/LAEs  - low threshold for GI consult  - RD eval for nutrition status        HEMATOLOGIC  #. Mild normocytic anemia  - unknown baseline   - no indication for intervention        ID  #.  Concern for infection  - seems less likely  - cefT, flagyl for now pending 48h of cx data        ENDOCRINE  - accuchecks ordered  - high-sens SSI Mobility: Bedrest  PT/OT:  not yet appropriate          DVT Prophylaxis: SCDs; hold hep for now given elevated INR  U - Ulcer Prophylaxis: not indicated, but may consider acid-reducer given sxs  G - Glycemic Control: Insulin prn  B - Bowel Regimen: not indicated    Subjective:   Progress Note: 3/8/2023      Reason for ICU Admission: concern for sepsis     HPI:  per prior clinician  Dhruv Sanderson is a [de-identified] y.o. female with PMH s/f T2DM (A1c 7.1, 2018), HTN, chronic pain/arthritis who presented to the ED with N/V, anorexia x 2 days. In ED, workup notable for marked and varied lab abnmls, including LA 11, WBC 17, Na 125, K 5.7, CO2 16, AG 17, Cr 1.8, AST/ALT unreadably high, alkphos 176, Tbili 1.7. Pan-scan without contrast pending per ED physician. Given 2 L IVF in ED and started on antibiotics. ICU consulted for admission. Upon my arrival to bedside, pt awake and alert, /97, HR . Finishing first L of IVF. C/o nausea and no appetite. Will be admitted to the ICU for further management. Arrived to ICU after CT scans. Pt now SOB with RR in the 30s requiring 4 L NC. Bedside ultrasound of lungs and echo done. Lungs with bilateral B lines. Bedside echo significant for dilated LV with reduced systolic function. Possible wall motion abnormalities but a little hard to determine bc pt's HR still in the 130s. Official Echo ordered for the morning. First troponin only 141 but will resend in 3 hours to r/o NSTEMI. Denies chest pain. 3/8 - able to diurese with lasix 80. Home Medications:     Prior to Admission medications    Medication Sig Start Date End Date Taking? Authorizing Provider   dorzolamide (TRUSOPT) 2 % ophthalmic solution INSTILL 1 DROP IN BOTH EYES TWICE DAILY 2/27/23   Provider, Historical   naproxen (NAPROSYN) 500 mg tablet Take 1 Tab by mouth two (2) times daily (with meals). Hold for two weeks.  1/16/18   Silvia Coates NP   atorvastatin (LIPITOR) 10 mg tablet Take 10 mg by mouth nightly. Provider, Historical   timolol (TIMOPTIC) 0.5 % ophthalmic solution Administer 1 Drop to both eyes daily. Provider, Historical   latanoprost (XALATAN) 0.005 % ophthalmic solution Administer 1 Drop to both eyes nightly. Indications: OPEN ANGLE GLAUCOMA    Provider, Historical   prednisoLONE acetate (PRED FORTE) 1 % ophthalmic suspension Administer 1 Drop to left eye daily. Indications: SEVERE OCULAR INFLAMMATION    Provider, Historical   metFORMIN (GLUCOPHAGE) 500 mg tablet Take 1,000 mg by mouth two (2) times daily (with meals). Indications: type 2 diabetes mellitus    Provider, Historical   lisinopril (PRINIVIL, ZESTRIL) 5 mg tablet Take 5 mg by mouth daily. Indications: HYPERTENSION    Provider, Historical   acyclovir (ZOVIRAX) 800 mg tablet Take 800 mg by mouth nightly. Indications: shingles prevention    Provider, Historical   multivitamin with iron tablet Take 1 Tab by mouth daily.     Provider, Historical       Current Meds:     Current Facility-Administered Medications   Medication Dose Route Frequency    amiodarone (CORDARONE) 375 mg/250 mL D5W infusion  1 mg/min IntraVENous CONTINUOUS    Followed by    amiodarone (CORDARONE) 375 mg/250 mL D5W infusion  0.5 mg/min IntraVENous CONTINUOUS    amiodarone (NEXTERONE) 150 mg in dextrose 5% 100 ml 150 mg/100 mL (1.5 mg/mL) bolus premix        sodium chloride (NS) flush 5-10 mL  5-10 mL IntraVENous PRN    dextrose 10 % infusion 125-250 mL  125-250 mL IntraVENous PRN    sodium chloride (NS) flush 5-10 mL  5-10 mL IntraVENous PRN    sodium chloride (NS) flush 5-40 mL  5-40 mL IntraVENous PRN    ondansetron (ZOFRAN) injection 4 mg  4 mg IntraVENous Q6H PRN    piperacillin-tazobactam (ZOSYN) 3.375 g in 0.9% sodium chloride (MBP/ADV) 100 mL MBP  3.375 g IntraVENous Q8H    senna (SENOKOT) tablet 8.6 mg  1 Tablet Oral DAILY PRN    polyethylene glycol (MIRALAX) packet 17 g  17 g Oral DAILY PRN       Objective:   Vital Signs:  Visit Vitals  BP 107/72   Pulse 64   Temp 97.5 °F (36.4 °C)   Resp 22   Ht 5' 3\" (1.6 m)   Wt 64.8 kg (142 lb 13.7 oz)   SpO2 (!) 84%   Breastfeeding No   BMI 25.31 kg/m²        Temp (24hrs), Av.7 °F (36.5 °C), Min:97.5 °F (36.4 °C), Max:98 °F (36.7 °C)           Intake/Output:     Intake/Output Summary (Last 24 hours) at 3/8/2023 0725  Last data filed at 3/8/2023 2795  Gross per 24 hour   Intake 1010 ml   Output 1235 ml   Net -225 ml       Physical Exam:  Elderly female, awake, alert, lying in bed, NAD  Resps even and unlabored, symmetric chest rise on NC, O2 sats improved with increased supplemental  Reg rate, NSR, no heave/rub  Abd soft, nontender, no palpable organomegaly  NICOLAS, extremities cool to touch and dry  Calm and cooperative      LABS AND  DATA:   Personally reviewed    MEDS:   Reviewed    Multidisciplinary Rounds Completed: Yes    ABCDEF Bundle/Checklist Completed:  Yes    SPECIAL EQUIPMENT  None    DISPOSITION  Stay in ICU    CRITICAL CARE CONSULTANT NOTE  I had a face to face encounter with the patient, reviewed and interpreted patient data including clinical events, labs, images, vital signs, I/O's, and examined patient. I have discussed the case and the plan and management of the patient's care with the consulting services, the bedside nurses and the respiratory therapist.      NOTE OF PERSONAL INVOLVEMENT IN CARE   This patient has a high probability of imminent, clinically significant deterioration, which requires the highest level of preparedness to intervene urgently. I participated in the decision-making and personally managed or directed the management of the following life and organ supporting interventions that required my frequent assessment to treat or prevent imminent deterioration. I personally spent 55 minutes of critical care time. This is time spent at this critically ill patient's bedside actively involved in patient care as well as the coordination of care.   This does not include any procedural time which has been billed separately.     Doug Fowler MD  Intensivist  3/8/2023

## 2023-03-09 ENCOUNTER — APPOINTMENT (OUTPATIENT)
Dept: GENERAL RADIOLOGY | Age: 83
End: 2023-03-09
Attending: INTERNAL MEDICINE
Payer: MEDICARE

## 2023-03-09 ENCOUNTER — APPOINTMENT (OUTPATIENT)
Dept: ULTRASOUND IMAGING | Age: 83
End: 2023-03-09
Attending: INTERNAL MEDICINE
Payer: MEDICARE

## 2023-03-09 LAB
ACETONE,ACETX: ABNORMAL MG/L
ALBUMIN SERPL-MCNC: 2.9 G/DL (ref 3.5–5)
ALBUMIN/GLOB SERPL: 1 (ref 1.1–2.2)
ALP SERPL-CCNC: 153 U/L (ref 45–117)
ALT SERPL-CCNC: >3500 U/L (ref 12–78)
ANION GAP SERPL CALC-SCNC: 8 MMOL/L (ref 5–15)
APPEARANCE FLD: ABNORMAL
APTT PPP: 25.7 SEC (ref 22.1–31)
AST SERPL-CCNC: >2000 U/L (ref 15–37)
ATRIAL RATE: 141 BPM
ATRIAL RATE: 147 BPM
BASOPHILS # BLD: 0 K/UL (ref 0–0.1)
BASOPHILS NFR BLD: 0 % (ref 0–1)
BILIRUB SERPL-MCNC: 1.2 MG/DL (ref 0.2–1)
BODY FLD TYPE: NORMAL
BUN SERPL-MCNC: 51 MG/DL (ref 6–20)
BUN/CREAT SERPL: 44 (ref 12–20)
CALCIUM SERPL-MCNC: 7.8 MG/DL (ref 8.5–10.1)
CALCULATED R AXIS, ECG10: -31 DEGREES
CALCULATED R AXIS, ECG10: -40 DEGREES
CALCULATED T AXIS, ECG11: 155 DEGREES
CALCULATED T AXIS, ECG11: 155 DEGREES
CHAIN OF CUSTODY,CHC: NO
CHLORIDE SERPL-SCNC: 99 MMOL/L (ref 97–108)
CO2 SERPL-SCNC: 24 MMOL/L (ref 21–32)
COLOR FLD: YELLOW
CREAT FLD-MCNC: 1.08 MG/DL
CREAT SERPL-MCNC: 1.17 MG/DL (ref 0.55–1.02)
DIAGNOSIS, 93000: NORMAL
DIAGNOSIS, 93000: NORMAL
DIFFERENTIAL METHOD BLD: ABNORMAL
DIGOXIN SERPL-MCNC: 1.9 NG/ML (ref 0.9–2)
EOSINOPHIL # BLD: 0 K/UL (ref 0–0.4)
EOSINOPHIL NFR BLD: 0 % (ref 0–7)
ERYTHROCYTE [DISTWIDTH] IN BLOOD BY AUTOMATED COUNT: 15.7 % (ref 11.5–14.5)
ETHANOL,ETHX: <10 MG/L
ETHYLENE GLYCOL,XEGLYT: ABNORMAL MG/L
GGT SERPL-CCNC: 112 U/L (ref 5–55)
GLOBULIN SER CALC-MCNC: 3 G/DL (ref 2–4)
GLUCOSE BLD STRIP.AUTO-MCNC: 194 MG/DL (ref 65–117)
GLUCOSE BLD STRIP.AUTO-MCNC: 243 MG/DL (ref 65–117)
GLUCOSE BLD STRIP.AUTO-MCNC: 246 MG/DL (ref 65–117)
GLUCOSE FLD-MCNC: 237 MG/DL
GLUCOSE SERPL-MCNC: 194 MG/DL (ref 65–100)
HCT VFR BLD AUTO: 30.1 % (ref 35–47)
HGB BLD-MCNC: 9.9 G/DL (ref 11.5–16)
IGM SERPL-MCNC: 47 MG/DL (ref 40–230)
IMM GRANULOCYTES # BLD AUTO: 0.1 K/UL (ref 0–0.04)
IMM GRANULOCYTES NFR BLD AUTO: 1 % (ref 0–0.5)
INR PPP: 1.9 (ref 0.9–1.1)
ISOPROPANOL,ISOPX: ABNORMAL MG/L
LDH FLD L TO P-CCNC: 209 U/L
LDH SERPL L TO P-CCNC: 900 U/L (ref 81–246)
LYMPHOCYTES # BLD: 1.3 K/UL (ref 0.8–3.5)
LYMPHOCYTES NFR BLD: 8 % (ref 12–49)
LYMPHOCYTES NFR FLD: 66 %
MAGNESIUM SERPL-MCNC: 1.8 MG/DL (ref 1.6–2.4)
MCH RBC QN AUTO: 27.7 PG (ref 26–34)
MCHC RBC AUTO-ENTMCNC: 32.9 G/DL (ref 30–36.5)
MCV RBC AUTO: 84.3 FL (ref 80–99)
MESOTHL CELL NFR FLD: 1 %
METHANOL,METHX: ABNORMAL MG/L
MONOCYTES # BLD: 0.6 K/UL (ref 0–1)
MONOCYTES NFR BLD: 4 % (ref 5–13)
MONOS+MACROS NFR FLD: 2 %
NEUTROPHILS NFR FLD: 31 %
NEUTS SEG # BLD: 13.4 K/UL (ref 1.8–8)
NEUTS SEG NFR BLD: 87 % (ref 32–75)
NRBC # BLD: 0.02 K/UL (ref 0–0.01)
NRBC BLD-RTO: 0.1 PER 100 WBC
NUC CELL # FLD: 211 /CU MM
PH FLD: 7
PHOSPHATE SERPL-MCNC: 2.2 MG/DL (ref 2.6–4.7)
PLATELET # BLD AUTO: 230 K/UL (ref 150–400)
PMV BLD AUTO: 11.3 FL (ref 8.9–12.9)
POTASSIUM SERPL-SCNC: 3.7 MMOL/L (ref 3.5–5.1)
PROT FLD-MCNC: 2.6 G/DL
PROT SERPL-MCNC: 5.9 G/DL (ref 6.4–8.2)
PROTHROMBIN TIME: 19.5 SEC (ref 9–11.1)
Q-T INTERVAL, ECG07: 280 MS
Q-T INTERVAL, ECG07: 282 MS
QRS DURATION, ECG06: 128 MS
QRS DURATION, ECG06: 142 MS
QTC CALCULATION (BEZET), ECG08: 471 MS
QTC CALCULATION (BEZET), ECG08: 480 MS
RBC # BLD AUTO: 3.57 M/UL (ref 3.8–5.2)
RBC # FLD: >100 /CU MM
REPORT STATUS,RSTSX: ABNORMAL
SERVICE CMNT-IMP: ABNORMAL
SODIUM SERPL-SCNC: 131 MMOL/L (ref 136–145)
SPECIMEN SOURCE FLD: ABNORMAL
SPECIMEN SOURCE FLD: NORMAL
SPECIMEN SOURCE: ABNORMAL
THERAPEUTIC RANGE,PTTT: NORMAL SECS (ref 58–77)
VENTRICULAR RATE, ECG03: 168 BPM
VENTRICULAR RATE, ECG03: 177 BPM
WBC # BLD AUTO: 15.3 K/UL (ref 3.6–11)

## 2023-03-09 PROCEDURE — 71045 X-RAY EXAM CHEST 1 VIEW: CPT

## 2023-03-09 PROCEDURE — 87529 HSV DNA AMP PROBE: CPT

## 2023-03-09 PROCEDURE — 86038 ANTINUCLEAR ANTIBODIES: CPT

## 2023-03-09 PROCEDURE — 82945 GLUCOSE OTHER FLUID: CPT

## 2023-03-09 PROCEDURE — 74011250636 HC RX REV CODE- 250/636: Performed by: NURSE PRACTITIONER

## 2023-03-09 PROCEDURE — 86644 CMV ANTIBODY: CPT

## 2023-03-09 PROCEDURE — 85730 THROMBOPLASTIN TIME PARTIAL: CPT

## 2023-03-09 PROCEDURE — 85025 COMPLETE CBC W/AUTO DIFF WBC: CPT

## 2023-03-09 PROCEDURE — 77010033678 HC OXYGEN DAILY

## 2023-03-09 PROCEDURE — 65610000006 HC RM INTENSIVE CARE

## 2023-03-09 PROCEDURE — 86015 ACTIN ANTIBODY EACH: CPT

## 2023-03-09 PROCEDURE — 85610 PROTHROMBIN TIME: CPT

## 2023-03-09 PROCEDURE — 74011000258 HC RX REV CODE- 258: Performed by: INTERNAL MEDICINE

## 2023-03-09 PROCEDURE — 87205 SMEAR GRAM STAIN: CPT

## 2023-03-09 PROCEDURE — 80162 ASSAY OF DIGOXIN TOTAL: CPT

## 2023-03-09 PROCEDURE — 83615 LACTATE (LD) (LDH) ENZYME: CPT

## 2023-03-09 PROCEDURE — 82787 IGG 1 2 3 OR 4 EACH: CPT

## 2023-03-09 PROCEDURE — 36415 COLL VENOUS BLD VENIPUNCTURE: CPT

## 2023-03-09 PROCEDURE — 74011250636 HC RX REV CODE- 250/636: Performed by: INTERNAL MEDICINE

## 2023-03-09 PROCEDURE — 74011636637 HC RX REV CODE- 636/637: Performed by: INTERNAL MEDICINE

## 2023-03-09 PROCEDURE — 88112 CYTOPATH CELL ENHANCE TECH: CPT

## 2023-03-09 PROCEDURE — 83735 ASSAY OF MAGNESIUM: CPT

## 2023-03-09 PROCEDURE — 89050 BODY FLUID CELL COUNT: CPT

## 2023-03-09 PROCEDURE — 84100 ASSAY OF PHOSPHORUS: CPT

## 2023-03-09 PROCEDURE — 74011000250 HC RX REV CODE- 250: Performed by: STUDENT IN AN ORGANIZED HEALTH CARE EDUCATION/TRAINING PROGRAM

## 2023-03-09 PROCEDURE — 74011250636 HC RX REV CODE- 250/636: Performed by: STUDENT IN AN ORGANIZED HEALTH CARE EDUCATION/TRAINING PROGRAM

## 2023-03-09 PROCEDURE — 86381 MITOCHONDRIAL ANTIBODY EACH: CPT

## 2023-03-09 PROCEDURE — 87015 SPECIMEN INFECT AGNT CONCNTJ: CPT

## 2023-03-09 PROCEDURE — 74011000250 HC RX REV CODE- 250: Performed by: INTERNAL MEDICINE

## 2023-03-09 PROCEDURE — 84157 ASSAY OF PROTEIN OTHER: CPT

## 2023-03-09 PROCEDURE — 82962 GLUCOSE BLOOD TEST: CPT

## 2023-03-09 PROCEDURE — 83986 ASSAY PH BODY FLUID NOS: CPT

## 2023-03-09 PROCEDURE — 80053 COMPREHEN METABOLIC PANEL: CPT

## 2023-03-09 PROCEDURE — 74011000250 HC RX REV CODE- 250: Performed by: NURSE PRACTITIONER

## 2023-03-09 PROCEDURE — 82977 ASSAY OF GGT: CPT

## 2023-03-09 PROCEDURE — 76705 ECHO EXAM OF ABDOMEN: CPT

## 2023-03-09 PROCEDURE — 86664 EPSTEIN-BARR NUCLEAR ANTIGEN: CPT

## 2023-03-09 PROCEDURE — 82784 ASSAY IGA/IGD/IGG/IGM EACH: CPT

## 2023-03-09 PROCEDURE — 82570 ASSAY OF URINE CREATININE: CPT

## 2023-03-09 PROCEDURE — 74011250637 HC RX REV CODE- 250/637: Performed by: INTERNAL MEDICINE

## 2023-03-09 PROCEDURE — 93005 ELECTROCARDIOGRAM TRACING: CPT

## 2023-03-09 RX ORDER — FENTANYL CITRATE 50 UG/ML
100 INJECTION, SOLUTION INTRAMUSCULAR; INTRAVENOUS
Status: DISCONTINUED | OUTPATIENT
Start: 2023-03-09 | End: 2023-03-10

## 2023-03-09 RX ORDER — MAGNESIUM SULFATE HEPTAHYDRATE 40 MG/ML
2 INJECTION, SOLUTION INTRAVENOUS ONCE
Status: COMPLETED | OUTPATIENT
Start: 2023-03-09 | End: 2023-03-10

## 2023-03-09 RX ORDER — MIDAZOLAM HYDROCHLORIDE 1 MG/ML
1 INJECTION, SOLUTION INTRAMUSCULAR; INTRAVENOUS
Status: DISCONTINUED | OUTPATIENT
Start: 2023-03-09 | End: 2023-03-09

## 2023-03-09 RX ORDER — HEPARIN SODIUM 5000 [USP'U]/ML
5000 INJECTION, SOLUTION INTRAVENOUS; SUBCUTANEOUS EVERY 12 HOURS
Status: DISCONTINUED | OUTPATIENT
Start: 2023-03-09 | End: 2023-03-13

## 2023-03-09 RX ORDER — HYDROCHLOROTHIAZIDE 25 MG/1
25 TABLET ORAL DAILY
COMMUNITY
End: 2023-03-18

## 2023-03-09 RX ORDER — BUMETANIDE 0.25 MG/ML
1 INJECTION INTRAMUSCULAR; INTRAVENOUS 2 TIMES DAILY
Status: DISCONTINUED | OUTPATIENT
Start: 2023-03-09 | End: 2023-03-10

## 2023-03-09 RX ORDER — MILRINONE LACTATE 0.2 MG/ML
0-.75 INJECTION, SOLUTION INTRAVENOUS
Status: DISCONTINUED | OUTPATIENT
Start: 2023-03-09 | End: 2023-03-10

## 2023-03-09 RX ORDER — FENTANYL CITRATE 50 UG/ML
50 INJECTION, SOLUTION INTRAMUSCULAR; INTRAVENOUS ONCE
Status: COMPLETED | OUTPATIENT
Start: 2023-03-09 | End: 2023-03-09

## 2023-03-09 RX ADMIN — THIAMINE HYDROCHLORIDE 200 MG: 100 INJECTION, SOLUTION INTRAMUSCULAR; INTRAVENOUS at 16:17

## 2023-03-09 RX ADMIN — THIAMINE HYDROCHLORIDE 200 MG: 100 INJECTION, SOLUTION INTRAMUSCULAR; INTRAVENOUS at 21:26

## 2023-03-09 RX ADMIN — Medication 1 AMPULE: at 21:27

## 2023-03-09 RX ADMIN — PHENYLEPHRINE HYDROCHLORIDE 25 MCG/MIN: 10 INJECTION INTRAVENOUS at 10:09

## 2023-03-09 RX ADMIN — FENTANYL CITRATE 50 MCG: 50 INJECTION, SOLUTION INTRAMUSCULAR; INTRAVENOUS at 13:24

## 2023-03-09 RX ADMIN — TIMOLOL MALEATE 1 DROP: 5 SOLUTION OPHTHALMIC at 08:07

## 2023-03-09 RX ADMIN — Medication 2 UNITS: at 12:03

## 2023-03-09 RX ADMIN — CASTOR OIL AND BALSAM, PERU: 788; 87 OINTMENT TOPICAL at 21:00

## 2023-03-09 RX ADMIN — POTASSIUM PHOSPHATE, MONOBASIC AND POTASSIUM PHOSPHATE, DIBASIC: 224; 236 INJECTION, SOLUTION, CONCENTRATE INTRAVENOUS at 05:42

## 2023-03-09 RX ADMIN — BUMETANIDE 1 MG: 0.25 INJECTION INTRAMUSCULAR; INTRAVENOUS at 18:25

## 2023-03-09 RX ADMIN — PREDNISOLONE ACETATE 1 DROP: 10 SUSPENSION/ DROPS OPHTHALMIC at 07:58

## 2023-03-09 RX ADMIN — SODIUM CHLORIDE 15 MG/HR: 900 INJECTION, SOLUTION INTRAVENOUS at 23:50

## 2023-03-09 RX ADMIN — DORZOLAMIDE HYDROCHLORIDE 1 DROP: 20 SOLUTION/ DROPS OPHTHALMIC at 08:28

## 2023-03-09 RX ADMIN — Medication 2 UNITS: at 18:07

## 2023-03-09 RX ADMIN — HEPARIN SODIUM 5000 UNITS: 5000 INJECTION INTRAVENOUS; SUBCUTANEOUS at 21:26

## 2023-03-09 RX ADMIN — Medication 1 AMPULE: at 08:00

## 2023-03-09 RX ADMIN — METRONIDAZOLE 500 MG: 500 INJECTION, SOLUTION INTRAVENOUS at 02:37

## 2023-03-09 RX ADMIN — LATANOPROST 1 DROP: 50 SOLUTION OPHTHALMIC at 18:09

## 2023-03-09 RX ADMIN — SODIUM CHLORIDE 15 MG/HR: 900 INJECTION, SOLUTION INTRAVENOUS at 09:44

## 2023-03-09 RX ADMIN — MAGNESIUM SULFATE HEPTAHYDRATE 2 G: 40 INJECTION, SOLUTION INTRAVENOUS at 05:04

## 2023-03-09 RX ADMIN — CASTOR OIL AND BALSAM, PERU: 788; 87 OINTMENT TOPICAL at 08:00

## 2023-03-09 RX ADMIN — SODIUM CHLORIDE 15 MG/HR: 900 INJECTION, SOLUTION INTRAVENOUS at 17:08

## 2023-03-09 RX ADMIN — THIAMINE HYDROCHLORIDE 200 MG: 100 INJECTION, SOLUTION INTRAMUSCULAR; INTRAVENOUS at 05:38

## 2023-03-09 RX ADMIN — DIGOXIN 65 MCG: 250 INJECTION, SOLUTION INTRAMUSCULAR; INTRAVENOUS at 00:52

## 2023-03-09 RX ADMIN — DORZOLAMIDE HYDROCHLORIDE 1 DROP: 20 SOLUTION/ DROPS OPHTHALMIC at 18:09

## 2023-03-09 RX ADMIN — MILRINONE LACTATE IN DEXTROSE 0.25 MCG/KG/MIN: 200 INJECTION, SOLUTION INTRAVENOUS at 17:05

## 2023-03-09 RX ADMIN — SODIUM CHLORIDE 15 MG/HR: 900 INJECTION, SOLUTION INTRAVENOUS at 03:17

## 2023-03-09 RX ADMIN — MILRINONE LACTATE IN DEXTROSE 0.38 MCG/KG/MIN: 200 INJECTION, SOLUTION INTRAVENOUS at 05:06

## 2023-03-09 NOTE — PROGRESS NOTES
[facility]    ICU TEAM Progress Note    Name: Mayra Crow   : 1940   MRN: [id number]   Date: 3/9/2023           ICU Assessment & Plan of [address] Is a [de-identified] y.o. female with pmh of DM2 who is admitted 23 for N/V, lactic acidosis, and concern for new diagnosis heart failure. NEURO  - no acute issue       CARDIAC  #. Acute exacerbation of new diagnosis HFrEF  #. AF RVR, uncontrolled  #. Mildly elevated trop, demand  #. H/o HTN  - Cards consulted at bedside yesterday -- greatly appreciate Dr. James Jordan assistance  - TTE read pending  - using milrinone in setting of acute decomp in hopes of least ectopy-inducing (vs epi/dobuta)  - neli prn MAP support  - dig loaded last night per Cards recs  - HR remained uncontrolled -- Cardizem overnight, now at 15  - unable to safely use amio in setting of severe acute liver injury        RESPIRATORY  #. Acute hypoxic respiratory failure on NC  #. Pulmonary edema  #. Pulmonary effusions R>L  - BiPAP prn WOB, hypoxia and qhs  - R effusion appears larger; may need to consider thora/pigtail today        RENAL  #. OLGA, congestion vs ATN, improving  #. AGMA and osm gap (21), lactic + ketoacidosis, improving  #. Hypervolemic hyponatremia, improving  - improving with slight net neg since yesterday   -- spoke with pt's PCP 3/9; baseline SCr 0.9  - holding diuresis today as Cards concerned for intravascular volume  - may try lasix + albumin challenge given continued overall significant body water  - LA normalized        GASTROINTESTINAL  #. Nausea, vomiting  #. Significant transaminitis, unchanged  #. At risk for malnutrition based on acute illness  - acute tox and hep panel neg  - no cirrhotic morphology on CT, enlarged GB but no freddy on RUQUS (no comment on vasc)  - most likely etio at this point appears to be cardiohepatic / severe passive liver congestion   - GI consulted  - add CLD today         HEMATOLOGIC  #.  Mild normocytic anemia  - unknown baseline   - no indication for intervention        ID  #. Concern for infection, less likely  - cxs NG 48h  - monitor off abx        ENDOCRINE  - accuchecks ordered  - high-sens SSI         Mobility: Bedrest  PT/OT:  not yet appropriate          DVT Prophylaxis: SCDs; low-dose hep ppx after possible procedure today  U - Ulcer Prophylaxis: not indicated  G - Glycemic Control: Insulin prn  B - Bowel Regimen: not indicated    Subjective:   Progress Note: 3/9/2023      Reason for ICU Admission: concern for sepsis     HPI:  per prior clinician  Britney Cheema is a [de-identified] y.o. female with PMH s/f T2DM (A1c 7.1, 2018), HTN, chronic pain/arthritis who presented to the ED with N/V, anorexia x 2 days. In ED, workup notable for marked and varied lab abnmls, including LA 11, WBC 17, Na 125, K 5.7, CO2 16, AG 17, Cr 1.8, AST/ALT unreadably high, alkphos 176, Tbili 1.7. Pan-scan without contrast pending per ED physician. Given 2 L IVF in ED and started on antibiotics. ICU consulted for admission. Upon my arrival to bedside, pt awake and alert, /97, HR . Finishing first L of IVF. C/o nausea and no appetite. Will be admitted to the ICU for further management. Arrived to ICU after CT scans. Pt now SOB with RR in the 30s requiring 4 L NC. Bedside ultrasound of lungs and echo done. Lungs with bilateral B lines. Bedside echo significant for dilated LV with reduced systolic function. Possible wall motion abnormalities but a little hard to determine bc pt's HR still in the 130s. Official Echo ordered for the morning. First troponin only 141 but will resend in 3 hours to r/o NSTEMI. Denies chest pain. 3/8 - able to diurese with lasix 80.   3/9 - pt with recurrent, refractory RVR yesterday afternoon. Metop tried. Switched off dobuta to milrinone. Avoided amio due to liver injury. Cards consulted and dig loaded.    Cardizem started overnight; remains in 130s this AM.    Home Medications:     Prior to Admission medications    Medication Sig Start Date End Date Taking? Authorizing Provider   dorzolamide (TRUSOPT) 2 % ophthalmic solution INSTILL 1 DROP IN BOTH EYES TWICE DAILY 2/27/23   Provider, Historical   naproxen (NAPROSYN) 500 mg tablet Take 1 Tab by mouth two (2) times daily (with meals). Hold for two weeks. 1/16/18   Ren Lopez NP   atorvastatin (LIPITOR) 10 mg tablet Take 10 mg by mouth nightly. Provider, Historical   timolol (TIMOPTIC) 0.5 % ophthalmic solution Administer 1 Drop to both eyes daily. Provider, Historical   latanoprost (XALATAN) 0.005 % ophthalmic solution Administer 1 Drop to both eyes nightly. Indications: OPEN ANGLE GLAUCOMA    Provider, Historical   prednisoLONE acetate (PRED FORTE) 1 % ophthalmic suspension Administer 1 Drop to left eye daily. Indications: SEVERE OCULAR INFLAMMATION    Provider, Historical   metFORMIN (GLUCOPHAGE) 500 mg tablet Take 1,000 mg by mouth two (2) times daily (with meals). Indications: type 2 diabetes mellitus    Provider, Historical   lisinopril (PRINIVIL, ZESTRIL) 5 mg tablet Take 5 mg by mouth daily. Indications: HYPERTENSION    Provider, Historical   acyclovir (ZOVIRAX) 800 mg tablet Take 800 mg by mouth nightly. Indications: shingles prevention    Provider, Historical   multivitamin with iron tablet Take 1 Tab by mouth daily.     Provider, Historical       Current Meds:     Current Facility-Administered Medications   Medication Dose Route Frequency    potassium phosphate 15 mmol in 0.9% sodium chloride 250 mL infusion   IntraVENous ONCE    [Held by provider] bumetanide (BUMEX) injection 2 mg  2 mg IntraVENous Q8H    alcohol 62% (NOZIN) nasal  1 Ampule  1 Ampule Topical Q12H    balsam peru-castor oiL (VENELEX) ointment   Topical BID    thiamine (B-1) 200 mg in 0.9% sodium chloride 50 mL IVPB  200 mg IntraVENous Q8H    cefTRIAXone (ROCEPHIN) 1 g in 0.9% sodium chloride (MBP/ADV) 50 mL MBP  1 g IntraVENous Q24H    metroNIDAZOLE (FLAGYL) IVPB premix 500 mg  500 mg IntraVENous Q8H    latanoprost (XALATAN) 0.005 % ophthalmic solution 1 Drop  1 Drop Both Eyes QPM    timolol (TIMOPTIC) 0.5 % ophthalmic solution 1 Drop  1 Drop Both Eyes DAILY    dorzolamide (TRUSOPT) 2 % ophthalmic solution 1 Drop  1 Drop Both Eyes BID    prednisoLONE acetate (PRED FORTE) 1 % ophthalmic suspension 1 Drop  1 Drop Left Eye DAILY    insulin lispro (HUMALOG) injection   SubCUTAneous Q6H    glucose chewable tablet 16 g  4 Tablet Oral PRN    glucagon (GLUCAGEN) injection 1 mg  1 mg IntraMUSCular PRN    dextrose 10% infusion 0-250 mL  0-250 mL IntraVENous PRN    PHENYLephrine (VIRY-SYNEPHRINE) 30 mg in 0.9% sodium chloride 250 mL infusion   mcg/min IntraVENous TITRATE    milrinone (PRIMACOR) 20 MG/100 ML D5W infusion  0-0.75 mcg/kg/min IntraVENous TITRATE    dilTIAZem (CARDIZEM) 100 mg in 0.9% sodium chloride (MBP/ADV) 100 mL infusion  0-15 mg/hr IntraVENous TITRATE    sodium chloride (NS) flush 5-10 mL  5-10 mL IntraVENous PRN    dextrose 10 % infusion 125-250 mL  125-250 mL IntraVENous PRN    sodium chloride (NS) flush 5-10 mL  5-10 mL IntraVENous PRN    sodium chloride (NS) flush 5-40 mL  5-40 mL IntraVENous PRN    ondansetron (ZOFRAN) injection 4 mg  4 mg IntraVENous Q6H PRN    senna (SENOKOT) tablet 8.6 mg  1 Tablet Oral DAILY PRN    polyethylene glycol (MIRALAX) packet 17 g  17 g Oral DAILY PRN       Objective:   Vital Signs:  Visit Vitals  BP (!) 105/52   Pulse (!) 116   Temp 97.9 °F (36.6 °C)   Resp 22   Ht 5' 3\" (1.6 m)   Wt 64.4 kg (142 lb)   SpO2 92%   Breastfeeding No   BMI 25.15 kg/m²    O2 Flow Rate (L/min): 5 l/min O2 Device: Nasal cannula Temp (24hrs), Av °F (36.7 °C), Min:97.8 °F (36.6 °C), Max:98.3 °F (36.8 °C)           Intake/Output:     Intake/Output Summary (Last 24 hours) at 3/9/2023 0737  Last data filed at 3/9/2023 0700  Gross per 24 hour   Intake 1787.81 ml   Output 2155 ml   Net -367.19 ml         Physical Exam:  Elderly female, awake, alert, lying in bed, NAD, \"tired\" after not sleeping well last night  Resps even and unlabored, symmetric chest rise on NC, mild rales throughout, diminished R  AF, 90s-120s, no heave/rub  Abd soft, nontender, no palpable organomegaly  NICOLAS, extremities warm to touch  Calm and cooperative      LABS AND  DATA:   Personally reviewed    MEDS:   Reviewed    Multidisciplinary Rounds Completed: Yes    ABCDEF Bundle/Checklist Completed:  Yes    SPECIAL EQUIPMENT  None    DISPOSITION  Stay in ICU    CRITICAL CARE CONSULTANT NOTE  I had a face to face encounter with the patient, reviewed and interpreted patient data including clinical events, labs, images, vital signs, I/O's, and examined patient. I have discussed the case and the plan and management of the patient's care with the consulting services, the bedside nurses and the respiratory therapist.      NOTE OF PERSONAL INVOLVEMENT IN CARE   This patient has a high probability of imminent, clinically significant deterioration, which requires the highest level of preparedness to intervene urgently. I participated in the decision-making and personally managed or directed the management of the following life and organ supporting interventions that required my frequent assessment to treat or prevent imminent deterioration. I personally spent 55 minutes of critical care time. This is time spent at this critically ill patient's bedside actively involved in patient care as well as the coordination of care. This does not include any procedural time which has been billed separately.     Ayan Perkins MD  Intensivist  3/9/2023

## 2023-03-09 NOTE — PROCEDURES
[facility]      Procedure Note - Thoracostomy/Chest Tube:   Performed by Erika Lopez MD .    Staff Anesthesiologist/Intensivist    Performed by Erika Lopez MD.  Diagnosis: Pleural effusion  Insertion Date: 3/9/2023 Time:1300   Obtained Consent? yes; informed   Procedure Location:  ICU. Immediately prior to the procedure, the patient was reevaluated and found suitable for the planned procedure and any planned medications. Immediately prior to the procedure a time out was called to verify the correct patient, procedure, equipment, staff, and marking as appropriate. Site prepped with ChloraPrep. Anesthesia was obtained with 1% lidocaine. A mid-axillary approach was made using seldinger technique. A 8 Fr. pigtail was inserted over a wire under direct and continuous ultrasound guidance. It was then secured in place with suture, occlusive dressing, and Tegaderm. The procedure was tolerated very well. Follow up chest x-ray was ordered. LETA Pennington MD

## 2023-03-09 NOTE — PROGRESS NOTES
[facility]  [address]  [address] [facility] [address], [address] [facility]  [phone]  [email or web address]         Cardiology Progress Note      3/9/2023 9:28 AM    Admit Date: 3/7/2023    Admit Diagnosis:   Sepsis ([address] [A41.9]    Interval History/Subjective:     Dorian Patel remains in AF. Required cardizem gtt overnight for AF with RVR, rates now controlled. Gtts -- max dilt 15 mg/hr, viry 25 mcg/min, milrinone 0.375 mcg/min  CXR showed worsening bilateral pleural effusions, plan for right sided thoracentesis today at bedside  Denies new complaints, feels weak.     Visit Vitals  BP (!) 100/53   Pulse 89   Temp 98 °F (36.7 °C)   Resp 18   Ht 5' 3\" (1.6 m)   Wt 64.4 kg (142 lb)   SpO2 93%   Breastfeeding No   BMI 25.15 kg/m²       Current Facility-Administered Medications   Medication Dose Route Frequency    potassium phosphate 15 mmol in 0.9% sodium chloride 250 mL infusion   IntraVENous ONCE    heparin (porcine) injection 5,000 Units  5,000 Units SubCUTAneous Q12H    [Held by provider] bumetanide (BUMEX) injection 2 mg  2 mg IntraVENous Q8H    alcohol 62% (NOZIN) nasal  1 Ampule  1 Ampule Topical Q12H    balsam peru-castor oiL (VENELEX) ointment   Topical BID    thiamine (B-1) 200 mg in 0.9% sodium chloride 50 mL IVPB  200 mg IntraVENous Q8H    latanoprost (XALATAN) 0.005 % ophthalmic solution 1 Drop  1 Drop Both Eyes QPM    timolol (TIMOPTIC) 0.5 % ophthalmic solution 1 Drop  1 Drop Both Eyes DAILY    dorzolamide (TRUSOPT) 2 % ophthalmic solution 1 Drop  1 Drop Both Eyes BID    prednisoLONE acetate (PRED FORTE) 1 % ophthalmic suspension 1 Drop  1 Drop Left Eye DAILY    insulin lispro (HUMALOG) injection   SubCUTAneous Q6H    glucose chewable tablet 16 g  4 Tablet Oral PRN    glucagon (GLUCAGEN) injection 1 mg  1 mg IntraMUSCular PRN    dextrose 10% infusion 0-250 mL  0-250 mL IntraVENous PRN    PHENYLephrine (VIRY-SYNEPHRINE) 30 mg in 0.9% sodium chloride 250 mL infusion   mcg/min IntraVENous TITRATE    milrinone (PRIMACOR) 20 MG/100 ML D5W infusion  0-0.75 mcg/kg/min IntraVENous TITRATE    dilTIAZem (CARDIZEM) 100 mg in 0.9% sodium chloride (MBP/ADV) 100 mL infusion  0-15 mg/hr IntraVENous TITRATE    sodium chloride (NS) flush 5-10 mL  5-10 mL IntraVENous PRN    dextrose 10 % infusion 125-250 mL  125-250 mL IntraVENous PRN    sodium chloride (NS) flush 5-10 mL  5-10 mL IntraVENous PRN    sodium chloride (NS) flush 5-40 mL  5-40 mL IntraVENous PRN    ondansetron (ZOFRAN) injection 4 mg  4 mg IntraVENous Q6H PRN    senna (SENOKOT) tablet 8.6 mg  1 Tablet Oral DAILY PRN    polyethylene glycol (MIRALAX) packet 17 g  17 g Oral DAILY PRN       Objective:      Physical Exam:  General: no acute distress, elderly appearing  HEENT: No carotid bruits, PEERL, EOM intact. There is slight ptosis of the left eye. Heart:  irreg irreg; normal S1/S2; no murmurs  Respiratory: rales bilaterally  Abdomen:   Soft, non-tender, no distention, no masses. + BS. Extremities:  Normal cap refill, no cyanosis, atraumatic. No edema.   Neuro: A&Ox3, speech clear  Skin: Skin color is normal. Non diaphoretic  Vascular: 2+ pulses symmetric in all extremities    Data Review:   Recent Labs     03/09/23  0233 03/08/23  0219 03/07/23  1614   WBC 15.3* 16.8* 17.6*   HGB 9.9* 11.2* 11.0*   HCT 30.1* 34.8* 36.3    232 266     Recent Labs     03/09/23  0233 03/08/23  1629 03/08/23  0852 03/08/23  0219   * 131*  --  127*   K 3.7 3.4*  --  4.6   CL 99 98  --  95*   CO2 24 24  --  20*   * 234*  --  214*   BUN 51* 48*  --  43*   CREA 1.17* 1.21*  --  1.42*   CA 7.8* 7.8*  --  8.7   MG 1.8 1.9  --  1.7   PHOS 2.2*  --   --  4.5   ALB 2.9* 3.0*  --  3.7   TBILI 1.2* 0.9  --  1.1*   ALT >3,500* >3,500*  --  >3,500*   INR 1.9* 2.0* 2.3*  --        Recent Labs     03/07/23  1904   *         Intake/Output Summary (Last 24 hours) at 3/9/2023 0928  Last data filed at 3/9/2023 0753  Gross per 24 hour Intake 1787.81 ml   Output 2150 ml   Net -362.19 ml        Telemetry: A fib rates controlled; brief NSVT longest 5 beats overnight    Echocardiogram: pending    Radiology Results in the last 24 hours:  XR CHEST PORT    Result Date: 3/9/2023  Increased bilateral pleural effusions right greater than left. Assessment:     Active Problems:    Sepsis ([address] (3/7/2023)        Plan:     AF with RVR  In the setting of acute hypoxic resp failure with sepsis and decompensated HF  Rate controlled on diltiazem gtt  Hold amio due to elevated LFTs  Can consider dig load if needed  [address] on hold previously due to elevated INR; now subtherapeutic. Would resume therapeutic heparin/lovenox once able    2. Acute decompensated HF  On neli and milrinone  Recommend to titrate down milrinone and neli as able  Would reintroduce diuretics if able; renal function is improving  Echo pending    3. NSVT  Brief non-sustained VT noted on telemetry, longest 5 beats  Magnesium replacement  Keep K > 4; Mg > 2    4. Pleural effusions  Worsening bilateral pleural effusions today  Pending right sided thoracentesis today    5.   Sepsis  Source unclear; neg resp panel, neg UA, no GTD for [facility]  Care per primary team    Brigdet Hummel NP

## 2023-03-09 NOTE — CONSULTS
Mónica Celeste, NP-C  [phone] cell     Gastroenterology Consultation Note      Admit Date: 3/7/2023  Consult Date: 3/9/2023   I greatly appreciate your asking me to see Gennaro Gómez, thank you very much for the opportunity to participate in her care. Narrative Assessment and Plan   GI consultation for elevated LFTs. [de-identified] y/o female with abrupt onset of malaise about 6 days ago. Went to PCP 4 days ago and was told to come to the ER for abnormal labs. AST and ALT >2000 and >3500 respectively, TB 1.2 and . Negative hepatitis panel. In acute HF which is new. No GI complaints. History of zoster affecting optic nerve, on chronic acyclovir for many years. Impression:  Elevated LFTs  Acute HF    R-value 34 indicating hepatocellular injury predominantly but has mildly elevated bilirubin as well. Ddx include DILI vs ischemic hepatopathy vs other. With history of zoster will order HSV PCR as well as IgG subclasses and total IgM. Check CMV and EBV Ab and labs for AIH (ANTHONY, AMA, ASMA). Ultrasound with doppler to rule out portal thrombosis  Follow LFTs, platelets, and INR    Subjective:     Chief Complaint: Elevated LFTs    History of Present Illness: GI consultation for elevated LFTs. [de-identified] y/o female without significant PMH presented at the request of her PCP after having labs drawn earlier this week. She reports starting to feel weak and bad about 6 days ago and felt worse over the course of the weekend. She went to PCP Monday and had labs drawn. AST and ALT > 2000 and >3500 respectively, TB 1.2 and . Denies vomiting but had some nausea and some diarrhea although not severe. Says she was able to eat and drink when not feeling bad. No abdominal pain, weight loss, or GI blood loss. Found to have bilateral pleural effusions, pulmonary edema, cardiomegaly and was in acute HF.  Pertinent labs: WBC 15.3, hgb 9.9, hct 30.1, MCV 84.3, platelets 634, troponin 141, BNP 5172, INR 1.9, lactic acid 11 (down to 1.6), sodium 125, potassium 5.7, creatinine 18, BUN 51. Negative hepatitis panel. CT without contrast showed severely distended GB, fluid filled esophagus, and diverticulosis. CBD normal. Ultrasound showed markedly distended GB with numerous GS, no biliary dilation although CBD not well visualized. No new medications, use of herbal medications or supplements. No alcohol use. No Tylenol or ASA use. Has taken acyclovir 5 x day for years for zoster affecting optic nerve. PCP:  Radha Dubon MD    Past Medical History:   Diagnosis Date    Arthritis     Chronic pain     arthritic    Diabetes [address] [address] [facility]     Hypertension     Ill-defined condition     shingles 1.5 years ago 8/2014    Other ill-defined conditions(799.89)     high cholesterol    Other ill-defined conditions(799.89)     shingles        Past Surgical History:   Procedure Laterality Date    COLONOSCOPY N/A 10/25/2016    COLONOSCOPY performed by Peace Mccarty MD at [facility] ENDOSCOPY    HX APPENDECTOMY      HX CATARACT REMOVAL Left     HX HYSTERECTOMY      HX KNEE REPLACEMENT Right     parital    HX KNEE REPLACEMENT Left     HX ORTHOPAEDIC      right hip replacement    HX TONSILLECTOMY      HX TUBAL LIGATION      HX UROLOGICAL      bladder tacking       Social History     Tobacco Use    Smoking status: Never    Smokeless tobacco: Never   Substance Use Topics    Alcohol use: No        Family History   Problem Relation Age of Onset    Cancer Father         leukemia    Stroke Sister         No Known Allergies         Home Medications:  Prior to Admission Medications   Prescriptions Last Dose Informant Patient Reported? Taking?   acyclovir (ZOVIRAX) 800 mg tablet   Yes Yes   Sig: Take 800 mg by mouth nightly. Indications: shingles prevention   atorvastatin (LIPITOR) 10 mg tablet   Yes Yes   Sig: Take 10 mg by mouth nightly.    dorzolamide (TRUSOPT) 2 % ophthalmic solution   Yes Yes   Sig: INSTILL 1 DROP IN BOTH EYES TWICE DAILY   hydroCHLOROthiazide (HYDRODIURIL) 25 mg tablet   Yes Yes   Sig: Take 25 mg by mouth daily. latanoprost (XALATAN) 0.005 % ophthalmic solution   Yes Yes   Sig: Administer 1 Drop to both eyes nightly. Indications: OPEN ANGLE GLAUCOMA   lisinopril (PRINIVIL, ZESTRIL) 5 mg tablet   Yes Yes   Sig: Take 5 mg by mouth daily. Indications: HYPERTENSION   metFORMIN (GLUCOPHAGE) 500 mg tablet   Yes Yes   Sig: Take 1,000 mg by mouth two (2) times daily (with meals). Indications: type 2 diabetes mellitus   multivitamin with iron tablet   Yes Yes   Sig: Take 1 Tab by mouth daily. prednisoLONE acetate (PRED FORTE) 1 % ophthalmic suspension   Yes Yes   Sig: Administer 1 Drop to left eye daily. Indications: SEVERE OCULAR INFLAMMATION   timolol (TIMOPTIC) 0.5 % ophthalmic solution   Yes Yes   Sig: Administer 1 Drop to both eyes daily.       Facility-Administered Medications: None       Hospital Medications:  Current Facility-Administered Medications   Medication Dose Route Frequency    heparin (porcine) injection 5,000 Units  5,000 Units SubCUTAneous Q12H    [Held by provider] bumetanide (BUMEX) injection 2 mg  2 mg IntraVENous Q8H    alcohol 62% (NOZIN) nasal  1 Ampule  1 Ampule Topical Q12H    balsam peru-castor oiL (VENELEX) ointment   Topical BID    thiamine (B-1) 200 mg in 0.9% sodium chloride 50 mL IVPB  200 mg IntraVENous Q8H    latanoprost (XALATAN) 0.005 % ophthalmic solution 1 Drop  1 Drop Both Eyes QPM    timolol (TIMOPTIC) 0.5 % ophthalmic solution 1 Drop  1 Drop Both Eyes DAILY    dorzolamide (TRUSOPT) 2 % ophthalmic solution 1 Drop  1 Drop Both Eyes BID    prednisoLONE acetate (PRED FORTE) 1 % ophthalmic suspension 1 Drop  1 Drop Left Eye DAILY    insulin lispro (HUMALOG) injection   SubCUTAneous Q6H    glucose chewable tablet 16 g  4 Tablet Oral PRN    glucagon (GLUCAGEN) injection 1 mg  1 mg IntraMUSCular PRN    dextrose 10% infusion 0-250 mL  0-250 mL IntraVENous PRN    PHENYLephrine (VIRY-SYNEPHRINE) 30 mg in 0.9% sodium chloride 250 mL infusion   mcg/min IntraVENous TITRATE    milrinone (PRIMACOR) 20 MG/100 ML D5W infusion  0-0.75 mcg/kg/min IntraVENous TITRATE    dilTIAZem (CARDIZEM) 100 mg in 0.9% sodium chloride (MBP/ADV) 100 mL infusion  0-15 mg/hr IntraVENous TITRATE    sodium chloride (NS) flush 5-10 mL  5-10 mL IntraVENous PRN    dextrose 10 % infusion 125-250 mL  125-250 mL IntraVENous PRN    sodium chloride (NS) flush 5-10 mL  5-10 mL IntraVENous PRN    sodium chloride (NS) flush 5-40 mL  5-40 mL IntraVENous PRN    ondansetron (ZOFRAN) injection 4 mg  4 mg IntraVENous Q6H PRN    senna (SENOKOT) tablet 8.6 mg  1 Tablet Oral DAILY PRN    polyethylene glycol (MIRALAX) packet 17 g  17 g Oral DAILY PRN       Review of Systems: Per HPI, otherwise negative. Objective:     Physical Exam:  Visit Vitals  /80   Pulse 90   Temp 98 °F (36.7 °C)   Resp 19   Ht 5' 3\" (1.6 m)   Wt 64.4 kg (142 lb)   SpO2 90%   Breastfeeding No   BMI 25.15 kg/m²     SpO2 Readings from Last 6 Encounters:   03/09/23 90%   01/16/18 97%   01/08/18 99%   09/21/17 97%   09/19/17 98%   09/15/17 96%    O2 Flow Rate (L/min): 5 l/min     Intake/Output Summary (Last 24 hours) at 3/9/2023 1143  Last data filed at 3/9/2023 0753  Gross per 24 hour   Intake 1488.74 ml   Output 1900 ml   Net -411.26 ml      General: no distress, comfortable, chronically ill  Skin:  No rash or palpable dermatologic mass lesions  HEENT: Pupils equal, sclera anicteric, oropharynx with no gross lesions  Cardiovascular: Tachycardic  Respiratory:  No abnormal audible breath sounds, normal respiratory effort, no throacic deformity  GI:  Abdomen nondistended, nontender, no mass, no free fluid, no rebound or guarding. Musculoskeletal:  No skeletal deformity nor acute arthritis noted.   Neurological:  Motor and sensory function intact in upper extremeties  Psychiatric:  Normal affect, memory intact, appears to have insight into current illness    Laboratory:    Recent Results (from the past 24 hour(s))   GLUCOSE, POC    Collection Time: 03/08/23 12:15 PM   Result Value Ref Range    Glucose (POC) 231 (H) 65 - 117 mg/dL    Performed by Tiffany Vallejo RN    ECHO ADULT COMPLETE    Collection Time: 03/08/23  2:51 PM   Result Value Ref Range    IVSd 0.7 0.6 - 0.9 cm    LVIDd 5.2 3.9 - 5.3 cm    LVIDs 3.8 cm    LVOT Diameter 2.0 cm    LVPWd 0.8 0.6 - 0.9 cm    LVOT Peak Gradient 5 mmHg    LVOT Mean Gradient 3 mmHg    LVOT SV 71.6 ml    LVOT Peak Velocity 1.2 m/s    LVOT VTI 22.8 cm    LA Diameter 3.5 cm    LA Volume 4C 33 22 - 52 mL    AV Area by Peak Velocity 2.1 cm2    AV Area by VTI 2.3 cm2    AV Peak Gradient 13 mmHg    AV Mean Gradient 7 mmHg    AV Peak Velocity 1.8 m/s    AV Mean Velocity 1.3 m/s    AV VTI 31.9 cm    MR Peak Gradient 18 mmHg    MR Peak Velocity 2.1 m/s    MV A Velocity 0.80 m/s    MV E Wave Deceleration Time 159.2 ms    MV E Velocity 1.10 m/s    LV E' Lateral Velocity 5 cm/s    LV E' Septal Velocity 4 cm/s    PV Peak Gradient 4 mmHg    PV Max Velocity 1.0 m/s    TAPSE 1.9 1.7 cm    TR Peak Gradient 26 mmHg    TR Max Velocity 2.54 m/s    Fractional Shortening 2D 27 28 - 44 %    LVIDd Index 3.11 cm/m2    LVIDs Index 2.28 cm/m2    LV RWT Ratio 0.31     LV Mass 2D 133.8 67 - 162 g    LV Mass 2D Index 80.1 43 - 95 g/m2    MV E/A 1.38     E/E' Ratio (Averaged) 24.75     E/E' Lateral 22.00     E/E' Septal 27.50     LVOT Stroke Volume Index 42.9 mL/m2    LVOT Area 3.1 cm2    LA Volume Index 4C 20 16 - 34 mL/m2    LA Size Index 2.10 cm/m2    AV Velocity Ratio 0.67     LVOT:AV VTI Index 0.71     EDUARDA/BSA VTI 1.4 cm2/m2    EDUARDA/BSA Peak Velocity 1.3 cm2/m2   LACTIC ACID    Collection Time: 03/08/23  4:29 PM   Result Value Ref Range    Lactic acid 1.6 0.4 - 2.0 MMOL/L   METABOLIC PANEL, COMPREHENSIVE    Collection Time: 03/08/23  4:29 PM   Result Value Ref Range    Sodium 131 (L) 136 - 145 mmol/L    Potassium 3.4 (L) 3.5 - 5.1 mmol/L    Chloride 98 97 - 108 mmol/L    CO2 24 21 - 32 mmol/L Anion gap 9 5 - 15 mmol/L    Glucose 234 (H) 65 - 100 mg/dL    BUN 48 (H) 6 - 20 MG/DL    Creatinine 1.21 (H) 0.55 - 1.02 MG/DL    BUN/Creatinine ratio 40 (H) 12 - 20      eGFR 45 (L) >60 ml/min/1.73m2    Calcium 7.8 (L) 8.5 - 10.1 MG/DL    Bilirubin, total 0.9 0.2 - 1.0 MG/DL    ALT (SGPT) >3,500 (H) 12 - 78 U/L    AST (SGOT) >2,000 (H) 15 - 37 U/L    Alk. phosphatase 158 (H) 45 - 117 U/L    Protein, total 6.0 (L) 6.4 - 8.2 g/dL    Albumin 3.0 (L) 3.5 - 5.0 g/dL    Globulin 3.0 2.0 - 4.0 g/dL    A-G Ratio 1.0 (L) 1.1 - 2.2     PROTHROMBIN TIME + INR    Collection Time: 03/08/23  4:29 PM   Result Value Ref Range    INR 2.0 (H) 0.9 - 1.1      Prothrombin time 19.8 (H) 9.0 - 11.1 sec   AMYLASE    Collection Time: 03/08/23  4:29 PM   Result Value Ref Range    Amylase 49 25 - 115 U/L   LIPASE    Collection Time: 03/08/23  4:29 PM   Result Value Ref Range    Lipase 259 73 - 393 U/L   VOLATILES SCREEN    Collection Time: 03/08/23  4:29 PM   Result Value Ref Range    Specimen: BLOOD      Chain of custody?  NO      REPORT STATUS Results scanned into [facility] under Media tab      Methanol None detected (A) NEG mg/L    Ethanol <10 mg/L    Isopropanol None detected mg/L    Acetone None detected (A) NEG mg/L    Ethylene Glycol None detected NDET mg/L   DRUG SCREEN, URINE    Collection Time: 03/08/23  4:29 PM   Result Value Ref Range    AMPHETAMINES Negative NEG      BARBITURATES Negative NEG      BENZODIAZEPINES Negative NEG      COCAINE Negative NEG      METHADONE Negative NEG      OPIATES Negative NEG      PCP(PHENCYCLIDINE) Negative NEG      THC (TH-CANNABINOL) Negative NEG      Drug screen comment (NOTE)    AMMONIA    Collection Time: 03/08/23  4:29 PM   Result Value Ref Range    Ammonia, plasma 52 (H) <32 UMOL/L   MAGNESIUM    Collection Time: 03/08/23  4:29 PM   Result Value Ref Range    Magnesium 1.9 1.6 - 2.4 mg/dL   GLUCOSE, POC    Collection Time: 03/08/23 11:37 PM   Result Value Ref Range    Glucose (POC) 197 (H) 65 - 117 mg/dL    Performed by Rhona Rubio RN    METABOLIC PANEL, COMPREHENSIVE    Collection Time: 03/09/23  2:33 AM   Result Value Ref Range    Sodium 131 (L) 136 - 145 mmol/L    Potassium 3.7 3.5 - 5.1 mmol/L    Chloride 99 97 - 108 mmol/L    CO2 24 21 - 32 mmol/L    Anion gap 8 5 - 15 mmol/L    Glucose 194 (H) 65 - 100 mg/dL    BUN 51 (H) 6 - 20 MG/DL    Creatinine 1.17 (H) 0.55 - 1.02 MG/DL    BUN/Creatinine ratio 44 (H) 12 - 20      eGFR 47 (L) >60 ml/min/1.73m2    Calcium 7.8 (L) 8.5 - 10.1 MG/DL    Bilirubin, total 1.2 (H) 0.2 - 1.0 MG/DL    ALT (SGPT) >3,500 (H) 12 - 78 U/L    AST (SGOT) >2,000 (H) 15 - 37 U/L    Alk. phosphatase 153 (H) 45 - 117 U/L    Protein, total 5.9 (L) 6.4 - 8.2 g/dL    Albumin 2.9 (L) 3.5 - 5.0 g/dL    Globulin 3.0 2.0 - 4.0 g/dL    A-G Ratio 1.0 (L) 1.1 - 2.2     CBC WITH AUTOMATED DIFF    Collection Time: 03/09/23  2:33 AM   Result Value Ref Range    WBC 15.3 (H) 3.6 - 11.0 K/uL    RBC 3.57 (L) 3.80 - 5.20 M/uL    HGB 9.9 (L) 11.5 - 16.0 g/dL    HCT 30.1 (L) 35.0 - 47.0 %    MCV 84.3 80.0 - 99.0 FL    MCH 27.7 26.0 - 34.0 PG    MCHC 32.9 30.0 - 36.5 g/dL    RDW 15.7 (H) 11.5 - 14.5 %    PLATELET 658 076 - 506 K/uL    MPV 11.3 8.9 - 12.9 FL    NRBC 0.1 (H) 0  WBC    ABSOLUTE NRBC 0.02 (H) 0.00 - 0.01 K/uL    NEUTROPHILS 87 (H) 32 - 75 %    LYMPHOCYTES 8 (L) 12 - 49 %    MONOCYTES 4 (L) 5 - 13 %    EOSINOPHILS 0 0 - 7 %    BASOPHILS 0 0 - 1 %    IMMATURE GRANULOCYTES 1 (H) 0.0 - 0.5 %    ABS. NEUTROPHILS 13.4 (H) 1.8 - 8.0 K/UL    ABS. LYMPHOCYTES 1.3 0.8 - 3.5 K/UL    ABS. MONOCYTES 0.6 0.0 - 1.0 K/UL    ABS. EOSINOPHILS 0.0 0.0 - 0.4 K/UL    ABS. BASOPHILS 0.0 0.0 - 0.1 K/UL    ABS. IMM.  GRANS. 0.1 (H) 0.00 - 0.04 K/UL    DF AUTOMATED     MAGNESIUM    Collection Time: 03/09/23  2:33 AM   Result Value Ref Range    Magnesium 1.8 1.6 - 2.4 mg/dL   PHOSPHORUS    Collection Time: 03/09/23  2:33 AM   Result Value Ref Range    Phosphorus 2.2 (L) 2.6 - 4.7 MG/DL   PROTHROMBIN TIME + INR    Collection Time: 03/09/23  2:33 AM   Result Value Ref Range    INR 1.9 (H) 0.9 - 1.1      Prothrombin time 19.5 (H) 9.0 - 11.1 sec   PTT    Collection Time: 03/09/23  2:33 AM   Result Value Ref Range    aPTT 25.7 22.1 - 31.0 sec    aPTT, therapeutic range     58.0 - 77.0 SECS   DIGOXIN    Collection Time: 03/09/23  2:33 AM   Result Value Ref Range    Digoxin level 1.9 0.90 - 2.00 NG/ML   LD    Collection Time: 03/09/23  2:33 AM   Result Value Ref Range     (H) 81 - 246 U/L   GLUCOSE, POC    Collection Time: 03/09/23  5:42 AM   Result Value Ref Range    Glucose (POC) 194 (H) 65 - 117 mg/dL    Performed by Nathalie Brooke RN          Assessment/Plan:     Active Problems:    Sepsis ([address] (3/7/2023)         See above narrative for full detail.     Jorgito Fajardo NP

## 2023-03-09 NOTE — PROGRESS NOTES
1900: Bedside and Verbal shift change report given to JORDI RAMACHANDRAN (oncoming nurse) by Haroon Corbin RN (offgoing nurse). Report included the following information SBAR, Kardex, Procedure Summary, Intake/Output, MAR, Recent Results, Med Rec Status, and Cardiac Rhythm Afib .     2000: Shift assessment complete, see flow sheet for details    2019: cardizem gtt started at 5mg/hr    2119: heart rate remains 130s-150s, cardizem gtt increased to 10 mg/hr     2225: heart rate remains in 130s-150s, cardizem gtt increased to 15mg/hr    0000: reassessment complete, no acute changes    0219: pt had run of Vtach, BP stable pt asymptomatic, CELIA Lam notified, no new orders    0400: reassessment complete, no acute changes     0440: CELIA Lam notified of lab results, orders received    0700: Bedside and Verbal shift change report given to [address] (oncoming nurse) by JORDI RAMACHANDRAN (offgoing nurse). Report included the following information SBAR, Kardex, ED Summary, Procedure Summary, Intake/Output, MAR, Recent Results, Med Rec Status, and Cardiac Rhythm AFIB .

## 2023-03-09 NOTE — PROGRESS NOTES
0700  Report from [address]    0800  Assessment complete  patient awake alert oriented X4 no complaints of pain/discomfort on O2 at 2 lpm NC  Casiano in place with yellow clear urine. 2 PIV both #20 left and right AC with Amio at 1mg to change rate at 1000 hours and Denise Durant. Vitals stable Lungs coarse diminished in bases  mouth dry fingers and toes cyanotic and cold to touch pulses X4 NSR on monitor   Sacrum red but blanchable and heels red and boggy  NPO a few ice chips ok    0830  Dr Deisy Roy into see patient    9651  Labs sent per orders    0909  Dobutamine gtt started 2,5 mcg/kg/min titrate for better urine out  Bumex given see MAR    0914  NTG paste per orders see MAR    1000  Interdisciplinary team rounds were held 3/8/2023 with the following team members:Nursing, Nutrition, Pharmacy, Physician, and Clinical Coordinator and the n/a. Plan of care discussed. See clinical pathway and/or care plan for interventions and desired outcomes.     Goals of the Day: Diuresis Echocardiogram start Dobutamine for better urine output and stop amio    1100  Increased dobutamine to 5 mcg/kg/min due to slowing of output    1200  Assessment complete no changes    1530  In room to obtain labs per orders hard stick having difficulty    1600  Assessment complete no changes    1610  Dr Deisy Roy in room looking for lab results helped with arterial stick for labs    1633  Patient went into A-fib RVR rate up to 180  EKG done and notified Dr Deisy Roy    1647  6 mg Adenosine given no change in rate    1649  Metoprolol 2.5 mg given no change in rate    1700  Metoprolol 2.5 mg given no change in rate  Dobutamine changed by Dr Deisy Roy to 2.5 mcg/kg/min  O2 increased to 5 lpm    1705  Maximino started 30 mcg for low BP    1730  Dr Andria Guevara cardiology here to see patient    7078  Orders for Dig 250 mcg IV push given with repeat dose at midnight    1808  Orders for Milrinone to start see [facility]      [address] with Dr Linda Jimenez in reference to HR still in 150's and higher order for Cardizem 10 mg IV push     1840  Cardizem given HR in 130 then back to 140'-150's    1900  Report to [facility]  Dr Taiwo Lujan made aware of HR still elevated said to call Cardiology     [address] with Dr Tate Peoples orders to start Cardizem at 5 mg/hr and titrate  Keep Maximino and Milrinone for BP

## 2023-03-09 NOTE — PROGRESS NOTES
Transition of Care Plan:    RUR:14%    Disposition: Home with spouse    If SNF or IPR: Date FOC offered:N/A    Date [facility] received:N/A    Date authorization started with reference number:N/A    Date authorization received and expires:N/A    Accepting facility:N/A    Follow up appointments: To be done prior to discharge. DME needed:None    Transportation at [address] or means to access home:  has keys         IM Medicare Letter: To be given prior to discharge. Is patient a [place] and connected with the [place]? No              If yes, was [place] transfer form completed and VA notified? N/A    Caregiver Contact:    Discharge Caregiver contacted prior to discharge? Caregiver to be contacted prior to discharge. Care Conference needed?:   No        Reason for Admission:  Patient came to ed for nausea, vomiting, anorexia and abnormal lab work. RUR Score:  14%                   Plan for utilizing home health:  She has used home health services in the past however did not remember the name of the agency. PCP: First and Last name:  Earlene Lowery MD     Name of Practice: [facility]   Are you a current patient: Yes/No: Yes     Approximate date of last visit: 3/6/23     Can you participate in a virtual visit with your PCP: Yes                    Current Advanced Directive/Advance Care Plan: Full Code  Advance Care Planning     General Advance Care Planning (ACP) Conversation      Date of Conversation: 3/9/23  Conducted with: Patient with Decision Making [address]:   No healthcare decision makers have been documented.    Click here to complete [place] including selection of the Healthcare Decision Maker Relationship (ie \"Primary\")      Content/Action Overview:   DECLINED ACP conversation - will revisit periodically   Reviewed DNR/DNI and patient elects Full Code (Attempt Resuscitation)         Length of Voluntary ACP Conversation in minutes:  <16 minutes (Non-Billable)    Glenn Manning RN                 Healthcare Decision Maker:   Click here to complete [address] including selection of the Healthcare Decision Maker Relationship (ie \"Primary\")                             Confirmed demographics and pcp information with patient. She lives in one story home with her . She states she was independent prior to becoming ill. She drives. She does not use dme for ambulation. She denies using home oxygen and she does not use cpap. She plans to return home with her  when medically stable. Leelee Sorensen  RN BSN CRM        [phone]

## 2023-03-09 NOTE — PROGRESS NOTES
0700  Bedside shift change report received from [place], [facility] (offgoing nurse). Assumed care of pt at this time. Report included the following information SBAR, Kardex, Intake/Output, MAR, Recent Results, Heart rhythm and Medications. 0730  Turned and skin assessed. Venelex applied to posterior prominences. 0900  Dr. Clare Ruiz at [facility]. Discussed Thoracentesis procedure  Possibly will need this today as infiltrates are increasing after given IV diuresis yesterday. 1000  Interdisciplinary team rounds were held 3/9/2023 with the following team members:Care Management, Nursing, Nutrition, Pharmacy, Physician, and Clinical Coordinator and the patient and spouse. Plan of care discussed. See clinical pathway and/or care plan for interventions and desired outcomes. Goals of the Day:   Start clear liquid diet  Thoracentesis right lobe  Continue A. Fib RVR management medications. DC Antibiotics, neg blood cx.    1300  Chest Tube placed by Dr. Clare Ruiz. Pleural fluid samples sent to lab. Connected to -20 suction cannister. Total out 750 ml's in cannister. Approx 100 ml's to lab. Converted to gravity when pt started coughing repeatedly. 1430  Converted pleural drain to pleura-vac to gravity.   SPO2 96%, O2 5 L/M nc    1700  Ekg completed  Labs drawn and sent    1900  Report to Nawaf Narvaez

## 2023-03-10 LAB
ALBUMIN SERPL-MCNC: 3.1 G/DL (ref 3.5–5)
ALBUMIN/GLOB SERPL: 1 (ref 1.1–2.2)
ALP SERPL-CCNC: 155 U/L (ref 45–117)
ALT SERPL-CCNC: 3040 U/L (ref 12–78)
ANA SER QL: NEGATIVE
ANION GAP SERPL CALC-SCNC: 6 MMOL/L (ref 5–15)
ANION GAP SERPL CALC-SCNC: 8 MMOL/L (ref 5–15)
ANION GAP SERPL CALC-SCNC: 8 MMOL/L (ref 5–15)
APTT PPP: 20.7 SEC (ref 22.1–31)
AST SERPL-CCNC: 1251 U/L (ref 15–37)
ATRIAL RATE: 65 BPM
BILIRUB DIRECT SERPL-MCNC: 0.6 MG/DL (ref 0–0.2)
BILIRUB SERPL-MCNC: 1.4 MG/DL (ref 0.2–1)
BUN SERPL-MCNC: 21 MG/DL (ref 6–20)
BUN SERPL-MCNC: 28 MG/DL (ref 6–20)
BUN SERPL-MCNC: 32 MG/DL (ref 6–20)
BUN/CREAT SERPL: 27 (ref 12–20)
BUN/CREAT SERPL: 31 (ref 12–20)
BUN/CREAT SERPL: 40 (ref 12–20)
CALCIUM SERPL-MCNC: 7.3 MG/DL (ref 8.5–10.1)
CALCIUM SERPL-MCNC: 7.7 MG/DL (ref 8.5–10.1)
CALCIUM SERPL-MCNC: 8.2 MG/DL (ref 8.5–10.1)
CALCULATED R AXIS, ECG10: 33 DEGREES
CALCULATED T AXIS, ECG11: -149 DEGREES
CANCER AG125 SERPL-ACNC: 61 U/ML (ref 1.5–35)
CHLORIDE SERPL-SCNC: 102 MMOL/L (ref 97–108)
CHLORIDE SERPL-SCNC: 95 MMOL/L (ref 97–108)
CHLORIDE SERPL-SCNC: 96 MMOL/L (ref 97–108)
CK SERPL-CCNC: 66 U/L (ref 26–192)
CMV IGG SERPL IA-ACNC: >10 U/ML (ref 0–0.59)
CMV IGM SERPL IA-ACNC: <30 AU/ML (ref 0–29.9)
CO2 SERPL-SCNC: 29 MMOL/L (ref 21–32)
CO2 SERPL-SCNC: 32 MMOL/L (ref 21–32)
CO2 SERPL-SCNC: 34 MMOL/L (ref 21–32)
CREAT SERPL-MCNC: 0.79 MG/DL (ref 0.55–1.02)
CREAT SERPL-MCNC: 0.8 MG/DL (ref 0.55–1.02)
CREAT SERPL-MCNC: 0.9 MG/DL (ref 0.55–1.02)
DIAGNOSIS, 93000: NORMAL
DIGOXIN SERPL-MCNC: 0.6 NG/ML (ref 0.9–2)
EBV NA IGG SER-ACNC: 247 U/ML (ref 0–17.9)
EBV VCA IGG SER-ACNC: >600 U/ML (ref 0–17.9)
EBV VCA IGM SER-ACNC: <36 U/ML (ref 0–35.9)
ERYTHROCYTE [DISTWIDTH] IN BLOOD BY AUTOMATED COUNT: 15.9 % (ref 11.5–14.5)
GLOBULIN SER CALC-MCNC: 3.1 G/DL (ref 2–4)
GLUCOSE BLD STRIP.AUTO-MCNC: 172 MG/DL (ref 65–117)
GLUCOSE BLD STRIP.AUTO-MCNC: 204 MG/DL (ref 65–117)
GLUCOSE BLD STRIP.AUTO-MCNC: 305 MG/DL (ref 65–117)
GLUCOSE BLD STRIP.AUTO-MCNC: 310 MG/DL (ref 65–117)
GLUCOSE SERPL-MCNC: 198 MG/DL (ref 65–100)
GLUCOSE SERPL-MCNC: 362 MG/DL (ref 65–100)
GLUCOSE SERPL-MCNC: 366 MG/DL (ref 65–100)
HCT VFR BLD AUTO: 31.7 % (ref 35–47)
HGB BLD-MCNC: 10.5 G/DL (ref 11.5–16)
INR PPP: 1.4 (ref 0.9–1.1)
INTERPRETATION, 169995: ABNORMAL
MAGNESIUM SERPL-MCNC: 1.6 MG/DL (ref 1.6–2.4)
MAGNESIUM SERPL-MCNC: 1.8 MG/DL (ref 1.6–2.4)
MAGNESIUM SERPL-MCNC: 2.2 MG/DL (ref 1.6–2.4)
MCH RBC QN AUTO: 27.9 PG (ref 26–34)
MCHC RBC AUTO-ENTMCNC: 33.1 G/DL (ref 30–36.5)
MCV RBC AUTO: 84.3 FL (ref 80–99)
NRBC # BLD: 0.02 K/UL (ref 0–0.01)
NRBC BLD-RTO: 0.1 PER 100 WBC
PHOSPHATE SERPL-MCNC: 1.5 MG/DL (ref 2.6–4.7)
PHOSPHATE SERPL-MCNC: 1.9 MG/DL (ref 2.6–4.7)
PHOSPHATE SERPL-MCNC: 3 MG/DL (ref 2.6–4.7)
PLATELET # BLD AUTO: 236 K/UL (ref 150–400)
PMV BLD AUTO: 10.4 FL (ref 8.9–12.9)
POTASSIUM SERPL-SCNC: 2.6 MMOL/L (ref 3.5–5.1)
POTASSIUM SERPL-SCNC: 3.1 MMOL/L (ref 3.5–5.1)
POTASSIUM SERPL-SCNC: 3.8 MMOL/L (ref 3.5–5.1)
PROT SERPL-MCNC: 6.2 G/DL (ref 6.4–8.2)
PROTHROMBIN TIME: 14.6 SEC (ref 9–11.1)
Q-T INTERVAL, ECG07: 440 MS
QRS DURATION, ECG06: 146 MS
QTC CALCULATION (BEZET), ECG08: 514 MS
RBC # BLD AUTO: 3.76 M/UL (ref 3.8–5.2)
SERVICE CMNT-IMP: ABNORMAL
SODIUM SERPL-SCNC: 135 MMOL/L (ref 136–145)
SODIUM SERPL-SCNC: 136 MMOL/L (ref 136–145)
SODIUM SERPL-SCNC: 139 MMOL/L (ref 136–145)
THERAPEUTIC RANGE,PTTT: ABNORMAL SECS (ref 58–77)
VENTRICULAR RATE, ECG03: 82 BPM
WBC # BLD AUTO: 13.4 K/UL (ref 3.6–11)

## 2023-03-10 PROCEDURE — 80162 ASSAY OF DIGOXIN TOTAL: CPT

## 2023-03-10 PROCEDURE — 83735 ASSAY OF MAGNESIUM: CPT

## 2023-03-10 PROCEDURE — 86304 IMMUNOASSAY TUMOR CA 125: CPT

## 2023-03-10 PROCEDURE — 74011000258 HC RX REV CODE- 258: Performed by: INTERNAL MEDICINE

## 2023-03-10 PROCEDURE — 84100 ASSAY OF PHOSPHORUS: CPT

## 2023-03-10 PROCEDURE — 36415 COLL VENOUS BLD VENIPUNCTURE: CPT

## 2023-03-10 PROCEDURE — 74011000250 HC RX REV CODE- 250: Performed by: INTERNAL MEDICINE

## 2023-03-10 PROCEDURE — 80048 BASIC METABOLIC PNL TOTAL CA: CPT

## 2023-03-10 PROCEDURE — 74011250636 HC RX REV CODE- 250/636: Performed by: INTERNAL MEDICINE

## 2023-03-10 PROCEDURE — 74011000250 HC RX REV CODE- 250: Performed by: STUDENT IN AN ORGANIZED HEALTH CARE EDUCATION/TRAINING PROGRAM

## 2023-03-10 PROCEDURE — 85610 PROTHROMBIN TIME: CPT

## 2023-03-10 PROCEDURE — 0W9930Z DRAINAGE OF RIGHT PLEURAL CAVITY WITH DRAINAGE DEVICE, PERCUTANEOUS APPROACH: ICD-10-PCS | Performed by: INTERNAL MEDICINE

## 2023-03-10 PROCEDURE — 80076 HEPATIC FUNCTION PANEL: CPT

## 2023-03-10 PROCEDURE — 65610000006 HC RM INTENSIVE CARE

## 2023-03-10 PROCEDURE — 74011250637 HC RX REV CODE- 250/637: Performed by: NURSE PRACTITIONER

## 2023-03-10 PROCEDURE — 82962 GLUCOSE BLOOD TEST: CPT

## 2023-03-10 PROCEDURE — 82550 ASSAY OF CK (CPK): CPT

## 2023-03-10 PROCEDURE — 76937 US GUIDE VASCULAR ACCESS: CPT

## 2023-03-10 PROCEDURE — 85027 COMPLETE CBC AUTOMATED: CPT

## 2023-03-10 PROCEDURE — 74011250636 HC RX REV CODE- 250/636: Performed by: NURSE PRACTITIONER

## 2023-03-10 PROCEDURE — 85730 THROMBOPLASTIN TIME PARTIAL: CPT

## 2023-03-10 PROCEDURE — 77010033678 HC OXYGEN DAILY

## 2023-03-10 PROCEDURE — 74011636637 HC RX REV CODE- 636/637: Performed by: INTERNAL MEDICINE

## 2023-03-10 PROCEDURE — 74011250637 HC RX REV CODE- 250/637: Performed by: INTERNAL MEDICINE

## 2023-03-10 RX ORDER — SODIUM,POTASSIUM PHOSPHATES 280-250MG
2 POWDER IN PACKET (EA) ORAL EVERY 4 HOURS
Status: COMPLETED | OUTPATIENT
Start: 2023-03-10 | End: 2023-03-10

## 2023-03-10 RX ORDER — METOPROLOL TARTRATE 25 MG/1
12.5 TABLET, FILM COATED ORAL EVERY 12 HOURS
Status: DISCONTINUED | OUTPATIENT
Start: 2023-03-11 | End: 2023-03-10

## 2023-03-10 RX ORDER — MAGNESIUM SULFATE HEPTAHYDRATE 40 MG/ML
2 INJECTION, SOLUTION INTRAVENOUS ONCE
Status: COMPLETED | OUTPATIENT
Start: 2023-03-10 | End: 2023-03-10

## 2023-03-10 RX ORDER — MILRINONE LACTATE 0.2 MG/ML
0.25 INJECTION, SOLUTION INTRAVENOUS CONTINUOUS
Status: DISCONTINUED | OUTPATIENT
Start: 2023-03-10 | End: 2023-03-11

## 2023-03-10 RX ORDER — POTASSIUM CHLORIDE 7.45 MG/ML
10 INJECTION INTRAVENOUS
Status: COMPLETED | OUTPATIENT
Start: 2023-03-10 | End: 2023-03-10

## 2023-03-10 RX ORDER — BUMETANIDE 0.25 MG/ML
1 INJECTION INTRAMUSCULAR; INTRAVENOUS ONCE
Status: COMPLETED | OUTPATIENT
Start: 2023-03-10 | End: 2023-03-10

## 2023-03-10 RX ORDER — HEPARIN 100 UNIT/ML
300 SYRINGE INTRAVENOUS AS NEEDED
Status: CANCELLED | OUTPATIENT
Start: 2023-03-10

## 2023-03-10 RX ORDER — METOPROLOL SUCCINATE 25 MG/1
25 TABLET, EXTENDED RELEASE ORAL DAILY
Status: DISCONTINUED | OUTPATIENT
Start: 2023-03-10 | End: 2023-03-10

## 2023-03-10 RX ORDER — INSULIN LISPRO 100 [IU]/ML
INJECTION, SOLUTION INTRAVENOUS; SUBCUTANEOUS EVERY 6 HOURS
Status: DISCONTINUED | OUTPATIENT
Start: 2023-03-10 | End: 2023-03-12

## 2023-03-10 RX ORDER — FENTANYL CITRATE 50 UG/ML
25 INJECTION, SOLUTION INTRAMUSCULAR; INTRAVENOUS ONCE
Status: COMPLETED | OUTPATIENT
Start: 2023-03-10 | End: 2023-03-10

## 2023-03-10 RX ORDER — LIDOCAINE 4 G/100G
1 PATCH TOPICAL EVERY 24 HOURS
Status: DISCONTINUED | OUTPATIENT
Start: 2023-03-10 | End: 2023-03-18 | Stop reason: HOSPADM

## 2023-03-10 RX ORDER — BUMETANIDE 0.25 MG/ML
1 INJECTION INTRAMUSCULAR; INTRAVENOUS DAILY
Status: DISCONTINUED | OUTPATIENT
Start: 2023-03-11 | End: 2023-03-11

## 2023-03-10 RX ORDER — METOPROLOL TARTRATE 25 MG/1
12.5 TABLET, FILM COATED ORAL EVERY 12 HOURS
Status: DISCONTINUED | OUTPATIENT
Start: 2023-03-10 | End: 2023-03-12

## 2023-03-10 RX ORDER — MILRINONE LACTATE 0.2 MG/ML
0.25 INJECTION, SOLUTION INTRAVENOUS
Status: DISCONTINUED | OUTPATIENT
Start: 2023-03-10 | End: 2023-03-10

## 2023-03-10 RX ADMIN — BUMETANIDE 1 MG: 0.25 INJECTION INTRAMUSCULAR; INTRAVENOUS at 08:26

## 2023-03-10 RX ADMIN — Medication 7 UNITS: at 17:22

## 2023-03-10 RX ADMIN — POTASSIUM PHOSPHATE, MONOBASIC AND POTASSIUM PHOSPHATE, DIBASIC: 224; 236 INJECTION, SOLUTION, CONCENTRATE INTRAVENOUS at 16:39

## 2023-03-10 RX ADMIN — BUMETANIDE 1 MG: 0.25 INJECTION, SOLUTION INTRAMUSCULAR; INTRAVENOUS at 17:32

## 2023-03-10 RX ADMIN — THIAMINE HYDROCHLORIDE 200 MG: 100 INJECTION, SOLUTION INTRAMUSCULAR; INTRAVENOUS at 22:27

## 2023-03-10 RX ADMIN — MILRINONE LACTATE IN DEXTROSE 0.25 MCG/KG/MIN: 200 INJECTION, SOLUTION INTRAVENOUS at 11:29

## 2023-03-10 RX ADMIN — ACETYLCYSTEINE 6040 MG: 200 INJECTION, SOLUTION INTRAVENOUS at 15:13

## 2023-03-10 RX ADMIN — Medication 2 UNITS: at 00:08

## 2023-03-10 RX ADMIN — POTASSIUM CHLORIDE 10 MEQ: 10 INJECTION, SOLUTION INTRAVENOUS at 11:16

## 2023-03-10 RX ADMIN — POTASSIUM & SODIUM PHOSPHATES POWDER PACK 280-160-250 MG 2 PACKET: 280-160-250 PACK at 08:58

## 2023-03-10 RX ADMIN — MAGNESIUM SULFATE IN WATER 2 G: 40 INJECTION, SOLUTION INTRAVENOUS at 09:00

## 2023-03-10 RX ADMIN — FENTANYL CITRATE 25 MCG: 50 INJECTION, SOLUTION INTRAMUSCULAR; INTRAVENOUS at 02:21

## 2023-03-10 RX ADMIN — SODIUM CHLORIDE 15 MG/HR: 900 INJECTION, SOLUTION INTRAVENOUS at 13:40

## 2023-03-10 RX ADMIN — POTASSIUM BICARBONATE 50 MEQ: 391 TABLET, EFFERVESCENT ORAL at 08:57

## 2023-03-10 RX ADMIN — PREDNISOLONE ACETATE 1 DROP: 10 SUSPENSION/ DROPS OPHTHALMIC at 08:45

## 2023-03-10 RX ADMIN — THIAMINE HYDROCHLORIDE 200 MG: 100 INJECTION, SOLUTION INTRAMUSCULAR; INTRAVENOUS at 06:42

## 2023-03-10 RX ADMIN — THIAMINE HYDROCHLORIDE 200 MG: 100 INJECTION, SOLUTION INTRAMUSCULAR; INTRAVENOUS at 13:41

## 2023-03-10 RX ADMIN — POTASSIUM CHLORIDE 10 MEQ: 10 INJECTION, SOLUTION INTRAVENOUS at 09:04

## 2023-03-10 RX ADMIN — SODIUM CHLORIDE 15 MG/HR: 900 INJECTION, SOLUTION INTRAVENOUS at 21:02

## 2023-03-10 RX ADMIN — POTASSIUM CHLORIDE 10 MEQ: 10 INJECTION, SOLUTION INTRAVENOUS at 12:20

## 2023-03-10 RX ADMIN — DORZOLAMIDE HYDROCHLORIDE 1 DROP: 20 SOLUTION/ DROPS OPHTHALMIC at 18:59

## 2023-03-10 RX ADMIN — CASTOR OIL AND BALSAM, PERU: 788; 87 OINTMENT TOPICAL at 09:00

## 2023-03-10 RX ADMIN — CASTOR OIL AND BALSAM, PERU: 788; 87 OINTMENT TOPICAL at 20:48

## 2023-03-10 RX ADMIN — METOPROLOL TARTRATE 12.5 MG: 25 TABLET, FILM COATED ORAL at 22:54

## 2023-03-10 RX ADMIN — ACETYLCYSTEINE 3020 MG: 200 INJECTION, SOLUTION INTRAVENOUS at 11:12

## 2023-03-10 RX ADMIN — ACETYLCYSTEINE 9040 MG: 200 INJECTION, SOLUTION INTRAVENOUS at 10:05

## 2023-03-10 RX ADMIN — POTASSIUM & SODIUM PHOSPHATES POWDER PACK 280-160-250 MG 2 PACKET: 280-160-250 PACK at 11:31

## 2023-03-10 RX ADMIN — Medication 7 UNITS: at 11:25

## 2023-03-10 RX ADMIN — DIBASIC SODIUM PHOSPHATE, MONOBASIC POTASSIUM PHOSPHATE AND MONOBASIC SODIUM PHOSPHATE 2 TABLET: 852; 155; 130 TABLET ORAL at 22:54

## 2023-03-10 RX ADMIN — TIMOLOL MALEATE 1 DROP: 5 SOLUTION OPHTHALMIC at 09:00

## 2023-03-10 RX ADMIN — METOPROLOL SUCCINATE 25 MG: 25 TABLET, EXTENDED RELEASE ORAL at 11:40

## 2023-03-10 RX ADMIN — Medication 1 AMPULE: at 21:00

## 2023-03-10 RX ADMIN — DORZOLAMIDE HYDROCHLORIDE 1 DROP: 20 SOLUTION/ DROPS OPHTHALMIC at 09:00

## 2023-03-10 RX ADMIN — Medication 1 AMPULE: at 09:00

## 2023-03-10 RX ADMIN — LATANOPROST 1 DROP: 50 SOLUTION OPHTHALMIC at 18:40

## 2023-03-10 RX ADMIN — HEPARIN SODIUM 5000 UNITS: 5000 INJECTION INTRAVENOUS; SUBCUTANEOUS at 08:36

## 2023-03-10 RX ADMIN — SODIUM CHLORIDE 15 MG/HR: 900 INJECTION, SOLUTION INTRAVENOUS at 06:48

## 2023-03-10 RX ADMIN — POTASSIUM CHLORIDE 10 MEQ: 10 INJECTION, SOLUTION INTRAVENOUS at 10:17

## 2023-03-10 RX ADMIN — HEPARIN SODIUM 5000 UNITS: 5000 INJECTION INTRAVENOUS; SUBCUTANEOUS at 20:46

## 2023-03-10 NOTE — PROGRESS NOTES
[facility]  [address]  [phone]  [email or web address]         Cardiology Progress Note      3/10/2023 11:25 AM    Admit Date: 3/7/2023    Admit Diagnosis:   Sepsis ([address] [A41.9]    Interval History/Subjective:     Tara Campos is improving. Off viry; remains on milrinone and dilt gtt. Remains in AF. Put out 7L out of urine and 1L drained from CT. BP holding.     Visit Vitals  BP (!) 136/43 (BP 1 Location: Left lower arm, BP Patient Position: At rest)   Pulse 95   Temp 98 °F (36.7 °C)   Resp 16   Ht 5' 3\" (1.6 m)   Wt 60.3 kg (132 lb 15 oz)   SpO2 95%   Breastfeeding No   BMI 23.55 kg/m²       Current Facility-Administered Medications   Medication Dose Route Frequency    PHENYLephrine (VIRY-SYNEPHRINE) 30 mg in 0.9% sodium chloride 250 mL infusion   mcg/min IntraVENous TITRATE    potassium chloride 10 mEq in 100 ml IVPB  10 mEq IntraVENous Q1H    potassium, sodium phosphates (NEUTRA-PHOS) packet 2 Packet  2 Packet Oral Q4H    [START ON 3/11/2023] bumetanide (BUMEX) injection 1 mg  1 mg IntraVENous DAILY    milrinone (PRIMACOR) 20 MG/100 ML D5W infusion  0.25 mcg/kg/min IntraVENous CONTINUOUS    acetylcysteine (ACETADOTE) 3,020 mg in dextrose 5% 500 mL infusion  50 mg/kg IntraVENous ONCE    Followed by    acetylcysteine (ACETADOTE) 6,040 mg in dextrose 5% 1,000 mL infusion  100 mg/kg IntraVENous ONCE    insulin lispro (HUMALOG) injection   SubCUTAneous Q6H    lidocaine 4 % patch 1 Patch  1 Patch TransDERmal Q24H    heparin (porcine) injection 5,000 Units  5,000 Units SubCUTAneous Q12H    alcohol 62% (NOZIN) nasal  1 Ampule  1 Ampule Topical Q12H    balsam peru-castor oiL (VENELEX) ointment   Topical BID    thiamine (B-1) 200 mg in 0.9% sodium chloride 50 mL IVPB  200 mg IntraVENous Q8H    latanoprost (XALATAN) 0.005 % ophthalmic solution 1 Drop  1 Drop Both Eyes QPM    timolol (TIMOPTIC) 0.5 % ophthalmic solution 1 Drop  1 Drop Both Eyes DAILY    dorzolamide (TRUSOPT) 2 % ophthalmic solution 1 Drop  1 Drop Both Eyes BID    prednisoLONE acetate (PRED FORTE) 1 % ophthalmic suspension 1 Drop  1 Drop Left Eye DAILY    glucose chewable tablet 16 g  4 Tablet Oral PRN    glucagon (GLUCAGEN) injection 1 mg  1 mg IntraMUSCular PRN    dextrose 10% infusion 0-250 mL  0-250 mL IntraVENous PRN    dilTIAZem (CARDIZEM) 100 mg in 0.9% sodium chloride (MBP/ADV) 100 mL infusion  0-15 mg/hr IntraVENous TITRATE    sodium chloride (NS) flush 5-10 mL  5-10 mL IntraVENous PRN    dextrose 10 % infusion 125-250 mL  125-250 mL IntraVENous PRN    sodium chloride (NS) flush 5-10 mL  5-10 mL IntraVENous PRN    sodium chloride (NS) flush 5-40 mL  5-40 mL IntraVENous PRN    ondansetron (ZOFRAN) injection 4 mg  4 mg IntraVENous Q6H PRN    senna (SENOKOT) tablet 8.6 mg  1 Tablet Oral DAILY PRN    polyethylene glycol (MIRALAX) packet 17 g  17 g Oral DAILY PRN       Objective:      Physical Exam:  General: no acute distress, elderly appearing  HEENT: No carotid bruits, PEERL, EOM intact. There is slight ptosis of the left eye. Heart:  irreg irreg; normal S1/S2; no murmurs  Respiratory: rales bilaterally. Right sided CT to drainage. Abdomen:   Soft, non-tender, no distention, no masses. + BS. Extremities:  Normal cap refill, no cyanosis, atraumatic. No edema.   Neuro: A&Ox3, speech clear  Skin: Skin color is normal. Non diaphoretic  Vascular: 2+ pulses symmetric in all extremities    Data Review:   Recent Labs     03/10/23  0525 03/09/23  0233 03/08/23  0219   WBC 13.4* 15.3* 16.8*   HGB 10.5* 9.9* 11.2*   HCT 31.7* 30.1* 34.8*    230 232     Recent Labs     03/10/23  0525 03/09/23  0233 03/08/23  1629 03/08/23  0852 03/08/23  0219    131* 131*  --  127*   K 2.6* 3.7 3.4*  --  4.6    99 98  --  95*   CO2 29 24 24  --  20*   * 194* 234*  --  214*   BUN 32* 51* 48*  --  43*   CREA 0.80 1.17* 1.21*  --  1.42*   CA 8.2* 7.8* 7.8*  --  8.7 MG 1.8 1.8 1.9  --  1.7   PHOS 1.9* 2.2*  --   --  4.5   ALB 3.1* 2.9* 3.0*  --  3.7   TBILI 1.4* 1.2* 0.9  --  1.1*   ALT 3,040* >3,500* >3,500*  --  >3,500*   INR 1.4* 1.9* 2.0*   < >  --     < > = values in this interval not displayed. Recent Labs     03/10/23  0525 03/07/23  1904   CPK 66 386*         Intake/Output Summary (Last 24 hours) at 3/10/2023 1125  Last data filed at 3/10/2023 1000  Gross per 24 hour   Intake 1068.08 ml   Output 4370 ml   Net -3301.92 ml        Telemetry: atrial fibrillation controlled vr 90s    Echocardiogram: pending    Radiology Results in the last 24 hours:  US ABD LTD    Result Date: 3/9/2023  1. Gallbladder is distended and contains multiple gallstones. No ductal dilatation    XR CHEST PORT    Result Date: 3/9/2023  Pigtail drainage catheter on the right with diminished right-sided effusion. Persistent left effusion. Assessment:     Active Problems:    Sepsis ([address] (3/7/2023)        Plan:     AF with RVR  In the setting of acute hypoxic resp failure with sepsis and decompensated HF  Remains rate controlled on max dilt gtt 15 mg/hr  Will add Toprol as BP is acceptable. Would like to wean cardizem gtt if able to avoid neg inotropic effect  On heparin SQ q12H     2. Acute decompensated HF  Echo pending; appears low on prelim read  Off neli; remains on lower dose milrinone   Diuresed well since yesterday -- net -4.2L fluid balance  Continue IV Bumex daily; agree with additional dose today if needed  Cath sometime next week to r/o ischemia, once her fluid status has improved     3. NSVT  Brief non-sustained VT noted on telemetry, longest 5 beats  Magnesium replacement  Keep K > 4; Mg > 2     4. Pleural effusions  Right sided CT tube to drain  Persistent left sided effusion noted on CXR  Continue diuresis  Concern for malignancy?  Heme/onc consulted    Chris Hobbs NP

## 2023-03-10 NOTE — PROGRESS NOTES
PICC order acknowledged. VAT asked to assist in obtaining additional access for this patient, either endurance or PICC. Bilateral upper extremities assessed with ultrasound. Left upper extremity cephalic would not compress with pressure and showed white matter inside vessel on ultrasound. Unable to use left upper basilic or left upper brachial vessels due to infiltration at [facility] with is red and edematous 1/2 up upper arm. Right upper extremity assessed, no vessels visualized in lower arm appropriate for endurance catheter. Right upper cephalic vessel would not compress with pressure and also showed white matter in vessel. Right upper brachial and basilic vessels were too small for 4fr catheter, with catheter measuring to occupy >45% of vessel. 22g Endurance extended dwell placed in R basilic vessel with ultrasound. No other vessels visualized for PIV/Endurance/ML/PICC. Patient tolerated procedure well. Call bell in reach, bed in lowest position, side rails up x 3. Primary nurse aware.

## 2023-03-10 NOTE — PROGRESS NOTES
[facility]    ICU TEAM Progress Note    Name: Sadie Larkin   : 1940   MRN: [id number]   Date: 3/10/2023           ICU Assessment & Plan of Care     Sadie Larkin Is a [de-identified] y.o. female with PMH s/f DM2 who is admitted 23 for N/V, lactic acidosis, and concern for new diagnosis heart failure. NEURO  - no acute issue (no e/o encephalopathy)       CARDIAC  #. Acute exacerbation of new diagnosis HFrEF  #. AF RVR, uncontrolled  #. Mildly elevated trop, demand  #. H/o HTN  - Cards following -- greatly appreciate assistance  - TTE read pending, but informally, EF 40% with no sig valvular dz   -- unsure if this explains overwhelming clinical picture  - milrinone in setting of acute decomp in hopes of least ectopy-inducing (vs epi/dobuta)  - will decrease milrinone to 0.25   - now off neli  - dig loaded per Cards 3/8, but now tolerating Cardizem gtt for rate control (hold further digoxin)  - unable to safely use amio in setting of severe acute liver injury        RESPIRATORY  #. Acute hypoxic respiratory failure on NC  #. Pulmonary edema  #. Pulmonary effusions R>L  - BiPAP prn WOB, hypoxia and qhs -- hasn't needed  - R effusion was larger despite diuresis   -- R pigtail placed 3/9 with 750cc out   -- placed on water seal, now with ~150 in <24h  - pleural fluid exudative but question specificity in setting of markedly elevated serum LDH   -- cyto pending  - spoke with Dr. Lisa Linder (Onc) and he will see pt to eval if further Onc workup necessary   -- though rare, Meig's syndrome considered in setting of new (predominantly) R-sided pleural effusion + ascites; have sent CA-125        RENAL  #. OLGA, ?cardiorenal, resolved  #. AGMA, osm gap, LA + ketoacidosis, resolved  #.  Hypervolemic hyponatremia, resolved  - diuresing well with Bumex    -- will slow to 1mg daily with goal net neg 1-2L/24h   -- follow this afternoon for possible second dose in setting of increased IVFs  - standard renal precautions GASTROINTESTINAL  #. Nausea, vomiting; resolved  #. Significant transaminitis, now downtrending  #. New ascites  #. At risk for malnutrition based on acute illness  - GI following -- greatly appreciate assistance  - no cirrhotic morphology on CT, no e/o clot on repeat RUQUS (flows not done on first)  - cardiohepatic / severe passive liver congestion vs chronic APAP tox vs other  - will add NAC now that pt is diuresing and can tolerate additional fluids        HEMATOLOGIC  #. Mild normocytic anemia  - unknown baseline   - no indication for intervention        ID  #. Concern for infection, less likely  - cxs NG  >48h  - CTM off abx        ENDOCRINE  - accuchecks   - SSI        Mobility: Bedrest  PT/OT:  not yet appropriate          DVT Prophylaxis: SCDs; low-dose hep ppx in setting of liver injury  U - Ulcer Prophylaxis: not indicated  G - Glycemic Control: Insulin prn  B - Bowel Regimen: prn    Subjective:   Progress Note: 3/10/2023      Reason for ICU Admission: concern for sepsis     HPI:  per prior clinician  Lamiguel Ti is a [de-identified] y.o. female with PMH s/f T2DM (A1c 7.1, 2018), HTN, chronic pain/arthritis who presented to the ED with N/V, anorexia x 2 days. In ED, workup notable for marked and varied lab abnmls, including LA 11, WBC 17, Na 125, K 5.7, CO2 16, AG 17, Cr 1.8, AST/ALT unreadably high, alkphos 176, Tbili 1.7. Pan-scan without contrast pending per ED physician. Given 2 L IVF in ED and started on antibiotics. ICU consulted for admission. Upon my arrival to bedside, pt awake and alert, /97, HR . Finishing first L of IVF. C/o nausea and no appetite. Will be admitted to the ICU for further management. Arrived to ICU after CT scans. Pt now SOB with RR in the 30s requiring 4 L NC. Bedside ultrasound of lungs and echo done. Lungs with bilateral B lines. Bedside echo significant for dilated LV with reduced systolic function.   Possible wall motion abnormalities but a little hard to determine bc pt's HR still in the 130s. Official Echo ordered for the morning. First troponin only 141 but will resend in 3 hours to r/o NSTEMI. Denies chest pain. 3/8 - able to diurese with lasix 80.   3/9 - pt with recurrent, refractory RVR yesterday afternoon. Metop tried. Switched off dobuta to milrinone. Avoided amio due to liver injury. Cards consulted and dig loaded. Cardizem started overnight; remains in 130s this AM.  3/10 - worsening R pleural effusion despite net neg. R pigtail placed with 750cc out before turning to water seal. Restarted diuresis and now net neg 3L with improvement in renal fxn, BP, and resp status. Home Medications:     Prior to Admission medications    Medication Sig Start Date End Date Taking? Authorizing Provider   hydroCHLOROthiazide (HYDRODIURIL) 25 mg tablet Take 25 mg by mouth daily. Yes Provider, Historical   dorzolamide (TRUSOPT) 2 % ophthalmic solution INSTILL 1 DROP IN BOTH EYES TWICE DAILY 2/27/23  Yes Provider, Historical   atorvastatin (LIPITOR) 10 mg tablet Take 10 mg by mouth nightly. Yes Provider, Historical   timolol (TIMOPTIC) 0.5 % ophthalmic solution Administer 1 Drop to both eyes daily. Yes Provider, Historical   latanoprost (XALATAN) 0.005 % ophthalmic solution Administer 1 Drop to both eyes nightly. Indications: OPEN ANGLE GLAUCOMA   Yes Provider, Historical   prednisoLONE acetate (PRED FORTE) 1 % ophthalmic suspension Administer 1 Drop to left eye daily. Indications: SEVERE OCULAR INFLAMMATION   Yes Provider, Historical   metFORMIN (GLUCOPHAGE) 500 mg tablet Take 1,000 mg by mouth two (2) times daily (with meals). Indications: type 2 diabetes mellitus   Yes Provider, Historical   lisinopril (PRINIVIL, ZESTRIL) 5 mg tablet Take 5 mg by mouth daily. Indications: HYPERTENSION   Yes Provider, Historical   acyclovir (ZOVIRAX) 800 mg tablet Take 800 mg by mouth nightly.  Indications: shingles prevention   Yes Provider, Historical multivitamin with iron tablet Take 1 Tab by mouth daily.    Yes Provider, Historical       Current Meds:     Current Facility-Administered Medications   Medication Dose Route Frequency    heparin (porcine) injection 5,000 Units  5,000 Units SubCUTAneous Q12H    fentaNYL citrate (PF) injection 100 mcg  100 mcg IntraVENous ON CALL    bumetanide (BUMEX) injection 1 mg  1 mg IntraVENous BID    milrinone (PRIMACOR) 20 MG/100 ML D5W infusion  0-0.75 mcg/kg/min IntraVENous TITRATE    alcohol 62% (NOZIN) nasal  1 Ampule  1 Ampule Topical Q12H    balsam peru-castor oiL (VENELEX) ointment   Topical BID    thiamine (B-1) 200 mg in 0.9% sodium chloride 50 mL IVPB  200 mg IntraVENous Q8H    latanoprost (XALATAN) 0.005 % ophthalmic solution 1 Drop  1 Drop Both Eyes QPM    timolol (TIMOPTIC) 0.5 % ophthalmic solution 1 Drop  1 Drop Both Eyes DAILY    dorzolamide (TRUSOPT) 2 % ophthalmic solution 1 Drop  1 Drop Both Eyes BID    prednisoLONE acetate (PRED FORTE) 1 % ophthalmic suspension 1 Drop  1 Drop Left Eye DAILY    insulin lispro (HUMALOG) injection   SubCUTAneous Q6H    glucose chewable tablet 16 g  4 Tablet Oral PRN    glucagon (GLUCAGEN) injection 1 mg  1 mg IntraMUSCular PRN    dextrose 10% infusion 0-250 mL  0-250 mL IntraVENous PRN    PHENYLephrine (VIRY-SYNEPHRINE) 30 mg in 0.9% sodium chloride 250 mL infusion   mcg/min IntraVENous TITRATE    dilTIAZem (CARDIZEM) 100 mg in 0.9% sodium chloride (MBP/ADV) 100 mL infusion  0-15 mg/hr IntraVENous TITRATE    sodium chloride (NS) flush 5-10 mL  5-10 mL IntraVENous PRN    dextrose 10 % infusion 125-250 mL  125-250 mL IntraVENous PRN    sodium chloride (NS) flush 5-10 mL  5-10 mL IntraVENous PRN    sodium chloride (NS) flush 5-40 mL  5-40 mL IntraVENous PRN    ondansetron (ZOFRAN) injection 4 mg  4 mg IntraVENous Q6H PRN    senna (SENOKOT) tablet 8.6 mg  1 Tablet Oral DAILY PRN    polyethylene glycol (MIRALAX) packet 17 g  17 g Oral DAILY PRN       Objective: Vital Signs:  Visit Vitals  BP (!) 136/43 (BP 1 Location: Left lower arm, BP Patient Position: At rest)   Pulse 95   Temp 98 °F (36.7 °C)   Resp 16   Ht 5' 3\" (1.6 m)   Wt 60.3 kg (132 lb 15 oz)   SpO2 95%   Breastfeeding No   BMI 23.55 kg/m²    O2 Flow Rate (L/min): 6 l/min O2 Device: Nasal cannula Temp (24hrs), Av.7 °F (36.5 °C), Min:97.5 °F (36.4 °C), Max:98 °F (36.7 °C)           Intake/Output:     Intake/Output Summary (Last 24 hours) at 3/10/2023 2450  Last data filed at 3/10/2023 0700  Gross per 24 hour   Intake 808.28 ml   Output 3910 ml   Net -3101.72 ml         Physical Exam:  Elderly female, awake, alert, lying in bed, NAD, appearing slightly more active  Persistent L ptosis from remote VZV  Resps even and unlabored, symmetric chest rise on NC, mild rales throughout, clearer R  AF, 90s-100s, no heave/rub  Abd soft, nontender, nondistended  NICOLAS, extremities warm to touch  Calm and cooperative      LABS AND  DATA:   Personally reviewed    MEDS:   Reviewed    Multidisciplinary Rounds Completed: Yes    ABCDEF Bundle/Checklist Completed:  Yes    SPECIAL EQUIPMENT  None    DISPOSITION  Stay in ICU    CRITICAL CARE CONSULTANT NOTE  I had a face to face encounter with the patient, reviewed and interpreted patient data including clinical events, labs, images, vital signs, I/O's, and examined patient. I have discussed the case and the plan and management of the patient's care with the consulting services, the bedside nurses and the respiratory therapist.      NOTE OF PERSONAL INVOLVEMENT IN CARE   This patient has a high probability of imminent, clinically significant deterioration, which requires the highest level of preparedness to intervene urgently. I participated in the decision-making and personally managed or directed the management of the following life and organ supporting interventions that required my frequent assessment to treat or prevent imminent deterioration.     I personally spent 60 minutes of critical care time. This is time spent at this critically ill patient's bedside actively involved in patient care as well as the coordination of care. This does not include any procedural time which has been billed separately.     Susan Arceo MD  Intensivist  3/10/2023

## 2023-03-10 NOTE — CONSULTS
Palliative Medicine  [phone]    Introduced our services to Ms Bertha Granados today- it was a brief visit as I will do a full consult next week.       Will hopefully be able to support her and her family through this admission as her work up continues    Jonnie Doshi, CELIA

## 2023-03-10 NOTE — INTERDISCIPLINARY ROUNDS
Interdisciplinary team rounds were held 3/10/2023 with the following team members:Care Management, Nursing, Nutrition, Pharmacy, Physician, and Clinical Coordinator. Plan of care discussed. See clinical pathway and/or care plan for interventions and desired outcomes. Goals of the Day: Discussed goals and possible reasons for  current disease process. 0361  of patient now entering unit. Did not participate with rounds.

## 2023-03-10 NOTE — CONSULTS
Palliative Medicine Consult  Steven: [phone] ([phone])    Patient Name: Jessica Pelayo  YOB: 1940    Date of Initial Consult: 3/10/23  Date of Today's Visit: 3/13/2023  Reason for Consult: Care Decisions  Requesting Provider: Karen Mosher MD   Primary Care Physician: Indu Herrera MD     SUMMARY:   Jessica Pelayo is a [de-identified] y.o. with a past history of DM2, HTN and chronic pain from arthritis, who was admitted on 3/7/2023 from home with a diagnosis of acute liver failure, CHF, tachycardia and severe sepsis. She was having nausea vomiting and anorexia for 2 days which prompted her visit to the ED, but on arrival she was found to have extremely elevated LFTs, an elevated white count, and lactic acidosis. She was admitted to the ICU and started to deteriorate from a respiratory standpoint. She had a bedside echo that showed dilated LV with reduces systolic function. She was started on dobutamine the next morning, and diuresed to try and help alleviate her pulmonary effusions and ease her work of breathing. Changed to milrinone on 3/9. Also started on dig  as she has liver failure so no amio  Unfortunately despite diuresing her effusions worsened, so she had a thoracentesis with chest tube placement on 3/10. GI and oncology have been consulted for her  liver injury as well as possible malignant effusions- hopefully further work up from them and cardiology next week when she is a little more stable    Current medical issues leading to Palliative Medicine involvement include: goals of care and emotional support in this sweet lady who has received MANY new diagnoses this admission. PALLIATIVE DIAGNOSES:   Goals of care  Physical debility  Weakness  Frailty  Anorexia  Constipation  Palliative Medicine     PLAN:   Met with Mrs Bertha Granados along with her daughter and her  who were at bedside. She is feeling frustrated that all of this is happening to her all at once.   I tried to encourage her with the small improvements that she has made- they may be small but she was [facility] sicker last week than she is today even with some setbacks. She is hopeful she will be able to get her cardiac cath soon, but feeling frustrated that it might never happen. She is also feeling VERY weak and is realistic that she is not going to be at a level of functioning that resembles her independence prior to this admission  Hopefully PT/OT will work with her tomorrow, she isn't sure she can get out of bed, but is willing to try edge of bed which I told her she can ask nursing to do with her as well. Will check in with her periodically for support as this hospitalization progresses  She does report constipation but the primary team is working on this already so I will defer to them  Initial consult note routed to primary continuity provider and/or primary health care team members  Communicated plan of care with: Lucina Olmedo 192 Team     GOALS OF CARE / TREATMENT PREFERENCES:     GOALS OF CARE:  Patient/Health Care Proxy Stated Goals: Rehabilitation    TREATMENT PREFERENCES:   Code Status: Full Code    Advance Care Planning:  [x] [facility] Interdisciplinary Team has updated the ACP Navigator with Shaila and Patient Capacity      Primary Decision MakerShaniquemary lou Castillo Spouse - [phone]    Medical Interventions: Full interventions       Other:    As far as possible, the palliative care team has discussed with patient / health care proxy about goals of care / treatment preferences for patient.      HISTORY:     History obtained from: adilene angulo    CHIEF COMPLAINT: \"I just cant figure out how all this happened to me all at once\"    HPI/SUBJECTIVE:    The patient is:   [x] Verbal and participatory  [] Non-participatory due to:        Clinical Pain Assessment (nonverbal scale for severity on nonverbal patients):   Clinical Pain Assessment  Severity: 0          Duration: for how long has pt been experiencing pain (e.g., 2 days, 1 month, years)  Frequency: how often pain is an issue (e.g., several times per day, once every few days, constant)     FUNCTIONAL ASSESSMENT:     Palliative Performance Scale (PPS):  PPS: 30       PSYCHOSOCIAL/SPIRITUAL SCREENING:     Palliative IDT has assessed this patient for cultural preferences / practices and a referral made as appropriate to needs (Cultural Services, Patient Advocacy, Ethics, etc.)    Any spiritual / Congregation concerns:  [] Yes /  [x] No   If \"Yes\" to discuss with pastoral care during IDT     Does caregiver feel burdened by caring for their loved one:   [] Yes /  [x] No /  [] No Caregiver Present/Available [] No Caregiver [] Pt Lives at [place] [address]  If \"Yes\" to discuss with social work during IDT    Anticipatory grief assessment:   [x] Normal  / [] Maladaptive     If \"Maladaptive\" to discuss with social work during IDT    ESAS Anxiety: Anxiety: 0    ESAS Depression: Depression: 3        REVIEW OF SYSTEMS:     Positive and pertinent negative findings in ROS are noted above in HPI. The following systems were [x] reviewed / [] unable to be reviewed as noted in HPI  Other findings are noted below. Systems: constitutional, ears/nose/mouth/throat, respiratory, gastrointestinal, genitourinary, musculoskeletal, integumentary, neurologic, psychiatric, endocrine. Positive findings noted below. Modified ESAS Completed by: provider   Fatigue: 5     Depression: 3 Pain: 0   Anxiety: 0 Nausea: 0   Anorexia: 4 Dyspnea: 2           Stool Occurrence(s): 1        PHYSICAL EXAM:     From RN flowsheet:  Wt Readings from Last 3 Encounters:   03/13/23 147 lb 11.3 oz (67 kg)   01/15/18 152 lb 1.9 oz (69 kg)   01/08/18 153 lb 7 oz (69.6 kg)     Blood pressure 100/67, pulse (!) 116, temperature 98.2 °F (36.8 °C), resp. rate 18, height 5' 3\" (1.6 m), weight 147 lb 11.3 oz (67 kg), SpO2 92 %, not currently breastfeeding.     Pain Scale 1: Numeric (0 - 10)  Pain Intensity 1: 0              Pain Intervention(s) 1: Repositioned, Relaxation technique  Last bowel movement, if known:     Constitutional: frail, weak appearing, but easy smile and amenable disposition  Eyes: pupils equal, anicteric  ENMT: no nasal discharge, moist mucous membranes  Cardiovascular: regular rhythm, distal pulses intact  Respiratory: breathing not labored, symmetric  Gastrointestinal: soft non-tender, +bowel sounds  Musculoskeletal: no deformity, no tenderness to palpation  Skin: warm, dry  Neurologic: following commands, moving all extremities  Psychiatric: full affect, no hallucinations  Other:       HISTORY:     Active Problems:    Sepsis ([facility] [address] (3/7/2023)    Past Medical History:   Diagnosis Date    Arthritis     Chronic pain     arthritic    Diabetes ([facility] [address]     Hypertension     Ill-defined condition     shingles 1.5 years ago 8/2014    Other ill-defined conditions(799.89)     high cholesterol    Other ill-defined conditions(799.89)     shingles      Past Surgical History:   Procedure Laterality Date    COLONOSCOPY N/A 10/25/2016    COLONOSCOPY performed by Rubia Rodríguez MD at [facility] ENDOSCOPY    HX APPENDECTOMY      HX CATARACT REMOVAL Left     HX HYSTERECTOMY      HX KNEE REPLACEMENT Right     parital    HX KNEE REPLACEMENT Left     HX ORTHOPAEDIC      right hip replacement    HX TONSILLECTOMY      HX TUBAL LIGATION      HX UROLOGICAL      bladder tacking      Family History   Problem Relation Age of Onset    Cancer Father         leukemia    Stroke Sister       History reviewed, no pertinent family history.   Social History     Tobacco Use    Smoking status: Never    Smokeless tobacco: Never   Substance Use Topics    Alcohol use: No     No Known Allergies   Current Facility-Administered Medications   Medication Dose Route Frequency    amiodarone (CORDARONE) 375 mg/250 mL D5W infusion  0.5 mg/min IntraVENous CONTINUOUS    heparin 25,000 units in D5W 250 ml infusion  12-25 Units/kg/hr IntraVENous TITRATE heparin (porcine) 1,000 unit/mL injection 2,000 Units  2,000 Units IntraVENous PRN    Or    heparin (porcine) 1,000 unit/mL injection 4,000 Units  4,000 Units IntraVENous PRN    metoprolol succinate (TOPROL-XL) XL tablet 75 mg  75 mg Oral DAILY    lisinopriL (PRINIVIL, ZESTRIL) tablet 2.5 mg  2.5 mg Oral DAILY    furosemide (LASIX) tablet 20 mg  20 mg Oral BID    glucose chewable tablet 16 g  4 Tablet Oral PRN    glucagon (GLUCAGEN) injection 1 mg  1 mg IntraMUSCular PRN    insulin glargine (LANTUS) injection 20 Units  0.3 Units/kg SubCUTAneous DAILY    insulin lispro (HUMALOG) injection   SubCUTAneous AC&HS    dilTIAZem IR (CARDIZEM) tablet 60 mg  60 mg Oral AC&HS    polyethylene glycol (MIRALAX) packet 17 g  17 g Oral DAILY    senna-docusate (PERICOLACE) 8.6-50 mg per tablet 2 Tablet  2 Tablet Oral DAILY    bisacodyL (DULCOLAX) suppository 10 mg  10 mg Rectal DAILY PRN    dextrose 10% infusion 0-250 mL  0-250 mL IntraVENous PRN    metoprolol (LOPRESSOR) injection 5 mg  5 mg IntraVENous PRN    albuterol-ipratropium (DUO-NEB) 2.5 MG-0.5 MG/3 ML  3 mL Nebulization Q4H PRN    lidocaine 4 % patch 1 Patch  1 Patch TransDERmal Q24H    alcohol 62% (NOZIN) nasal  1 Ampule  1 Ampule Topical Q12H    balsam peru-castor oiL (VENELEX) ointment   Topical BID    latanoprost (XALATAN) 0.005 % ophthalmic solution 1 Drop  1 Drop Both Eyes QPM    timolol (TIMOPTIC) 0.5 % ophthalmic solution 1 Drop  1 Drop Both Eyes DAILY    dorzolamide (TRUSOPT) 2 % ophthalmic solution 1 Drop  1 Drop Both Eyes BID    prednisoLONE acetate (PRED FORTE) 1 % ophthalmic suspension 1 Drop  1 Drop Left Eye DAILY    sodium chloride (NS) flush 5-10 mL  5-10 mL IntraVENous PRN    sodium chloride (NS) flush 5-10 mL  5-10 mL IntraVENous PRN    sodium chloride (NS) flush 5-40 mL  5-40 mL IntraVENous PRN    ondansetron (ZOFRAN) injection 4 mg  4 mg IntraVENous Q6H PRN          LAB AND IMAGING FINDINGS:     Lab Results   Component Value Date/Time    WBC 17.6 (H) 03/13/2023 09:02 AM    HGB 11.5 03/13/2023 09:02 AM    PLATELET 588 56/84/8164 09:02 AM     Lab Results   Component Value Date/Time    Sodium 134 (L) 03/13/2023 04:02 AM    Potassium 4.5 03/13/2023 04:02 AM    Chloride 95 (L) 03/13/2023 04:02 AM    CO2 31 03/13/2023 04:02 AM    BUN 22 (H) 03/13/2023 04:02 AM    Creatinine 0.67 03/13/2023 04:02 AM    Calcium 8.4 (L) 03/13/2023 04:02 AM    Magnesium 2.1 03/13/2023 04:02 AM    Phosphorus 2.6 03/13/2023 04:02 AM      Lab Results   Component Value Date/Time    Alk. phosphatase 142 (H) 03/13/2023 04:02 AM    Protein, total 6.3 (L) 03/13/2023 04:02 AM    Albumin 2.8 (L) 03/13/2023 04:02 AM    Globulin 3.5 03/13/2023 04:02 AM     (H) 03/09/2023 02:33 AM     Lab Results   Component Value Date/Time    INR 1.2 (H) 03/13/2023 04:02 AM    Prothrombin time 12.3 (H) 03/13/2023 04:02 AM    aPTT 21.7 (L) 03/13/2023 09:02 AM      No results found for: IRON, FE, TIBC, IBCT, PSAT, FERR   No results found for: PH, PCO2, PO2  No components found for: [place]   Lab Results   Component Value Date/Time    CK 66 03/10/2023 05:25 AM                Total time:   Counseling / coordination time, spent as noted above:   > 50% counseling / coordination?:     Prolonged service was provided for  []30 min   []75 min in face to face time in the presence of the patient, spent as noted above. Time Start:   Time End:   Note: this can only be billed with 89039 (initial) or 48871 (follow up). If multiple start / stop times, list each separately.

## 2023-03-10 NOTE — CONSULTS
[address] [facility]  [address], [address] [facility] [address] [place], [address] [facility]  [phone]      Hematology/ Oncology Consult Note      Reason for consult:     Ara Cancino is a [de-identified] y.o. female who we have been asked to see by Dr. Mirta Zhu for concern for possible lymphoma in pleural fuid. Subjective:     Ara Cancino is an 82-year-old female patient admitted to [facility] with acute exacerbation of new diagnosis of heart failure, uncontrolled atrial fibrillation, acute hypoxic respiratory failure with pulmonary edema and acute kidney injury. She was admitted to the ICU and underwent chest tube placement for right pleural effusion. Preliminary results are concerning that pleural fluid indicate lymphoma in the pleural fluid. Past medical history includes arthritis, hypertension, type 2 diabetes and chronic pain. Patient was seen at the bedside with her . She reports prior to admission that she is completely independent with all of her ADLs. Denies any recent changes in appetite or unintentional weight loss. We did discuss concern for possible lymphoma and preliminary pleural fluid results. At this time she is still requiring multiple IV medications in the ICU including milrinone and Cardizem. She is also requiring 6 L nasal cannula. We discussed continuing work-up once she is more stable, hopefully early next week. Patient and  verbalized understanding and agreement. Review of Systems:  Pertinent items are noted in the History of Present Illness.        Past Medical History:   Diagnosis Date    Arthritis     Chronic pain     arthritic    Diabetes ([address]     Hypertension     Ill-defined condition     shingles 1.5 years ago 8/2014    Other ill-defined conditions(799.89)     high cholesterol    Other ill-defined conditions(799.89)     shingles     Past Surgical History:   Procedure Laterality Date COLONOSCOPY N/A 10/25/2016    COLONOSCOPY performed by Bandar Villafuerte MD at [facility] ENDOSCOPY    HX APPENDECTOMY      HX CATARACT REMOVAL Left     HX HYSTERECTOMY      HX KNEE REPLACEMENT Right     parital    HX KNEE REPLACEMENT Left     HX ORTHOPAEDIC      right hip replacement    HX TONSILLECTOMY      HX TUBAL LIGATION      HX UROLOGICAL      bladder tacking      Family History   Problem Relation Age of Onset    Cancer Father         leukemia    Stroke Sister      Social History     Tobacco Use    Smoking status: Never    Smokeless tobacco: Never   Substance Use Topics    Alcohol use: No      Current Facility-Administered Medications   Medication Dose Route Frequency Provider Last Rate Last Admin    PHENYLephrine (VIRY-SYNEPHRINE) 30 mg in 0.9% sodium chloride 250 mL infusion   mcg/min IntraVENous TITRATE Mike Guajardo MD   Held at 03/10/23 0900    potassium chloride 10 mEq in 100 ml IVPB  10 mEq IntraVENous Q1H Hima Willard  mL/hr at 03/10/23 1116 10 mEq at 03/10/23 1116    [START ON 3/11/2023] bumetanide (BUMEX) injection 1 mg  1 mg IntraVENous DAILY Hima Willard MD        milrinone (PRIMACOR) 20 MG/100 ML D5W infusion  0.25 mcg/kg/min IntraVENous CONTINUOUS Hima Willard MD 4.8 mL/hr at 03/10/23 1129 0.25 mcg/kg/min at 03/10/23 1129    acetylcysteine (ACETADOTE) 3,020 mg in dextrose 5% 500 mL infusion  50 mg/kg IntraVENous ONCE Hima Willard .8 mL/hr at 03/10/23 1112 3,020 mg at 03/10/23 1112    Followed by    acetylcysteine (ACETADOTE) 6,040 mg in dextrose 5% 1,000 mL infusion  100 mg/kg IntraVENous ONCE Hima Willard MD        insulin lispro (HUMALOG) injection   SubCUTAneous Q6H Hima Willard MD   7 Units at 03/10/23 1125    lidocaine 4 % patch 1 Patch  1 Patch TransDERmal Q24H Mike Guajardo MD   1 Patch at 03/10/23 1042    [START ON 3/11/2023] metoprolol tartrate (LOPRESSOR) tablet 12.5 mg  12.5 mg Oral Q12H Alicia Willard MD        heparin (porcine) injection 5,000 Units  5,000 Units SubCUTAneous Q12H Marbella Henderson MD   5,000 Units at 03/10/23 0836    alcohol 62% (NOZIN) nasal  1 Ampule  1 Ampule Topical Q12H Dania Willard MD   1 Ampule at 03/10/23 0900    balsam peru-castor oiL (VENELEX) ointment   Topical BID Marbella Henderson MD   Given at 03/10/23 0900    thiamine (B-1) 200 mg in 0.9% sodium chloride 50 mL IVPB  200 mg IntraVENous Q8H Dania Willard  mL/hr at 03/10/23 0642 200 mg at 03/10/23 0642    latanoprost (XALATAN) 0.005 % ophthalmic solution 1 Drop  1 Drop Both Eyes QPM Marbella Henderson MD   1 Drop at 03/09/23 1809    timolol (TIMOPTIC) 0.5 % ophthalmic solution 1 Drop  1 Drop Both Eyes DAILY Dania Willard MD   1 Drop at 03/10/23 0900    dorzolamide (TRUSOPT) 2 % ophthalmic solution 1 Drop  1 Drop Both Eyes BID Marbella Henderson MD   1 Drop at 03/10/23 0900    prednisoLONE acetate (PRED FORTE) 1 % ophthalmic suspension 1 Drop  1 Drop Left Eye DAILY Marbella Henderson MD   1 Drop at 03/10/23 0845    glucose chewable tablet 16 g  4 Tablet Oral PRN Sudheer, Dania Shin MD        glucagon (GLUCAGEN) injection 1 mg  1 mg IntraMUSCular PRN Dania Willard MD        dextrose 10% infusion 0-250 mL  0-250 mL IntraVENous PRN Dania Willard MD        dilTIAZem (CARDIZEM) 100 mg in 0.9% sodium chloride (MBP/ADV) 100 mL infusion  0-15 mg/hr IntraVENous TITRATE Marbella Henderson MD 15 mL/hr at 03/10/23 0900 15 mg/hr at 03/10/23 0900    sodium chloride (NS) flush 5-10 mL  5-10 mL IntraVENous PRN Evan Cisneros MD        dextrose 10 % infusion 125-250 mL  125-250 mL IntraVENous PRN Evan Cisneros MD        sodium chloride (NS) flush 5-10 mL  5-10 mL IntraVENous PRN WillardDania hunter MD        sodium chloride (NS) flush 5-40 mL  5-40 mL IntraVENous PRN Dania Willard MD        ondansetron (ZOFRAN) injection 4 mg  4 mg IntraVENous Q6H PRN Marbella Henderson MD   4 mg at 03/07/23 1936    senna (SENOKOT) tablet 8.6 mg  1 Tablet Oral DAILY PRN Marbella Henderson MD polyethylene glycol (MIRALAX) packet 17 g  17 g Oral DAILY PRN Cosme Coronado MD            No Known Allergies       Objective:     Patient Vitals for the past 8 hrs:   BP Temp Pulse Resp SpO2 Weight   03/10/23 1200 (!) 144/66 98 °F (36.7 °C) (!) 104 20 94 % --   03/10/23 1130 135/65 -- (!) 108 21 94 % --   03/10/23 1100 130/61 -- 87 18 94 % --   03/10/23 1030 132/64 -- (!) 106 15 93 % --   03/10/23 1000 129/86 -- 92 25 94 % --   03/10/23 0930 (!) 137/91 -- 100 15 96 % --   03/10/23 0900 133/63 -- 89 25 95 % --   03/10/23 0800 (!) 136/43 98 °F (36.7 °C) 95 16 95 % --   03/10/23 0730 -- -- -- -- -- 132 lb 15 oz (60.3 kg)   03/10/23 0700 116/64 -- 89 16 96 % --   03/10/23 0645 120/64 -- (!) 102 16 96 % --   03/10/23 0630 (!) 119/47 -- 90 21 91 % --   03/10/23 0615 (!) 126/49 -- 96 20 96 % --   03/10/23 0600 (!) 123/56 -- 95 25 94 % --   03/10/23 0545 (!) 116/56 -- 84 18 96 % --   03/10/23 0530 (!) 127/51 -- (!) 102 15 94 % --   03/10/23 0515 (!) 118/56 -- 90 20 94 % --   03/10/23 0500 117/63 -- 90 15 93 % --   03/10/23 0445 130/61 -- 90 18 95 % --   03/10/23 0430 (!) 131/58 -- 92 20 94 % --       Lab Results   Component Value Date/Time    WBC 13.4 (H) 03/10/2023 05:25 AM    Hemoglobin (POC) 13.6 01/15/2018 07:02 AM    HGB 10.5 (L) 03/10/2023 05:25 AM    Hematocrit (POC) 40 01/15/2018 07:02 AM    HCT 31.7 (L) 03/10/2023 05:25 AM    PLATELET 606 35/07/8968 05:25 AM    MCV 84.3 03/10/2023 05:25 AM       Physical Exam:   Visit Vitals  BP (!) 144/66   Pulse (!) 104   Temp 98 °F (36.7 °C)   Resp 20   Ht 5' 3\" (1.6 m)   Wt 132 lb 15 oz (60.3 kg)   SpO2 94%   Breastfeeding No   BMI 23.55 kg/m²     General appearance: alert, cooperative, no distress, appears stated age, moderately obese  Abdomen: soft, non-tender.  Bowel sounds normal. No masses,  no organomegaly  Skin: Skin color, texture, turgor normal. No rashes or lesions  Neurologic: Grossly normal    Assessment:     Sepsis  Acute hypoxic respiratory failure  Pulmonary edema with chest tube insertion  New diagnosis of heart failure  A-fib with RVR  OLGA  Managed by primary ICU team and cardiology  Currently on milrinone and Cardizem drips  On 6 L nasal cannula  Has chest tube, draining    Concern for lymphoma in pleural fluid  Preliminary pleural fluid results with exudative, markedly elevated serum LDH  Cytology pending    CT chest/abdomen/pelvis on 3/7  1. Congestive heart failure, with large right and moderate left pleural  effusions, bibasilar atelectasis, and pulmonary edema. 2. Multiple small pulmonary nodules. These may be a component of pulmonary edema, recommend follow-up chest CT after acute issues are resolved. 3. Prominent distended gallbladder, without other findings of acute  cholecystitis. Plan:     > Await final pleural fluid results and cytology  > Multiple medical issues requiring ICU level of care at this time, will pursue work-up pending patient's stability. Hopefully early next week. Discussed with Dr. Mariann Cosby    Thank you for allowing us to participate in the care of Mrs. Bertha Granados. We will see as needed over the weekend.     Ariel Jj NP

## 2023-03-10 NOTE — PROGRESS NOTES
Comprehensive Nutrition Assessment    Type and Reason for Visit: Reassess    Nutrition Recommendations/Plan:   RD to advance diet to Diabetic/Low Na  Discontinue fluid restriction per discussion with Intensivist  RD to add Ensure High Protein TID  Please document % meals and supplements consumed in flowsheet I/O's under intake      Malnutrition Assessment:  Malnutrition Status: At risk for malnutrition (specify) (03/08/23 7075)    Context:  Acute illness     Findings of the 6 clinical characteristics of malnutrition:   Energy Intake:  Mild decrease in energy intake (specify) (only admits to poor appetite x 48h PTA due to n/v)  Weight Loss:  No significant weight loss (per pt report)     Body Fat Loss:  No significant body fat loss,     Muscle Mass Loss:  No significant muscle mass loss,    Fluid Accumulation:   ,     Strength:  Not performed     Nutrition Assessment:  Chart reviewed, case discussed during CCU rounds. Pt sitting up in bed awake and alert. Oncology consulted, concerns for potential malignancy (plans for workup next week). GI is also following for liver. Pt only ate fruit from her tray and couple of bites of her meal (chicken, noodles and carrots). Provided menu and showed her alternative selections should she be interested. She is open to trying ensure high protein (chocolate). Pt is off of pressors. Renal function improving. K 2.6, phos 1.9, being repleted. Mag 1.8, also being repleted. Continue to encourage PO intake. Patient Vitals for the past 168 hrs:   % Diet Eaten   03/10/23 0800 76 - 100%          Nutrition Related Findings:    Meds: bumex, thiamine, neutraphos, humalog, MagSO4, KCl, Kphos, acetylcystine. BM PTA Wound Type: None    Current Nutrition Intake & Therapies:  Average Meal Intake: 26-50%  Average Supplement Intake: None ordered  ADULT ORAL NUTRITION SUPPLEMENT Breakfast, Lunch, Dinner; Low Calorie/High Protein  ADULT DIET Regular; 3 carb choices (45 gm/meal);  Low Sodium (2 gm); please only send chocolate ensure high protein    Anthropometric Measures:  Height: 5' 3\" (160 cm)  Ideal Body Weight (IBW): 115 lbs (52 kg)     Current Body Wt:  60.3 kg (132 lb 15 oz), 124.2 % IBW. Bed scale  Current BMI (kg/m2): 23.6                          BMI Category: Normal weight (BMI 22.0-24.9) age over 72    Estimated Daily Nutrient Needs:  Energy Requirements Based On: Formula  Weight Used for Energy Requirements: Current  Energy (kcal/day):    Weight Used for Protein Requirements: Current  Protein (g/day): 65-78g (1-1.2gPro/kg)  Method Used for Fluid Requirements: Other (comment)  Fluid (ml/day): per MD    Nutrition Diagnosis:   Inadequate protein-energy intake related to altered GI function, catabolic illness as evidenced by NPO or clear liquid status due to medical condition  Previous dx continues.      Nutrition Interventions:   Food and/or Nutrient Delivery: Modify current diet, Start oral nutrition supplement  Nutrition Education/Counseling: No recommendations at this time  Coordination of Nutrition Care: Continue to monitor while inpatient, Interdisciplinary rounds       Goals:  Previous Goal Met: Goal(s) achieved  Goals: PO intake 50% or greater, by next RD assessment       Nutrition Monitoring and Evaluation:   Behavioral-Environmental Outcomes: None identified  Food/Nutrient Intake Outcomes: Food and nutrient intake, Supplement intake  Physical Signs/Symptoms Outcomes: Biochemical data, Nutrition focused physical findings, Skin, Weight, GI status, Fluid status or edema, Hemodynamic status    Discharge Planning:    Continue current diet, Continue oral nutrition supplement    Sean Muñoz RD, [facility]  Contact: ext [phone]

## 2023-03-10 NOTE — PROGRESS NOTES
0700: Bedside shift report received from CARYΑΝSAIRA [facility] (offgoing nurse). 0800: Shift assessment completed. Pt is A & O x 4. Pt follows commands. Pt moves all extremities purposefully. Pt opens eyes spontaneously. Pt can focus and track with eyes. Pt is in A-Fib on the monitor. Pt's lung sounds are diminished bilaterally. Pt is on O2 6 LPM via NC. Pt's bowel sounds are active. Pt has a Casiano that is patent and draining. UOP adequate. Pt has a right sided chest tube that is patent and draining. Diltiazem infusing @ 15 mL/hr. Milrinone infusing @ 0.25 mcg/kg/min. 1200: Shift reassessment completed, no changes to previous assessment. Pt remains on O2 6 LPM via NC. Diltiazem infusing @ 15 mL/hr. Milrinone infusing @ 0.25 mcg/kg/min. 1539: Pt felt like PIV Left AC was leaking, I checked and it was leaking. Site was also red and appeared swollen. I tried to flush it but pt said it was painful. IV removed and Vascular access team called to put in an endurance catheter. 1550: Vascular access team at the bedside, endurance catheter placed. 1600: Shift reassessment completed, no changes to previous assessment. Pt remains on O2 6 LPM via NC. Diltiazem infusing @ 15 mL/hr. Milrinone infusing @ 0.25 mcg/kg/min. 1900: Bedside shift report given to Leia Pabon RN (oncoming nurse).

## 2023-03-10 NOTE — PROGRESS NOTES
Progress Note  Date:3/10/2023       Room:[address] [place] [address]  Patient Name:Emerita Granados     YOB: 1940     Age:82 y.o. Subjective    Subjective:  Symptoms:  Stable. Diet:  No nausea or vomiting. Pain:  She reports no pain. Review of Systems   Constitutional:  Negative for appetite change, fatigue and fever. Gastrointestinal:  Negative for abdominal pain, blood in stool, nausea and vomiting. Skin:  Negative for pallor. Objective         Vitals Last 24 Hours:  TEMPERATURE:  Temp  Av.8 °F (36.6 °C)  Min: 97.5 °F (36.4 °C)  Max: 98 °F (36.7 °C)  RESPIRATIONS RANGE: Resp  Av.1  Min: 12  Max: 27  PULSE OXIMETRY RANGE: SpO2  Av.3 %  Min: 85 %  Max: 99 %  PULSE RANGE: Pulse  Av.4  Min: 71  Max: 108  BLOOD PRESSURE RANGE: Systolic (67PPY), DAZ:449 , Min:103 , EQF:088   ; Diastolic (16ITV), NGY:47, Min:43, Max:91    I/O (24Hr): Intake/Output Summary (Last 24 hours) at 3/10/2023 1229  Last data filed at 3/10/2023 1200  Gross per 24 hour   Intake 1068.08 ml   Output 4265 ml   Net -3196.92 ml     Objective:  General Appearance:  Comfortable and not in pain (Elderly, chronically ill). Vital signs: (most recent): Blood pressure (!) 144/66, pulse (!) 104, temperature 98 °F (36.7 °C), resp. rate 20, height 5' 3\" (1.6 m), weight 60.3 kg (132 lb 15 oz), SpO2 94 %, not currently breastfeeding. No fever. HEENT: Normal HEENT exam.    Lungs:  Normal effort and normal respiratory rate. Breath sounds clear to auscultation. Heart: Tachycardia. Irregular rhythm. S1 normal and S2 normal.    Abdomen: Abdomen is soft and non-distended. Bowel sounds are normal.   There is no abdominal tenderness. Extremities: Normal range of motion. There is no dependent edema. Pulses: Distal pulses are intact. Neurological: Patient is alert and oriented to person, place and time. Pupils:  Pupils are equal, round, and reactive to light. Skin:  Warm and dry. Labs/Imaging/Diagnostics    Labs:  CBC:  Recent Labs     03/10/23  0525 03/09/23  0233 03/08/23  0219   WBC 13.4* 15.3* 16.8*   RBC 3.76* 3.57* 4.01   HGB 10.5* 9.9* 11.2*   HCT 31.7* 30.1* 34.8*   MCV 84.3 84.3 86.8   RDW 15.9* 15.7* 15.9*    230 232     CHEMISTRIES:  Recent Labs     03/10/23  0525 03/09/23  0233 03/08/23  1629 03/08/23  0219    131* 131* 127*   K 2.6* 3.7 3.4* 4.6    99 98 95*   CO2 29 24 24 20*   BUN 32* 51* 48* 43*   CA 8.2* 7.8* 7.8* 8.7   PHOS 1.9* 2.2*  --  4.5   MG 1.8 1.8 1.9 1.7   PT/INR:  Recent Labs     03/10/23  0525 03/09/23  0233 03/08/23  1629   INR 1.4* 1.9* 2.0*     APTT:  Recent Labs     03/10/23  0525 03/09/23  0233 03/08/23  0852   APTT 20.7* 25.7 22.8     LIVER PROFILE:  Recent Labs     03/10/23  0525 03/09/23  0233 03/08/23  1629   AST 1,251* >2,000* >2,000*   ALT 3,040* >3,500* >3,500*     Lab Results   Component Value Date/Time    ALT (SGPT) 3,040 (H) 03/10/2023 05:25 AM    AST (SGOT) 1,251 (H) 03/10/2023 05:25 AM     (H) 03/09/2023 02:33 AM    Alk. phosphatase 155 (H) 03/10/2023 05:25 AM    Bilirubin, direct 0.6 (H) 03/10/2023 05:25 AM    Bilirubin, total 1.4 (H) 03/10/2023 05:25 AM       Imaging Last 24 Hours:  US ABD LTD    Result Date: 3/9/2023  ULTRASOUND OF THE RIGHT UPPER QUADRANT INDICATION: abnormal hepatic function tests; abnormal hepatic function test COMPARISON: Prior day TECHNIQUE: Sonography of the right upper quadrant was performed. FINDINGS: LIVER: Normal echogenicity. No focal hepatic mass or intrahepatic biliary dilatation is shown. Portal vein is patent with appropriate direction of flow GALLBLADDER: Distended containing multiple gallstones. No wall thickening COMMON DUCT: 0.4 cm in diameter. The duct is normal caliber. PANCREAS: The visualized portions of the pancreas are normal. RIGHT KIDNEY: 11.8 cm in length. No hydronephrosis, shadowing calculus, or contour-deforming renal mass.      1. Gallbladder is distended and contains multiple gallstones. No ductal dilatation    XR CHEST PORT    Result Date: 3/9/2023  Clinical history: pigtail INDICATION:   pigtail COMPARISON: 3/9/2023 FINDINGS: AP portable upright view of the chest demonstrates a stable  cardiopericardial silhouette. Interval right-sided pigtail drainage catheter. Pleural effusion on the right is improved. Left sided effusion is not changed. .There is no focal consolidation. .There is no pneumothorax. . Patient is on a cardiac monitor. Pigtail drainage catheter on the right with diminished right-sided effusion. Persistent left effusion. Assessment//Plan   Active Problems:    Sepsis ([address] (3/7/2023)      Assessment:   (GI consultation for elevated LFTs. [de-identified] y/o female with abrupt onset of malaise about 6 days ago. Went to PCP 4 days ago and was told to come to the ER for abnormal labs. AST and ALT >2000 and >3500 respectively, TB 1.2 and . Negative hepatitis panel. In acute HF which is new. No GI complaints. History of zoster affecting optic nerve, on chronic acyclovir for many years. Impression:  Elevated LFTs  Acute HF    3/10/2023:  at bedside. LFTs down slightly. Off pressors. Doppler ultrasound yesterday without liver thrombosis. NAC started. Serologies pending. MELD 30 at admission, 11 today. INR down. No GI complaints. ). Plan:    (- Elevated LFTs likely due to shock liver due to heart issues. Serologies pending. NAC started. LFTs trending down and INR better. MELD 30 at admission, 11 today. - Follow MELD labs and monitor for encephalopathy).      Electronically signed by Severiano Civatte, NP on 3/10/2023 at 12:29 PM

## 2023-03-10 NOTE — PROGRESS NOTES
1900-Bedside shift change report given to Morro Thomas (oncoming nurse) by Perla Foreman RN (offgoing nurse). Report included the following information SBAR, Kardex, ED Summary, Intake/Output, MAR, and Recent Results. 2000-initial assessment complete, see flowsheet. 2345-Rangel NP notified of pt having 5 beats of Vtach, no new orders at this time, will continue to monitor. 0000-reassessment complete, see flowsheet. Pt's MAP at 80 titrated Levophed to 0, see MAR, will continue to monitor. 0215-pt complaining of 6/10 pain at chest tube site, discussed with Taj Rahman NP, verbal order with readback for one time dose of Fentanyl 25 mcg, see MAR.    0400-reassessment complete, see flowsheet. 0700-Bedside shift change report given to Carmelo Holden RN (oncoming nurse) by Alyssa Vick RN (offgoing nurse). Report included the following information SBAR, Kardex, ED Summary, Intake/Output, MAR, and Recent Results.

## 2023-03-11 LAB
ALBUMIN SERPL-MCNC: 2.7 G/DL (ref 3.5–5)
ALBUMIN/GLOB SERPL: 0.8 (ref 1.1–2.2)
ALP SERPL-CCNC: 129 U/L (ref 45–117)
ALT SERPL-CCNC: 2104 U/L (ref 12–78)
ANION GAP SERPL CALC-SCNC: 6 MMOL/L (ref 5–15)
APTT PPP: 22.2 SEC (ref 22.1–31)
AST SERPL-CCNC: 475 U/L (ref 15–37)
BILIRUB DIRECT SERPL-MCNC: 0.6 MG/DL (ref 0–0.2)
BILIRUB SERPL-MCNC: 1.3 MG/DL (ref 0.2–1)
BUN SERPL-MCNC: 17 MG/DL (ref 6–20)
BUN/CREAT SERPL: 25 (ref 12–20)
CALCIUM SERPL-MCNC: 7.5 MG/DL (ref 8.5–10.1)
CHLORIDE SERPL-SCNC: 95 MMOL/L (ref 97–108)
CO2 SERPL-SCNC: 32 MMOL/L (ref 21–32)
CREAT SERPL-MCNC: 0.68 MG/DL (ref 0.55–1.02)
ERYTHROCYTE [DISTWIDTH] IN BLOOD BY AUTOMATED COUNT: 16.2 % (ref 11.5–14.5)
GLOBULIN SER CALC-MCNC: 3.2 G/DL (ref 2–4)
GLUCOSE BLD STRIP.AUTO-MCNC: 246 MG/DL (ref 65–117)
GLUCOSE BLD STRIP.AUTO-MCNC: 270 MG/DL (ref 65–117)
GLUCOSE BLD STRIP.AUTO-MCNC: 299 MG/DL (ref 65–117)
GLUCOSE BLD STRIP.AUTO-MCNC: 319 MG/DL (ref 65–117)
GLUCOSE SERPL-MCNC: 273 MG/DL (ref 65–100)
HCT VFR BLD AUTO: 30.4 % (ref 35–47)
HGB BLD-MCNC: 9.9 G/DL (ref 11.5–16)
INR PPP: 1.3 (ref 0.9–1.1)
MAGNESIUM SERPL-MCNC: 1.5 MG/DL (ref 1.6–2.4)
MCH RBC QN AUTO: 27.7 PG (ref 26–34)
MCHC RBC AUTO-ENTMCNC: 32.6 G/DL (ref 30–36.5)
MCV RBC AUTO: 84.9 FL (ref 80–99)
NRBC # BLD: 0.04 K/UL (ref 0–0.01)
NRBC BLD-RTO: 0.3 PER 100 WBC
PHOSPHATE SERPL-MCNC: 2.2 MG/DL (ref 2.6–4.7)
PLATELET # BLD AUTO: 238 K/UL (ref 150–400)
PMV BLD AUTO: 10.2 FL (ref 8.9–12.9)
POTASSIUM SERPL-SCNC: 3.5 MMOL/L (ref 3.5–5.1)
PROT SERPL-MCNC: 5.9 G/DL (ref 6.4–8.2)
PROTHROMBIN TIME: 13.3 SEC (ref 9–11.1)
RBC # BLD AUTO: 3.58 M/UL (ref 3.8–5.2)
SERVICE CMNT-IMP: ABNORMAL
SODIUM SERPL-SCNC: 133 MMOL/L (ref 136–145)
THERAPEUTIC RANGE,PTTT: NORMAL SECS (ref 58–77)
WBC # BLD AUTO: 14.8 K/UL (ref 3.6–11)

## 2023-03-11 PROCEDURE — 82962 GLUCOSE BLOOD TEST: CPT

## 2023-03-11 PROCEDURE — 65610000006 HC RM INTENSIVE CARE

## 2023-03-11 PROCEDURE — 74011636637 HC RX REV CODE- 636/637: Performed by: INTERNAL MEDICINE

## 2023-03-11 PROCEDURE — 74011000250 HC RX REV CODE- 250: Performed by: INTERNAL MEDICINE

## 2023-03-11 PROCEDURE — 74011250637 HC RX REV CODE- 250/637: Performed by: NURSE PRACTITIONER

## 2023-03-11 PROCEDURE — 36415 COLL VENOUS BLD VENIPUNCTURE: CPT

## 2023-03-11 PROCEDURE — 74011000258 HC RX REV CODE- 258: Performed by: INTERNAL MEDICINE

## 2023-03-11 PROCEDURE — 74011250636 HC RX REV CODE- 250/636: Performed by: INTERNAL MEDICINE

## 2023-03-11 PROCEDURE — 84100 ASSAY OF PHOSPHORUS: CPT

## 2023-03-11 PROCEDURE — 85730 THROMBOPLASTIN TIME PARTIAL: CPT

## 2023-03-11 PROCEDURE — 80076 HEPATIC FUNCTION PANEL: CPT

## 2023-03-11 PROCEDURE — 85027 COMPLETE CBC AUTOMATED: CPT

## 2023-03-11 PROCEDURE — 83735 ASSAY OF MAGNESIUM: CPT

## 2023-03-11 PROCEDURE — 77010033711 HC HIGH FLOW OXYGEN

## 2023-03-11 PROCEDURE — 85610 PROTHROMBIN TIME: CPT

## 2023-03-11 PROCEDURE — 80048 BASIC METABOLIC PNL TOTAL CA: CPT

## 2023-03-11 PROCEDURE — 74011250637 HC RX REV CODE- 250/637: Performed by: INTERNAL MEDICINE

## 2023-03-11 RX ORDER — BUMETANIDE 0.25 MG/ML
1 INJECTION INTRAMUSCULAR; INTRAVENOUS 2 TIMES DAILY
Status: DISCONTINUED | OUTPATIENT
Start: 2023-03-11 | End: 2023-03-13

## 2023-03-11 RX ORDER — MAGNESIUM SULFATE HEPTAHYDRATE 40 MG/ML
2 INJECTION, SOLUTION INTRAVENOUS ONCE
Status: COMPLETED | OUTPATIENT
Start: 2023-03-11 | End: 2023-03-11

## 2023-03-11 RX ORDER — POTASSIUM CHLORIDE 7.45 MG/ML
10 INJECTION INTRAVENOUS
Status: COMPLETED | OUTPATIENT
Start: 2023-03-11 | End: 2023-03-11

## 2023-03-11 RX ADMIN — POTASSIUM CHLORIDE 10 MEQ: 7.46 INJECTION, SOLUTION INTRAVENOUS at 06:18

## 2023-03-11 RX ADMIN — LATANOPROST 1 DROP: 50 SOLUTION OPHTHALMIC at 17:47

## 2023-03-11 RX ADMIN — BUMETANIDE 1 MG: 0.25 INJECTION INTRAMUSCULAR; INTRAVENOUS at 08:56

## 2023-03-11 RX ADMIN — CASTOR OIL AND BALSAM, PERU: 788; 87 OINTMENT TOPICAL at 20:43

## 2023-03-11 RX ADMIN — HEPARIN SODIUM 5000 UNITS: 5000 INJECTION INTRAVENOUS; SUBCUTANEOUS at 20:42

## 2023-03-11 RX ADMIN — DORZOLAMIDE HYDROCHLORIDE 1 DROP: 20 SOLUTION/ DROPS OPHTHALMIC at 17:45

## 2023-03-11 RX ADMIN — Medication 1 AMPULE: at 08:55

## 2023-03-11 RX ADMIN — POTASSIUM CHLORIDE 10 MEQ: 7.46 INJECTION, SOLUTION INTRAVENOUS at 03:06

## 2023-03-11 RX ADMIN — Medication 7 UNITS: at 00:22

## 2023-03-11 RX ADMIN — TIMOLOL MALEATE 1 DROP: 5 SOLUTION OPHTHALMIC at 09:33

## 2023-03-11 RX ADMIN — DORZOLAMIDE HYDROCHLORIDE 1 DROP: 20 SOLUTION/ DROPS OPHTHALMIC at 09:29

## 2023-03-11 RX ADMIN — CASTOR OIL AND BALSAM, PERU: 788; 87 OINTMENT TOPICAL at 08:56

## 2023-03-11 RX ADMIN — Medication 1 AMPULE: at 20:42

## 2023-03-11 RX ADMIN — POTASSIUM BICARBONATE 50 MEQ: 391 TABLET, EFFERVESCENT ORAL at 03:06

## 2023-03-11 RX ADMIN — MAGNESIUM SULFATE HEPTAHYDRATE 2 G: 40 INJECTION, SOLUTION INTRAVENOUS at 09:35

## 2023-03-11 RX ADMIN — POTASSIUM CHLORIDE 10 MEQ: 7.46 INJECTION, SOLUTION INTRAVENOUS at 05:10

## 2023-03-11 RX ADMIN — METOPROLOL TARTRATE 12.5 MG: 25 TABLET, FILM COATED ORAL at 09:03

## 2023-03-11 RX ADMIN — THIAMINE HYDROCHLORIDE 200 MG: 100 INJECTION, SOLUTION INTRAMUSCULAR; INTRAVENOUS at 06:21

## 2023-03-11 RX ADMIN — ONDANSETRON 4 MG: 2 INJECTION INTRAMUSCULAR; INTRAVENOUS at 02:12

## 2023-03-11 RX ADMIN — BUMETANIDE 1 MG: 0.25 INJECTION INTRAMUSCULAR; INTRAVENOUS at 17:37

## 2023-03-11 RX ADMIN — METOPROLOL TARTRATE 12.5 MG: 25 TABLET, FILM COATED ORAL at 20:42

## 2023-03-11 RX ADMIN — Medication 5 UNITS: at 06:31

## 2023-03-11 RX ADMIN — SODIUM PHOSPHATE, MONOBASIC, MONOHYDRATE AND SODIUM PHOSPHATE, DIBASIC, ANHYDROUS: 276; 142 INJECTION, SOLUTION INTRAVENOUS at 11:44

## 2023-03-11 RX ADMIN — POTASSIUM CHLORIDE 10 MEQ: 7.46 INJECTION, SOLUTION INTRAVENOUS at 04:08

## 2023-03-11 RX ADMIN — SODIUM CHLORIDE 15 MG/HR: 900 INJECTION, SOLUTION INTRAVENOUS at 04:08

## 2023-03-11 RX ADMIN — HEPARIN SODIUM 5000 UNITS: 5000 INJECTION INTRAVENOUS; SUBCUTANEOUS at 09:01

## 2023-03-11 RX ADMIN — Medication 3 UNITS: at 17:51

## 2023-03-11 RX ADMIN — PREDNISOLONE ACETATE 1 DROP: 10 SUSPENSION/ DROPS OPHTHALMIC at 09:32

## 2023-03-11 RX ADMIN — Medication 5 UNITS: at 13:03

## 2023-03-11 NOTE — PROGRESS NOTES
[facility]  [address]  [address]  [phone]  [email or web address]         Cardiology Progress Note      3/11/2023 1530 PM     Admit Date: 3/7/2023    Admit Diagnosis:   Sepsis ([address] [A41.9]    Interval History/Subjective:     Mervat Luke is off all gtts, reports \"I feel so much better than I did, I can't believe how sudden it came on\". Afib, rate controlled.    VSS  Cr normalized 0.68  Liver enzymes trending down       Visit Vitals  BP (!) 118/92   Pulse (!) 102   Temp 98.3 °F (36.8 °C)   Resp 21   Ht 5' 3\" (1.6 m)   Wt 60.3 kg (132 lb 15 oz)   SpO2 95%   Breastfeeding No   BMI 23.55 kg/m²       Current Facility-Administered Medications   Medication Dose Route Frequency    bumetanide (BUMEX) injection 1 mg  1 mg IntraVENous BID    insulin lispro (HUMALOG) injection   SubCUTAneous Q6H    lidocaine 4 % patch 1 Patch  1 Patch TransDERmal Q24H    metoprolol tartrate (LOPRESSOR) tablet 12.5 mg  12.5 mg Oral Q12H    heparin (porcine) injection 5,000 Units  5,000 Units SubCUTAneous Q12H    alcohol 62% (NOZIN) nasal  1 Ampule  1 Ampule Topical Q12H    balsam peru-castor oiL (VENELEX) ointment   Topical BID    latanoprost (XALATAN) 0.005 % ophthalmic solution 1 Drop  1 Drop Both Eyes QPM    timolol (TIMOPTIC) 0.5 % ophthalmic solution 1 Drop  1 Drop Both Eyes DAILY    dorzolamide (TRUSOPT) 2 % ophthalmic solution 1 Drop  1 Drop Both Eyes BID    prednisoLONE acetate (PRED FORTE) 1 % ophthalmic suspension 1 Drop  1 Drop Left Eye DAILY    glucose chewable tablet 16 g  4 Tablet Oral PRN    glucagon (GLUCAGEN) injection 1 mg  1 mg IntraMUSCular PRN    dextrose 10% infusion 0-250 mL  0-250 mL IntraVENous PRN    sodium chloride (NS) flush 5-10 mL  5-10 mL IntraVENous PRN    dextrose 10 % infusion 125-250 mL  125-250 mL IntraVENous PRN    sodium chloride (NS) flush 5-10 mL  5-10 mL IntraVENous PRN    sodium chloride (NS) flush 5-40 mL  5-40 mL IntraVENous PRN    ondansetron (ZOFRAN) injection 4 mg  4 mg IntraVENous Q6H PRN    senna (SENOKOT) tablet 8.6 mg  1 Tablet Oral DAILY PRN    polyethylene glycol (MIRALAX) packet 17 g  17 g Oral DAILY PRN       Objective:      Physical Exam:  General: no acute distress, elderly appearing, AxO  HEENT: No carotid bruits, PEERL, EOM intact. There is slight ptosis of the left eye. Heart:  irreg irreg; normal S1/S2; no murmurs  Respiratory: rales bilaterally. Right sided CT to drainage. Abdomen:   Soft, non-tender, no distention, no masses. + BS. Extremities:  Normal cap refill, no cyanosis, atraumatic. No edema. Neuro: A&Ox3, speech clear  Skin: Skin color is normal. Non diaphoretic  Vascular: 2+ pulses symmetric in all extremities    Data Review:   Recent Labs     03/11/23  0741 03/10/23  0525 03/09/23  0233   WBC 14.8* 13.4* 15.3*   HGB 9.9* 10.5* 9.9*   HCT 30.4* 31.7* 30.1*    236 230     Recent Labs     03/11/23  0741 03/10/23  2218 03/10/23  1412 03/10/23  0525 03/09/23  0233   * 136 135* 139 131*   K 3.5 3.1* 3.8 2.6* 3.7   CL 95* 96* 95* 102 99   CO2 32 34* 32 29 24   * 362* 366* 198* 194*   BUN 17 21* 28* 32* 51*   CREA 0.68 0.79 0.90 0.80 1.17*   CA 7.5* 7.3* 7.7* 8.2* 7.8*   MG 1.5* 1.6 2.2 1.8 1.8   PHOS 2.2* 3.0 1.5* 1.9* 2.2*   ALB 2.7*  --   --  3.1* 2.9*   TBILI 1.3*  --   --  1.4* 1.2*   ALT 2,104*  --   --  3,040* >3,500*   INR 1.3*  --   --  1.4* 1.9*       Recent Labs     03/10/23  0525   CPK 66         Intake/Output Summary (Last 24 hours) at 3/11/2023 1623  Last data filed at 3/11/2023 1600  Gross per 24 hour   Intake 2698.21 ml   Output 3010 ml   Net -311.79 ml        Telemetry: atrial fibrillation controlled vr 90s    Echocardiogram: pending    Radiology Results in the last 24 hours:  No results found.         Assessment:     Active Problems:    Sepsis ([address] (3/7/2023)      Plan:     AF with RVR  Rate now controlled on PO BB   In the setting of acute hypoxic resp failure with sepsis and decompensated HF  Continue heparin SQ q12H     2. Acute decompensated HF  Echo pending; appears low on prelim read  Diuresing well since yesterday -- net -4.2L fluid balance  Continue IV Bumex BID, BB  Cath likely Monday to r/o ischemia     3. NSVT  Brief non-sustained VT noted on telemetry, longest 5 beats  Magnesium replacement  Keep K > 4; Mg > 2     4. Pleural effusions  Right sided CT tube to drain  Persistent left sided effusion noted on CXR  Continue diuresis  Concern for malignancy?  Heme/onc following    Suzette Felt, NP

## 2023-03-11 NOTE — PROGRESS NOTES
Spiritual Care Assessment/Progress Note  [facility]      NAME: Chata Carreno      MRN: [id number]  AGE: [de-identified] y.o.  SEX: female  Hinduism Affiliation: No Presybeterian   Language: English     3/11/2023     Total Time (in minutes): 14     Spiritual Assessment begun in [facility] CRITICAL CARE 2 through conversation with:         [x]Patient        [x] Family    [] Friend(s)        Reason for Consult: Initial/Spiritual assessment, critical care     Spiritual beliefs: (Please include comment if needed)     [] Identifies with a jono tradition:         [] Supported by a jono community:            [] Claims no spiritual orientation:           [] Seeking spiritual identity:                [x] Adheres to an individual form of spirituality:           [] Not able to assess:                           Identified resources for coping:      [x] Prayer                               [] Music                  [] Guided Imagery     [x] Family/friends                 [] Pet visits     [] Devotional reading                         [] Unknown     [] Other:                                                Interventions offered during this visit: (See comments for more details)    Patient Interventions: Affirmation of emotions/emotional suffering, Affirmation of jono, Catharsis/review of pertinent events in supportive environment, Coping skills reviewed/reinforced, Initial/Spiritual assessment, patient floor, Prayer (assurance of), Hinduism beliefs/image of God discussed     Family/Friend(s): Catharsis/review of pertinent events in supportive environment, Affirmation of jono, Prayer (assurance of), Hinduism beliefs/image of God discussed     Plan of Care:     [] Support spiritual and/or cultural needs    [] Support AMD and/or advance care planning process      [] Support grieving process   [] Coordinate Rites and/or Rituals    [] Coordination with community clergy   [x] No spiritual needs identified at this time   [] Detailed Plan of Care below (See Comments)  [] Make referral to Music Therapy  [] Make referral to Pet Therapy     [] Make referral to Addiction services  [] Make referral to [facility]  [] Make referral to Spiritual Care Partner  [] No future visits requested        [x] Contact Spiritual Care for further referrals     Comments:   Reviewed chart prior to visit on CCU for spiritual assessment. Patient's  was present. Mrs Bertha Granados appeared to be in good spirits; her  indicated she is making progress. Mrs Bertha Granados shared she did not rest well last night. She claims no Buddhist tradition, but she and her  believe in treating people with dignity and respect. No spiritual needs or concerns were expressed. Provided supportive listening presence. Mr and Mrs Bertha Granados were receptive to assurance of prayer.    available upon referral by staff or by patient/family request.       AYAKA Alaniz, [facility], Staff   [facility] Paging Service  [phone]

## 2023-03-11 NOTE — PROGRESS NOTES
GI Progress Note  Date:3/11/2023       Room:[address] [place] [address]  Patient Name:Emerita Granados     YOB: 1940     Age:82 y.o. Subjective     She is doing better. Converssant. Mild SOB. Denies any GI   issues  Review of Systems   Constitutional:  Negative for appetite change, fatigue and fever. Gastrointestinal:  Negative for abdominal pain, blood in stool, nausea and vomiting. Skin:  Negative for pallor. Objective         Vitals Last 24 Hours:  TEMPERATURE:  Temp  Av.1 °F (36.7 °C)  Min: 97.7 °F (36.5 °C)  Max: 98.5 °F (36.9 °C)  RESPIRATIONS RANGE: Resp  Av.2  Min: 13  Max: 25  PULSE OXIMETRY RANGE: SpO2  Av.1 %  Min: 84 %  Max: 96 %  PULSE RANGE: Pulse  Av.2  Min: 79  Max: 108  BLOOD PRESSURE RANGE: Systolic (26WLX), QXN:668 , Min:74 , NIY:116   ; Diastolic (33FQN), IXN:92, Min:52, Max:95    I/O (24Hr):     Intake/Output Summary (Last 24 hours) at 3/11/2023 1106  Last data filed at 3/11/2023 0800  Gross per 24 hour   Intake 4250.9 ml   Output 3145 ml   Net 1105.9 ml     Gen:AAo  Chest: dec BS bases  Heart: S1,S2,  GI soft, NT,ND  Ext: no cyanosis    Labs/Imaging/Diagnostics    Labs:  CBC:  Recent Labs     23  0741 03/10/23  0525 23  0233   WBC 14.8* 13.4* 15.3*   RBC 3.58* 3.76* 3.57*   HGB 9.9* 10.5* 9.9*   HCT 30.4* 31.7* 30.1*   MCV 84.9 84.3 84.3   RDW 16.2* 15.9* 15.7*    236 230       CHEMISTRIES:  Recent Labs     23  0741 03/10/23  2218 03/10/23  1412   * 136 135*   K 3.5 3.1* 3.8   CL 95* 96* 95*   CO2 32 34* 32   BUN 17 21* 28*   CA 7.5* 7.3* 7.7*   PHOS 2.2* 3.0 1.5*   MG 1.5* 1.6 2.2     PT/INR:  Recent Labs     23  0741 03/10/23  0525 23   INR 1.3* 1.4* 1.9*       APTT:  Recent Labs     23  0741 03/10/23  0525 23   APTT 22.2 20.7* 25.7       LIVER PROFILE:  Recent Labs     23  0741 03/10/23  0525 23  0233   * 1,251* >2,000*   ALT 2,104* 3,040* >3,500* Lab Results   Component Value Date/Time    ALT (SGPT) 2,104 (H) 03/11/2023 07:41 AM    AST (SGOT) 475 (H) 03/11/2023 07:41 AM     (H) 03/09/2023 02:33 AM    Alk. phosphatase 129 (H) 03/11/2023 07:41 AM    Bilirubin, direct 0.6 (H) 03/11/2023 07:41 AM    Bilirubin, total 1.3 (H) 03/11/2023 07:41 AM       Imaging Last 24 Hours:  No results found. Assessment//Plan   Active Problems:    Sepsis ([address] (3/7/2023)    Assessment:   GI consultation was for elevated LFTs. [de-identified] y/o female with abrupt onset of malaise. Went to PCP and was told to come to the ER for abnormal labs. AST and ALT >2000 and >3500 respectively, TB 1.2 and . Negative hepatitis panel. History of zoster affecting optic nerve, on chronic acyclovir for many years. Impression:  Elevated LFTs  Acute HF    3/11/2023:  LFTs are getting better (ALT 2104, , TB 1.3. She is off pressors. Doppler ultrasound was  without PV thrombosis. NAC completed   Serologies: CMV IgG, EBV IgG. Nuc ag +. Others pending.  elevated  MELD 30 at admission, INR down at 1.3. MELD-na today 10. No GI complaints. Continue current treatment. Follow MELD labs and LFTs.     Electronically signed by Crista Lucio MD on 3/11/2023 at 12:29 PM

## 2023-03-11 NOTE — PROGRESS NOTES
1900 - Bedside and Verbal shift change report given to Jericho Garcia RN (oncoming nurse) by Nati Diaz RN (offgoing nurse). Report included the following information SBAR, Kardex, ED Summary, Procedure Summary, Intake/Output, MAR, Accordion, Recent Results, Med Rec Status, and Cardiac Rhythm Afib .    2000 - Assessment done and documented, see flow sheet. Seen and examined the patient. Fully conscious and oriented x4. With oxygen via nasal cannula. With right ICD with 250mL draining by gravity level at the 250mL. 2200 - blood sample sent to lab for BMP, Mg and Phos. 0000 - Re-assessment done. No new changes. 0300 - NP ordered 40MEq kcl IV over 4hours and  50meq potassium tab stat.    0400 - Re-assessment done and completed, see flow sheet. 0700 - Bedside and Verbal shift change report given to Nati Diaz RN (oncoming nurse) by Jericho Garcia RN (offgoing nurse). Report included the following information SBAR, Kardex, Intake/Output, MAR, Recent Results, Med Rec Status, and Cardiac Rhythm Afib .

## 2023-03-11 NOTE — PROGRESS NOTES
0700: Bedside shift report received from Alicia Judge, [address] [place] [address] (offgoing nurse). 0800: Milrinone D/C per MD order. 0800: Shift assessment completed. Patient is calm and cooperative. Pt is A & O x 4. Pt appropriately follows commands. Pt moves all extremities purposefully. Pt can focus and track with eyes. Pt is A-Fib on the monitor. Pt's lung sounds are diminished/coarse bilaterally. Pt is on O2 6 L/min via NC. Pt's bowel sounds are active. Pt has a claire that is patent and draining. UOP adequate. Diltiazem infusing @ 15 mg/mL. 0813: Diltiazem weaned per MD order, see MAR for amount. 9087: Diltiazem D/C'd per MD order. 1015: Dr. Sandrine Ramirez at bedside, chest tube removed. 1200: Shift reassessment completed, no changes to previous assessment. 1430: Pt moved from the bed to the bedside chair. 1500: Pt sat on the bedside commode and had a small BM. Pt moved back to the bed from the bedside chair. 1600: Shift reassessment completed, no changes to previous assessment. 1900: Bedside shift report given to Alicia Judge, RN (offgoing nurse).

## 2023-03-11 NOTE — PROGRESS NOTES
[facility]    ICU TEAM Progress Note    Name: Candelaria Ackerman   : 1940   MRN: [id number]   Date: 3/11/2023           ICU Assessment & Plan of Care     Candelaria Ackerman Is a [de-identified] y.o. female with PMH s/f DM2 who is admitted 23 for N/V, lactic acidosis, and concern for new diagnosis heart failure. NEURO  - no acute issue (no e/o encephalopathy)       CARDIAC  #. Acute exacerbation of new diagnosis HFrEF  #. AF RVR, uncontrolled  #. Mildly elevated trop, demand  #. H/o HTN  - Cards following -- greatly appreciate assistance  - TTE read pending, but informally, EF 40% with no sig valvular dz   -- unsure if this explains overwhelming clinical picture  - taper off and d/c cardizem  D/c milrinone  continue      RESPIRATORY  #. Acute hypoxic respiratory failure on NC  #. Pulmonary edema  #. Pulmonary effusions R>L  - -discontinue chest tube today       RENAL  #. OLGA, ?cardiorenal, resolved  #. AGMA, osm gap, LA + ketoacidosis, resolved  #. Hypervolemic hyponatremia, resolved  - diuresing well with Bumex    -- - standard renal precautions        GASTROINTESTINAL  #. Nausea, vomiting; resolved  #. Significant transaminitis, now downtrending  #. New ascites  #. At risk for malnutrition based on acute illness  - GI following -- greatly appreciate assistance  - no cirrhotic morphology on CT, no e/o clot on repeat RUQUS (flows not done on first)  - cardiohepatic / severe passive liver congestion      HEMATOLOGIC  #. Mild normocytic anemia  - unknown baseline   - no indication for intervention        ID  #.  Concern for infection, less likely  - cxs NG  >48h  - CTM off abx        ENDOCRINE  - accuchecks   - SSI        Mobility: Bedrest  PT/OT:  consulted      DVT Prophylaxis: SCDs; low-dose hep ppx in setting of liver injury  U - Ulcer Prophylaxis: not indicated  G - Glycemic Control: Insulin prn  B - Bowel Regimen: prn    Subjective:   Progress Note: 3/11/2023      Reason for ICU Admission: concern for sepsis HPI:  per prior clinician  Penny Arora is a [de-identified] y.o. female with PMH s/f T2DM (A1c 7.1, 2018), HTN, chronic pain/arthritis who presented to the ED with N/V, anorexia x 2 days. In ED, workup notable for marked and varied lab abnmls, including LA 11, WBC 17, Na 125, K 5.7, CO2 16, AG 17, Cr 1.8, AST/ALT unreadably high, alkphos 176, Tbili 1.7. Pan-scan without contrast pending per ED physician. Given 2 L IVF in ED and started on antibiotics. ICU consulted for admission. Upon my arrival to bedside, pt awake and alert, /97, HR . Finishing first L of IVF. C/o nausea and no appetite. Will be admitted to the ICU for further management. Arrived to ICU after CT scans. Pt now SOB with RR in the 30s requiring 4 L NC. Bedside ultrasound of lungs and echo done. Lungs with bilateral B lines. Bedside echo significant for dilated LV with reduced systolic function. Possible wall motion abnormalities but a little hard to determine bc pt's HR still in the 130s. Official Echo ordered for the morning. First troponin only 141 but will resend in 3 hours to r/o NSTEMI. Denies chest pain. 3/8 - able to diurese with lasix 80.   3/9 - pt with recurrent, refractory RVR yesterday afternoon. Metop tried. Switched off dobuta to milrinone. Avoided amio due to liver injury. Cards consulted and dig loaded. Cardizem started overnight; remains in 130s this AM.  3/10 - worsening R pleural effusion despite net neg. R pigtail placed with 750cc out before turning to water seal. Restarted diuresis and now net neg 3L with improvement in renal fxn, BP, and resp status. 3/11 : heart rate well controlled on diltiazem drip  Home Medications:     Prior to Admission medications    Medication Sig Start Date End Date Taking? Authorizing Provider   hydroCHLOROthiazide (HYDRODIURIL) 25 mg tablet Take 25 mg by mouth daily.    Yes Provider, Historical   dorzolamide (TRUSOPT) 2 % ophthalmic solution INSTILL 1 DROP IN BOTH EYES TWICE DAILY 2/27/23  Yes Provider, Historical   atorvastatin (LIPITOR) 10 mg tablet Take 10 mg by mouth nightly. Yes Provider, Historical   timolol (TIMOPTIC) 0.5 % ophthalmic solution Administer 1 Drop to both eyes daily. Yes Provider, Historical   latanoprost (XALATAN) 0.005 % ophthalmic solution Administer 1 Drop to both eyes nightly. Indications: OPEN ANGLE GLAUCOMA   Yes Provider, Historical   prednisoLONE acetate (PRED FORTE) 1 % ophthalmic suspension Administer 1 Drop to left eye daily. Indications: SEVERE OCULAR INFLAMMATION   Yes Provider, Historical   metFORMIN (GLUCOPHAGE) 500 mg tablet Take 1,000 mg by mouth two (2) times daily (with meals). Indications: type 2 diabetes mellitus   Yes Provider, Historical   lisinopril (PRINIVIL, ZESTRIL) 5 mg tablet Take 5 mg by mouth daily. Indications: HYPERTENSION   Yes Provider, Historical   acyclovir (ZOVIRAX) 800 mg tablet Take 800 mg by mouth nightly. Indications: shingles prevention   Yes Provider, Historical   multivitamin with iron tablet Take 1 Tab by mouth daily.    Yes Provider, Historical       Current Meds:     Current Facility-Administered Medications   Medication Dose Route Frequency    bumetanide (BUMEX) injection 1 mg  1 mg IntraVENous DAILY    insulin lispro (HUMALOG) injection   SubCUTAneous Q6H    lidocaine 4 % patch 1 Patch  1 Patch TransDERmal Q24H    metoprolol tartrate (LOPRESSOR) tablet 12.5 mg  12.5 mg Oral Q12H    heparin (porcine) injection 5,000 Units  5,000 Units SubCUTAneous Q12H    alcohol 62% (NOZIN) nasal  1 Ampule  1 Ampule Topical Q12H    balsam peru-castor oiL (VENELEX) ointment   Topical BID    latanoprost (XALATAN) 0.005 % ophthalmic solution 1 Drop  1 Drop Both Eyes QPM    timolol (TIMOPTIC) 0.5 % ophthalmic solution 1 Drop  1 Drop Both Eyes DAILY    dorzolamide (TRUSOPT) 2 % ophthalmic solution 1 Drop  1 Drop Both Eyes BID    prednisoLONE acetate (PRED FORTE) 1 % ophthalmic suspension 1 Drop  1 Drop Left Eye DAILY    glucose chewable tablet 16 g  4 Tablet Oral PRN    glucagon (GLUCAGEN) injection 1 mg  1 mg IntraMUSCular PRN    dextrose 10% infusion 0-250 mL  0-250 mL IntraVENous PRN    sodium chloride (NS) flush 5-10 mL  5-10 mL IntraVENous PRN    dextrose 10 % infusion 125-250 mL  125-250 mL IntraVENous PRN    sodium chloride (NS) flush 5-10 mL  5-10 mL IntraVENous PRN    sodium chloride (NS) flush 5-40 mL  5-40 mL IntraVENous PRN    ondansetron (ZOFRAN) injection 4 mg  4 mg IntraVENous Q6H PRN    senna (SENOKOT) tablet 8.6 mg  1 Tablet Oral DAILY PRN    polyethylene glycol (MIRALAX) packet 17 g  17 g Oral DAILY PRN       Objective:   Vital Signs:  Visit Vitals  /65   Pulse 91   Temp 98.5 °F (36.9 °C)   Resp 17   Ht 5' 3\" (1.6 m)   Wt 60.3 kg (132 lb 15 oz)   SpO2 (!) 89%   Breastfeeding No   BMI 23.55 kg/m²    O2 Flow Rate (L/min): 6 l/min O2 Device: Nasal cannula Temp (24hrs), Av.1 °F (36.7 °C), Min:97.7 °F (36.5 °C), Max:98.5 °F (36.9 °C)           Intake/Output:     Intake/Output Summary (Last 24 hours) at 3/11/2023 0902  Last data filed at 3/11/2023 0800  Gross per 24 hour   Intake 3912.72 ml   Output 3395 ml   Net 517.72 ml       Physical Exam:  Elderly female, awake, alert, lying in bed, NAD, appearing slightly more active  Persistent L ptosis from remote VZV  Resps even and unlabored, symmetric chest rise on NC, mild rales throughout, clearer R  AF, 90s-100s, no heave/rub  Abd soft, nontender, nondistended  NICOLAS, extremities warm to touch  Calm and cooperative      LABS AND  DATA:   Personally reviewed    MEDS:   Reviewed    Multidisciplinary Rounds Completed:  NA    ABCDEF Bundle/Checklist Completed:  Yes    SPECIAL EQUIPMENT  None    DISPOSITION  Stay in ICU    CRITICAL CARE CONSULTANT NOTE  I had a face to face encounter with the patient, reviewed and interpreted patient data including clinical events, labs, images, vital signs, I/O's, and examined patient.   I have discussed the case and the plan and management of the patient's care with the consulting services, the bedside nurses and the respiratory therapist.      NOTE OF PERSONAL INVOLVEMENT IN CARE   This patient has a high probability of imminent, clinically significant deterioration, which requires the highest level of preparedness to intervene urgently. I participated in the decision-making and personally managed or directed the management of the following life and organ supporting interventions that required my frequent assessment to treat or prevent imminent deterioration. I personally spent 35 minutes of critical care time. This is time spent at this critically ill patient's bedside actively involved in patient care as well as the coordination of care. This does not include any procedural time which has been billed separately.     Azeb Anderson MD  Intensivist  3/11/2023

## 2023-03-11 NOTE — PROGRESS NOTES
Problem: Pressure Injury - Risk of  Goal: *Prevention of pressure injury  Description: Document Edin Scale and appropriate interventions in the flowsheet. Outcome: Progressing Towards Goal  Note: Pressure Injury Interventions:  Sensory Interventions: Assess changes in LOC, Assess need for specialty bed, Avoid rigorous massage over bony prominences, Check visual cues for pain, Discuss PT/OT consult with provider, Keep linens dry and wrinkle-free, Maintain/enhance activity level, Minimize linen layers, Pressure redistribution bed/mattress (bed type), Turn and reposition approx. every two hours (pillows and wedges if needed)    Moisture Interventions: Absorbent underpads, Apply protective barrier, creams and emollients, Assess need for specialty bed, Check for incontinence Q2 hours and as needed, Internal/External fecal devices, Maintain skin hydration (lotion/cream), Minimize layers    Activity Interventions: Assess need for specialty bed, Pressure redistribution bed/mattress(bed type)    Mobility Interventions: Assess need for specialty bed, Float heels, Pressure redistribution bed/mattress (bed type), PT/OT evaluation, Turn and reposition approx.  every two hours(pillow and wedges)    Nutrition Interventions: Document food/fluid/supplement intake    Friction and Shear Interventions: Apply protective barrier, creams and emollients, Minimize layers

## 2023-03-12 ENCOUNTER — APPOINTMENT (OUTPATIENT)
Dept: GENERAL RADIOLOGY | Age: 83
End: 2023-03-12
Payer: MEDICARE

## 2023-03-12 LAB
ALBUMIN SERPL-MCNC: 2.4 G/DL (ref 3.5–5)
ALBUMIN/GLOB SERPL: 0.8 (ref 1.1–2.2)
ALP SERPL-CCNC: 114 U/L (ref 45–117)
ALT SERPL-CCNC: 1394 U/L (ref 12–78)
ANION GAP SERPL CALC-SCNC: 3 MMOL/L (ref 5–15)
ANION GAP SERPL CALC-SCNC: 6 MMOL/L (ref 5–15)
APTT PPP: 24.8 SEC (ref 22.1–31)
AST SERPL-CCNC: 249 U/L (ref 15–37)
BILIRUB DIRECT SERPL-MCNC: 0.5 MG/DL (ref 0–0.2)
BILIRUB SERPL-MCNC: 1.2 MG/DL (ref 0.2–1)
BUN SERPL-MCNC: 12 MG/DL (ref 6–20)
BUN SERPL-MCNC: 19 MG/DL (ref 6–20)
BUN/CREAT SERPL: 24 (ref 12–20)
BUN/CREAT SERPL: 27 (ref 12–20)
CALCIUM SERPL-MCNC: 7.6 MG/DL (ref 8.5–10.1)
CALCIUM SERPL-MCNC: 8.3 MG/DL (ref 8.5–10.1)
CHLORIDE SERPL-SCNC: 93 MMOL/L (ref 97–108)
CHLORIDE SERPL-SCNC: 98 MMOL/L (ref 97–108)
CO2 SERPL-SCNC: 32 MMOL/L (ref 21–32)
CO2 SERPL-SCNC: 34 MMOL/L (ref 21–32)
CREAT SERPL-MCNC: 0.45 MG/DL (ref 0.55–1.02)
CREAT SERPL-MCNC: 0.79 MG/DL (ref 0.55–1.02)
ERYTHROCYTE [DISTWIDTH] IN BLOOD BY AUTOMATED COUNT: 16.1 % (ref 11.5–14.5)
GLOBULIN SER CALC-MCNC: 2.9 G/DL (ref 2–4)
GLUCOSE BLD STRIP.AUTO-MCNC: 143 MG/DL (ref 65–117)
GLUCOSE BLD STRIP.AUTO-MCNC: 160 MG/DL (ref 65–117)
GLUCOSE BLD STRIP.AUTO-MCNC: 217 MG/DL (ref 65–117)
GLUCOSE BLD STRIP.AUTO-MCNC: 219 MG/DL (ref 65–117)
GLUCOSE BLD STRIP.AUTO-MCNC: 289 MG/DL (ref 65–117)
GLUCOSE SERPL-MCNC: 150 MG/DL (ref 65–100)
GLUCOSE SERPL-MCNC: 279 MG/DL (ref 65–100)
HCT VFR BLD AUTO: 29.4 % (ref 35–47)
HGB BLD-MCNC: 9.7 G/DL (ref 11.5–16)
HSV1 DNA SPEC QL NAA+PROBE: NEGATIVE
HSV2 DNA SPEC QL NAA+PROBE: NEGATIVE
INR PPP: 1.3 (ref 0.9–1.1)
MAGNESIUM SERPL-MCNC: 1.5 MG/DL (ref 1.6–2.4)
MAGNESIUM SERPL-MCNC: 2.1 MG/DL (ref 1.6–2.4)
MCH RBC QN AUTO: 27.6 PG (ref 26–34)
MCHC RBC AUTO-ENTMCNC: 33 G/DL (ref 30–36.5)
MCV RBC AUTO: 83.8 FL (ref 80–99)
MITOCHONDRIA M2 IGG SER-ACNC: <20 UNITS (ref 0–20)
NRBC # BLD: 0 K/UL (ref 0–0.01)
NRBC BLD-RTO: 0 PER 100 WBC
PHOSPHATE SERPL-MCNC: 2.1 MG/DL (ref 2.6–4.7)
PHOSPHATE SERPL-MCNC: 2.3 MG/DL (ref 2.6–4.7)
PLATELET # BLD AUTO: 217 K/UL (ref 150–400)
PMV BLD AUTO: 10.3 FL (ref 8.9–12.9)
POTASSIUM SERPL-SCNC: 2.6 MMOL/L (ref 3.5–5.1)
POTASSIUM SERPL-SCNC: 4.8 MMOL/L (ref 3.5–5.1)
PROT SERPL-MCNC: 5.3 G/DL (ref 6.4–8.2)
PROTHROMBIN TIME: 13 SEC (ref 9–11.1)
RBC # BLD AUTO: 3.51 M/UL (ref 3.8–5.2)
SERVICE CMNT-IMP: ABNORMAL
SMA IGG SER-ACNC: 7 UNITS (ref 0–19)
SODIUM SERPL-SCNC: 130 MMOL/L (ref 136–145)
SODIUM SERPL-SCNC: 136 MMOL/L (ref 136–145)
SPECIMEN SOURCE: NORMAL
THERAPEUTIC RANGE,PTTT: NORMAL SECS (ref 58–77)
WBC # BLD AUTO: 12.3 K/UL (ref 3.6–11)

## 2023-03-12 PROCEDURE — 83735 ASSAY OF MAGNESIUM: CPT

## 2023-03-12 PROCEDURE — 74011250637 HC RX REV CODE- 250/637

## 2023-03-12 PROCEDURE — 85730 THROMBOPLASTIN TIME PARTIAL: CPT

## 2023-03-12 PROCEDURE — 84100 ASSAY OF PHOSPHORUS: CPT

## 2023-03-12 PROCEDURE — 74011000250 HC RX REV CODE- 250: Performed by: INTERNAL MEDICINE

## 2023-03-12 PROCEDURE — 80048 BASIC METABOLIC PNL TOTAL CA: CPT

## 2023-03-12 PROCEDURE — 85027 COMPLETE CBC AUTOMATED: CPT

## 2023-03-12 PROCEDURE — 80076 HEPATIC FUNCTION PANEL: CPT

## 2023-03-12 PROCEDURE — 74011250637 HC RX REV CODE- 250/637: Performed by: NURSE PRACTITIONER

## 2023-03-12 PROCEDURE — 82962 GLUCOSE BLOOD TEST: CPT

## 2023-03-12 PROCEDURE — 74011250636 HC RX REV CODE- 250/636: Performed by: INTERNAL MEDICINE

## 2023-03-12 PROCEDURE — 65270000046 HC RM TELEMETRY

## 2023-03-12 PROCEDURE — 74011250637 HC RX REV CODE- 250/637: Performed by: INTERNAL MEDICINE

## 2023-03-12 PROCEDURE — 74011636637 HC RX REV CODE- 636/637

## 2023-03-12 PROCEDURE — 36415 COLL VENOUS BLD VENIPUNCTURE: CPT

## 2023-03-12 PROCEDURE — 77010033711 HC HIGH FLOW OXYGEN

## 2023-03-12 PROCEDURE — 74011636637 HC RX REV CODE- 636/637: Performed by: INTERNAL MEDICINE

## 2023-03-12 PROCEDURE — 85610 PROTHROMBIN TIME: CPT

## 2023-03-12 PROCEDURE — 71045 X-RAY EXAM CHEST 1 VIEW: CPT

## 2023-03-12 PROCEDURE — 74011250636 HC RX REV CODE- 250/636

## 2023-03-12 RX ORDER — POTASSIUM CHLORIDE 750 MG/1
40 TABLET, FILM COATED, EXTENDED RELEASE ORAL 2 TIMES DAILY
Status: DISCONTINUED | OUTPATIENT
Start: 2023-03-12 | End: 2023-03-12

## 2023-03-12 RX ORDER — IBUPROFEN 200 MG
4 TABLET ORAL AS NEEDED
Status: DISCONTINUED | OUTPATIENT
Start: 2023-03-12 | End: 2023-03-13

## 2023-03-12 RX ORDER — ADHESIVE BANDAGE
30 BANDAGE TOPICAL ONCE
Status: COMPLETED | OUTPATIENT
Start: 2023-03-12 | End: 2023-03-12

## 2023-03-12 RX ORDER — METOPROLOL TARTRATE 25 MG/1
25 TABLET, FILM COATED ORAL EVERY 12 HOURS
Status: DISCONTINUED | OUTPATIENT
Start: 2023-03-12 | End: 2023-03-13

## 2023-03-12 RX ORDER — AMOXICILLIN 250 MG
2 CAPSULE ORAL DAILY
Status: DISCONTINUED | OUTPATIENT
Start: 2023-03-12 | End: 2023-03-18 | Stop reason: HOSPADM

## 2023-03-12 RX ORDER — INSULIN LISPRO 100 [IU]/ML
INJECTION, SOLUTION INTRAVENOUS; SUBCUTANEOUS
Status: DISCONTINUED | OUTPATIENT
Start: 2023-03-12 | End: 2023-03-13

## 2023-03-12 RX ORDER — IPRATROPIUM BROMIDE AND ALBUTEROL SULFATE 2.5; .5 MG/3ML; MG/3ML
3 SOLUTION RESPIRATORY (INHALATION)
Status: DISCONTINUED | OUTPATIENT
Start: 2023-03-12 | End: 2023-03-18 | Stop reason: HOSPADM

## 2023-03-12 RX ORDER — MAGNESIUM SULFATE HEPTAHYDRATE 40 MG/ML
2 INJECTION, SOLUTION INTRAVENOUS ONCE
Status: COMPLETED | OUTPATIENT
Start: 2023-03-12 | End: 2023-03-12

## 2023-03-12 RX ORDER — DEXTROSE MONOHYDRATE 100 MG/ML
0-250 INJECTION, SOLUTION INTRAVENOUS AS NEEDED
Status: DISCONTINUED | OUTPATIENT
Start: 2023-03-12 | End: 2023-03-18 | Stop reason: HOSPADM

## 2023-03-12 RX ORDER — FACIAL-BODY WIPES
10 EACH TOPICAL DAILY PRN
Status: DISCONTINUED | OUTPATIENT
Start: 2023-03-12 | End: 2023-03-18 | Stop reason: HOSPADM

## 2023-03-12 RX ORDER — POLYETHYLENE GLYCOL 3350 17 G/17G
17 POWDER, FOR SOLUTION ORAL DAILY
Status: DISCONTINUED | OUTPATIENT
Start: 2023-03-13 | End: 2023-03-18 | Stop reason: HOSPADM

## 2023-03-12 RX ORDER — METOPROLOL TARTRATE 5 MG/5ML
5 INJECTION INTRAVENOUS AS NEEDED
Status: DISCONTINUED | OUTPATIENT
Start: 2023-03-12 | End: 2023-03-16

## 2023-03-12 RX ADMIN — Medication 2 UNITS: at 00:32

## 2023-03-12 RX ADMIN — POTASSIUM BICARBONATE 50 MEQ: 391 TABLET, EFFERVESCENT ORAL at 11:37

## 2023-03-12 RX ADMIN — Medication 3 UNITS: at 13:09

## 2023-03-12 RX ADMIN — METOPROLOL TARTRATE 25 MG: 25 TABLET, FILM COATED ORAL at 08:37

## 2023-03-12 RX ADMIN — HEPARIN SODIUM 5000 UNITS: 5000 INJECTION INTRAVENOUS; SUBCUTANEOUS at 08:36

## 2023-03-12 RX ADMIN — POTASSIUM CHLORIDE 40 MEQ: 750 TABLET, FILM COATED, EXTENDED RELEASE ORAL at 08:33

## 2023-03-12 RX ADMIN — METOPROLOL TARTRATE 25 MG: 25 TABLET, FILM COATED ORAL at 20:55

## 2023-03-12 RX ADMIN — CASTOR OIL AND BALSAM, PERU: 788; 87 OINTMENT TOPICAL at 13:10

## 2023-03-12 RX ADMIN — LATANOPROST 1 DROP: 50 SOLUTION OPHTHALMIC at 17:42

## 2023-03-12 RX ADMIN — Medication 3 UNITS: at 17:41

## 2023-03-12 RX ADMIN — SENNOSIDES AND DOCUSATE SODIUM 2 TABLET: 50; 8.6 TABLET ORAL at 16:27

## 2023-03-12 RX ADMIN — Medication 2 UNITS: at 21:57

## 2023-03-12 RX ADMIN — Medication 1 AMPULE: at 20:55

## 2023-03-12 RX ADMIN — MAGNESIUM HYDROXIDE 30 ML: 400 SUSPENSION ORAL at 18:49

## 2023-03-12 RX ADMIN — BUMETANIDE 1 MG: 0.25 INJECTION INTRAMUSCULAR; INTRAVENOUS at 09:00

## 2023-03-12 RX ADMIN — BUMETANIDE 1 MG: 0.25 INJECTION INTRAMUSCULAR; INTRAVENOUS at 17:40

## 2023-03-12 RX ADMIN — DORZOLAMIDE HYDROCHLORIDE 1 DROP: 20 SOLUTION/ DROPS OPHTHALMIC at 17:42

## 2023-03-12 RX ADMIN — MAGNESIUM SULFATE HEPTAHYDRATE 2 G: 40 INJECTION, SOLUTION INTRAVENOUS at 11:38

## 2023-03-12 RX ADMIN — DORZOLAMIDE HYDROCHLORIDE 1 DROP: 20 SOLUTION/ DROPS OPHTHALMIC at 08:43

## 2023-03-12 RX ADMIN — POTASSIUM BICARBONATE 50 MEQ: 391 TABLET, EFFERVESCENT ORAL at 17:40

## 2023-03-12 RX ADMIN — HEPARIN SODIUM 5000 UNITS: 5000 INJECTION INTRAVENOUS; SUBCUTANEOUS at 20:54

## 2023-03-12 RX ADMIN — Medication 1 AMPULE: at 08:40

## 2023-03-12 RX ADMIN — Medication 2 UNITS: at 06:30

## 2023-03-12 RX ADMIN — CASTOR OIL AND BALSAM, PERU: 788; 87 OINTMENT TOPICAL at 20:55

## 2023-03-12 RX ADMIN — TIMOLOL MALEATE 1 DROP: 5 SOLUTION OPHTHALMIC at 08:41

## 2023-03-12 RX ADMIN — PREDNISOLONE ACETATE 1 DROP: 10 SUSPENSION/ DROPS OPHTHALMIC at 08:42

## 2023-03-12 NOTE — PROGRESS NOTES
1900 - Bedside and Verbal shift change report given to Sammie Jansen RN (oncoming nurse) by Jeanne Flores RN (offgoing nurse). Report included the following information SBAR, Kardex, Procedure Summary, Intake/Output, MAR, Recent Results, Med Rec Status, and Cardiac Rhythm Afib . 1930 - Assessment done and documented, see flow sheet    0000 - Re-assessment done and documented, see flow sheet. 0700 - Bedside and Verbal shift change report given to JORDI Hale (oncoming nurse) by Sammie Jansen RN (offgoing nurse). Report included the following information SBAR, Kardex, ED Summary, Procedure Summary, Intake/Output, MAR, Recent Results, Med Rec Status, and Cardiac Rhythm Afib .

## 2023-03-12 NOTE — PROGRESS NOTES
0700- Bedside and Verbal shift change report given to Alexia BACON (oncoming nurse) by Chas Wells (offgoing nurse). Report included the following information SBAR, Kardex, OR Summary, Intake/Output, MAR, and Recent Results. 0800- Shift assessment completed. Patient resting in bed with no distress. A&O X 4.     0900- Assisted pt to sit in recliner to eat breakfast.    1200- Cardiology called and left message to inform of uncontrolled Aifb    1255- Patient converted to NSR; patient return to bed    1800- patient is c/o of constipation through out the day; Miralax; senna and milk of magnesia administered. 1900-  Bedside and Verbal shift change report given to Rolando Lawrence (oncoming nurse) by Jasmin Cruz (offgoing nurse). Report included the following information SBAR, Kardex, OR Summary, Intake/Output, MAR, and Recent Results.

## 2023-03-12 NOTE — PROGRESS NOTES
[facility]  [address], [address] [place] [address]  [phone]  [email or web address]         Cardiology Progress Note      3/12/2023 1530 PM     Admit Date: 3/7/2023    Admit Diagnosis:   Sepsis ([address] [A41.9]    Interval History/Subjective:     Dorian Patel is off all gtts, reports she continues to feel better each day. NSR at time of assessment; Afib RVR earlier today.  Resolved after electrolyte replacement   VSS  Cr normalized 0.45  Liver enzymes trending down   NPO after midnight for cardiac cath in am       Visit Vitals  /81 (BP 1 Location: Left upper arm, BP Patient Position: At rest)   Pulse 90   Temp 97.9 °F (36.6 °C)   Resp 20   Ht 5' 3\" (1.6 m)   Wt 66.9 kg (147 lb 7.8 oz)   SpO2 92%   Breastfeeding No   BMI 26.13 kg/m²       Current Facility-Administered Medications   Medication Dose Route Frequency    metoprolol tartrate (LOPRESSOR) tablet 25 mg  25 mg Oral Q12H    potassium bicarb-citric acid (EFFER-K) tablet 50 mEq  50 mEq Oral BID    [START ON 3/13/2023] polyethylene glycol (MIRALAX) packet 17 g  17 g Oral DAILY    senna-docusate (PERICOLACE) 8.6-50 mg per tablet 2 Tablet  2 Tablet Oral DAILY    bisacodyL (DULCOLAX) suppository 10 mg  10 mg Rectal DAILY PRN    magnesium hydroxide (MILK OF MAGNESIA) 400 mg/5 mL oral suspension 30 mL  30 mL Oral ONCE    glucose chewable tablet 16 g  4 Tablet Oral PRN    glucagon (GLUCAGEN) injection 1 mg  1 mg IntraMUSCular PRN    dextrose 10% infusion 0-250 mL  0-250 mL IntraVENous PRN    insulin lispro (HUMALOG) injection   SubCUTAneous AC&HS    metoprolol (LOPRESSOR) injection 5 mg  5 mg IntraVENous PRN    bumetanide (BUMEX) injection 1 mg  1 mg IntraVENous BID    lidocaine 4 % patch 1 Patch  1 Patch TransDERmal Q24H    heparin (porcine) injection 5,000 Units  5,000 Units SubCUTAneous Q12H    alcohol 62% (NOZIN) nasal  1 Ampule  1 Ampule Topical Q12H    balsam peru-castor oiL (VENELEX) ointment   Topical BID    latanoprost (XALATAN) 0.005 % ophthalmic solution 1 Drop  1 Drop Both Eyes QPM    timolol (TIMOPTIC) 0.5 % ophthalmic solution 1 Drop  1 Drop Both Eyes DAILY    dorzolamide (TRUSOPT) 2 % ophthalmic solution 1 Drop  1 Drop Both Eyes BID    prednisoLONE acetate (PRED FORTE) 1 % ophthalmic suspension 1 Drop  1 Drop Left Eye DAILY    dextrose 10% infusion 0-250 mL  0-250 mL IntraVENous PRN    sodium chloride (NS) flush 5-10 mL  5-10 mL IntraVENous PRN    dextrose 10 % infusion 125-250 mL  125-250 mL IntraVENous PRN    sodium chloride (NS) flush 5-10 mL  5-10 mL IntraVENous PRN    sodium chloride (NS) flush 5-40 mL  5-40 mL IntraVENous PRN    ondansetron (ZOFRAN) injection 4 mg  4 mg IntraVENous Q6H PRN       Objective:      Physical Exam:  General: no acute distress, elderly appearing, AxO  HEENT: No carotid bruits, PEERL, EOM intact. There is slight ptosis of the left eye. Heart:  RRR; normal S1/S2; no murmurs  Respiratory: clear breath sounds bilaterally. Right sided CT to drainage. Abdomen:   Soft, non-tender, no distention, no masses. + BS. Extremities:  Normal cap refill, no cyanosis, atraumatic. No edema.   Neuro: A&Ox3, speech clear  Skin: Skin color is normal. Non diaphoretic  Vascular: 2+ pulses symmetric in all extremities    Data Review:   Recent Labs     03/12/23  0422 03/11/23  0741 03/10/23  0525   WBC 12.3* 14.8* 13.4*   HGB 9.7* 9.9* 10.5*   HCT 29.4* 30.4* 31.7*    238 236     Recent Labs     03/12/23  0422 03/11/23  0741 03/10/23  2218 03/10/23  1412 03/10/23  0525    133* 136   < > 139   K 2.6* 3.5 3.1*   < > 2.6*   CL 98 95* 96*   < > 102   CO2 32 32 34*   < > 29   * 273* 362*   < > 198*   BUN 12 17 21*   < > 32*   CREA 0.45* 0.68 0.79   < > 0.80   CA 7.6* 7.5* 7.3*   < > 8.2*   MG 1.5* 1.5* 1.6   < > 1.8   PHOS 2.1* 2.2* 3.0   < > 1.9*   ALB 2.4* 2.7*  --   --  3.1*   TBILI 1.2* 1.3*  --   --  1.4*   ALT 1,394* 2,104*  --   --  3,040*   INR 1.3* 1.3*  --   --  1.4* < > = values in this interval not displayed. Recent Labs     03/10/23  0525   CPK 66         Intake/Output Summary (Last 24 hours) at 3/12/2023 1722  Last data filed at 3/12/2023 1634  Gross per 24 hour   Intake 870 ml   Output 1740 ml   Net -870 ml        Telemetry: NSR     Echocardiogram: still pending? Radiology Results in the last 24 hours:  XR CHEST PORT    Result Date: 3/12/2023  1. Mild RIGHT basilar atelectasis status post pleural drain removal. No pneumothorax. 2. Persistent, small left-sided effusion. Assessment:     Active Problems:    Sepsis ([address] (3/7/2023)      Plan:     AF with RVR  Converted to NSR today   In the setting of acute hypoxic resp failure with sepsis and decompensated HF  Continue heparin SQ q12H, PO BB  Closely monitor electrolytes, replace as needed   Keep K 4, Mg 2 or greater      2. Acute decompensated HF  Echo pending; appears low on prelim read  Diuresing well -- net -5L fluid balance  Continue IV Bumex BID, BB  Cath likely Monday to r/o ischemia  I discussed the risks/benefits/alternatives of cardiac catheterization +/- PCI with the patient. Risks include (but are not limited to) bleeding, infection, cva/mi/tamponade/death. The patient understands and wishes to proceed. 3.  NSVT  Brief non-sustained VT noted on telemetry, longest 5 beats  Magnesium replacement  Keep K > 4; Mg > 2     4. Pleural effusions  Right sided CT tube removed   Mild right basilar atelectasis, no pneumothorax   Persistent small left sided effusion noted on CXR  Continue diuresis  Concern for malignancy? Heme/onc following    Plan for cardiac cath tomorrow am. NPO after midnight.      Amrik Dave NP

## 2023-03-12 NOTE — PROGRESS NOTES
[facility]    ICU TEAM Progress Note    Name: Jessica Pleayo   : 1940   MRN: [id number]   Date: 3/12/2023           ICU Assessment & Plan of Care     Jessica Pelayo Is a [de-identified] y.o. female with PMH s/f DM2 who is admitted 23 for N/V, lactic acidosis, and concern for new diagnosis heart failure. NEURO  - no acute issue (no e/o encephalopathy)       CARDIAC  #. Acute exacerbation of new diagnosis HFrEF  #. AF RVR, uncontrolled  #. Mildly elevated trop, demand  #. H/o HTN  - Cards following -- greatly appreciate assistance  - TTE read pending, but informally, EF 40% with no sig valvular dz   -- continue bumex  Increase metoprolol to 25 mg Q 12 hrs     RESPIRATORY  #. Acute hypoxic respiratory failure on NC  #. Pulmonary edema  #. Pulmonary effusions R>L  -continue bumex     RENAL  #. OLGA, ?cardiorenal, resolved  #. AGMA, osm gap, LA + ketoacidosis, resolved  #. Hypervolemic hyponatremia, resolved  - diuresing well with Bumex    -- - standard renal precautions  Replace K        GASTROINTESTINAL  #. Nausea, vomiting; resolved  #. Significant transaminitis, now downtrending  #. New ascites  #. At risk for malnutrition based on acute illness  - GI following -- greatly appreciate assistance  - no cirrhotic morphology on CT, no e/o clot on repeat RUQUS (flows not done on first)  - cardiohepatic / severe passive liver congestion      HEMATOLOGIC  #. Mild normocytic anemia  - unknown baseline   - no indication for intervention        ID  #.  Concern for infection, less likely  - cxs NG  >48h  - CTM off abx        ENDOCRINE  - accuchecks   - SSI        Mobility: Bedrest  PT/OT:  consulted      DVT Prophylaxis: SCDs; low-dose hep ppx in setting of liver injury  U - Ulcer Prophylaxis: not indicated  G - Glycemic Control: Insulin prn  B - Bowel Regimen: prn    Subjective:   Progress Note: 3/12/2023      Reason for ICU Admission: concern for sepsis     HPI:  per prior clinician  Jessica Pelayo is a [de-identified] y.o. female with PMH s/f T2DM (A1c 7.1, 2018), HTN, chronic pain/arthritis who presented to the ED with N/V, anorexia x 2 days. In ED, workup notable for marked and varied lab abnmls, including LA 11, WBC 17, Na 125, K 5.7, CO2 16, AG 17, Cr 1.8, AST/ALT unreadably high, alkphos 176, Tbili 1.7. Pan-scan without contrast pending per ED physician. Given 2 L IVF in ED and started on antibiotics. ICU consulted for admission. Upon my arrival to bedside, pt awake and alert, /97, HR . Finishing first L of IVF. C/o nausea and no appetite. Will be admitted to the ICU for further management. Arrived to ICU after CT scans. Pt now SOB with RR in the 30s requiring 4 L NC. Bedside ultrasound of lungs and echo done. Lungs with bilateral B lines. Bedside echo significant for dilated LV with reduced systolic function. Possible wall motion abnormalities but a little hard to determine bc pt's HR still in the 130s. Official Echo ordered for the morning. First troponin only 141 but will resend in 3 hours to r/o NSTEMI. Denies chest pain. 3/8 - able to diurese with lasix 80.   3/9 - pt with recurrent, refractory RVR yesterday afternoon. Metop tried. Switched off dobuta to milrinone. Avoided amio due to liver injury. Cards consulted and dig loaded. Cardizem started overnight; remains in 130s this AM.  3/10 - worsening R pleural effusion despite net neg. R pigtail placed with 750cc out before turning to water seal. Restarted diuresis and now net neg 3L with improvement in renal fxn, BP, and resp status. 3/11 : heart rate well controlled on diltiazem drip  3/12 : off milrinone and cardizem,looking comfortable  Home Medications:     Prior to Admission medications    Medication Sig Start Date End Date Taking? Authorizing Provider   hydroCHLOROthiazide (HYDRODIURIL) 25 mg tablet Take 25 mg by mouth daily.    Yes Provider, Historical   dorzolamide (TRUSOPT) 2 % ophthalmic solution INSTILL 1 DROP IN BOTH EYES TWICE DAILY 2/27/23  Yes Provider, Historical   atorvastatin (LIPITOR) 10 mg tablet Take 10 mg by mouth nightly. Yes Provider, Historical   timolol (TIMOPTIC) 0.5 % ophthalmic solution Administer 1 Drop to both eyes daily. Yes Provider, Historical   latanoprost (XALATAN) 0.005 % ophthalmic solution Administer 1 Drop to both eyes nightly. Indications: OPEN ANGLE GLAUCOMA   Yes Provider, Historical   prednisoLONE acetate (PRED FORTE) 1 % ophthalmic suspension Administer 1 Drop to left eye daily. Indications: SEVERE OCULAR INFLAMMATION   Yes Provider, Historical   metFORMIN (GLUCOPHAGE) 500 mg tablet Take 1,000 mg by mouth two (2) times daily (with meals). Indications: type 2 diabetes mellitus   Yes Provider, Historical   lisinopril (PRINIVIL, ZESTRIL) 5 mg tablet Take 5 mg by mouth daily. Indications: HYPERTENSION   Yes Provider, Historical   acyclovir (ZOVIRAX) 800 mg tablet Take 800 mg by mouth nightly. Indications: shingles prevention   Yes Provider, Historical   multivitamin with iron tablet Take 1 Tab by mouth daily.    Yes Provider, Historical       Current Meds:     Current Facility-Administered Medications   Medication Dose Route Frequency    potassium chloride SR (KLOR-CON 10) tablet 40 mEq  40 mEq Oral BID    metoprolol tartrate (LOPRESSOR) tablet 25 mg  25 mg Oral Q12H    bumetanide (BUMEX) injection 1 mg  1 mg IntraVENous BID    insulin lispro (HUMALOG) injection   SubCUTAneous Q6H    lidocaine 4 % patch 1 Patch  1 Patch TransDERmal Q24H    heparin (porcine) injection 5,000 Units  5,000 Units SubCUTAneous Q12H    alcohol 62% (NOZIN) nasal  1 Ampule  1 Ampule Topical Q12H    balsam peru-castor oiL (VENELEX) ointment   Topical BID    latanoprost (XALATAN) 0.005 % ophthalmic solution 1 Drop  1 Drop Both Eyes QPM    timolol (TIMOPTIC) 0.5 % ophthalmic solution 1 Drop  1 Drop Both Eyes DAILY    dorzolamide (TRUSOPT) 2 % ophthalmic solution 1 Drop  1 Drop Both Eyes BID    prednisoLONE acetate (PRED FORTE) 1 % ophthalmic suspension 1 Drop  1 Drop Left Eye DAILY    glucose chewable tablet 16 g  4 Tablet Oral PRN    glucagon (GLUCAGEN) injection 1 mg  1 mg IntraMUSCular PRN    dextrose 10% infusion 0-250 mL  0-250 mL IntraVENous PRN    sodium chloride (NS) flush 5-10 mL  5-10 mL IntraVENous PRN    dextrose 10 % infusion 125-250 mL  125-250 mL IntraVENous PRN    sodium chloride (NS) flush 5-10 mL  5-10 mL IntraVENous PRN    sodium chloride (NS) flush 5-40 mL  5-40 mL IntraVENous PRN    ondansetron (ZOFRAN) injection 4 mg  4 mg IntraVENous Q6H PRN    senna (SENOKOT) tablet 8.6 mg  1 Tablet Oral DAILY PRN    polyethylene glycol (MIRALAX) packet 17 g  17 g Oral DAILY PRN       Objective:   Vital Signs:  Visit Vitals  /70 (BP 1 Location: Left upper arm, BP Patient Position: At rest)   Pulse (!) 127   Temp 98.2 °F (36.8 °C)   Resp 20   Ht 5' 3\" (1.6 m)   Wt 60.3 kg (132 lb 15 oz)   SpO2 95%   Breastfeeding No   BMI 23.55 kg/m²    O2 Flow Rate (L/min): 5 l/min O2 Device: None (Room air), Nasal cannula Temp (24hrs), Av.1 °F (36.7 °C), Min:97.8 °F (36.6 °C), Max:98.4 °F (36.9 °C)           Intake/Output:     Intake/Output Summary (Last 24 hours) at 3/12/2023 3188  Last data filed at 3/12/2023 0400  Gross per 24 hour   Intake 125.28 ml   Output 2275 ml   Net -2149.72 ml       Physical Exam:  Elderly female, awake, alert, lying in bed, NAD, appearing slightly more active  Persistent L ptosis from remote VZV  Resps even and unlabored, symmetric chest rise on NC, mild rales throughout, clearer R  AF, 90s-100s, no heave/rub  Abd soft, nontender, nondistended  NICOLAS, extremities warm to touch  Calm and cooperative      LABS AND  DATA:   Personally reviewed    MEDS:   Reviewed    Multidisciplinary Rounds Completed:  NA    ABCDEF Bundle/Checklist Completed:  Yes    SPECIAL EQUIPMENT  None    DISPOSITION  Transfer to tele    CRITICAL CARE CONSULTANT NOTE  I had a face to face encounter with the patient, reviewed and interpreted patient data including clinical events, labs, images, vital signs, I/O's, and examined patient. I have discussed the case and the plan and management of the patient's care with the consulting services, the bedside nurses and the respiratory therapist.      NOTE OF PERSONAL INVOLVEMENT IN CARE   This patient has a high probability of imminent, clinically significant deterioration, which requires the highest level of preparedness to intervene urgently. I participated in the decision-making and personally managed or directed the management of the following life and organ supporting interventions that required my frequent assessment to treat or prevent imminent deterioration.     Azeb Anderson MD  Intensivist  3/12/2023

## 2023-03-12 NOTE — PROGRESS NOTES
GI note: Following for elevated LFTs which are improving. Latest Reference Range & Units 3/8/23 16:29 3/9/23 02:33 3/10/23 05:25 3/11/23 07:41 3/12/23 04:22   Bilirubin, total 0.2 - 1.0 MG/DL 0.9 1.2 (H) 1.4 (H) 1.3 (H) 1.2 (H)   Bilirubin, direct 0.0 - 0.2 MG/DL   0.6 (H) 0.6 (H) 0.5 (H)   Protein, total 6.4 - 8.2 g/dL 6.0 (L) 5.9 (L) 6.2 (L) 5.9 (L) 5.3 (L)   Albumin 3.5 - 5.0 g/dL 3.0 (L) 2.9 (L) 3.1 (L) 2.7 (L) 2.4 (L)   ALT 12 - 78 U/L >3,500 (H) >3,500 (H) 3,040 (H) 2,104 (H) 1,394 (H)   AST 15 - 37 U/L >2,000 (H) >2,000 (H) 1,251 (H) 475 (H) 249 (H)   Alk. phosphatase 45 - 117 U/L 158 (H) 153 (H) 155 (H) 129 (H) 114   (H): Data is abnormally high  (L): Data is abnormally low  Assessment:   GI consultation was for elevated LFTs. [de-identified] y/o female with abrupt onset of malaise. Initial AST and ALT >2000 and >3500 respectively, TB 1.2 and . Negative hepatitis panel. History of zoster affecting optic nerve, on chronic acyclovir for many years. Impression:  Elevated LFTs  Acute HF     Plan:  LFTs are getting better as noted above  Serologies: CMV IgG, EBV IgG. Nuc ag +. Others are still pending.  elevate. No GI complaints. Continue current treatment. Follow  daily LFTs. Expect these to improve as cardiac function improves.     SMA Tj MD

## 2023-03-12 NOTE — PROGRESS NOTES
Physical Therapy    Order acknowledged, chart reviewed. Noted pt with fluctuating 's-140s at rest. Spoke with RN who notes pt recently received metropolol. Will defer PT evaluation at this time and con't to follow.     Tona Menezes, PT, MPT

## 2023-03-12 NOTE — PROGRESS NOTES
Hospitalist Progress Note    Subjective:   Daily Progress Note: 3/12/2023     Hospital Course:  Ms. Paulina Favre is an [de-identified]year old female with PMH significant for DM type 2 and arthritis but otherwise in apparently good health, who presented to ED with abnormal lab results per her PCP office, and persistent N/V x several days with associated constipation. In ED labs were significantly altered including lactic 11, WBC 17, Na 125, K 5., CO2 16, anion gap 17, Cr 1.8, AST/ALT unreadably high, alkphos 176, Tbili 1.7. Pt was admitted to ICU and became increasingly SOB, tachypneic, requiring 4L NC. Bedside US revealed decreased systolic function, official Echo done and not yet read. She went into Afib RVR and was placed on amio and dobutamine gtts. Recurrent right pleural effusion required chest tube placement, fluid sent off for cytology suspicious for possible malignancy questionably lymphoma. Liver enzymes elevated suspected 2/2 passive congestion from HF, trending down. As of 3/11 pt weaned off dobutamine and amio gtts, doing better, able to have chest tube removed, WBC trending down and lactic also. As her clinical status has improved, hospitalist service consulted to assume care of patient as she is transferred to stepdown IVCU unit. Subjective:  Pt examined getting OOB to armchair with assistance of RN. Pt endorses constipation, denies CP, SOB, nausea/vomiting, constipation/diarrhea, or any new c/c or s/o. States \"I'm feeling better\" and is eager to get out of ICU.     Current Facility-Administered Medications   Medication Dose Route Frequency    metoprolol tartrate (LOPRESSOR) tablet 25 mg  25 mg Oral Q12H    potassium bicarb-citric acid (EFFER-K) tablet 50 mEq  50 mEq Oral BID    bumetanide (BUMEX) injection 1 mg  1 mg IntraVENous BID    insulin lispro (HUMALOG) injection   SubCUTAneous Q6H    lidocaine 4 % patch 1 Patch  1 Patch TransDERmal Q24H    heparin (porcine) injection 5,000 Units  5,000 Units SubCUTAneous Q12H    alcohol 62% (NOZIN) nasal  1 Ampule  1 Ampule Topical Q12H    balsam peru-castor oiL (VENELEX) ointment   Topical BID    latanoprost (XALATAN) 0.005 % ophthalmic solution 1 Drop  1 Drop Both Eyes QPM    timolol (TIMOPTIC) 0.5 % ophthalmic solution 1 Drop  1 Drop Both Eyes DAILY    dorzolamide (TRUSOPT) 2 % ophthalmic solution 1 Drop  1 Drop Both Eyes BID    prednisoLONE acetate (PRED FORTE) 1 % ophthalmic suspension 1 Drop  1 Drop Left Eye DAILY    glucose chewable tablet 16 g  4 Tablet Oral PRN    glucagon (GLUCAGEN) injection 1 mg  1 mg IntraMUSCular PRN    dextrose 10% infusion 0-250 mL  0-250 mL IntraVENous PRN    sodium chloride (NS) flush 5-10 mL  5-10 mL IntraVENous PRN    dextrose 10 % infusion 125-250 mL  125-250 mL IntraVENous PRN    sodium chloride (NS) flush 5-10 mL  5-10 mL IntraVENous PRN    sodium chloride (NS) flush 5-40 mL  5-40 mL IntraVENous PRN    ondansetron (ZOFRAN) injection 4 mg  4 mg IntraVENous Q6H PRN    senna (SENOKOT) tablet 8.6 mg  1 Tablet Oral DAILY PRN    polyethylene glycol (MIRALAX) packet 17 g  17 g Oral DAILY PRN        Review of Systems:    Review of Systems   Constitutional:  Positive for malaise/fatigue. Negative for chills and fever. Respiratory:  Negative for cough and shortness of breath. Cardiovascular:  Negative for chest pain, palpitations and leg swelling. Gastrointestinal:  Positive for constipation. Negative for diarrhea, nausea and vomiting. Last BM approx a week ago   Neurological:  Positive for weakness. Negative for dizziness.       Objective:     Visit Vitals  BP 97/61   Pulse (!) 133   Temp 98.1 °F (36.7 °C)   Resp 21   Ht 5' 3\" (1.6 m)   Wt 66.9 kg (147 lb 7.8 oz)   SpO2 93%   Breastfeeding No   BMI 26.13 kg/m²    O2 Flow Rate (L/min): 4 l/min O2 Device: Nasal cannula    Temp (24hrs), Av.1 °F (36.7 °C), Min:97.8 °F (36.6 °C), Max:98.3 °F (36.8 °C)       07  In: 400 [P.O.:400]  Out: 465 [NFUGX:891]  03/10 1901 - 03/12 0700  In: 2459.6 [P.O.:790; I.V.:1669.6]  Out: 3770 [Urine:3520]    PHYSICAL EXAM:    Physical Exam  Constitutional:       General: She is not in acute distress. Comments: Elderly adult female   HENT:      Head: Normocephalic and atraumatic. Mouth/Throat:      Mouth: Mucous membranes are moist.   Eyes:      Pupils: Pupils are equal, round, and reactive to light. Cardiovascular:      Rate and Rhythm: Tachycardia present. Rhythm irregular. Pulses:           Radial pulses are 2+ on the right side and 2+ on the left side. Dorsalis pedis pulses are 1+ on the right side and 1+ on the left side. Heart sounds: Normal heart sounds. No murmur heard. Pulmonary:      Effort: Pulmonary effort is normal. No respiratory distress. Breath sounds: No decreased breath sounds. Comments: O2 @ 2L in use  Abdominal:      General: Bowel sounds are normal. There is no distension. Palpations: Abdomen is soft. Genitourinary:     Comments: Casiano catheter in place draining clear dark yellow urine  Musculoskeletal:      Right lower leg: No edema. Left lower leg: No edema. Skin:     General: Skin is warm and dry. Capillary Refill: Capillary refill takes less than 2 seconds. Coloration: Skin is pale. Neurological:      Mental Status: She is alert and oriented to person, place, and time.           Data Review    Recent Results (from the past 24 hour(s))   GLUCOSE, POC    Collection Time: 03/11/23  5:51 PM   Result Value Ref Range    Glucose (POC) 246 (H) 65 - 117 mg/dL    Performed by Mariangel Sweet RN    GLUCOSE, POC    Collection Time: 03/12/23 12:22 AM   Result Value Ref Range    Glucose (POC) 160 (H) 65 - 117 mg/dL    Performed by Yadiel Bautista RN    PROTHROMBIN TIME + INR    Collection Time: 03/12/23  4:22 AM   Result Value Ref Range    INR 1.3 (H) 0.9 - 1.1      Prothrombin time 13.0 (H) 9.0 - 11.1 sec   PTT    Collection Time: 03/12/23  4:22 AM   Result Value Ref Range    aPTT 24.8 22.1 - 31.0 sec    aPTT, therapeutic range     58.0 - 77.0 SECS   CBC W/O DIFF    Collection Time: 03/12/23  4:22 AM   Result Value Ref Range    WBC 12.3 (H) 3.6 - 11.0 K/uL    RBC 3.51 (L) 3.80 - 5.20 M/uL    HGB 9.7 (L) 11.5 - 16.0 g/dL    HCT 29.4 (L) 35.0 - 47.0 %    MCV 83.8 80.0 - 99.0 FL    MCH 27.6 26.0 - 34.0 PG    MCHC 33.0 30.0 - 36.5 g/dL    RDW 16.1 (H) 11.5 - 14.5 %    PLATELET 345 122 - 787 K/uL    MPV 10.3 8.9 - 12.9 FL    NRBC 0.0 0  WBC    ABSOLUTE NRBC 0.00 0.00 - 6.60 K/uL   METABOLIC PANEL, BASIC    Collection Time: 03/12/23  4:22 AM   Result Value Ref Range    Sodium 136 136 - 145 mmol/L    Potassium 2.6 (LL) 3.5 - 5.1 mmol/L    Chloride 98 97 - 108 mmol/L    CO2 32 21 - 32 mmol/L    Anion gap 6 5 - 15 mmol/L    Glucose 150 (H) 65 - 100 mg/dL    BUN 12 6 - 20 MG/DL    Creatinine 0.45 (L) 0.55 - 1.02 MG/DL    BUN/Creatinine ratio 27 (H) 12 - 20      eGFR >60 >60 ml/min/1.73m2    Calcium 7.6 (L) 8.5 - 10.1 MG/DL   HEPATIC FUNCTION PANEL    Collection Time: 03/12/23  4:22 AM   Result Value Ref Range    Protein, total 5.3 (L) 6.4 - 8.2 g/dL    Albumin 2.4 (L) 3.5 - 5.0 g/dL    Globulin 2.9 2.0 - 4.0 g/dL    A-G Ratio 0.8 (L) 1.1 - 2.2      Bilirubin, total 1.2 (H) 0.2 - 1.0 MG/DL    Bilirubin, direct 0.5 (H) 0.0 - 0.2 MG/DL    Alk.  phosphatase 114 45 - 117 U/L    AST (SGOT) 249 (H) 15 - 37 U/L    ALT (SGPT) 1,394 (H) 12 - 78 U/L   MAGNESIUM    Collection Time: 03/12/23  4:22 AM   Result Value Ref Range    Magnesium 1.5 (L) 1.6 - 2.4 mg/dL   PHOSPHORUS    Collection Time: 03/12/23  4:22 AM   Result Value Ref Range    Phosphorus 2.1 (L) 2.6 - 4.7 MG/DL   GLUCOSE, POC    Collection Time: 03/12/23  6:26 AM   Result Value Ref Range    Glucose (POC) 143 (H) 65 - 117 mg/dL    Performed by [address] [place] [address] JORDI    GLUCOSE, POC    Collection Time: 03/12/23 12:16 PM   Result Value Ref Range    Glucose (POC) 219 (H) 65 - 117 mg/dL    Performed by Haydee Yepez US ABD LTD   Final Result      1. Gallbladder is distended and contains multiple gallstones. No ductal   dilatation      XR CHEST PORT   Final Result   Pigtail drainage catheter on the right with diminished right-sided effusion. Persistent left effusion. XR CHEST PORT   Final Result   Increased bilateral pleural effusions right greater than left. CT ABD PELV WO CONT   Final Result      1. Congestive heart failure, with large right and moderate left pleural   effusions, bibasilar atelectasis, and pulmonary edema. 2. Multiple small pulmonary nodules. These may be a component of pulmonary   edema, recommend follow-up chest CT after acute issues are resolved. 3. Prominent distended gallbladder, without other findings of acute   cholecystitis. CT CHEST WO CONT   Final Result      1. Congestive heart failure, with large right and moderate left pleural   effusions, bibasilar atelectasis, and pulmonary edema. 2. Multiple small pulmonary nodules. These may be a component of pulmonary   edema, recommend follow-up chest CT after acute issues are resolved. 3. Prominent distended gallbladder, without other findings of acute   cholecystitis. US ABD LTD   Final Result      1. Marked gallbladder distention with cholelithiasis but no wall thickening. 2. No biliary ductal dilatation is documented. 3. Small ascites. 4. Right pleural effusion. XR CHEST PORT   Final Result      Small right greater than left bilateral pleural effusions.          XR CHEST PORT    (Results Pending)       Active Problems:    Sepsis ([address] (3/7/2023)        Assessment/Plan:   Acute Decompensated HF  Afib with RVR now rate controlled  Runs of NSVT  Cardiology consulted and following  Required IV milrinone and cardizem gtts - weaned off 3/11  Echo done not yet read, per cardiology note prelim read appears low EF  Remains in Afib, rate ~90s-low 110s although with exertion OOB was 130s  SBPs variable but soft today, 90s during my visit  Continued diuresis  Perhaps addition of midodrine could support BP while further adjustments made with beta blocker for better HR control?   Defer this decision to cardiology  Plan for [facility] 3/13 to investigate CAD or ischemic etiology as cause of HFrEF    Pleural effusions, questionably malignant  Possible lymphoma not confirmed  Chest Tube - removed 3/11  Hem/Onc consulted and following  Further hem/onc workup anticipated early this week to evaluate possible lymphoma as was suggested by pleural fluid findings  Pt required chest tube for recurring right sided pleural effusion, this was discontinued 3/11  Check CXR to eval current status of pleural effusion    Suspected severe sepsis with hypovolemic shock on admission  Leukocytosis, lactic acidosis, OLGA, and hypervolemic hyponatremia  LA 11.75 now 1.6 - resolved  OLGA likely 2/2 hypovolemia, Cr resolving 1.81 now 0.45  Na 125 now 136 - resolved  WBC 17.6 now  12.3  No source of infection has been isolated  Urine cx (-), BC drawn 3/7 prelim (-), Resp PCR (-), pleural fluid 3/9 prelim (-)  Pt was initially treated with Zosyn, Flagyl but with no infectious source these were discontinued and WBC has trended down without abx, will continue to monitor    Elevated LFTs suspect cardiohepatic syndrome  Severe passive congestion  GI consulted and following  LFTs elevated 2/2 severe passive congestion, \"unreadably high\" on admission, now ALT 1394, , alk ph 114  Continuing to improve  Continue to monitor    Constipation x1 week per pt report  Added daily scheduled miralax and stool softeners  1x dose MOM now  PRN bisacodyl suppository    Diabetes mellitus type 2  Accuchecks ACHS  Sliding scale insulin  Monitor for s/s hypo/hyperglycemia  Hypoglycemia treatment per protocol  Diabetes management consulted    Hypokalemia, hypomagnesemia - K 2.6, Mg 1.5, repletion ordered, recheck BMP @ 1800  Suspect 2/2 diuresis    Arthritis - avoiding tylenol d/t liver dz and ibuprofen/NSAIDs due to HFrEF; pt denies pain at this time    Palliative care consulted and following    Disposition: Pending hospital course  DVT Prophylaxis: Heparin SQ  Code Status:  Full Code  POA: Delphine Calzada -  - [phone]     Time Spent: 35 minutes  Care Plan discussed with: Patient, , Nurse  _______________________________________________________________    JEANIE Caballero

## 2023-03-13 ENCOUNTER — APPOINTMENT (OUTPATIENT)
Dept: GENERAL RADIOLOGY | Age: 83
End: 2023-03-13
Attending: INTERNAL MEDICINE
Payer: MEDICARE

## 2023-03-13 PROBLEM — Z71.89 GOALS OF CARE, COUNSELING/DISCUSSION: Status: ACTIVE | Noted: 2023-03-13

## 2023-03-13 PROBLEM — R53.81 PHYSICAL DEBILITY: Status: ACTIVE | Noted: 2023-03-13

## 2023-03-13 PROBLEM — R53.1 WEAKNESS: Status: ACTIVE | Noted: 2023-03-13

## 2023-03-13 PROBLEM — K59.00 CONSTIPATION, UNSPECIFIED CONSTIPATION TYPE: Status: ACTIVE | Noted: 2023-03-13

## 2023-03-13 PROBLEM — R63.0 ANOREXIA: Status: ACTIVE | Noted: 2023-03-13

## 2023-03-13 PROBLEM — Z51.5 PALLIATIVE CARE ENCOUNTER: Status: ACTIVE | Noted: 2023-03-13

## 2023-03-13 PROBLEM — R54 FRAILTY: Status: ACTIVE | Noted: 2023-03-13

## 2023-03-13 LAB
ALBUMIN SERPL-MCNC: 2.8 G/DL (ref 3.5–5)
ALBUMIN/GLOB SERPL: 0.8 (ref 1.1–2.2)
ALP SERPL-CCNC: 142 U/L (ref 45–117)
ALT SERPL-CCNC: 1161 U/L (ref 12–78)
ANION GAP SERPL CALC-SCNC: 8 MMOL/L (ref 5–15)
APTT PPP: 21.7 SEC (ref 22.1–31)
APTT PPP: 22.2 SEC (ref 22.1–31)
AST SERPL-CCNC: 166 U/L (ref 15–37)
BACTERIA SPEC CULT: NORMAL
BASOPHILS # BLD: 0 K/UL (ref 0–0.1)
BASOPHILS NFR BLD: 0 % (ref 0–1)
BILIRUB DIRECT SERPL-MCNC: 0.5 MG/DL (ref 0–0.2)
BILIRUB SERPL-MCNC: 1 MG/DL (ref 0.2–1)
BNP SERPL-MCNC: 6700 PG/ML
BUN SERPL-MCNC: 22 MG/DL (ref 6–20)
BUN/CREAT SERPL: 33 (ref 12–20)
CALCIUM SERPL-MCNC: 8.4 MG/DL (ref 8.5–10.1)
CHLORIDE SERPL-SCNC: 95 MMOL/L (ref 97–108)
CO2 SERPL-SCNC: 31 MMOL/L (ref 21–32)
CREAT SERPL-MCNC: 0.67 MG/DL (ref 0.55–1.02)
DIFFERENTIAL METHOD BLD: ABNORMAL
EOSINOPHIL # BLD: 0 K/UL (ref 0–0.4)
EOSINOPHIL NFR BLD: 0 % (ref 0–7)
ERYTHROCYTE [DISTWIDTH] IN BLOOD BY AUTOMATED COUNT: 16.2 % (ref 11.5–14.5)
ERYTHROCYTE [DISTWIDTH] IN BLOOD BY AUTOMATED COUNT: 16.5 % (ref 11.5–14.5)
GLOBULIN SER CALC-MCNC: 3.5 G/DL (ref 2–4)
GLUCOSE BLD STRIP.AUTO-MCNC: 202 MG/DL (ref 65–117)
GLUCOSE BLD STRIP.AUTO-MCNC: 227 MG/DL (ref 65–117)
GLUCOSE BLD STRIP.AUTO-MCNC: 229 MG/DL (ref 65–117)
GLUCOSE SERPL-MCNC: 232 MG/DL (ref 65–100)
GRAM STN SPEC: NORMAL
GRAM STN SPEC: NORMAL
HCT VFR BLD AUTO: 36.8 % (ref 35–47)
HCT VFR BLD AUTO: 36.9 % (ref 35–47)
HGB BLD-MCNC: 11.5 G/DL (ref 11.5–16)
HGB BLD-MCNC: 11.8 G/DL (ref 11.5–16)
IMM GRANULOCYTES # BLD AUTO: 0.1 K/UL (ref 0–0.04)
IMM GRANULOCYTES NFR BLD AUTO: 1 % (ref 0–0.5)
INR PPP: 1.2 (ref 0.9–1.1)
LYMPHOCYTES # BLD: 3.5 K/UL (ref 0.8–3.5)
LYMPHOCYTES NFR BLD: 20 % (ref 12–49)
MAGNESIUM SERPL-MCNC: 2.1 MG/DL (ref 1.6–2.4)
MCH RBC QN AUTO: 27.1 PG (ref 26–34)
MCH RBC QN AUTO: 27.3 PG (ref 26–34)
MCHC RBC AUTO-ENTMCNC: 31.3 G/DL (ref 30–36.5)
MCHC RBC AUTO-ENTMCNC: 32 G/DL (ref 30–36.5)
MCV RBC AUTO: 84.6 FL (ref 80–99)
MCV RBC AUTO: 87.2 FL (ref 80–99)
MONOCYTES # BLD: 1 K/UL (ref 0–1)
MONOCYTES NFR BLD: 5 % (ref 5–13)
NEUTS SEG # BLD: 13 K/UL (ref 1.8–8)
NEUTS SEG NFR BLD: 74 % (ref 32–75)
NRBC # BLD: 0 K/UL (ref 0–0.01)
NRBC # BLD: 0.02 K/UL (ref 0–0.01)
NRBC BLD-RTO: 0 PER 100 WBC
NRBC BLD-RTO: 0.1 PER 100 WBC
PHOSPHATE SERPL-MCNC: 2.6 MG/DL (ref 2.6–4.7)
PLATELET # BLD AUTO: 296 K/UL (ref 150–400)
PLATELET # BLD AUTO: 313 K/UL (ref 150–400)
PMV BLD AUTO: 10.2 FL (ref 8.9–12.9)
PMV BLD AUTO: 11.2 FL (ref 8.9–12.9)
POTASSIUM SERPL-SCNC: 4.5 MMOL/L (ref 3.5–5.1)
PROT SERPL-MCNC: 6.3 G/DL (ref 6.4–8.2)
PROTHROMBIN TIME: 12.3 SEC (ref 9–11.1)
RBC # BLD AUTO: 4.22 M/UL (ref 3.8–5.2)
RBC # BLD AUTO: 4.36 M/UL (ref 3.8–5.2)
SERVICE CMNT-IMP: ABNORMAL
SERVICE CMNT-IMP: NORMAL
SODIUM SERPL-SCNC: 134 MMOL/L (ref 136–145)
THERAPEUTIC RANGE,PTTT: ABNORMAL SECS (ref 58–77)
THERAPEUTIC RANGE,PTTT: NORMAL SECS (ref 58–77)
UFH PPP CHRO-ACNC: 0.44 IU/ML
UFH PPP CHRO-ACNC: <0.1 IU/ML
WBC # BLD AUTO: 17.6 K/UL (ref 3.6–11)
WBC # BLD AUTO: 18 K/UL (ref 3.6–11)

## 2023-03-13 PROCEDURE — 74011000250 HC RX REV CODE- 250: Performed by: NURSE PRACTITIONER

## 2023-03-13 PROCEDURE — 74011000258 HC RX REV CODE- 258: Performed by: INTERNAL MEDICINE

## 2023-03-13 PROCEDURE — 74011636637 HC RX REV CODE- 636/637: Performed by: CLINICAL NURSE SPECIALIST

## 2023-03-13 PROCEDURE — 85610 PROTHROMBIN TIME: CPT

## 2023-03-13 PROCEDURE — 77010033711 HC HIGH FLOW OXYGEN

## 2023-03-13 PROCEDURE — 80076 HEPATIC FUNCTION PANEL: CPT

## 2023-03-13 PROCEDURE — 85730 THROMBOPLASTIN TIME PARTIAL: CPT

## 2023-03-13 PROCEDURE — 36415 COLL VENOUS BLD VENIPUNCTURE: CPT

## 2023-03-13 PROCEDURE — 80048 BASIC METABOLIC PNL TOTAL CA: CPT

## 2023-03-13 PROCEDURE — 74011000250 HC RX REV CODE- 250: Performed by: INTERNAL MEDICINE

## 2023-03-13 PROCEDURE — 85025 COMPLETE CBC W/AUTO DIFF WBC: CPT

## 2023-03-13 PROCEDURE — 83880 ASSAY OF NATRIURETIC PEPTIDE: CPT

## 2023-03-13 PROCEDURE — 74011250636 HC RX REV CODE- 250/636: Performed by: INTERNAL MEDICINE

## 2023-03-13 PROCEDURE — 83735 ASSAY OF MAGNESIUM: CPT

## 2023-03-13 PROCEDURE — 74011250637 HC RX REV CODE- 250/637

## 2023-03-13 PROCEDURE — 74011250637 HC RX REV CODE- 250/637: Performed by: STUDENT IN AN ORGANIZED HEALTH CARE EDUCATION/TRAINING PROGRAM

## 2023-03-13 PROCEDURE — 82962 GLUCOSE BLOOD TEST: CPT

## 2023-03-13 PROCEDURE — 65270000046 HC RM TELEMETRY

## 2023-03-13 PROCEDURE — 99222 1ST HOSP IP/OBS MODERATE 55: CPT | Performed by: INTERNAL MEDICINE

## 2023-03-13 PROCEDURE — 85027 COMPLETE CBC AUTOMATED: CPT

## 2023-03-13 PROCEDURE — 71045 X-RAY EXAM CHEST 1 VIEW: CPT

## 2023-03-13 PROCEDURE — 74011250637 HC RX REV CODE- 250/637: Performed by: INTERNAL MEDICINE

## 2023-03-13 PROCEDURE — 74011250636 HC RX REV CODE- 250/636: Performed by: STUDENT IN AN ORGANIZED HEALTH CARE EDUCATION/TRAINING PROGRAM

## 2023-03-13 PROCEDURE — 99222 1ST HOSP IP/OBS MODERATE 55: CPT | Performed by: NURSE PRACTITIONER

## 2023-03-13 PROCEDURE — 85520 HEPARIN ASSAY: CPT

## 2023-03-13 PROCEDURE — 84100 ASSAY OF PHOSPHORUS: CPT

## 2023-03-13 RX ORDER — HEPARIN SODIUM 10000 [USP'U]/100ML
12-25 INJECTION, SOLUTION INTRAVENOUS
Status: DISCONTINUED | OUTPATIENT
Start: 2023-03-13 | End: 2023-03-14

## 2023-03-13 RX ORDER — INSULIN GLARGINE 100 [IU]/ML
0.3 INJECTION, SOLUTION SUBCUTANEOUS DAILY
Status: DISCONTINUED | OUTPATIENT
Start: 2023-03-13 | End: 2023-03-14

## 2023-03-13 RX ORDER — INSULIN LISPRO 100 [IU]/ML
INJECTION, SOLUTION INTRAVENOUS; SUBCUTANEOUS
Status: DISCONTINUED | OUTPATIENT
Start: 2023-03-13 | End: 2023-03-17

## 2023-03-13 RX ORDER — LISINOPRIL 5 MG/1
2.5 TABLET ORAL DAILY
Status: DISCONTINUED | OUTPATIENT
Start: 2023-03-13 | End: 2023-03-18

## 2023-03-13 RX ORDER — DEXTROSE MONOHYDRATE 100 MG/ML
0-250 INJECTION, SOLUTION INTRAVENOUS AS NEEDED
Status: DISCONTINUED | OUTPATIENT
Start: 2023-03-13 | End: 2023-03-13

## 2023-03-13 RX ORDER — HEPARIN SODIUM 1000 [USP'U]/ML
4000 INJECTION, SOLUTION INTRAVENOUS; SUBCUTANEOUS AS NEEDED
Status: DISCONTINUED | OUTPATIENT
Start: 2023-03-13 | End: 2023-03-14 | Stop reason: ALTCHOICE

## 2023-03-13 RX ORDER — IBUPROFEN 200 MG
4 TABLET ORAL AS NEEDED
Status: DISCONTINUED | OUTPATIENT
Start: 2023-03-13 | End: 2023-03-17

## 2023-03-13 RX ORDER — FUROSEMIDE 10 MG/ML
20 INJECTION INTRAMUSCULAR; INTRAVENOUS ONCE
Status: COMPLETED | OUTPATIENT
Start: 2023-03-13 | End: 2023-03-13

## 2023-03-13 RX ORDER — HEPARIN SODIUM 1000 [USP'U]/ML
2000 INJECTION, SOLUTION INTRAVENOUS; SUBCUTANEOUS AS NEEDED
Status: DISCONTINUED | OUTPATIENT
Start: 2023-03-13 | End: 2023-03-14 | Stop reason: ALTCHOICE

## 2023-03-13 RX ORDER — DILTIAZEM HYDROCHLORIDE 60 MG/1
60 TABLET, FILM COATED ORAL
Status: DISCONTINUED | OUTPATIENT
Start: 2023-03-13 | End: 2023-03-18

## 2023-03-13 RX ORDER — ASPIRIN 325 MG
325 TABLET ORAL ONCE
Status: COMPLETED | OUTPATIENT
Start: 2023-03-13 | End: 2023-03-13

## 2023-03-13 RX ORDER — FUROSEMIDE 20 MG/1
20 TABLET ORAL 2 TIMES DAILY
Status: DISCONTINUED | OUTPATIENT
Start: 2023-03-13 | End: 2023-03-14

## 2023-03-13 RX ADMIN — SODIUM CHLORIDE, POTASSIUM CHLORIDE, SODIUM LACTATE AND CALCIUM CHLORIDE 250 ML: 600; 310; 30; 20 INJECTION, SOLUTION INTRAVENOUS at 13:12

## 2023-03-13 RX ADMIN — FUROSEMIDE 20 MG: 20 TABLET ORAL at 18:22

## 2023-03-13 RX ADMIN — Medication 1 AMPULE: at 21:11

## 2023-03-13 RX ADMIN — DILTIAZEM HYDROCHLORIDE 60 MG: 60 TABLET, FILM COATED ORAL at 21:11

## 2023-03-13 RX ADMIN — Medication 1 AMPULE: at 09:46

## 2023-03-13 RX ADMIN — CASTOR OIL AND BALSAM, PERU: 788; 87 OINTMENT TOPICAL at 09:47

## 2023-03-13 RX ADMIN — SENNOSIDES AND DOCUSATE SODIUM 2 TABLET: 50; 8.6 TABLET ORAL at 09:22

## 2023-03-13 RX ADMIN — METOPROLOL TARTRATE 5 MG: 5 INJECTION, SOLUTION INTRAVENOUS at 04:42

## 2023-03-13 RX ADMIN — AMIODARONE HYDROCHLORIDE 150 MG: 1.5 INJECTION, SOLUTION INTRAVENOUS at 09:21

## 2023-03-13 RX ADMIN — DILTIAZEM HYDROCHLORIDE 60 MG: 60 TABLET, FILM COATED ORAL at 16:00

## 2023-03-13 RX ADMIN — TIMOLOL MALEATE 1 DROP: 5 SOLUTION OPHTHALMIC at 09:49

## 2023-03-13 RX ADMIN — PREDNISOLONE ACETATE 1 DROP: 10 SUSPENSION/ DROPS OPHTHALMIC at 09:48

## 2023-03-13 RX ADMIN — DORZOLAMIDE HYDROCHLORIDE 1 DROP: 20 SOLUTION/ DROPS OPHTHALMIC at 09:47

## 2023-03-13 RX ADMIN — AMIODARONE HYDROCHLORIDE 0.5 MG/MIN: 50 INJECTION, SOLUTION INTRAVENOUS at 15:36

## 2023-03-13 RX ADMIN — METOPROLOL TARTRATE 5 MG: 5 INJECTION, SOLUTION INTRAVENOUS at 00:55

## 2023-03-13 RX ADMIN — POLYETHYLENE GLYCOL 3350 17 G: 17 POWDER, FOR SOLUTION ORAL at 09:21

## 2023-03-13 RX ADMIN — HEPARIN SODIUM 12 UNITS/KG/HR: 10000 INJECTION, SOLUTION INTRAVENOUS at 10:44

## 2023-03-13 RX ADMIN — METOPROLOL SUCCINATE 75 MG: 50 TABLET, EXTENDED RELEASE ORAL at 09:47

## 2023-03-13 RX ADMIN — FUROSEMIDE 20 MG: 10 INJECTION, SOLUTION INTRAMUSCULAR; INTRAVENOUS at 16:00

## 2023-03-13 RX ADMIN — Medication 1 MG/MIN: at 09:39

## 2023-03-13 RX ADMIN — Medication 2 UNITS: at 21:11

## 2023-03-13 RX ADMIN — POTASSIUM BICARBONATE 50 MEQ: 391 TABLET, EFFERVESCENT ORAL at 09:22

## 2023-03-13 RX ADMIN — CASTOR OIL AND BALSAM, PERU: 788; 87 OINTMENT TOPICAL at 21:11

## 2023-03-13 RX ADMIN — Medication 2 UNITS: at 18:22

## 2023-03-13 RX ADMIN — LATANOPROST 1 DROP: 50 SOLUTION OPHTHALMIC at 18:24

## 2023-03-13 RX ADMIN — INSULIN GLARGINE 20 UNITS: 100 INJECTION, SOLUTION SUBCUTANEOUS at 13:11

## 2023-03-13 RX ADMIN — AMIODARONE HYDROCHLORIDE 0.5 MG/MIN: 50 INJECTION, SOLUTION INTRAVENOUS at 16:25

## 2023-03-13 RX ADMIN — ASPIRIN 325 MG: 325 TABLET ORAL at 09:47

## 2023-03-13 RX ADMIN — DORZOLAMIDE HYDROCHLORIDE 1 DROP: 20 SOLUTION/ DROPS OPHTHALMIC at 18:23

## 2023-03-13 NOTE — PROGRESS NOTES
GI follow up note:    Her daughter is at bedside. No new GI issues to report. I saw cardiology note from yesterday     Latest Reference Range & Units 3/10/23 05:25 3/11/23 07:41 3/12/23 04:22 3/13/23 04:02   Bilirubin, total 0.2 - 1.0 MG/DL 1.4 (H) 1.3 (H) 1.2 (H) 1.0   Bilirubin, direct 0.0 - 0.2 MG/DL 0.6 (H) 0.6 (H) 0.5 (H) 0.5 (H)   Protein, total 6.4 - 8.2 g/dL 6.2 (L) 5.9 (L) 5.3 (L) 6.3 (L)   Albumin 3.5 - 5.0 g/dL 3.1 (L) 2.7 (L) 2.4 (L) 2.8 (L)   Globulin 2.0 - 4.0 g/dL 3.1 3.2 2.9 3.5   A-G Ratio 1.1 - 2.2   1.0 (L) 0.8 (L) 0.8 (L) 0.8 (L)   ALT 12 - 78 U/L 3,040 (H) 2,104 (H) 1,394 (H) 1,161 (H)   (H): Data is abnormally high  (L): Data is abnormally low    O/E: AAO. diuaghter at bedside  COR; RRR  GI soft, NT      Assessment:   GI consultation was for elevated LFTs. She is a [de-identified] y/o female with abrupt onset of malaise. Initial AST and ALT >2000 and >3500 respectively, TB 1.2 and . Negative hepatitis panel. History of zoster affecting optic nerve, on chronic acyclovir for many years. Liver enzymes elevation suspected 2/2 passive liver congestion from HF. This is improving     Impression:  Elevated LFTs  Acute HF     Plan:   Liver enzymes elevation is suspected 2/2 passive hepatic congestion from HF and they are trending down. LFTs are getting better as noted above. T bili has normalized   Serologies CMV IgG, EBV IgG Nuc ag +. HSV was negative   elevate. Oncology saw the pt  Follow  daily LFTs. Expect these to improve as cardiac function improves. The trend is promising  We will see her on request while here. Please call us if there are questions that arise.     SMA Tj MD

## 2023-03-13 NOTE — PROGRESS NOTES
Received notification from bedside RN about patient with regards to: unable to hold sats >92% on NC 6 L, placed on NRM 13 L.  Wheezing on ausculation and reports SOB  VS: /87, HR 95, RR 28, O2 sat 89% on NRM 13 L    Intervention given:   - Duoneb PRN  - NHHF to tirate to keep sat >92%  - pro BNP with AM labs

## 2023-03-13 NOTE — PROGRESS NOTES
[facility]  [address]  [phone]  WWW. [email or web address]    Cardiology Progress Note      3/13/2023 5:31 PM    Admit Date: 3/7/2023    Admit Diagnosis:   Sepsis ([address] [A41.9]    Subjective: The patient was seen and examined at the bedside this morning. Her breathing is better. Back in atrial fibrillation with rapid ventricular response overnight.      Visit Vitals  /67   Pulse (!) 116   Temp 98.2 °F (36.8 °C)   Resp 18   Ht 5' 3\" (1.6 m)   Wt 67 kg (147 lb 11.3 oz)   SpO2 92%   Breastfeeding No   BMI 26.17 kg/m²       Current Facility-Administered Medications   Medication Dose Route Frequency    amiodarone (CORDARONE) 375 mg/250 mL D5W infusion  0.5 mg/min IntraVENous CONTINUOUS    heparin 25,000 units in D5W 250 ml infusion  12-25 Units/kg/hr IntraVENous TITRATE    heparin (porcine) 1,000 unit/mL injection 2,000 Units  2,000 Units IntraVENous PRN    Or    heparin (porcine) 1,000 unit/mL injection 4,000 Units  4,000 Units IntraVENous PRN    metoprolol succinate (TOPROL-XL) XL tablet 75 mg  75 mg Oral DAILY    lisinopriL (PRINIVIL, ZESTRIL) tablet 2.5 mg  2.5 mg Oral DAILY    furosemide (LASIX) tablet 20 mg  20 mg Oral BID    glucose chewable tablet 16 g  4 Tablet Oral PRN    glucagon (GLUCAGEN) injection 1 mg  1 mg IntraMUSCular PRN    insulin glargine (LANTUS) injection 20 Units  0.3 Units/kg SubCUTAneous DAILY    insulin lispro (HUMALOG) injection   SubCUTAneous AC&HS    dilTIAZem IR (CARDIZEM) tablet 60 mg  60 mg Oral AC&HS    polyethylene glycol (MIRALAX) packet 17 g  17 g Oral DAILY    senna-docusate (PERICOLACE) 8.6-50 mg per tablet 2 Tablet  2 Tablet Oral DAILY    bisacodyL (DULCOLAX) suppository 10 mg  10 mg Rectal DAILY PRN    dextrose 10% infusion 0-250 mL  0-250 mL IntraVENous PRN    metoprolol (LOPRESSOR) injection 5 mg  5 mg IntraVENous PRN    albuterol-ipratropium (DUO-NEB) 2.5 MG-0.5 MG/3 ML  3 mL Nebulization Q4H PRN    lidocaine 4 % patch 1 Patch  1 Patch TransDERmal Q24H    alcohol 62% (NOZIN) nasal  1 Ampule  1 Ampule Topical Q12H    balsam peru-castor oiL (VENELEX) ointment   Topical BID    latanoprost (XALATAN) 0.005 % ophthalmic solution 1 Drop  1 Drop Both Eyes QPM    timolol (TIMOPTIC) 0.5 % ophthalmic solution 1 Drop  1 Drop Both Eyes DAILY    dorzolamide (TRUSOPT) 2 % ophthalmic solution 1 Drop  1 Drop Both Eyes BID    prednisoLONE acetate (PRED FORTE) 1 % ophthalmic suspension 1 Drop  1 Drop Left Eye DAILY    sodium chloride (NS) flush 5-10 mL  5-10 mL IntraVENous PRN    sodium chloride (NS) flush 5-10 mL  5-10 mL IntraVENous PRN    sodium chloride (NS) flush 5-40 mL  5-40 mL IntraVENous PRN    ondansetron (ZOFRAN) injection 4 mg  4 mg IntraVENous Q6H PRN       Objective:      Physical Assessment:   General Appearance:  alert, cooperative, well nourished, well developed; appears stated age  Eyes: sclera anicteric  Mouth/Throat: moist mucous membranes; oral pharynx clear  Neck: supple; no JVD or bruit  Pulmonary:  inspiratory crackles posteriorly; good effort  Cardiovascular: fast rate and irregular rhythm, no murmur, click, rub, or gallop  Abdomen: soft, non-tender, non-distended; bowel sounds normal  Musculoskeletal: no swelling or deformity; moves all extremities  Extremities: no edema; palpable distal pulses   Skin: warm and dry  Neuro: grossly normal  Psych: normal mood and affect given the setting      Data Review:   Recent Labs     03/13/23  0902 03/13/23 0402 03/12/23 0422   WBC 17.6* 18.0* 12.3*   HGB 11.5 11.8 9.7*   HCT 36.8 36.9 29.4*    296 217     Recent Labs     03/13/23  0402 03/12/23  1834 03/12/23  0422 03/11/23  0741   * 130* 136 133*   K 4.5 4.8 2.6* 3.5   CL 95* 93* 98 95*   CO2 31 34* 32 32   * 279* 150* 273*   BUN 22* 19 12 17   CREA 0.67 0.79 0.45* 0.68   CA 8.4* 8.3* 7.6* 7.5*   MG 2.1 2.1 1.5* 1.5*   PHOS 2.6 2.3* 2.1* 2.2*   ALB 2.8*  --  2.4* 2.7* TBILI 1.0  --  1.2* 1.3*   ALT 1,161*  --  1,394* 2,104*   INR 1.2*  --  1.3* 1.3*       No results for input(s): TROPHS, CPK, CKMB in the last 72 hours. Intake/Output Summary (Last 24 hours) at 3/13/2023 1731  Last data filed at 3/13/2023 1206  Gross per 24 hour   Intake 290 ml   Output 610 ml   Net -320 ml        Telemetry:   EKG:  Cxray:    Assessment:   AF with RVR  Switches between sinus and atrial fibrillation repeatedly  Started amiodarone today  Continue anticoagulation with heparin     2. Acute decompensated HF  Echo pending final read; ejection fraction moderately to severely reduced on prelim read  Switched to oral diuretic today  Would avoid diltiazem given heart failure with reduced ejection fraction   Left heart catheterization cancelled today, will plan for tomorrow morning     3. NSVT  BB     4. Pleural effusions  Concern for malignancy given exudative effusion and no pneumonia, onc follow up    Thank you for allowing us to participate in the care of this patient. We will follow.     Signed By: Linda Almaraz MD     March 13, 2023

## 2023-03-13 NOTE — PROGRESS NOTES
Participated in CCU IDR where pt was discussed.   Nadege Collier M.Div, [facility] Paging Service [phone])

## 2023-03-13 NOTE — PROGRESS NOTES
Hospitalist Progress Note    Subjective:   Daily Progress Note: 3/13/2023     Hospital Course:  Ms. Daljit Garcia is an [de-identified]year old female with PMH significant for DM type 2 and arthritis but otherwise in apparently good health, who presented to ED with abnormal lab results per her PCP office, and persistent N/V x several days with associated constipation. In ED labs were significantly altered including lactic 11, WBC 17, Na 125, K 5., CO2 16, anion gap 17, Cr 1.8, AST/ALT unreadably high, alkphos 176, Tbili 1.7. Pt was admitted to ICU and became increasingly SOB, tachypneic, requiring 4L NC. Bedside US revealed decreased systolic function, official Echo done and not yet read. She went into Afib RVR and was placed on amio and dobutamine gtts. Recurrent right pleural effusion required chest tube placement, fluid sent off for cytology suspicious for possible malignancy questionably lymphoma. Liver enzymes elevated suspected 2/2 passive congestion from HF, trending down. As of 3/11 pt weaned off dobutamine and amio gtts, doing better, able to have chest tube removed, WBC trending down and lactic also. As her clinical status has improved, hospitalist service consulted to assume care of patient as she is transferred to stepdown IVCU unit. Subjective:  Pt examined getting OOB to armchair with assistance of RN. Pt endorses constipation, denies CP, SOB, nausea/vomiting, constipation/diarrhea, or any new c/c or s/o. States \"I'm feeling better\" and is eager to get out of ICU.     Current Facility-Administered Medications   Medication Dose Route Frequency    amiodarone (CORDARONE) 375 mg/250 mL D5W infusion  1 mg/min IntraVENous CONTINUOUS    Followed by    amiodarone (CORDARONE) 375 mg/250 mL D5W infusion  0.5 mg/min IntraVENous CONTINUOUS    heparin 25,000 units in D5W 250 ml infusion  12-25 Units/kg/hr IntraVENous TITRATE    heparin (porcine) 1,000 unit/mL injection 2,000 Units  2,000 Units IntraVENous PRN    Or heparin (porcine) 1,000 unit/mL injection 4,000 Units  4,000 Units IntraVENous PRN    metoprolol succinate (TOPROL-XL) XL tablet 75 mg  75 mg Oral DAILY    lisinopriL (PRINIVIL, ZESTRIL) tablet 2.5 mg  2.5 mg Oral DAILY    furosemide (LASIX) tablet 20 mg  20 mg Oral BID    glucose chewable tablet 16 g  4 Tablet Oral PRN    glucagon (GLUCAGEN) injection 1 mg  1 mg IntraMUSCular PRN    dextrose 10% infusion 0-250 mL  0-250 mL IntraVENous PRN    insulin glargine (LANTUS) injection 20 Units  0.3 Units/kg SubCUTAneous DAILY    insulin lispro (HUMALOG) injection   SubCUTAneous AC&HS    polyethylene glycol (MIRALAX) packet 17 g  17 g Oral DAILY    senna-docusate (PERICOLACE) 8.6-50 mg per tablet 2 Tablet  2 Tablet Oral DAILY    bisacodyL (DULCOLAX) suppository 10 mg  10 mg Rectal DAILY PRN    dextrose 10% infusion 0-250 mL  0-250 mL IntraVENous PRN    metoprolol (LOPRESSOR) injection 5 mg  5 mg IntraVENous PRN    albuterol-ipratropium (DUO-NEB) 2.5 MG-0.5 MG/3 ML  3 mL Nebulization Q4H PRN    lidocaine 4 % patch 1 Patch  1 Patch TransDERmal Q24H    [Held by provider] heparin (porcine) injection 5,000 Units  5,000 Units SubCUTAneous Q12H    alcohol 62% (NOZIN) nasal  1 Ampule  1 Ampule Topical Q12H    balsam peru-castor oiL (VENELEX) ointment   Topical BID    latanoprost (XALATAN) 0.005 % ophthalmic solution 1 Drop  1 Drop Both Eyes QPM    timolol (TIMOPTIC) 0.5 % ophthalmic solution 1 Drop  1 Drop Both Eyes DAILY    dorzolamide (TRUSOPT) 2 % ophthalmic solution 1 Drop  1 Drop Both Eyes BID    prednisoLONE acetate (PRED FORTE) 1 % ophthalmic suspension 1 Drop  1 Drop Left Eye DAILY    dextrose 10% infusion 0-250 mL  0-250 mL IntraVENous PRN    sodium chloride (NS) flush 5-10 mL  5-10 mL IntraVENous PRN    dextrose 10 % infusion 125-250 mL  125-250 mL IntraVENous PRN    sodium chloride (NS) flush 5-10 mL  5-10 mL IntraVENous PRN    sodium chloride (NS) flush 5-40 mL  5-40 mL IntraVENous PRN    ondansetron (ZOFRAN) injection 4 mg  4 mg IntraVENous Q6H PRN        Review of Systems:    Review of Systems   Constitutional:  Positive for malaise/fatigue. Negative for chills and fever. Respiratory:  Negative for cough and shortness of breath. Cardiovascular:  Negative for chest pain, palpitations and leg swelling. Gastrointestinal:  Positive for constipation. Negative for diarrhea, nausea and vomiting. Last BM approx a week ago   Neurological:  Positive for weakness. Negative for dizziness. Objective:     Visit Vitals  /85   Pulse (!) 113   Temp 98.2 °F (36.8 °C)   Resp 17   Ht 5' 3\" (1.6 m)   Wt 67 kg (147 lb 11.3 oz)   SpO2 (!) 89%   Breastfeeding No   BMI 26.17 kg/m²    O2 Flow Rate (L/min): 15 l/min O2 Device: Hi flow nasal cannula    Temp (24hrs), Av °F (36.7 °C), Min:97.8 °F (36.6 °C), Max:98.2 °F (36.8 °C)      701 - 1900  In: -   Out: 95 [Urine:95]  1901 -  0700  In: 676 [P.O.:870; I.V.:50]  Out:  [Urine:]    PHYSICAL EXAM:    Physical Exam  Constitutional:       General: She is not in acute distress. Comments: Elderly adult female   HENT:      Head: Normocephalic and atraumatic. Mouth/Throat:      Mouth: Mucous membranes are moist.   Eyes:      Pupils: Pupils are equal, round, and reactive to light. Cardiovascular:      Rate and Rhythm: Tachycardia present. Rhythm irregular. Pulses:           Radial pulses are 2+ on the right side and 2+ on the left side. Dorsalis pedis pulses are 1+ on the right side and 1+ on the left side. Heart sounds: Normal heart sounds. No murmur heard. Pulmonary:      Effort: Pulmonary effort is normal. No respiratory distress. Breath sounds: No decreased breath sounds. Comments: O2 @ 2L in use  Abdominal:      General: Bowel sounds are normal. There is no distension. Palpations: Abdomen is soft.    Genitourinary:     Comments: Casiano catheter in place draining clear dark yellow urine  Musculoskeletal:      Right lower leg: No edema. Left lower leg: No edema. Skin:     General: Skin is warm and dry. Capillary Refill: Capillary refill takes less than 2 seconds. Coloration: Skin is pale. Neurological:      Mental Status: She is alert and oriented to person, place, and time.           Data Review    Recent Results (from the past 24 hour(s))   GLUCOSE, POC    Collection Time: 03/12/23 12:16 PM   Result Value Ref Range    Glucose (POC) 219 (H) 65 - 117 mg/dL    Performed by Yanira Collazo    GLUCOSE, POC    Collection Time: 03/12/23  5:22 PM   Result Value Ref Range    Glucose (POC) 217 (H) 65 - 117 mg/dL    Performed by Yanira Collazo    METABOLIC PANEL, BASIC    Collection Time: 03/12/23  6:34 PM   Result Value Ref Range    Sodium 130 (L) 136 - 145 mmol/L    Potassium 4.8 3.5 - 5.1 mmol/L    Chloride 93 (L) 97 - 108 mmol/L    CO2 34 (H) 21 - 32 mmol/L    Anion gap 3 (L) 5 - 15 mmol/L    Glucose 279 (H) 65 - 100 mg/dL    BUN 19 6 - 20 MG/DL    Creatinine 0.79 0.55 - 1.02 MG/DL    BUN/Creatinine ratio 24 (H) 12 - 20      eGFR >60 >60 ml/min/1.73m2    Calcium 8.3 (L) 8.5 - 10.1 MG/DL   MAGNESIUM    Collection Time: 03/12/23  6:34 PM   Result Value Ref Range    Magnesium 2.1 1.6 - 2.4 mg/dL   PHOSPHORUS    Collection Time: 03/12/23  6:34 PM   Result Value Ref Range    Phosphorus 2.3 (L) 2.6 - 4.7 MG/DL   GLUCOSE, POC    Collection Time: 03/12/23  9:47 PM   Result Value Ref Range    Glucose (POC) 289 (H) 65 - 117 mg/dL    Performed by [address] [place] [address]    PROTHROMBIN TIME + INR    Collection Time: 03/13/23  4:02 AM   Result Value Ref Range    INR 1.2 (H) 0.9 - 1.1      Prothrombin time 12.3 (H) 9.0 - 11.1 sec   PTT    Collection Time: 03/13/23  4:02 AM   Result Value Ref Range    aPTT 22.2 22.1 - 31.0 sec    aPTT, therapeutic range     58.0 - 77.0 SECS   CBC W/O DIFF    Collection Time: 03/13/23  4:02 AM   Result Value Ref Range    WBC 18.0 (H) 3.6 - 11.0 K/uL    RBC 4.36 3.80 - 5.20 M/uL    HGB 11.8 11.5 - 16.0 g/dL    HCT 36.9 35.0 - 47.0 %    MCV 84.6 80.0 - 99.0 FL    MCH 27.1 26.0 - 34.0 PG    MCHC 32.0 30.0 - 36.5 g/dL    RDW 16.2 (H) 11.5 - 14.5 %    PLATELET 096 074 - 823 K/uL    MPV 10.2 8.9 - 12.9 FL    NRBC 0.1 (H) 0  WBC    ABSOLUTE NRBC 0.02 (H) 0.00 - 6.96 K/uL   METABOLIC PANEL, BASIC    Collection Time: 03/13/23  4:02 AM   Result Value Ref Range    Sodium 134 (L) 136 - 145 mmol/L    Potassium 4.5 3.5 - 5.1 mmol/L    Chloride 95 (L) 97 - 108 mmol/L    CO2 31 21 - 32 mmol/L    Anion gap 8 5 - 15 mmol/L    Glucose 232 (H) 65 - 100 mg/dL    BUN 22 (H) 6 - 20 MG/DL    Creatinine 0.67 0.55 - 1.02 MG/DL    BUN/Creatinine ratio 33 (H) 12 - 20      eGFR >60 >60 ml/min/1.73m2    Calcium 8.4 (L) 8.5 - 10.1 MG/DL   HEPATIC FUNCTION PANEL    Collection Time: 03/13/23  4:02 AM   Result Value Ref Range    Protein, total 6.3 (L) 6.4 - 8.2 g/dL    Albumin 2.8 (L) 3.5 - 5.0 g/dL    Globulin 3.5 2.0 - 4.0 g/dL    A-G Ratio 0.8 (L) 1.1 - 2.2      Bilirubin, total 1.0 0.2 - 1.0 MG/DL    Bilirubin, direct 0.5 (H) 0.0 - 0.2 MG/DL    Alk.  phosphatase 142 (H) 45 - 117 U/L    AST (SGOT) 166 (H) 15 - 37 U/L    ALT (SGPT) 1,161 (H) 12 - 78 U/L   MAGNESIUM    Collection Time: 03/13/23  4:02 AM   Result Value Ref Range    Magnesium 2.1 1.6 - 2.4 mg/dL   NT-PRO BNP    Collection Time: 03/13/23  4:02 AM   Result Value Ref Range    NT pro-BNP 6,700 (H) <450 PG/ML   PHOSPHORUS    Collection Time: 03/13/23  4:02 AM   Result Value Ref Range    Phosphorus 2.6 2.6 - 4.7 MG/DL   CBC WITH AUTOMATED DIFF    Collection Time: 03/13/23  9:02 AM   Result Value Ref Range    WBC 17.6 (H) 3.6 - 11.0 K/uL    RBC 4.22 3.80 - 5.20 M/uL    HGB 11.5 11.5 - 16.0 g/dL    HCT 36.8 35.0 - 47.0 %    MCV 87.2 80.0 - 99.0 FL    MCH 27.3 26.0 - 34.0 PG    MCHC 31.3 30.0 - 36.5 g/dL    RDW 16.5 (H) 11.5 - 14.5 %    PLATELET 468 690 - 109 K/uL    MPV 11.2 8.9 - 12.9 FL    NRBC 0.0 0  WBC    ABSOLUTE NRBC 0.00 0.00 - 0.01 K/uL    NEUTROPHILS 74 32 - 75 %    LYMPHOCYTES 20 12 - 49 %    MONOCYTES 5 5 - 13 %    EOSINOPHILS 0 0 - 7 %    BASOPHILS 0 0 - 1 %    IMMATURE GRANULOCYTES 1 (H) 0.0 - 0.5 %    ABS. NEUTROPHILS 13.0 (H) 1.8 - 8.0 K/UL    ABS. LYMPHOCYTES 3.5 0.8 - 3.5 K/UL    ABS. MONOCYTES 1.0 0.0 - 1.0 K/UL    ABS. EOSINOPHILS 0.0 0.0 - 0.4 K/UL    ABS. BASOPHILS 0.0 0.0 - 0.1 K/UL    ABS. IMM. GRANS. 0.1 (H) 0.00 - 0.04 K/UL    DF AUTOMATED         XR CHEST PORT   Final Result   No change. XR CHEST PORT   Final Result      1. Mild RIGHT basilar atelectasis status post pleural drain removal. No   pneumothorax. 2. Persistent, small left-sided effusion. US ABD LTD   Final Result      1. Gallbladder is distended and contains multiple gallstones. No ductal   dilatation      XR CHEST PORT   Final Result   Pigtail drainage catheter on the right with diminished right-sided effusion. Persistent left effusion. XR CHEST PORT   Final Result   Increased bilateral pleural effusions right greater than left. CT ABD PELV WO CONT   Final Result      1. Congestive heart failure, with large right and moderate left pleural   effusions, bibasilar atelectasis, and pulmonary edema. 2. Multiple small pulmonary nodules. These may be a component of pulmonary   edema, recommend follow-up chest CT after acute issues are resolved. 3. Prominent distended gallbladder, without other findings of acute   cholecystitis. CT CHEST WO CONT   Final Result      1. Congestive heart failure, with large right and moderate left pleural   effusions, bibasilar atelectasis, and pulmonary edema. 2. Multiple small pulmonary nodules. These may be a component of pulmonary   edema, recommend follow-up chest CT after acute issues are resolved. 3. Prominent distended gallbladder, without other findings of acute   cholecystitis. US ABD LTD   Final Result      1.  Marked gallbladder distention with cholelithiasis but no wall thickening. 2. No biliary ductal dilatation is documented. 3. Small ascites. 4. Right pleural effusion. XR CHEST PORT   Final Result      Small right greater than left bilateral pleural effusions. Active Problems:    Sepsis ([address] (3/7/2023)      Assessment/Plan:   Acute Decompensated HF  Afib with RVR now rate controlled  Runs of NSVT  Cardiology consulted and following  Required IV milrinone and cardizem gtts - weaned off 3/11  Echo done not yet read, per cardiology note prelim read appears low EF  Remains in Afib, rate ~90s-low 110s although with exertion OOB was 130s  SBPs variable but soft today, 90s during my visit  Continued diuresis  Perhaps addition of midodrine could support BP while further adjustments made with beta blocker for better HR control? Defer this decision to cardiology  Plan for [facility] 3/13 to investigate CAD or ischemic etiology as cause of HFrEF    3/13-pt back in RVR 150s this am, placed on amiodarone bolus and gtt. Cont bumex, may hold off on cath today.  Placed on heparin drip    Pleural effusions, questionably malignant  Possible lymphoma not confirmed  Chest Tube - removed 3/11  Hem/Onc consulted and following  Further hem/onc workup anticipated early this week to evaluate possible lymphoma as was suggested by pleural fluid findings  Pt required chest tube for recurring right sided pleural effusion, this was discontinued 3/11  Check CXR to eval current status of pleural effusion    3/13- worsening O2 requirement, currnetly on HFNC    Suspected severe sepsis with hypovolemic shock on admission  Leukocytosis, lactic acidosis, OLGA, and hypervolemic hyponatremia  LA 11.75 now 1.6 - resolved  OLGA likely 2/2 hypovolemia, Cr resolving 1.81 now 0.45  Na 125 now 136 - resolved  WBC 17.6 now  12.3  No source of infection has been isolated  Urine cx (-), BC drawn 3/7 prelim (-), Resp PCR (-), pleural fluid 3/9 prelim (-)  Pt was initially treated with Zosyn, Flagyl but with no infectious source these were discontinued and WBC has trended down without abx, will continue to monitor    Elevated LFTs suspect cardiohepatic syndrome  Severe passive congestion  GI consulted and following  LFTs elevated 2/2 severe passive congestion, \"unreadably high\" on admission, now ALT 1394, , alk ph 114  Continuing to improve  Continue to monitor    Constipation x1 week per pt report  Added daily scheduled miralax and stool softeners  1x dose MOM now  PRN bisacodyl suppository    Diabetes mellitus type 2  Accuchecks ACHS  Sliding scale insulin  Monitor for s/s hypo/hyperglycemia  Hypoglycemia treatment per protocol  Diabetes management consulted    Hypokalemia, hypomagnesemia - K 2.6, Mg 1.5, repletion ordered, recheck BMP @ 1800  Suspect 2/2 diuresis    Arthritis - avoiding tylenol d/t liver dz and ibuprofen/NSAIDs due to HFrEF; pt denies pain at this time    Palliative care consulted and following    Disposition: Pending hospital course  DVT Prophylaxis: Heparin gtt  Code Status:  Full Code  POA: Akiko Arts -  - [phone]     Time Spent: 35 minutes  Care Plan discussed with: Patient, , Nurse  _______________________________________________________________    Louis Juares MD

## 2023-03-13 NOTE — PROGRESS NOTES
[facility]    ICU TEAM Progress Note    Name: Ara Cancino   : 1940   MRN: [id number]   Date: 3/13/2023           ICU Assessment & Plan of Care     Ara Cancino Is a [de-identified] y.o. female with PMH s/f DM2 who is admitted 23 for N/V, lactic acidosis, and concern for new diagnosis heart failure. NEURO  - no acute issue (no e/o encephalopathy)       CARDIAC  #. Acute exacerbation of new diagnosis HFrEF  #. AF RVR, uncontrolled  #. Mildly elevated trop, demand  #. H/o HTN  -continue amiodarone drip  Start po cardizem also : patient responded to cardizem in the past     RESPIRATORY  #. Acute hypoxic respiratory failure on NC  #. Pulmonary edema  #. Pulmonary effusions R>L  Taper down fio2 as tolerated  Suspect still has pul edema from rapid a fib     RENAL  #. OLGA, ?cardiorenal, resolved  #. AGMA, osm gap, LA + ketoacidosis, resolved  #. Hypervolemic hyponatremia, resolved  - one dose of lasix now   Replace electrolytes     GASTROINTESTINAL  #. Nausea, vomiting; resolved  #. Significant transaminitis, now downtrending  #. New ascites  #. At risk for malnutrition based on acute illness  - GI following -- greatly appreciate assistance  - no cirrhotic morphology on CT, no e/o clot on repeat RUQUS (flows not done on first)  - cardiohepatic / severe passive liver congestion      HEMATOLOGIC  #. Mild normocytic anemia  - unknown baseline   - no indication for intervention        ID  #.  Concern for infection, less likely  - cxs NG  >48h  - CTM off abx        ENDOCRINE  - accuchecks   - SSI        Mobility: Bedrest  PT/OT:  consulted      DVT Prophylaxis: SCDs; low-dose hep ppx in setting of liver injury  U - Ulcer Prophylaxis: not indicated  G - Glycemic Control: Insulin prn  B - Bowel Regimen: prn    40 minutes of critical care time spent  Subjective:   Progress Note: 3/13/2023      Reason for ICU Admission: concern for sepsis     HPI:  per prior clinician  Ara Cancino is a [de-identified] y.o. female with PMH s/f T2DM (A1c 7.1, 2018), HTN, chronic pain/arthritis who presented to the ED with N/V, anorexia x 2 days. In ED, workup notable for marked and varied lab abnmls, including LA 11, WBC 17, Na 125, K 5.7, CO2 16, AG 17, Cr 1.8, AST/ALT unreadably high, alkphos 176, Tbili 1.7. Pan-scan without contrast pending per ED physician. Given 2 L IVF in ED and started on antibiotics. ICU consulted for admission. Upon my arrival to bedside, pt awake and alert, /97, HR . Finishing first L of IVF. C/o nausea and no appetite. Will be admitted to the ICU for further management. Arrived to ICU after CT scans. Pt now SOB with RR in the 30s requiring 4 L NC. Bedside ultrasound of lungs and echo done. Lungs with bilateral B lines. Bedside echo significant for dilated LV with reduced systolic function. Possible wall motion abnormalities but a little hard to determine bc pt's HR still in the 130s. Official Echo ordered for the morning. First troponin only 141 but will resend in 3 hours to r/o NSTEMI. Denies chest pain. 3/8 - able to diurese with lasix 80.   3/9 - pt with recurrent, refractory RVR yesterday afternoon. Metop tried. Switched off dobuta to milrinone. Avoided amio due to liver injury. Cards consulted and dig loaded. Cardizem started overnight; remains in 130s this AM.  3/10 - worsening R pleural effusion despite net neg. R pigtail placed with 750cc out before turning to water seal. Restarted diuresis and now net neg 3L with improvement in renal fxn, BP, and resp status. 3/11 : heart rate well controlled on diltiazem drip  3/12 : off milrinone and cardizem,looking comfortable  3/13: hospitalist requested to take the patient back to ICU as she is in rapid a fib with \" soft\" BP  Patient is sleeping comfortably at present  3  Home Medications:     Prior to Admission medications    Medication Sig Start Date End Date Taking?  Authorizing Provider   hydroCHLOROthiazide (HYDRODIURIL) 25 mg tablet Take 25 mg by mouth daily. Yes Provider, Historical   dorzolamide (TRUSOPT) 2 % ophthalmic solution INSTILL 1 DROP IN BOTH EYES TWICE DAILY 2/27/23  Yes Provider, Historical   atorvastatin (LIPITOR) 10 mg tablet Take 10 mg by mouth nightly. Yes Provider, Historical   timolol (TIMOPTIC) 0.5 % ophthalmic solution Administer 1 Drop to both eyes daily. Yes Provider, Historical   latanoprost (XALATAN) 0.005 % ophthalmic solution Administer 1 Drop to both eyes nightly. Indications: OPEN ANGLE GLAUCOMA   Yes Provider, Historical   prednisoLONE acetate (PRED FORTE) 1 % ophthalmic suspension Administer 1 Drop to left eye daily. Indications: SEVERE OCULAR INFLAMMATION   Yes Provider, Historical   metFORMIN (GLUCOPHAGE) 500 mg tablet Take 1,000 mg by mouth two (2) times daily (with meals). Indications: type 2 diabetes mellitus   Yes Provider, Historical   lisinopril (PRINIVIL, ZESTRIL) 5 mg tablet Take 5 mg by mouth daily. Indications: HYPERTENSION   Yes Provider, Historical   acyclovir (ZOVIRAX) 800 mg tablet Take 800 mg by mouth nightly. Indications: shingles prevention   Yes Provider, Historical   multivitamin with iron tablet Take 1 Tab by mouth daily.    Yes Provider, Historical       Current Meds:     Current Facility-Administered Medications   Medication Dose Route Frequency    amiodarone (CORDARONE) 375 mg/250 mL D5W infusion  0.5 mg/min IntraVENous CONTINUOUS    heparin 25,000 units in D5W 250 ml infusion  12-25 Units/kg/hr IntraVENous TITRATE    heparin (porcine) 1,000 unit/mL injection 2,000 Units  2,000 Units IntraVENous PRN    Or    heparin (porcine) 1,000 unit/mL injection 4,000 Units  4,000 Units IntraVENous PRN    metoprolol succinate (TOPROL-XL) XL tablet 75 mg  75 mg Oral DAILY    lisinopriL (PRINIVIL, ZESTRIL) tablet 2.5 mg  2.5 mg Oral DAILY    furosemide (LASIX) tablet 20 mg  20 mg Oral BID    glucose chewable tablet 16 g  4 Tablet Oral PRN    glucagon (GLUCAGEN) injection 1 mg  1 mg IntraMUSCular PRN    insulin glargine (LANTUS) injection 20 Units  0.3 Units/kg SubCUTAneous DAILY    insulin lispro (HUMALOG) injection   SubCUTAneous AC&HS    dilTIAZem IR (CARDIZEM) tablet 60 mg  60 mg Oral AC&HS    polyethylene glycol (MIRALAX) packet 17 g  17 g Oral DAILY    senna-docusate (PERICOLACE) 8.6-50 mg per tablet 2 Tablet  2 Tablet Oral DAILY    bisacodyL (DULCOLAX) suppository 10 mg  10 mg Rectal DAILY PRN    dextrose 10% infusion 0-250 mL  0-250 mL IntraVENous PRN    metoprolol (LOPRESSOR) injection 5 mg  5 mg IntraVENous PRN    albuterol-ipratropium (DUO-NEB) 2.5 MG-0.5 MG/3 ML  3 mL Nebulization Q4H PRN    lidocaine 4 % patch 1 Patch  1 Patch TransDERmal Q24H    alcohol 62% (NOZIN) nasal  1 Ampule  1 Ampule Topical Q12H    balsam peru-castor oiL (VENELEX) ointment   Topical BID    latanoprost (XALATAN) 0.005 % ophthalmic solution 1 Drop  1 Drop Both Eyes QPM    timolol (TIMOPTIC) 0.5 % ophthalmic solution 1 Drop  1 Drop Both Eyes DAILY    dorzolamide (TRUSOPT) 2 % ophthalmic solution 1 Drop  1 Drop Both Eyes BID    prednisoLONE acetate (PRED FORTE) 1 % ophthalmic suspension 1 Drop  1 Drop Left Eye DAILY    sodium chloride (NS) flush 5-10 mL  5-10 mL IntraVENous PRN    sodium chloride (NS) flush 5-10 mL  5-10 mL IntraVENous PRN    sodium chloride (NS) flush 5-40 mL  5-40 mL IntraVENous PRN    ondansetron (ZOFRAN) injection 4 mg  4 mg IntraVENous Q6H PRN       Objective:   Vital Signs:  Visit Vitals  /67   Pulse (!) 116   Temp 98.2 °F (36.8 °C)   Resp 18   Ht 5' 3\" (1.6 m)   Wt 67 kg (147 lb 11.3 oz)   SpO2 94%   Breastfeeding No   BMI 26.17 kg/m²    O2 Flow Rate (L/min): 15 l/min O2 Device: Hi flow nasal cannula Temp (24hrs), Av °F (36.7 °C), Min:97.8 °F (36.6 °C), Max:98.2 °F (36.8 °C)           Intake/Output:     Intake/Output Summary (Last 24 hours) at 3/13/2023 1506  Last data filed at 3/13/2023 1206  Gross per 24 hour   Intake 410 ml   Output 810 ml   Net -400 ml Physical Exam:  Elderly female, awake, alert, lying in bed, NAD, appearing slightly more active  Persistent L ptosis from remote VZV  Resps even and unlabored, symmetric chest rise on NC, mild rales throughout, clearer R  AF,110-120s, no heave/rub  Abd soft, nontender, nondistended  NICOLAS, extremities warm to touch  Calm and cooperative      LABS AND  DATA:   Personally reviewed    MEDS:   Reviewed    Multidisciplinary Rounds Completed:  yes    ABCDEF Bundle/Checklist Completed:  Yes    SPECIAL EQUIPMENT  None    DISPOSITION  Stay in icu  CRITICAL CARE CONSULTANT NOTE  I had a face to face encounter with the patient, reviewed and interpreted patient data including clinical events, labs, images, vital signs, I/O's, and examined patient. I have discussed the case and the plan and management of the patient's care with the consulting services, the bedside nurses and the respiratory therapist.      NOTE OF PERSONAL INVOLVEMENT IN CARE   This patient has a high probability of imminent, clinically significant deterioration, which requires the highest level of preparedness to intervene urgently. I participated in the decision-making and personally managed or directed the management of the following life and organ supporting interventions that required my frequent assessment to treat or prevent imminent deterioration.     Merlinda Jasper, MD  Intensivist  3/13/2023

## 2023-03-13 NOTE — PROGRESS NOTES
0700 Bedside report received from Liv Child, [facility], [facility]. Patient is 15L midflow and HR is at 142. 5mg metop IV X2 given overnight. Patient is scheduled for heart cath this a.m.  0800 Patient's HR is still elevated. Patient turned on right side, floating heels. 0830 Paged Dr. Johnny Krishnan and called cath lab to inform MD and lab of patient's condition, was referred to cardiology. Contacted cardiologist with findings. Ebony Varela at bedside. New plan per cardiology: Cath on hold. Hold Bumex, initiate amio bolus, amio gtt and Hep gtt. 0715 Diabetic management at bedside. 0945 Patient found to be having frequent soaked pads. Closer assessment reveal old chest tube site was still draining serous fluid. Site clean, absorbent dressing apply, along with Mepelex. Will continue to monitor. Patient received complete bath, with linen changed. Team is aware. 1000 Patient turned to left side. Family at bedside. 1100 Patient glucose level remains elevated despite being NPO since midnight. Patient turned on right side. 1200 Patient's endurance is very positional. 22 Gauge PIV inserted RFA using US. Patient turned to left side. 1312 Patient's BP low (see flowsheets). Per verbal order from Dr. Ed Varela, 250 ml LR bolus given. 1400 Patient turned to right side. 46 Perfect served Dr. Johnny Krishnan regarding plan of care for patient. Patient now under care of Dr. Sylvia Brown. Plan per intensivist is give PO Cardizem, diurese with 20 mg of Lasix, keep amio gtt infusing. 1600 Patient turned to left side. 1655 Patient converted to SR, HR 67 now. Patient now on a modified cardiac diet. Will be NPO at midnight for a cath tomorrow. 1800 Patient supine for supper. 1900 Bedside report given to Catrachito Sutton RN.

## 2023-03-13 NOTE — DIABETES MGMT
[address] [facility]  DIABETES MANAGEMENT CONSULT    Consulted by Provider for advanced nursing evaluation and care for inpatient blood glucose management. Evaluation and Action Plan   This [de-identified]year old  female was admitted with concern for new diagnosis of heart failure. Issues of Afib intermittently. Was to undergo cardiac cath today but she is back in Afib and amio infusion restarted. Remains on 02. As for BG management, a corrective insulin approach has been unsuccessful in achieving BG targets of 140-180mg/dl. Would switch to basal/corrective approach. Management Rationale Action Plan   Medication   Basal needs Using 0.3 units/kg/D based on BG pattern Lantus insulin 20 units D   Nutritional needs  NPO today   Corrective insulin Using HIGH sensitivity based on insulin naivete    Additional orders        Diabetes Discharge Plan   Medication     Referral  [x]        Outpatient diabetes education to expand meal choices (MNT)   Additional orders            Initial Presentation   Ryann Oconnell is a [de-identified] y.o. female admitted from ER 3/7/23 after experiencing nausea, vomiting and anorexia. Abnormal labs included elevated white count, hyponatremia, & elevated liver enzymes and lactic acid. Became dyspneic upon transfer to ICU & placed on 02. Echo revealed dilated LV. Troponin 141. Concern for new diagnosis of heart failure. CXR: Pulmonary edema and effusions R>L  US ABD: Enlarged gall bladder. No cirrhosis. Likely liver congestion    HX:   Past Medical History:   Diagnosis Date    Arthritis     Chronic pain     arthritic    Diabetes ([address]     Hypertension     Ill-defined condition     shingles 1.5 years ago 8/2014    Other ill-defined conditions(799.89)     high cholesterol    Other ill-defined conditions(799.89)     shingles      INITIAL DX:   Sepsis ([address] [A41.9]   Heart failure  Afib    Current Treatment     TX: Amio & Heparin infusions. Diuresis. BP meds.  [address] Clinical progress has been complicated by need for ICU level of care. 3/12/23 GI consult regarding elevated LFTs:   LFTs are getting better as noted above  Serologies: CMV IgG, EBV IgG. Nuc ag +. Others are still pending.  elevate. No GI complaints. Continue current treatment. Follow  daily LFTs. Expect these to improve as cardiac function improves. 3/13/23 Afib with RVR => amio infusion restarted. Heart cath delayed    Diabetes History   Type 2 diabetes treated with an oral agent  No family history of diabetes  Patient has basically eliminated starch to normalize Bgs  A1c 6.8%    Diabetes-related Medical History  Other associated conditions     Right cataract removal  Shingles affected left eye causing glaucoma    Diabetes Medication History  Key Antihyperglycemic Medications               metFORMIN (GLUCOPHAGE) 500 mg tablet (Taking) Take 1,000 mg by mouth two (2) times daily (with meals). Indications: type 2 diabetes mellitus           Diabetes self-management practices:   Eating pattern - Has eliminated carbohydrate from starch sources; will consume fruit   [x] Eating a carbohydrate-controlled meal plan  [x] Breakfast  Scrambled egg with fruit OR oatmeal or banana with peanut butter. Drinks coffee  [x] Lunch   Vegetable soup (does not include noodles or rice)  [x] Dinner   Chicken or fish with non-starchy vegetables (e.g., green beans, broccoli or cauliflower.  Drinks DF lemonade or iced tea  [x] Dentition status Intact  Physical activity pattern   [x] Employing a physical activity program to control BG  [x] Aerobic exercise 15 minutes per day on exercise bike  Monitoring pattern   [x] Testing BGs sufficiently to inform self-management adjustments  [x] Breakfast  160s  Taking medications pattern  [x] Consistent administration  [x] Affordable  Reducing risks  [] Influenza:   Immunization History   Administered Date(s) Administered    Influenza Vaccine 10/01/2017   [] Pneumonia:   Immunization History Administered Date(s) Administered    Pneumococcal Vaccine (Unspecified Type) 06/18/2011   [] Hepatitis: There is no immunization history for the selected administration types on file for this patient. Social determinants of health impacting diabetes self-management practices   None identified  Overall evaluation:    [x] Achieving A1c target with drug therapy & self-care practices    Subjective   I watch everything I put in my mouth.     Objective   Physical exam  General Normal weight female in no acute distress. Conversant and cooperative  Neuro  Alert, oriented   Vital Signs Visit Vitals  /83   Pulse (!) 139   Temp 98.2 °F (36.8 °C)   Resp 24   Ht 5' 3\" (1.6 m)   Wt 67 kg (147 lb 11.3 oz)   SpO2 97%   Breastfeeding No   BMI 26.17 kg/m²     Skin  Warm and dry. No acanthosis noted along neckline. Heart   Afib   Extremities No foot wounds. Feet cool bilaterally    Diabetic foot exam:    Left Foot     Visual Exam: normal Mottling (not new per nursing)   Pulse DP: 1+ (weak)     Right Foot   Visual Exam: normal Mottling (not new per nursing)   Pulse DP: 1+ (weak)    Laboratory  Recent Labs     03/13/23  0402 03/12/23  1834 03/12/23  0422 03/11/23  0741   * 279* 150* 273*   AGAP 8 3* 6 6   WBC 18.0*  --  12.3* 14.8*   CREA 0.67 0.79 0.45* 0.68   *  --  249* 475*   ALT 1,161*  --  1,394* 2,104*     Factors impacting BG management  Factor Dose Comments   Nutrition:  NPO      For heart cath today (delayed due to Afib)   Drugs:  Heparin in D5W infusion     Other:   Kidney function  Liver function   Creatinine normalized  ALT markedly elevated      Blood glucose pattern    Significant diabetes-related events over the past 24-72 hours  3/7/23 Admission . A1c 6.8%.  Hyponatremic  3/8-13/23 Corrective insulin approach  Using 12-20 units of correction per day    Assessment and Nursing Intervention   Nursing Diagnosis Risk for unstable blood glucose pattern   Nursing Intervention Domain 6244 Decision-making Support   Nursing Interventions Examined current inpatient diabetes/blood glucose control   Explored factors facilitating and impeding inpatient management  Explored corrective strategies with patient and responsible inpatient provider   Informed patient of rational for insulin strategy while hospitalized       Billing Code(s)   [] 14882 IP subsequent hospital care - 50 minutes   [] 57314 IP subsequent hospital care - 35 minutes   [] [phone] IP subsequent hospital care - 25 minutes   [x] 0312 [id number] IP initial hospital care - 40 minutes     Before making these care recommendations, I personally reviewed the hospitalization record, including notes, laboratory & diagnostic data and current medications, and examined the patient at the bedside (circumstances permitting) before determining care. More than fifty (50) percent of the time was spent in patient counseling and/or care coordination.   Total minutes: YESIKA Levine 55, CNS  Diabetes Clinical Nurse Specialist  [facility]  Access via [address] [place] [address]

## 2023-03-13 NOTE — PROGRESS NOTES
1900 - Bedside and Verbal shift change report given to Magda Salmon RN  (oncoming nurse) by Dylon Lowe RN (offgoing nurse). Report included the following information SBAR, Kardex, Procedure Summary, Intake/Output, MAR, Recent Results, Med Rec Status, and Cardiac Rhythm Sinus Rhythm . 1930 - Seen and examined the patient. Conscious and oriented. Calm and cooperative. Assessment done and documented, see flow sheet. 2030 - Patient complaints of having nausea. Ondansetron IV stat given. 2100 - Patient feels better but not okay with her breathing. SPO2-94%and RR-24bPm    2130 - SOB increased as per patient. SPO-87% and MO87-20yDc. 2200 - Put on face mask at  10L and increased too 13L    2215 - Put on NRM at 15L. Perfectserved NP Hospitalist.    2230 - Got new orders and carried out. RT had her breathing treatment and put her midflow oxygention. 0000 - Re-assessment done and documented, see flow sheet. 0400 - Re-assessment done and documented, see flow sheet. 0700 - Bedside and Verbal shift change report given to Virgil Bolaños (oncoming nurse) by Magda Salmon RN (offgoing nurse). Report included the following information Kardex, ED Summary, Procedure Summary, Intake/Output, MAR, Recent Results, Med Rec Status, and Cardiac Rhythm Afib .

## 2023-03-13 NOTE — PROGRESS NOTES
Physical Therapy:  Orders received, chart reviewed, and discussed patient status with RN. Patient with possibly procedure today though HR's into the 140-150's. Will defer evaluation today and follow-up when patient more medically stable.     Kayla Upton PT, DPT

## 2023-03-13 NOTE — PROGRESS NOTES
Pharmacy Heparin Monitoring    Indication: ACS/PCI/STEMI/NSTEMI (uncertain at this time)    Factor Xa inhibitor/LMWH use within the past 72 hours? No  If yes, date of last administration: n/a  If yes, when can aPTT nomogram be changed to anti-Xa: n/a    Hypertriglyceridemia (> 414 mg/dL) or hyperbilirubinemia (> 37 mg/dL) present? NO  Recent Labs     03/13/23  0402 03/12/23  0422 03/11/23  0741   TBILI 1.0 1.2* 1.3*     Heparin to be monitored using anti-Xa for duration of therapy.     Goal: Anti-Xa 0.3-0.7 units/mL    Initial dosing Weight: 67 kg    Labs:  Recent Labs     03/13/23  0402 03/12/23  0422 03/11/23  0741   APTT 22.2 24.8 22.2     Recent Labs     03/13/23  0402   HGB 11.8      INR 1.2*       Current rate:  0 unit/kg/hr    Impression/Plan:   New rate: 12 unit/kg/hr  PRN bolus dose: No, do not include bolus dose, none ordered  Ordered prn bolus doses per protocol  Placed heparin 5,000 units subq q12h on hold  Infusion rate, PRN bolus dose and aPTT results were discussed with the nurse: Yes       Thank you,  Elizabeth Crump, EVONNED

## 2023-03-13 NOTE — PROGRESS NOTES
[address] [facility]  at [facility]0 [address], [address] [facility]  [phone]      Continuing to follow, ongoing cardiac work up. Patient seen at bedside with . Will pursue malignancy work up once cardiac work up is complete.

## 2023-03-13 NOTE — PROGRESS NOTES
Transition of Care Plan:     RUR:14%     Disposition: Home with spouse     If SNF or IPR: Date FOC offered:N/A     Date [facility] received:N/A     Date authorization started with reference number:N/A     Date authorization received and expires:N/A     Accepting facility:N/A     Follow up appointments: To be done prior to discharge. DME needed:None     Transportation at [address] [place] [address] or means to access home:  has keys          Medicare Letter: To be given prior to discharge. Is patient a  and connected with the [facility]? No              If yes, was [employer] transfer form completed and [facility] notified? N/A        Patient recovering in ccu. Had worsening oxygen requirement and now on HFNC. Will continue to monitor. Leelee Sorensen RN BSN CRM        [phone]

## 2023-03-14 LAB
ALBUMIN SERPL-MCNC: 2.5 G/DL (ref 3.5–5)
ALBUMIN/GLOB SERPL: 0.7 (ref 1.1–2.2)
ALP SERPL-CCNC: 132 U/L (ref 45–117)
ALT SERPL-CCNC: 855 U/L (ref 12–78)
ANION GAP SERPL CALC-SCNC: 5 MMOL/L (ref 5–15)
AST SERPL-CCNC: 103 U/L (ref 15–37)
BACTERIA SPEC CULT: NORMAL
BACTERIA SPEC CULT: NORMAL
BILIRUB DIRECT SERPL-MCNC: 0.3 MG/DL (ref 0–0.2)
BILIRUB SERPL-MCNC: 0.8 MG/DL (ref 0.2–1)
BUN SERPL-MCNC: 28 MG/DL (ref 6–20)
BUN/CREAT SERPL: 36 (ref 12–20)
CALCIUM SERPL-MCNC: 8.1 MG/DL (ref 8.5–10.1)
CHLORIDE SERPL-SCNC: 95 MMOL/L (ref 97–108)
CO2 SERPL-SCNC: 35 MMOL/L (ref 21–32)
CREAT SERPL-MCNC: 0.77 MG/DL (ref 0.55–1.02)
ECHO AV AREA PEAK VELOCITY: 2.1 CM2
ECHO AV AREA VTI: 2.3 CM2
ECHO AV AREA/BSA PEAK VELOCITY: 1.3 CM2/M2
ECHO AV AREA/BSA VTI: 1.4 CM2/M2
ECHO AV MEAN GRADIENT: 7 MMHG
ECHO AV MEAN VELOCITY: 1.3 M/S
ECHO AV PEAK GRADIENT: 13 MMHG
ECHO AV PEAK VELOCITY: 1.8 M/S
ECHO AV VELOCITY RATIO: 0.67
ECHO AV VTI: 31.9 CM
ECHO EST RA PRESSURE: 10 MMHG
ECHO LA DIAMETER INDEX: 2.1 CM/M2
ECHO LA DIAMETER: 3.5 CM
ECHO LA VOL 4C: 33 ML (ref 22–52)
ECHO LA VOLUME INDEX A4C: 20 ML/M2 (ref 16–34)
ECHO LV E' LATERAL VELOCITY: 5 CM/S
ECHO LV E' SEPTAL VELOCITY: 4 CM/S
ECHO LV FRACTIONAL SHORTENING: 27 % (ref 28–44)
ECHO LV INTERNAL DIMENSION DIASTOLE INDEX: 3.11 CM/M2
ECHO LV INTERNAL DIMENSION DIASTOLIC: 5.2 CM (ref 3.9–5.3)
ECHO LV INTERNAL DIMENSION SYSTOLIC INDEX: 2.28 CM/M2
ECHO LV INTERNAL DIMENSION SYSTOLIC: 3.8 CM
ECHO LV IVSD: 0.7 CM (ref 0.6–0.9)
ECHO LV MASS 2D: 133.8 G (ref 67–162)
ECHO LV MASS INDEX 2D: 80.1 G/M2 (ref 43–95)
ECHO LV POSTERIOR WALL DIASTOLIC: 0.8 CM (ref 0.6–0.9)
ECHO LV RELATIVE WALL THICKNESS RATIO: 0.31
ECHO LVOT AREA: 3.1 CM2
ECHO LVOT AV VTI INDEX: 0.71
ECHO LVOT DIAM: 2 CM
ECHO LVOT MEAN GRADIENT: 3 MMHG
ECHO LVOT PEAK GRADIENT: 5 MMHG
ECHO LVOT PEAK VELOCITY: 1.2 M/S
ECHO LVOT STROKE VOLUME INDEX: 42.9 ML/M2
ECHO LVOT SV: 71.6 ML
ECHO LVOT VTI: 22.8 CM
ECHO MV A VELOCITY: 0.8 M/S
ECHO MV E DECELERATION TIME (DT): 159.2 MS
ECHO MV E VELOCITY: 1.1 M/S
ECHO MV E/A RATIO: 1.38
ECHO MV E/E' LATERAL: 22
ECHO MV E/E' RATIO (AVERAGED): 24.75
ECHO MV E/E' SEPTAL: 27.5
ECHO MV REGURGITANT PEAK GRADIENT: 18 MMHG
ECHO MV REGURGITANT PEAK VELOCITY: 2.1 M/S
ECHO PV MAX VELOCITY: 1 M/S
ECHO PV PEAK GRADIENT: 4 MMHG
ECHO RIGHT VENTRICULAR SYSTOLIC PRESSURE (RVSP): 36 MMHG
ECHO RV TAPSE: 1.9 CM (ref 1.7–?)
ECHO TV REGURGITANT MAX VELOCITY: 2.54 M/S
ECHO TV REGURGITANT PEAK GRADIENT: 26 MMHG
ERYTHROCYTE [DISTWIDTH] IN BLOOD BY AUTOMATED COUNT: 16.4 % (ref 11.5–14.5)
GLOBULIN SER CALC-MCNC: 3.4 G/DL (ref 2–4)
GLUCOSE BLD STRIP.AUTO-MCNC: 107 MG/DL (ref 65–117)
GLUCOSE BLD STRIP.AUTO-MCNC: 154 MG/DL (ref 65–117)
GLUCOSE BLD STRIP.AUTO-MCNC: 164 MG/DL (ref 65–117)
GLUCOSE BLD STRIP.AUTO-MCNC: 195 MG/DL (ref 65–117)
GLUCOSE SERPL-MCNC: 185 MG/DL (ref 65–100)
HCT VFR BLD AUTO: 34.3 % (ref 35–47)
HGB BLD-MCNC: 10.9 G/DL (ref 11.5–16)
IGG SERPL-MCNC: 870 MG/DL (ref 586–1602)
IGG1 SER-MCNC: 391 MG/DL (ref 248–810)
IGG2 SER-MCNC: 399 MG/DL (ref 130–555)
IGG3 SER-MCNC: 92 MG/DL (ref 15–102)
IGG4 SER-MCNC: 28 MG/DL (ref 2–96)
MAGNESIUM SERPL-MCNC: 1.9 MG/DL (ref 1.6–2.4)
MCH RBC QN AUTO: 27.5 PG (ref 26–34)
MCHC RBC AUTO-ENTMCNC: 31.8 G/DL (ref 30–36.5)
MCV RBC AUTO: 86.6 FL (ref 80–99)
NRBC # BLD: 0 K/UL (ref 0–0.01)
NRBC BLD-RTO: 0 PER 100 WBC
PHOSPHATE SERPL-MCNC: 2.5 MG/DL (ref 2.6–4.7)
PLATELET # BLD AUTO: 296 K/UL (ref 150–400)
PMV BLD AUTO: 11.5 FL (ref 8.9–12.9)
POTASSIUM SERPL-SCNC: 3.5 MMOL/L (ref 3.5–5.1)
PROT SERPL-MCNC: 5.9 G/DL (ref 6.4–8.2)
RBC # BLD AUTO: 3.96 M/UL (ref 3.8–5.2)
SERVICE CMNT-IMP: ABNORMAL
SERVICE CMNT-IMP: NORMAL
SODIUM SERPL-SCNC: 135 MMOL/L (ref 136–145)
UFH PPP CHRO-ACNC: 0.63 IU/ML
WBC # BLD AUTO: 18.3 K/UL (ref 3.6–11)

## 2023-03-14 PROCEDURE — 93458 L HRT ARTERY/VENTRICLE ANGIO: CPT | Performed by: STUDENT IN AN ORGANIZED HEALTH CARE EDUCATION/TRAINING PROGRAM

## 2023-03-14 PROCEDURE — 84100 ASSAY OF PHOSPHORUS: CPT

## 2023-03-14 PROCEDURE — 77030010221 HC SPLNT WR POS TELE -B: Performed by: STUDENT IN AN ORGANIZED HEALTH CARE EDUCATION/TRAINING PROGRAM

## 2023-03-14 PROCEDURE — B2151ZZ FLUOROSCOPY OF LEFT HEART USING LOW OSMOLAR CONTRAST: ICD-10-PCS | Performed by: STUDENT IN AN ORGANIZED HEALTH CARE EDUCATION/TRAINING PROGRAM

## 2023-03-14 PROCEDURE — 77030019569 HC BND COMPR RAD TERU -B: Performed by: STUDENT IN AN ORGANIZED HEALTH CARE EDUCATION/TRAINING PROGRAM

## 2023-03-14 PROCEDURE — C1769 GUIDE WIRE: HCPCS | Performed by: STUDENT IN AN ORGANIZED HEALTH CARE EDUCATION/TRAINING PROGRAM

## 2023-03-14 PROCEDURE — 74011250637 HC RX REV CODE- 250/637: Performed by: STUDENT IN AN ORGANIZED HEALTH CARE EDUCATION/TRAINING PROGRAM

## 2023-03-14 PROCEDURE — 97116 GAIT TRAINING THERAPY: CPT | Performed by: PHYSICAL THERAPIST

## 2023-03-14 PROCEDURE — 74011250636 HC RX REV CODE- 250/636: Performed by: INTERNAL MEDICINE

## 2023-03-14 PROCEDURE — 85027 COMPLETE CBC AUTOMATED: CPT

## 2023-03-14 PROCEDURE — 65270000046 HC RM TELEMETRY

## 2023-03-14 PROCEDURE — 80048 BASIC METABOLIC PNL TOTAL CA: CPT

## 2023-03-14 PROCEDURE — 97161 PT EVAL LOW COMPLEX 20 MIN: CPT | Performed by: PHYSICAL THERAPIST

## 2023-03-14 PROCEDURE — 77010033711 HC HIGH FLOW OXYGEN

## 2023-03-14 PROCEDURE — 99153 MOD SED SAME PHYS/QHP EA: CPT | Performed by: STUDENT IN AN ORGANIZED HEALTH CARE EDUCATION/TRAINING PROGRAM

## 2023-03-14 PROCEDURE — 97530 THERAPEUTIC ACTIVITIES: CPT | Performed by: OCCUPATIONAL THERAPIST

## 2023-03-14 PROCEDURE — 99232 SBSQ HOSP IP/OBS MODERATE 35: CPT | Performed by: NURSE PRACTITIONER

## 2023-03-14 PROCEDURE — 2709999900 HC NON-CHARGEABLE SUPPLY: Performed by: STUDENT IN AN ORGANIZED HEALTH CARE EDUCATION/TRAINING PROGRAM

## 2023-03-14 PROCEDURE — 74011000636 HC RX REV CODE- 636: Performed by: STUDENT IN AN ORGANIZED HEALTH CARE EDUCATION/TRAINING PROGRAM

## 2023-03-14 PROCEDURE — 77030015766: Performed by: STUDENT IN AN ORGANIZED HEALTH CARE EDUCATION/TRAINING PROGRAM

## 2023-03-14 PROCEDURE — 4A023N7 MEASUREMENT OF CARDIAC SAMPLING AND PRESSURE, LEFT HEART, PERCUTANEOUS APPROACH: ICD-10-PCS | Performed by: STUDENT IN AN ORGANIZED HEALTH CARE EDUCATION/TRAINING PROGRAM

## 2023-03-14 PROCEDURE — 80076 HEPATIC FUNCTION PANEL: CPT

## 2023-03-14 PROCEDURE — 76937 US GUIDE VASCULAR ACCESS: CPT | Performed by: STUDENT IN AN ORGANIZED HEALTH CARE EDUCATION/TRAINING PROGRAM

## 2023-03-14 PROCEDURE — 74011250637 HC RX REV CODE- 250/637: Performed by: NURSE PRACTITIONER

## 2023-03-14 PROCEDURE — 74011250637 HC RX REV CODE- 250/637

## 2023-03-14 PROCEDURE — 74011250637 HC RX REV CODE- 250/637: Performed by: INTERNAL MEDICINE

## 2023-03-14 PROCEDURE — 97165 OT EVAL LOW COMPLEX 30 MIN: CPT | Performed by: OCCUPATIONAL THERAPIST

## 2023-03-14 PROCEDURE — 82962 GLUCOSE BLOOD TEST: CPT

## 2023-03-14 PROCEDURE — 74011000250 HC RX REV CODE- 250: Performed by: INTERNAL MEDICINE

## 2023-03-14 PROCEDURE — 77030008543 HC TBNG MON PRSS MRTM -A: Performed by: STUDENT IN AN ORGANIZED HEALTH CARE EDUCATION/TRAINING PROGRAM

## 2023-03-14 PROCEDURE — B2111ZZ FLUOROSCOPY OF MULTIPLE CORONARY ARTERIES USING LOW OSMOLAR CONTRAST: ICD-10-PCS | Performed by: STUDENT IN AN ORGANIZED HEALTH CARE EDUCATION/TRAINING PROGRAM

## 2023-03-14 PROCEDURE — 74011000250 HC RX REV CODE- 250: Performed by: STUDENT IN AN ORGANIZED HEALTH CARE EDUCATION/TRAINING PROGRAM

## 2023-03-14 PROCEDURE — 36415 COLL VENOUS BLD VENIPUNCTURE: CPT

## 2023-03-14 PROCEDURE — 83735 ASSAY OF MAGNESIUM: CPT

## 2023-03-14 PROCEDURE — C1894 INTRO/SHEATH, NON-LASER: HCPCS | Performed by: STUDENT IN AN ORGANIZED HEALTH CARE EDUCATION/TRAINING PROGRAM

## 2023-03-14 PROCEDURE — 74011250636 HC RX REV CODE- 250/636: Performed by: STUDENT IN AN ORGANIZED HEALTH CARE EDUCATION/TRAINING PROGRAM

## 2023-03-14 PROCEDURE — 74011636637 HC RX REV CODE- 636/637: Performed by: CLINICAL NURSE SPECIALIST

## 2023-03-14 PROCEDURE — 85520 HEPARIN ASSAY: CPT

## 2023-03-14 PROCEDURE — 77030019698 HC SYR ANGI MDLON MRTM -A: Performed by: STUDENT IN AN ORGANIZED HEALTH CARE EDUCATION/TRAINING PROGRAM

## 2023-03-14 PROCEDURE — 99152 MOD SED SAME PHYS/QHP 5/>YRS: CPT | Performed by: STUDENT IN AN ORGANIZED HEALTH CARE EDUCATION/TRAINING PROGRAM

## 2023-03-14 PROCEDURE — 74011000258 HC RX REV CODE- 258: Performed by: INTERNAL MEDICINE

## 2023-03-14 PROCEDURE — 99232 SBSQ HOSP IP/OBS MODERATE 35: CPT | Performed by: CLINICAL NURSE SPECIALIST

## 2023-03-14 RX ORDER — FENTANYL CITRATE 50 UG/ML
INJECTION, SOLUTION INTRAMUSCULAR; INTRAVENOUS AS NEEDED
Status: DISCONTINUED | OUTPATIENT
Start: 2023-03-14 | End: 2023-03-14 | Stop reason: HOSPADM

## 2023-03-14 RX ORDER — LANOLIN ALCOHOL/MO/W.PET/CERES
800 CREAM (GRAM) TOPICAL ONCE
Status: COMPLETED | OUTPATIENT
Start: 2023-03-14 | End: 2023-03-14

## 2023-03-14 RX ORDER — MIDAZOLAM HYDROCHLORIDE 1 MG/ML
INJECTION, SOLUTION INTRAMUSCULAR; INTRAVENOUS AS NEEDED
Status: DISCONTINUED | OUTPATIENT
Start: 2023-03-14 | End: 2023-03-14 | Stop reason: HOSPADM

## 2023-03-14 RX ORDER — LIDOCAINE HYDROCHLORIDE 10 MG/ML
INJECTION, SOLUTION EPIDURAL; INFILTRATION; INTRACAUDAL; PERINEURAL AS NEEDED
Status: DISCONTINUED | OUTPATIENT
Start: 2023-03-14 | End: 2023-03-14 | Stop reason: HOSPADM

## 2023-03-14 RX ORDER — VERAPAMIL HYDROCHLORIDE 2.5 MG/ML
INJECTION, SOLUTION INTRAVENOUS AS NEEDED
Status: DISCONTINUED | OUTPATIENT
Start: 2023-03-14 | End: 2023-03-14 | Stop reason: HOSPADM

## 2023-03-14 RX ORDER — HEPARIN SODIUM 200 [USP'U]/100ML
INJECTION, SOLUTION INTRAVENOUS
Status: COMPLETED | OUTPATIENT
Start: 2023-03-14 | End: 2023-03-14

## 2023-03-14 RX ORDER — FUROSEMIDE 40 MG/1
40 TABLET ORAL 2 TIMES DAILY
Status: DISCONTINUED | OUTPATIENT
Start: 2023-03-14 | End: 2023-03-18 | Stop reason: HOSPADM

## 2023-03-14 RX ORDER — INSULIN GLARGINE 100 [IU]/ML
12 INJECTION, SOLUTION SUBCUTANEOUS DAILY
Status: DISCONTINUED | OUTPATIENT
Start: 2023-03-15 | End: 2023-03-17

## 2023-03-14 RX ORDER — FUROSEMIDE 10 MG/ML
INJECTION INTRAMUSCULAR; INTRAVENOUS AS NEEDED
Status: DISCONTINUED | OUTPATIENT
Start: 2023-03-14 | End: 2023-03-14 | Stop reason: HOSPADM

## 2023-03-14 RX ORDER — HEPARIN SODIUM 1000 [USP'U]/ML
INJECTION, SOLUTION INTRAVENOUS; SUBCUTANEOUS AS NEEDED
Status: DISCONTINUED | OUTPATIENT
Start: 2023-03-14 | End: 2023-03-14 | Stop reason: HOSPADM

## 2023-03-14 RX ORDER — POTASSIUM CHLORIDE 20 MEQ/1
40 TABLET, EXTENDED RELEASE ORAL
Status: COMPLETED | OUTPATIENT
Start: 2023-03-14 | End: 2023-03-14

## 2023-03-14 RX ADMIN — METOPROLOL SUCCINATE 75 MG: 50 TABLET, EXTENDED RELEASE ORAL at 09:26

## 2023-03-14 RX ADMIN — POLYETHYLENE GLYCOL 3350 17 G: 17 POWDER, FOR SOLUTION ORAL at 09:25

## 2023-03-14 RX ADMIN — DORZOLAMIDE HYDROCHLORIDE 1 DROP: 20 SOLUTION/ DROPS OPHTHALMIC at 09:28

## 2023-03-14 RX ADMIN — INSULIN GLARGINE 20 UNITS: 100 INJECTION, SOLUTION SUBCUTANEOUS at 09:25

## 2023-03-14 RX ADMIN — FUROSEMIDE 40 MG: 40 TABLET ORAL at 17:18

## 2023-03-14 RX ADMIN — POTASSIUM CHLORIDE 40 MEQ: 1500 TABLET, EXTENDED RELEASE ORAL at 05:23

## 2023-03-14 RX ADMIN — CASTOR OIL AND BALSAM, PERU: 788; 87 OINTMENT TOPICAL at 09:00

## 2023-03-14 RX ADMIN — TIMOLOL MALEATE 1 DROP: 5 SOLUTION OPHTHALMIC at 09:28

## 2023-03-14 RX ADMIN — FUROSEMIDE 20 MG: 20 TABLET ORAL at 09:27

## 2023-03-14 RX ADMIN — APIXABAN 5 MG: 5 TABLET, FILM COATED ORAL at 09:40

## 2023-03-14 RX ADMIN — DORZOLAMIDE HYDROCHLORIDE 1 DROP: 20 SOLUTION/ DROPS OPHTHALMIC at 17:19

## 2023-03-14 RX ADMIN — Medication 1 AMPULE: at 09:00

## 2023-03-14 RX ADMIN — MAGNESIUM OXIDE TAB 400 MG (241.3 MG ELEMENTAL MG) 800 MG: 400 (241.3 MG) TAB at 08:00

## 2023-03-14 RX ADMIN — SENNOSIDES AND DOCUSATE SODIUM 2 TABLET: 50; 8.6 TABLET ORAL at 09:27

## 2023-03-14 RX ADMIN — CASTOR OIL AND BALSAM, PERU: 788; 87 OINTMENT TOPICAL at 21:04

## 2023-03-14 RX ADMIN — AMIODARONE HYDROCHLORIDE 0.5 MG/MIN: 50 INJECTION, SOLUTION INTRAVENOUS at 05:23

## 2023-03-14 RX ADMIN — LATANOPROST 1 DROP: 50 SOLUTION OPHTHALMIC at 17:19

## 2023-03-14 RX ADMIN — Medication 1 AMPULE: at 21:04

## 2023-03-14 RX ADMIN — APIXABAN 5 MG: 5 TABLET, FILM COATED ORAL at 21:04

## 2023-03-14 RX ADMIN — PREDNISOLONE ACETATE 1 DROP: 10 SUSPENSION/ DROPS OPHTHALMIC at 09:28

## 2023-03-14 RX ADMIN — LISINOPRIL 2.5 MG: 5 TABLET ORAL at 09:26

## 2023-03-14 NOTE — PROGRESS NOTES
Comprehensive Nutrition Assessment    Type and Reason for Visit: Reassess    Nutrition Recommendations/Plan:   Continue diet and supplements as tolerated  Please document % meals and supplements consumed in flowsheet I/O's under intake      Malnutrition Assessment:  Malnutrition Status: At risk for malnutrition (specify) (03/08/23 1335)    Context:  Acute illness     Findings of the 6 clinical characteristics of malnutrition:   Energy Intake:  Mild decrease in energy intake (specify) (only admits to poor appetite x 48h PTA due to n/v)  Weight Loss:  No significant weight loss (per pt report)     Body Fat Loss:  No significant body fat loss,     Muscle Mass Loss:  No significant muscle mass loss,    Fluid Accumulation:   ,     Strength:  Not performed     Nutrition Assessment:  Chart reviewed remotely. Pt for cath this morning. BG were quite uncontrolled yesterday (200's) diabetes team is following, adjustments made and BG much better controlled this morning (107). Appetite appears to have declined over the weekend. LFT's are improving. Malignancy workup pending. Would continue to encourage PO intake and push ONS. Patient Vitals for the past 168 hrs:   % Diet Eaten   03/14/23 1101 26 - 50%   03/13/23 1745 26 - 50%   03/12/23 1309 26 - 50%   03/12/23 1000 51 - 75%   03/11/23 0800 51 - 75%   03/10/23 1600 76 - 100%   03/10/23 0800 76 - 100%            Nutrition Related Findings:    Meds: lasix, lantus, humalog, miralax, pericolace. BM 3/11 Wound Type: None    Current Nutrition Intake & Therapies:  Average Meal Intake:  (varied)  Average Supplement Intake: Unable to assess  ADULT ORAL NUTRITION SUPPLEMENT Breakfast, Lunch, Dinner; Low Calorie/High Protein  ADULT DIET Regular; 3 carb choices (45 gm/meal); please only send chocolate ensure high protein    Anthropometric Measures:  Height: 5' 3\" (160 cm)  Ideal Body Weight (IBW): 115 lbs (52 kg)     Current Body Wt:  60.3 kg (132 lb 15 oz), 124.2 % IBW.  Bed scale  Current BMI (kg/m2): 23.6                          BMI Category: Normal weight (BMI 22.0-24.9) age over 72    Estimated Daily Nutrient Needs:  Energy Requirements Based On: Formula  Weight Used for Energy Requirements: Current  Energy (kcal/day):    Weight Used for Protein Requirements: Current  Protein (g/day): 65-78g (1-1.2gPro/kg)  Method Used for Fluid Requirements: Other (comment)  Fluid (ml/day): per MD    Nutrition Diagnosis:   Inadequate protein-energy intake related to altered GI function, catabolic illness as evidenced by NPO or clear liquid status due to medical condition  Previous dx continues.      Nutrition Interventions:   Food and/or Nutrient Delivery: Continue current diet, Continue oral nutrition supplement  Nutrition Education/Counseling: No recommendations at this time  Coordination of Nutrition Care: Continue to monitor while inpatient, Interdisciplinary rounds       Goals:  Previous Goal Met: Goal(s) achieved  Goals: PO intake 75% or greater, by next RD assessment       Nutrition Monitoring and Evaluation:   Behavioral-Environmental Outcomes: None identified  Food/Nutrient Intake Outcomes: Food and nutrient intake, Supplement intake  Physical Signs/Symptoms Outcomes: Biochemical data, Nutrition focused physical findings, Skin, Weight, GI status, Fluid status or edema, Hemodynamic status    Discharge Planning:    Continue current diet, Continue oral nutrition supplement    Mary Carrasco RD, CNSC  Contact: ext [phone]

## 2023-03-14 NOTE — PROGRESS NOTES
[facility] at [address], [place], [address] [facility]  W: [phone] F: [phone]    Patient seen at bedside today with her  and daughter. Discussed malignancy evaluation can occur in the outpatient setting. We will continue to reevaluate although patient is still undergoing cardiac work-up at this time. They all verbalized understanding and agreement, we will continue to follow along.

## 2023-03-14 NOTE — PROGRESS NOTES
FUNCTIONAL STATUS PRIOR TO ADMISSION: Patient was independent and active without use of DME for all ADLS and IADLS    HOME SUPPORT PRIOR TO ADMISSION: The patient lived with  and has a daughter that lives locally. Occupational Therapy Goals:  Initiated 3/14/2023  1. Patient will perform grooming standing with modified independence within 7 days. 2. Patient will perform upper body dressing and lower body dressing with modified independence within 7 days. 3. Patient will perform toileting with modified independence within 7 days. 4. Patient will demonstrate independence with home exercise program within 7 days. 5. Patient will transfer from toilet with modified independence using the least restrictive device and appropriate durable medical equipment within 7 days. OCCUPATIONAL THERAPY EVALUATION  Patient: Sadie Larkin ([de-identified] y.o. female)  Date: 3/14/2023  Primary Diagnosis: Sepsis ([address] [A41.9]  Procedure(s) (LRB):  LEFT HEART CATH / CORONARY ANGIOGRAPHY (N/A)  ULTRASOUND GUIDED VASCULAR ACCESS (N/A) Day of Surgery   Precautions:  Fall    ASSESSMENT  Based on the objective data described below, the patient presents with functional weakness and decreased dynamic balance which impacts pts independence with mobility and ADLS. Pt reports being fully independent with ADLs and IADLs prior to illness and admit. She reports having good family support and a desire to regain her independence. Currently pt needs min assist for ADL mobility and Rw for support. Over short distance mobility to bathroom pt becomes more fatigued and need to sit and rest.  Cues needed for walker safety. All vitals were stable this session. Recommend rehab at discharge. Current Level of Function Impacting Discharge (ADLs/self-care): min assist mobility short distance with RW for support    Feeding: Setup    Oral Facial Hygiene/Grooming: Setup (seated)    Bathing:  Moderate assistance    Upper Body Dressing: Setup    Lower Body Dressing: Moderate assistance    Toileting: Moderate assistance       Functional Outcome Measure: The patient scored 55/100 on the barthel outcome measure which is indicative of moderate decline in mobility and ADLS. Other factors to consider for discharge: was independent prior to extended complicated hospital stay     Patient will benefit from skilled therapy intervention to address the above noted impairments. PLAN :  Recommendations and Planned Interventions: self care training, functional mobility training, therapeutic exercise, balance training, therapeutic activities, patient education, home safety training, and family training/education    Frequency/Duration: Patient will be followed by occupational therapy 5 times a week to address goals. Recommendation for discharge: (in order for the patient to meet his/her long term goals)  Therapy 3 hours per day 5-7 days per week    This discharge recommendation:  Has not yet been discussed the attending provider and/or case management    IF patient discharges home will need the following DME: none       SUBJECTIVE:   Patient stated I have gotten weak. I have only been able to get up one other time since I have been here.   Assessment and mobility has been limited due to pts medical condition, which has now improved.      OBJECTIVE DATA SUMMARY:   HISTORY:   Past Medical History:   Diagnosis Date    Arthritis     Chronic pain     arthritic    Diabetes ([facility] [address]     Hypertension     Ill-defined condition     shingles 1.5 years ago 8/2014    Other ill-defined conditions(799.89)     high cholesterol    Other ill-defined conditions(799.89)     shingles     Past Surgical History:   Procedure Laterality Date    COLONOSCOPY N/A 10/25/2016    COLONOSCOPY performed by Macarena Perez MD at [facility]    HX APPENDECTOMY      HX CATARACT REMOVAL Left     HX HYSTERECTOMY      HX KNEE REPLACEMENT Right     parital    HX KNEE REPLACEMENT Left     HX ORTHOPAEDIC      right hip replacement    HX TONSILLECTOMY      HX TUBAL LIGATION      HX UROLOGICAL      bladder tacking       Expanded or extensive additional review of patient history:     Home Situation  Home Environment: Private residence  # Steps to Enter: 5  Rails to Enter: Yes  Hand Rails : Bilateral  One/Two Story Residence: One story  Living Alone: No  Support Systems: Spouse/Significant Other  Patient Expects to be Discharged to[de-identified] Home  Current DME Used/Available at Home: Cane, straight, Walker, rolling, Grab bars, Raised toilet seat  Tub or Shower Type: Shower    Hand dominance: Right    EXAMINATION OF PERFORMANCE DEFICITS:  Cognitive/Behavioral Status:  Neurologic State: Alert  Orientation Level: Oriented X4  Cognition: Follows commands  Perception: Cues to maintain midline in standing  Perseveration: No perseveration noted  Safety/Judgement: Fall prevention;Home safety; Insight into deficits    Hearing: Auditory  Auditory Impairment: None    Vision/Perceptual:                           Acuity: Within Defined Limits (with glasses, 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)    Corrective Lenses: Glasses (bifocals)    Range of Motion:    AROM: Generally decreased, functional                         Strength:    Strength: Generally decreased, functional                Coordination:  Coordination: Within functional limits  Fine Motor Skills-Upper: Left Intact; Right Intact    Gross Motor Skills-Upper: Left Intact; Right Intact    Tone & Sensation:    Tone: Normal  Sensation:  (NT)                      Balance:  Sitting: Intact  Standing: Impaired  Standing - Static: Constant support;Good  Standing - Dynamic : Constant support; Fair    Functional Mobility and Transfers for ADLs:  Bed Mobility:  Rolling:  (seated at bedside chair upon arrival)    Transfers:  Sit to Stand: Minimum assistance  Stand to Sit: Minimum assistance  Bed to Chair: Minimum assistance (with RW)  Bathroom Mobility: Minimum assistance (with RW)  Toilet Transfer : Minimum assistance (wtth RW)    ADL Assessment:  Feeding: Setup    Oral Facial Hygiene/Grooming: Setup (seated)    Bathing: Moderate assistance    Upper Body Dressing: Setup    Lower Body Dressing: Moderate assistance    Toileting: Moderate assistance                  ADL Intervention and task modifications:      Cognitive Retraining  Safety/Judgement: Fall prevention;Home safety; Insight into deficits      Therapeutic Exercise:  Educated on seated LE and UE AROM exercises. Pt voiced understanding. Functional Measure:    Barthel Index:  Bathin  Bladder: 10  Bowels: 10  Groomin  Dressin  Feeding: 10  Mobility: 0  Stairs: 0  Toilet Use: 5  Transfer (Bed to Chair and Back): 10  Total: 55/100      The Barthel ADL Index: Guidelines  1. The index should be used as a record of what a patient does, not as a record of what a patient could do. 2. The main aim is to establish degree of independence from any help, physical or verbal, however minor and for whatever reason. 3. The need for supervision renders the patient not independent. 4. A patient's performance should be established using the best available evidence. Asking the patient, friends/relatives and nurses are the usual sources, but direct observation and common sense are also important. However direct testing is not needed. 5. Usually the patient's performance over the preceding 24-48 hours is important, but occasionally longer periods will be relevant. 6. Middle categories imply that the patient supplies over 50 per cent of the effort. 7. Use of aids to be independent is allowed. Score Interpretation (from [address] [facility] [address])    Independent   60-79 Minimally independent   40-59 Partially dependent   20-39 Very dependent   <20 Totally dependent     -Lucas Vazquez., Barthel, D.W. (1965). Functional evaluation: the Barthel Index. [address] [facility] ([address] ().  The Barthel activities of daily living index: self-reporting versus actual performance in the old (> or = 75 years). Journal of [address] [place] [address] 45(5), [facility], KATHIEF, Gaetano Chavarria., Selvin Masterson. (1999). Measuring the change in disability after inpatient rehabilitation; comparison of the responsiveness of the Barthel Index and Functional [place] Measure. Journal of Neurology, Neurosurgery, and Psychiatry, 66(4), 988-125. PACO Hughes, GUILHERME Jarrett, & Pop Larsen M.A. (2004) Assessment of post-stroke quality of life in cost-effectiveness studies: The usefulness of the Barthel Index and the EuroQoL-5D. Quality of Life Research, 15, 596-10         Occupational Therapy Evaluation Charge Determination   History Examination Decision-Making   LOW Complexity : Brief history review  MEDIUM Complexity : 3-5 performance deficits relating to physical, cognitive , or psychosocial skils that result in activity limitations and / or participation restrictions MEDIUM Complexity : Patient may present with comorbidities that affect occupational performnce. Miniml to moderate modification of tasks or assistance (eg, physical or verbal ) with assesment(s) is necessary to enable patient to complete evaluation       Based on the above components, the patient evaluation is determined to be of the following complexity level: LOW   Pain Ratin/10    Activity Tolerance:   Fair    After treatment patient left in no apparent distress:    Sitting in chair, Call bell within reach, Bed / chair alarm activated, and LE elevated in recliner and slipper socks on     COMMUNICATION/EDUCATION:   The patients plan of care was discussed with: Physical therapist, Registered nurse, and patient . Home safety education was provided and the patient/caregiver indicated understanding., Patient/family have participated as able in goal setting and plan of care. , and Patient/family agree to work toward stated goals and plan of care. This patients plan of care is appropriate for delegation to PACO.     Thank you for this referral.  Stephanie Yang, OTR/L  Time Calculation: 14 mins

## 2023-03-14 NOTE — PROGRESS NOTES
Hospitalist Progress Note    Subjective:   Daily Progress Note: 3/14/2023     Hospital Course:  Ms. Rickey Beltran is an [de-identified]year old female with PMH significant for DM type 2 and arthritis but otherwise in apparently good health, who presented to ED with abnormal lab results per her PCP office, and persistent N/V x several days with associated constipation. In ED labs were significantly altered including lactic 11, WBC 17, Na 125, K 5., CO2 16, anion gap 17, Cr 1.8, AST/ALT unreadably high, alkphos 176, Tbili 1.7. Pt was admitted to ICU and became increasingly SOB, tachypneic, requiring 4L NC. Bedside US revealed decreased systolic function, official Echo done and not yet read. She went into Afib RVR and was placed on amio and dobutamine gtts. Recurrent right pleural effusion required chest tube placement, fluid sent off for cytology suspicious for possible malignancy questionably lymphoma. Liver enzymes elevated suspected 2/2 passive congestion from HF, trending down. As of 3/11 pt weaned off dobutamine and amio gtts, doing better, able to have chest tube removed, WBC trending down and lactic also. As her clinical status has improved, hospitalist service consulted to assume care of patient as she is transferred to stepdown IVCU unit. Subjective:  3/14-patient stayed in ICU yesterday as she was in A-fib requiring amiodarone drip, and was on 15 L high flow nasal cannula. Patient had a cath today. Patient received diuretics overnight, her oxygen requirement has improved to 2 L. Hospitalist were consulted again to take over the patient.  -Patient sitting in the chair, no acute distress.   Currently sinus rhythm    Current Facility-Administered Medications   Medication Dose Route Frequency    apixaban (ELIQUIS) tablet 5 mg  5 mg Oral BID    [START ON 3/15/2023] insulin glargine (LANTUS) injection 12 Units  12 Units SubCUTAneous DAILY    amiodarone (CORDARONE) 375 mg/250 mL D5W infusion  0.5 mg/min IntraVENous CONTINUOUS    metoprolol succinate (TOPROL-XL) XL tablet 75 mg  75 mg Oral DAILY    lisinopriL (PRINIVIL, ZESTRIL) tablet 2.5 mg  2.5 mg Oral DAILY    furosemide (LASIX) tablet 20 mg  20 mg Oral BID    glucose chewable tablet 16 g  4 Tablet Oral PRN    glucagon (GLUCAGEN) injection 1 mg  1 mg IntraMUSCular PRN    insulin lispro (HUMALOG) injection   SubCUTAneous AC&HS    [Held by provider] dilTIAZem IR (CARDIZEM) tablet 60 mg  60 mg Oral AC&HS    polyethylene glycol (MIRALAX) packet 17 g  17 g Oral DAILY    senna-docusate (PERICOLACE) 8.6-50 mg per tablet 2 Tablet  2 Tablet Oral DAILY    bisacodyL (DULCOLAX) suppository 10 mg  10 mg Rectal DAILY PRN    dextrose 10% infusion 0-250 mL  0-250 mL IntraVENous PRN    metoprolol (LOPRESSOR) injection 5 mg  5 mg IntraVENous PRN    albuterol-ipratropium (DUO-NEB) 2.5 MG-0.5 MG/3 ML  3 mL Nebulization Q4H PRN    lidocaine 4 % patch 1 Patch  1 Patch TransDERmal Q24H    alcohol 62% (NOZIN) nasal  1 Ampule  1 Ampule Topical Q12H    balsam peru-castor oiL (VENELEX) ointment   Topical BID    latanoprost (XALATAN) 0.005 % ophthalmic solution 1 Drop  1 Drop Both Eyes QPM    timolol (TIMOPTIC) 0.5 % ophthalmic solution 1 Drop  1 Drop Both Eyes DAILY    dorzolamide (TRUSOPT) 2 % ophthalmic solution 1 Drop  1 Drop Both Eyes BID    prednisoLONE acetate (PRED FORTE) 1 % ophthalmic suspension 1 Drop  1 Drop Left Eye DAILY    sodium chloride (NS) flush 5-10 mL  5-10 mL IntraVENous PRN    sodium chloride (NS) flush 5-10 mL  5-10 mL IntraVENous PRN    sodium chloride (NS) flush 5-40 mL  5-40 mL IntraVENous PRN    ondansetron (ZOFRAN) injection 4 mg  4 mg IntraVENous Q6H PRN        Review of Systems:    Review of Systems   Constitutional:  Positive for malaise/fatigue. Negative for chills and fever. Respiratory:  Negative for cough and shortness of breath. Cardiovascular:  Negative for chest pain, palpitations and leg swelling.    Gastrointestinal:  Positive for constipation. Negative for diarrhea, nausea and vomiting. Last BM approx a week ago   Neurological:  Positive for weakness. Negative for dizziness. Objective:     Visit Vitals  BP (!) 109/53 (BP 1 Location: Left upper arm, BP Patient Position: Standing)   Pulse 70   Temp 98.1 °F (36.7 °C)   Resp 19   Ht 5' 3\" (1.6 m)   Wt 67 kg (147 lb 11.3 oz)   SpO2 94%   Breastfeeding No   BMI 26.17 kg/m²    O2 Flow Rate (L/min): 5 l/min O2 Device: Hi flow nasal cannula    Temp (24hrs), Av.2 °F (36.8 °C), Min:97.9 °F (36.6 °C), Max:98.9 °F (37.2 °C)       07 -  1900  In: 240 [P.O.:240]  Out: 560 [Urine:560]  1901 -  0700  In: 1755.5 [P.O.:970; I.V.:785.5]  Out: 1435 [UNGDU:2570]    PHYSICAL EXAM:    Physical Exam  Constitutional:       General: She is not in acute distress. Comments: Elderly adult female   HENT:      Head: Normocephalic and atraumatic. Mouth/Throat:      Mouth: Mucous membranes are moist.   Eyes:      Pupils: Pupils are equal, round, and reactive to light. Cardiovascular:      Rate and Rhythm: Tachycardia present. Rhythm irregular. Pulses:           Radial pulses are 2+ on the right side and 2+ on the left side. Dorsalis pedis pulses are 1+ on the right side and 1+ on the left side. Heart sounds: Normal heart sounds. No murmur heard. Pulmonary:      Effort: Pulmonary effort is normal. No respiratory distress. Breath sounds: No decreased breath sounds. Comments: O2 @ 2L in use  Abdominal:      General: Bowel sounds are normal. There is no distension. Palpations: Abdomen is soft. Genitourinary:     Comments: Casiano catheter in place draining clear dark yellow urine  Musculoskeletal:      Right lower leg: No edema. Left lower leg: No edema. Skin:     General: Skin is warm and dry. Capillary Refill: Capillary refill takes less than 2 seconds. Coloration: Skin is pale.    Neurological:      Mental Status: She is alert and oriented to person, place, and time. Data Review    Recent Results (from the past 24 hour(s))   ANTI-XA UNFRACTIONATED AND LMW HEPARIN    Collection Time: 03/13/23  4:34 PM   Result Value Ref Range    Heparin Xa,Unfrac. and LMW 0.44 IU/mL   GLUCOSE, POC    Collection Time: 03/13/23  4:41 PM   Result Value Ref Range    Glucose (POC) 227 (H) 65 - 117 mg/dL    Performed by Aliya Portillo RN    GLUCOSE, POC    Collection Time: 03/13/23  8:33 PM   Result Value Ref Range    Glucose (POC) 202 (H) 65 - 117 mg/dL    Performed by [facility] RN    MAGNESIUM    Collection Time: 03/14/23 12:07 AM   Result Value Ref Range    Magnesium 1.9 1.6 - 2.4 mg/dL   PHOSPHORUS    Collection Time: 03/14/23 12:07 AM   Result Value Ref Range    Phosphorus 2.5 (L) 2.6 - 4.7 MG/DL   ANTI-XA UNFRACTIONATED AND LMW HEPARIN    Collection Time: 03/14/23 12:07 AM   Result Value Ref Range    Heparin Xa,Unfrac. and LMW 8.44 IU/mL   METABOLIC PANEL, BASIC    Collection Time: 03/14/23 12:07 AM   Result Value Ref Range    Sodium 135 (L) 136 - 145 mmol/L    Potassium 3.5 3.5 - 5.1 mmol/L    Chloride 95 (L) 97 - 108 mmol/L    CO2 35 (H) 21 - 32 mmol/L    Anion gap 5 5 - 15 mmol/L    Glucose 185 (H) 65 - 100 mg/dL    BUN 28 (H) 6 - 20 MG/DL    Creatinine 0.77 0.55 - 1.02 MG/DL    BUN/Creatinine ratio 36 (H) 12 - 20      eGFR >60 >60 ml/min/1.73m2    Calcium 8.1 (L) 8.5 - 10.1 MG/DL   HEPATIC FUNCTION PANEL    Collection Time: 03/14/23 12:07 AM   Result Value Ref Range    Protein, total 5.9 (L) 6.4 - 8.2 g/dL    Albumin 2.5 (L) 3.5 - 5.0 g/dL    Globulin 3.4 2.0 - 4.0 g/dL    A-G Ratio 0.7 (L) 1.1 - 2.2      Bilirubin, total 0.8 0.2 - 1.0 MG/DL    Bilirubin, direct 0.3 (H) 0.0 - 0.2 MG/DL    Alk.  phosphatase 132 (H) 45 - 117 U/L    AST (SGOT) 103 (H) 15 - 37 U/L    ALT (SGPT) 855 (H) 12 - 78 U/L   CBC W/O DIFF    Collection Time: 03/14/23 12:07 AM   Result Value Ref Range    WBC 18.3 (H) 3.6 - 11.0 K/uL    RBC 3.96 3.80 - 5.20 M/uL    HGB 10.9 (L) 11.5 - 16.0 g/dL    HCT 34.3 (L) 35.0 - 47.0 %    MCV 86.6 80.0 - 99.0 FL    MCH 27.5 26.0 - 34.0 PG    MCHC 31.8 30.0 - 36.5 g/dL    RDW 16.4 (H) 11.5 - 14.5 %    PLATELET 462 996 - 283 K/uL    MPV 11.5 8.9 - 12.9 FL    NRBC 0.0 0  WBC    ABSOLUTE NRBC 0.00 0.00 - 0.01 K/uL   GLUCOSE, POC    Collection Time: 03/14/23  9:22 AM   Result Value Ref Range    Glucose (POC) 107 65 - 117 mg/dL    Performed by Akiko Bergman RN    GLUCOSE, POC    Collection Time: 03/14/23 12:53 PM   Result Value Ref Range    Glucose (POC) 195 (H) 65 - 117 mg/dL    Performed by Akiko Bergman RN        XR CHEST PORT   Final Result   No change. XR CHEST PORT   Final Result      1. Mild RIGHT basilar atelectasis status post pleural drain removal. No   pneumothorax. 2. Persistent, small left-sided effusion. US ABD LTD   Final Result      1. Gallbladder is distended and contains multiple gallstones. No ductal   dilatation      XR CHEST PORT   Final Result   Pigtail drainage catheter on the right with diminished right-sided effusion. Persistent left effusion. XR CHEST PORT   Final Result   Increased bilateral pleural effusions right greater than left. CT ABD PELV WO CONT   Final Result      1. Congestive heart failure, with large right and moderate left pleural   effusions, bibasilar atelectasis, and pulmonary edema. 2. Multiple small pulmonary nodules. These may be a component of pulmonary   edema, recommend follow-up chest CT after acute issues are resolved. 3. Prominent distended gallbladder, without other findings of acute   cholecystitis. CT CHEST WO CONT   Final Result      1. Congestive heart failure, with large right and moderate left pleural   effusions, bibasilar atelectasis, and pulmonary edema. 2. Multiple small pulmonary nodules. These may be a component of pulmonary   edema, recommend follow-up chest CT after acute issues are resolved.    3. Prominent distended gallbladder, without other findings of acute   cholecystitis. US ABD LTD   Final Result      1. Marked gallbladder distention with cholelithiasis but no wall thickening. 2. No biliary ductal dilatation is documented. 3. Small ascites. 4. Right pleural effusion. XR CHEST PORT   Final Result      Small right greater than left bilateral pleural effusions. Active Problems:    Sepsis ([address] (3/7/2023)      Goals of care, counseling/discussion (3/13/2023)      Physical debility (3/13/2023)      Weakness (3/13/2023)      Frailty (3/13/2023)      Anorexia (3/13/2023)      Constipation, unspecified constipation type (3/13/2023)      Palliative care encounter (3/13/2023)      Assessment/Plan:   Acute Decompensated HF  Afib with RVR now rate controlled  Runs of NSVT  Cardiology consulted and following  Required IV milrinone and cardizem gtts - weaned off 3/11  Echo done not yet read, per cardiology note prelim read appears low EF  Remains in Afib, rate ~90s-low 110s although with exertion OOB was 130s  SBPs variable but soft today, 90s during my visit  Continued diuresis  Perhaps addition of midodrine could support BP while further adjustments made with beta blocker for better HR control? Defer this decision to cardiology  Plan for [facility] 3/13 to investigate CAD or ischemic etiology as cause of HFrEF    3/13-pt back in RVR 150s this am, placed on amiodarone bolus and gtt. Cont bumex, may hold off on cath today. Placed on heparin drip, was transferred back to ICU attending due to unstable vitals  3/14-converted to sinus rhythm, had a cath today which showed no significant CAD. Elevated LVEDP. Oxygen weaned down from 15 to 2 L NC. Awaiting transfer to stepdown. Heparin GTT switched to Eliquis.  Switched to PO lasix    Pleural effusions, questionably malignant  Possible lymphoma not confirmed  Chest Tube - removed 3/11  Hem/Onc consulted and following  Further hem/onc workup anticipated early this week to evaluate possible lymphoma as was suggested by pleural fluid findings  Pt required chest tube for recurring right sided pleural effusion, this was discontinued 3/11  -Chest x-ray showed stable pleural effusion        Suspected severe sepsis with hypovolemic shock on admission  Leukocytosis, lactic acidosis, OLGA, and hypervolemic hyponatremia  LA 11.75 now 1.6 - resolved  OLGA likely 2/2 hypovolemia, Cr resolving 1.81 now 0.45  Na 125 now 136 - resolved  WBC 17.6 now  12.3  No source of infection has been isolated  Urine cx (-), BC drawn 3/7 prelim (-), Resp PCR (-), pleural fluid 3/9 prelim (-)  Pt was initially treated with Zosyn, Flagyl but with no infectious source these were discontinued and WBC has trended down without abx, will continue to monitor    Elevated LFTs suspect cardiohepatic syndrome  Severe passive congestion  GI consulted and following  LFTs elevated 2/2 severe passive congestion, \"unreadably high\" on admission, now ALT 1394, , alk ph 114  Continuing to improve  Continue to monitor    Constipation x1 week per pt report  Added daily scheduled miralax and stool softeners  1x dose MOM now  PRN bisacodyl suppository    Diabetes mellitus type 2  Accuchecks ACHS  Sliding scale insulin  Monitor for s/s hypo/hyperglycemia  Hypoglycemia treatment per protocol  Diabetes management consulted    Hypokalemia, hypomagnesemia -  Suspect 2/2 diuresis  Replace as needed    Arthritis - avoiding tylenol d/t liver dz and ibuprofen/NSAIDs due to HFrEF; pt denies pain at this time    Palliative care consulted and following    Disposition: Pending hospital course  DVT Prophylaxis: Heparin gtt  Code Status:  Full Code  POA: [facility] -  - [phone]     Time Spent: 35 minutes  Care Plan discussed with: Patient, , Nurse  _______________________________________________________________    Ramey Lundborg, MD

## 2023-03-14 NOTE — DIABETES MGMT
[address] [facility]  DIABETES MANAGEMENT CONSULT    Consulted by Provider for advanced nursing evaluation and care for inpatient blood glucose management. Evaluation and Action Plan   This [de-identified]year old  female was admitted with concern for new diagnosis of heart failure. Issues of Afib intermittently since admission so was placed back on an amio infusion. Underwent cardiac cath earlier today with no significant CAD noted. Cardiology recommended medical management with furosemide, metoprolol & lisinopril and switching to apixaban. On 02 this morning and reports being hungry. As for BG management, a corrective insulin approach had been unsuccessful so basal insulin was added yesterday 3/13/23.  this morning. Is hungry so will begin a diet today; recommended 45 gram carb-controlled meals. Would restart her metformin in two (2) days giving time for clearance of dye. Since BG control @goal at the time of this admission, would return to home diabetes regimen at discharge. Management Rationale Action Plan   Medication   Basal needs Using 0.3 units/kg/D based on BG pattern Reduce Lantus insulin to 12 units D tomorrow     Restart metformin (after dye has time to clear) 3/16/23   Nutritional needs  Will be eating   Corrective insulin Using HIGH sensitivity based on insulin naivete    Additional orders   Carb controlled meals (45 gms/meal)     Diabetes Discharge Plan   Medication  Metformin 1000mg twice daily   Referral  [x]        Outpatient diabetes education to expand meal choices (MNT)   Additional orders            Initial Presentation   Ara Cancino is a [de-identified] y.o. female admitted from ER 3/7/23 after experiencing nausea, vomiting and anorexia. Abnormal labs included elevated white count, hyponatremia, & elevated liver enzymes and lactic acid. Became dyspneic upon transfer to ICU & placed on 02. Echo revealed dilated LV. Troponin 141.  Concern for new diagnosis of heart failure. CXR: Pulmonary edema and effusions R>L  US ABD: Enlarged gall bladder. No cirrhosis. Likely liver congestion    HX:   Past Medical History:   Diagnosis Date    Arthritis     Chronic pain     arthritic    Diabetes ([facility] [address]     Hypertension     Ill-defined condition     shingles 1.5 years ago 8/2014    Other ill-defined conditions(799.89)     high cholesterol    Other ill-defined conditions(799.89)     shingles      INITIAL DX:   Sepsis ([facility] [address] [A41.9]   Heart failure  Afib    Current Treatment     TX: Amio infusion. Clot prevention. Diuresis. BP meds. [address] progress has been complicated by need for ICU level of care. 3/12/23 GI consult regarding elevated LFTs:   LFTs are getting better as noted above  Serologies: CMV IgG, EBV IgG. Nuc ag +. Others are still pending.  elevate. No GI complaints. Continue current treatment. Follow  daily LFTs. Expect these to improve as cardiac function improves. 3/13/23 Afib with RVR => amio infusion restarted. Heart cath delayed  3/14/23 Cardiac cath completed with no significant evidence of CAD; BP management recommended    Diabetes History   Type 2 diabetes treated with an oral agent  No family history of diabetes  Patient has basically eliminated starch to normalize Bgs  A1c 6.8%    Diabetes-related Medical History  Other associated conditions     Right cataract removal  Shingles affected left eye causing glaucoma    Diabetes Medication History  Key Antihyperglycemic Medications               metFORMIN (GLUCOPHAGE) 500 mg tablet (Taking) Take 1,000 mg by mouth two (2) times daily (with meals). Indications: type 2 diabetes mellitus           Diabetes self-management practices:   Eating pattern - Has eliminated carbohydrate from starch sources; will consume fruit   [x] Eating a carbohydrate-controlled meal plan  [x] Breakfast  Scrambled egg with fruit OR oatmeal or banana with peanut butter.  Drinks coffee  [x] Lunch   Vegetable soup (does not include noodles or rice)  [x] Dinner   Chicken or fish with non-starchy vegetables (e.g., green beans, broccoli or cauliflower. Drinks DF lemonade or iced tea  [x] Dentition status Intact  Physical activity pattern   [x] Employing a physical activity program to control BG  [x] Aerobic exercise 15 minutes per day on exercise bike  Monitoring pattern   [x] Testing BGs sufficiently to inform self-management adjustments  [x] Breakfast  160s  Taking medications pattern  [x] Consistent administration  [x] Affordable  Reducing risks  [] Influenza:   Immunization History   Administered Date(s) Administered    Influenza Vaccine 10/01/2017   [] Pneumonia:   Immunization History   Administered Date(s) Administered    Pneumococcal Vaccine (Unspecified Type) 06/18/2011   [] Hepatitis: There is no immunization history for the selected administration types on file for this patient. Social determinants of health impacting diabetes self-management practices   None identified  Overall evaluation:    [x] Achieving A1c target with drug therapy & self-care practices    Subjective   Can I order some food?     Objective   Physical exam  General Normal weight female in no acute distress. Conversant and cooperative  Neuro  Alert, oriented   Vital Signs Visit Vitals  BP (!) 102/59   Pulse 63   Temp 97.9 °F (36.6 °C)   Resp 13   Ht 5' 3\" (1.6 m)   Wt 67 kg (147 lb 11.3 oz)   SpO2 95%   Breastfeeding No   BMI 26.17 kg/m²     Skin  Warm and dry. No acanthosis noted along neckline. Heart   Afib   Extremities No foot wounds.  Feet cool bilaterally    Diabetic foot exam:    Left Foot     Visual Exam: normal Mottling (not new per nursing)   Pulse DP: 1+ (weak)     Right Foot   Visual Exam: normal Mottling (not new per nursing)   Pulse DP: 1+ (weak)    Laboratory  Recent Labs     03/14/23  0007 03/13/23  0902 03/13/23  0402 03/12/23  1834 03/12/23  0422   *  --  232* 279* 150*   AGAP 5  --  8 3* 6   WBC 18.3* 17.6* 18.0*  --  12.3* CREA 0.77  --  0.67 0.79 0.45*   *  --  166*  --  249*   *  --  1,161*  --  1,394*       Factors impacting BG management  Factor Dose Comments   Nutrition:  Standard meal   45 gram CHO meals   Hungry   Drugs:  Heparin in D5W infusion     Other:   Kidney function  Liver function   Creatinine normalized  ALT markedly elevated     Trending down     Blood glucose pattern    Significant diabetes-related events over the past 24-72 hours  3/7/23 Admission . A1c 6.8%. Hyponatremic  3/8-13/23 Corrective insulin approach  Using 12-20 units of correction per day  3/14/23  on Lantus insulin 20 units D    Assessment and Nursing Intervention   Nursing Diagnosis Risk for unstable blood glucose pattern   Nursing Intervention Domain 5254 Decision-making Support   Nursing Interventions Examined current inpatient diabetes/blood glucose control   Explored factors facilitating and impeding inpatient management  Explored corrective strategies with patient and responsible inpatient provider   Informed patient of rational for insulin strategy while hospitalized       Billing Code(s)   [] 70307 IP subsequent hospital care - 50 minutes   [x] 91085 IP subsequent hospital care - 35 minutes   [] [phone] IP subsequent hospital care - 25 minutes   [] [id number] IP initial hospital care - 40 minutes     Before making these care recommendations, I personally reviewed the hospitalization record, including notes, laboratory & diagnostic data and current medications, and examined the patient at the bedside (circumstances permitting) before determining care. More than fifty (50) percent of the time was spent in patient counseling and/or care coordination.   Total minutes: Yudy [id number], CNS  Diabetes Clinical Nurse Specialist  [facility]  Access via [address] [place] [address]

## 2023-03-14 NOTE — PROGRESS NOTES
[facility]  [address]  [phone]  [employer]    Cardiology Progress Note      3/14/2023 8:36 AM    Admit Date: 3/7/2023    Admit Diagnosis:   Sepsis ([address] [A41.9]    Subjective: The patient was seen and examined in the cath lab. No complaints.      Visit Vitals  BP (!) 102/59   Pulse 63   Temp 97.9 °F (36.6 °C)   Resp 13   Ht 5' 3\" (1.6 m)   Wt 67 kg (147 lb 11.3 oz)   SpO2 95%   Breastfeeding No   BMI 26.17 kg/m²       Current Facility-Administered Medications   Medication Dose Route Frequency    magnesium oxide (MAG-OX) tablet 800 mg  800 mg Oral ONCE    heparinized saline 2 units/mL infusion   Irrigation CONTINUOUS    heparinized saline 2 units/mL infusion   Irrigation CONTINUOUS    heparinized saline 2 units/mL infusion   Irrigation CONTINUOUS    fentaNYL citrate (PF) injection   IntraVENous PRN    midazolam (VERSED) injection   IntraVENous PRN    lidocaine (PF) (XYLOCAINE) 10 mg/mL (1 %) injection   IntraDERMal PRN    nitroGLYcerin 0.1 mg/mL in D5W injection    PRN    verapamiL (ISOPTIN) 2.5 mg/mL injection   IntraarTERial PRN    furosemide (LASIX) injection   IntraVENous PRN    iopamidoL (ISOVUE-370) 370 mg iodine /mL (76 %) injection    PRN    amiodarone (CORDARONE) 375 mg/250 mL D5W infusion  0.5 mg/min IntraVENous CONTINUOUS    heparin 25,000 units in D5W 250 ml infusion  12-25 Units/kg/hr IntraVENous TITRATE    heparin (porcine) 1,000 unit/mL injection 2,000 Units  2,000 Units IntraVENous PRN    Or    heparin (porcine) 1,000 unit/mL injection 4,000 Units  4,000 Units IntraVENous PRN    metoprolol succinate (TOPROL-XL) XL tablet 75 mg  75 mg Oral DAILY    lisinopriL (PRINIVIL, ZESTRIL) tablet 2.5 mg  2.5 mg Oral DAILY    furosemide (LASIX) tablet 20 mg  20 mg Oral BID    glucose chewable tablet 16 g  4 Tablet Oral PRN    glucagon (GLUCAGEN) injection 1 mg  1 mg IntraMUSCular PRN    insulin glargine (LANTUS) injection 20 Units  0.3 Units/kg SubCUTAneous DAILY    insulin lispro (HUMALOG) injection   SubCUTAneous AC&HS    [Held by provider] dilTIAZem IR (CARDIZEM) tablet 60 mg  60 mg Oral AC&HS    polyethylene glycol (MIRALAX) packet 17 g  17 g Oral DAILY    senna-docusate (PERICOLACE) 8.6-50 mg per tablet 2 Tablet  2 Tablet Oral DAILY    bisacodyL (DULCOLAX) suppository 10 mg  10 mg Rectal DAILY PRN    dextrose 10% infusion 0-250 mL  0-250 mL IntraVENous PRN    metoprolol (LOPRESSOR) injection 5 mg  5 mg IntraVENous PRN    albuterol-ipratropium (DUO-NEB) 2.5 MG-0.5 MG/3 ML  3 mL Nebulization Q4H PRN    lidocaine 4 % patch 1 Patch  1 Patch TransDERmal Q24H    alcohol 62% (NOZIN) nasal  1 Ampule  1 Ampule Topical Q12H    balsam peru-castor oiL (VENELEX) ointment   Topical BID    latanoprost (XALATAN) 0.005 % ophthalmic solution 1 Drop  1 Drop Both Eyes QPM    timolol (TIMOPTIC) 0.5 % ophthalmic solution 1 Drop  1 Drop Both Eyes DAILY    dorzolamide (TRUSOPT) 2 % ophthalmic solution 1 Drop  1 Drop Both Eyes BID    prednisoLONE acetate (PRED FORTE) 1 % ophthalmic suspension 1 Drop  1 Drop Left Eye DAILY    sodium chloride (NS) flush 5-10 mL  5-10 mL IntraVENous PRN    sodium chloride (NS) flush 5-10 mL  5-10 mL IntraVENous PRN    sodium chloride (NS) flush 5-40 mL  5-40 mL IntraVENous PRN    ondansetron (ZOFRAN) injection 4 mg  4 mg IntraVENous Q6H PRN       Objective:      Physical Assessment:   General Appearance:  alert, cooperative, well nourished, well developed; appears stated age  Eyes: sclera anicteric  Mouth/Throat: moist mucous membranes; oral pharynx clear  Neck: supple; borderline JVD or bruit  Pulmonary:  good effort  Cardiovascular: regular rate and rhythm; no murmur, click, rub, or gallop  Abdomen: soft, non-tender, non-distended; bowel sounds normal  Musculoskeletal: no swelling or deformity; moves all extremities  Extremities: no edema; palpable distal pulses   Skin: warm and dry  Neuro: grossly normal  Psych: normal mood and affect given the setting      Data Review:   Recent Labs     03/14/23  0007 03/13/23  0902 03/13/23  0402   WBC 18.3* 17.6* 18.0*   HGB 10.9* 11.5 11.8   HCT 34.3* 36.8 36.9    313 296     Recent Labs     03/14/23  0007 03/13/23  0402 03/12/23  1834 03/12/23  0422 03/11/23  0741   * 134* 130* 136 133*   K 3.5 4.5 4.8 2.6* 3.5   CL 95* 95* 93* 98 95*   CO2 35* 31 34* 32 32   * 232* 279* 150* 273*   BUN 28* 22* 19 12 17   CREA 0.77 0.67 0.79 0.45* 0.68   CA 8.1* 8.4* 8.3* 7.6* 7.5*   MG 1.9 2.1 2.1 1.5* 1.5*   PHOS 2.5* 2.6 2.3* 2.1* 2.2*   ALB 2.5* 2.8*  --  2.4* 2.7*   TBILI 0.8 1.0  --  1.2* 1.3*   * 1,161*  --  1,394* 2,104*   INR  --  1.2*  --  1.3* 1.3*       No results for input(s): TROPHS, CPK, CKMB in the last 72 hours. Intake/Output Summary (Last 24 hours) at 3/14/2023 0836  Last data filed at 3/14/2023 0548  Gross per 24 hour   Intake 1465.47 ml   Output 1040 ml   Net 425.47 ml          Assessment:     Tachycardia associated cardiomyopathy  Paroxysmal atrial fibrillation  Congestive hepatopathy  Acute renal failure, present on admission, resolved  Mild nonobstructive coronary artery disease    Recommend furosemide 40 mg twice a day for now. Recommend avoiding calcium channel blockers (ejection fraction ~ 40% or less on wet read). Continue metoprolol succinate and lisinopril. Switch from intravenous unfractionated heparin to apixaban 5 mg twice a day. Discussed with ICU team and the patient's nurse at the bedside. Thank you for allowing us to participate in the care of this patient. We will follow.     Signed By: Tiburcio Pappas MD     March 14, 2023

## 2023-03-14 NOTE — PROGRESS NOTES
Interdisciplinary team rounds were held 3/14/2023 with the following team members:Care Management, Nursing, Pharmacy, Physician, and Clinical Coordinator and the patient. Plan of care discussed. See clinical pathway and/or care plan for interventions and desired outcomes. Goals of the Day: Continue current plan of care. Transfer to [employer]. on unit

## 2023-03-14 NOTE — PROGRESS NOTES
0715 Bedside report received from [facility] & [facility], [facility].  6004 Initial assessment. Patient is stable and denies any pain. 4243 Transport at bedside for cath procedure. 0145 Report received from Cath lab JORDI Ferrell). 56 Patient's family at bedside. Patient is having breakfast with assistance from daughter. 1120 TR band deflation initiated, 2 ml removed. 3532 No complications at Radial site, 2 more ml removed. 1130 2 ml removed. 1135 2 ml removed. 1138 Patient placed on 2L O2 via NC, sats at 96%. 1140 2 ml removed from TR band. 1145 Last 2 ml removed. No sign of bleeding or hematoma. TR band removed and at bedside, 4X4 and tegaderm applied to site. Vitals will now trend to Q15X4, Q30X2 and Q1hrX4 per post TR removal protocol. 1300 Full complete bath given. Patient is up to chair and having lunch. Blood glucose is better regulated today. Appetite is also better today. 1555 Patient back to bed.  1630 Turned to right side. 1745 Family at bedside. 1800 Patient turned supine to eat dinner comfortably. 1900 Bedside given to Edelmira Madden RN.

## 2023-03-14 NOTE — PROGRESS NOTES
Physical Therapy  PT consult received and chart reviewed. Per RN patient recently returned from cardiac cath lab. Will hold for AM and follow up this PM for evaluation.   Rakesh Siegel, PT, DPT

## 2023-03-14 NOTE — PROGRESS NOTES
POST PROCEDURE NOTE     Dhruv Page  1940  [id number]    Date of Procedure: 3/14/2023    Preoperative diagnosis: Cardiomyopathy    Postoperative diagnosis: No significant coronary artery disease    Procedure: Procedure(s):  LEFT HEART CATH / CORONARY ANGIOGRAPHY  ULTRASOUND GUIDED VASCULAR ACCESS    :  Dr. Arianna Butler MD    Assistant(s):  None    EBL: None     Specimens: * No specimens in log *    Findings: No significant coronary artery disease    LVEDP 25 mmHg    Recommend: Optimization of fluid status with diuretics    Complications: None    Dr. Arianna Butler MD  3/14/2023  8:32 AM

## 2023-03-14 NOTE — PROGRESS NOTES
Palliative Medicine Consult  Steven: [phone] ([phone])    Patient Name: Dorian Patel  YOB: 1940    Date of Initial Consult: 3/10/23  Date of Today's Visit: 3/14/2023  Reason for Consult: Care Decisions  Requesting Provider: Jef Lora MD   Primary Care Physician: Mike Krause MD     SUMMARY:   Dorian Patel is a [de-identified] y.o. with a past history of DM2, HTN and chronic pain from arthritis, who was admitted on 3/7/2023 from home with a diagnosis of acute liver failure, CHF, tachycardia and severe sepsis. She was having nausea vomiting and anorexia for 2 days which prompted her visit to the ED, but on arrival she was found to have extremely elevated LFTs, an elevated white count, and lactic acidosis. She was admitted to the ICU and started to deteriorate from a respiratory standpoint. She had a bedside echo that showed dilated LV with reduces systolic function. She was started on dobutamine the next morning, and diuresed to try and help alleviate her pulmonary effusions and ease her work of breathing. Changed to milrinone on 3/9. Also started on dig  as she has liver failure so no amio  Unfortunately despite diuresing her effusions worsened, so she had a thoracentesis with chest tube placement on 3/10. GI and oncology have been consulted for her  liver injury as well as possible malignant effusions- hopefully further work up from them and cardiology next week when she is a little more stable    Current medical issues leading to Palliative Medicine involvement include: goals of care and emotional support in this sweet lady who has received MANY new diagnoses this admission. PALLIATIVE DIAGNOSES:   Goals of care  Physical debility  Weakness  Frailty  Anorexia  Constipation  Palliative Medicine     PLAN:   Met with Mrs Bertha Granados along with her daughter and her  who were at bedside.   Cath went well  We mostly talked about Advanced Care Planning- her  shared that they have talked about the need for a will but have never gotten around to it. I gave them some things to consider  How do you want to be cared for after you have  (CPR vs DNR)  How do you want to be cared for when you are still alive- full aggressive, limited with no machines etc, artificial nutrition?, dialysis? Etc  Who would you want to be your decision maker  Plan to meet again tomorrow morning at 10am to possibly put these things on paper  Encouraged her to get up again today, she is strongly considering rehab at discharge given that she has been here for a week now, and PTA she was completely independent and driving  Initial consult note routed to primary continuity provider and/or primary health care team members  Communicated plan of care with: Palliative ID[facility] Team     GOALS OF CARE / TREATMENT PREFERENCES:     GOALS OF CARE:  Patient/Health Care Proxy Stated Goals: Rehabilitation    TREATMENT PREFERENCES:   Code Status: Full Code    Advance Care Planning:  [x] [facility] Interdisciplinary Team has updated the ACP Navigator with [facility] and Patient Capacity      Primary Decision MakerNurajoe Hurtado Spouse - [phone]    Medical Interventions: Full interventions       Other:    As far as possible, the palliative care team has discussed with patient / health care proxy about goals of care / treatment preferences for patient.      HISTORY:     History obtained from: patient, chart    CHIEF COMPLAINT: \"I just cant figure out how all this happened to me all at once\"    HPI/SUBJECTIVE:    The patient is:   [x] Verbal and participatory  [] Non-participatory due to:        Clinical Pain Assessment (nonverbal scale for severity on nonverbal patients):   Clinical Pain Assessment  Severity: 0          Duration: for how long has pt been experiencing pain (e.g., 2 days, 1 month, years)  Frequency: how often pain is an issue (e.g., several times per day, once every few days, constant)     FUNCTIONAL ASSESSMENT:     Palliative Performance Scale (PPS):  PPS: 30       PSYCHOSOCIAL/SPIRITUAL SCREENING:     Palliative IDT has assessed this patient for cultural preferences / practices and a referral made as appropriate to needs (Cultural Services, Patient Advocacy, Ethics, etc.)    Any spiritual / Lutheran concerns:  [] Yes /  [x] No   If \"Yes\" to discuss with pastoral care during IDT     Does caregiver feel burdened by caring for their loved one:   [] Yes /  [x] No /  [] No Caregiver Present/Available [] No Caregiver [] Pt [facility]  If \"Yes\" to discuss with social work during IDT    Anticipatory grief assessment:   [x] Normal  / [] Maladaptive     If \"Maladaptive\" to discuss with social work during IDT    ESAS Anxiety: Anxiety: 0    ESAS Depression: Depression: 3        REVIEW OF SYSTEMS:     Positive and pertinent negative findings in ROS are noted above in HPI. The following systems were [x] reviewed / [] unable to be reviewed as noted in HPI  Other findings are noted below. Systems: constitutional, ears/nose/mouth/throat, respiratory, gastrointestinal, genitourinary, musculoskeletal, integumentary, neurologic, psychiatric, endocrine. Positive findings noted below. Modified ESAS Completed by: provider   Fatigue: 5     Depression: 3 Pain: 0   Anxiety: 0 Nausea: 0   Anorexia: 4 Dyspnea: 2           Stool Occurrence(s): 1        PHYSICAL EXAM:     From RN flowsheet:  Wt Readings from Last 3 Encounters:   03/13/23 147 lb 11.3 oz (67 kg)   01/15/18 152 lb 1.9 oz (69 kg)   01/08/18 153 lb 7 oz (69.6 kg)     Blood pressure 113/76, pulse 64, temperature 98.2 °F (36.8 °C), resp. rate (!) 7, height 5' 3\" (1.6 m), weight 147 lb 11.3 oz (67 kg), SpO2 93 %, not currently breastfeeding.     Pain Scale 1: Numeric (0 - 10)  Pain Intensity 1: 0              Pain Intervention(s) 1: Relaxation technique, Repositioned  Last bowel movement, if known:     Constitutional: frail, weak appearing, but easy smile and amenable disposition  Eyes: pupils equal, anicteric  ENMT: no nasal discharge, moist mucous membranes  Cardiovascular: regular rhythm, distal pulses intact  Respiratory: breathing not labored, symmetric  Gastrointestinal: soft non-tender, +bowel sounds  Musculoskeletal: no deformity, no tenderness to palpation  Skin: warm, dry  Neurologic: following commands, moving all extremities  Psychiatric: full affect, no hallucinations  Other:       HISTORY:     Active Problems:    Sepsis ([facility] [address] (3/7/2023)      Goals of care, counseling/discussion (3/13/2023)      Physical debility (3/13/2023)      Weakness (3/13/2023)      Frailty (3/13/2023)      Anorexia (3/13/2023)      Constipation, unspecified constipation type (3/13/2023)      Palliative care encounter (3/13/2023)    Past Medical History:   Diagnosis Date    Arthritis     Chronic pain     arthritic    Diabetes ([facility] [address]     Hypertension     Ill-defined condition     shingles 1.5 years ago 8/2014    Other ill-defined conditions(799.89)     high cholesterol    Other ill-defined conditions(799.89)     shingles      Past Surgical History:   Procedure Laterality Date    COLONOSCOPY N/A 10/25/2016    COLONOSCOPY performed by Erin Johnston MD at [facility] ENDOSCOPY    HX APPENDECTOMY      HX CATARACT REMOVAL Left     HX HYSTERECTOMY      HX KNEE REPLACEMENT Right     parital    HX KNEE REPLACEMENT Left     HX ORTHOPAEDIC      right hip replacement    HX TONSILLECTOMY      HX TUBAL LIGATION      HX UROLOGICAL      bladder tacking      Family History   Problem Relation Age of Onset    Cancer Father         leukemia    Stroke Sister       History reviewed, no pertinent family history.   Social History     Tobacco Use    Smoking status: Never    Smokeless tobacco: Never   Substance Use Topics    Alcohol use: No     No Known Allergies   Current Facility-Administered Medications   Medication Dose Route Frequency    apixaban (ELIQUIS) tablet 5 mg  5 mg Oral BID    [START ON 3/15/2023] insulin glargine (LANTUS) injection 12 Units  12 Units SubCUTAneous DAILY    amiodarone (CORDARONE) 375 mg/250 mL D5W infusion  0.5 mg/min IntraVENous CONTINUOUS    metoprolol succinate (TOPROL-XL) XL tablet 75 mg  75 mg Oral DAILY    lisinopriL (PRINIVIL, ZESTRIL) tablet 2.5 mg  2.5 mg Oral DAILY    furosemide (LASIX) tablet 20 mg  20 mg Oral BID    glucose chewable tablet 16 g  4 Tablet Oral PRN    glucagon (GLUCAGEN) injection 1 mg  1 mg IntraMUSCular PRN    insulin lispro (HUMALOG) injection   SubCUTAneous AC&HS    [Held by provider] dilTIAZem IR (CARDIZEM) tablet 60 mg  60 mg Oral AC&HS    polyethylene glycol (MIRALAX) packet 17 g  17 g Oral DAILY    senna-docusate (PERICOLACE) 8.6-50 mg per tablet 2 Tablet  2 Tablet Oral DAILY    bisacodyL (DULCOLAX) suppository 10 mg  10 mg Rectal DAILY PRN    dextrose 10% infusion 0-250 mL  0-250 mL IntraVENous PRN    metoprolol (LOPRESSOR) injection 5 mg  5 mg IntraVENous PRN    albuterol-ipratropium (DUO-NEB) 2.5 MG-0.5 MG/3 ML  3 mL Nebulization Q4H PRN    lidocaine 4 % patch 1 Patch  1 Patch TransDERmal Q24H    alcohol 62% (NOZIN) nasal  1 Ampule  1 Ampule Topical Q12H    balsam peru-castor oiL (VENELEX) ointment   Topical BID    latanoprost (XALATAN) 0.005 % ophthalmic solution 1 Drop  1 Drop Both Eyes QPM    timolol (TIMOPTIC) 0.5 % ophthalmic solution 1 Drop  1 Drop Both Eyes DAILY    dorzolamide (TRUSOPT) 2 % ophthalmic solution 1 Drop  1 Drop Both Eyes BID    prednisoLONE acetate (PRED FORTE) 1 % ophthalmic suspension 1 Drop  1 Drop Left Eye DAILY    sodium chloride (NS) flush 5-10 mL  5-10 mL IntraVENous PRN    sodium chloride (NS) flush 5-10 mL  5-10 mL IntraVENous PRN    sodium chloride (NS) flush 5-40 mL  5-40 mL IntraVENous PRN    ondansetron (ZOFRAN) injection 4 mg  4 mg IntraVENous Q6H PRN          LAB AND IMAGING FINDINGS:     Lab Results   Component Value Date/Time    WBC 18.3 (H) 03/14/2023 12:07 AM    HGB 10.9 (L) 03/14/2023 12:07 AM PLATELET 511 67/39/0125 12:07 AM     Lab Results   Component Value Date/Time    Sodium 135 (L) 03/14/2023 12:07 AM    Potassium 3.5 03/14/2023 12:07 AM    Chloride 95 (L) 03/14/2023 12:07 AM    CO2 35 (H) 03/14/2023 12:07 AM    BUN 28 (H) 03/14/2023 12:07 AM    Creatinine 0.77 03/14/2023 12:07 AM    Calcium 8.1 (L) 03/14/2023 12:07 AM    Magnesium 1.9 03/14/2023 12:07 AM    Phosphorus 2.5 (L) 03/14/2023 12:07 AM      Lab Results   Component Value Date/Time    Alk. phosphatase 132 (H) 03/14/2023 12:07 AM    Protein, total 5.9 (L) 03/14/2023 12:07 AM    Albumin 2.5 (L) 03/14/2023 12:07 AM    Globulin 3.4 03/14/2023 12:07 AM     (H) 03/09/2023 02:33 AM     Lab Results   Component Value Date/Time    INR 1.2 (H) 03/13/2023 04:02 AM    Prothrombin time 12.3 (H) 03/13/2023 04:02 AM    aPTT 21.7 (L) 03/13/2023 09:02 AM      No results found for: IRON, FE, TIBC, IBCT, PSAT, FERR   No results found for: PH, PCO2, PO2  No components found for: [place]   Lab Results   Component Value Date/Time    CK 66 03/10/2023 05:25 AM                Total time:   Counseling / coordination time, spent as noted above:   > 50% counseling / coordination?:     Prolonged service was provided for  []30 min   []75 min in face to face time in the presence of the patient, spent as noted above. Time Start:   Time End:   Note: this can only be billed with 62444 (initial) or 16323 (follow up). If multiple start / stop times, list each separately.

## 2023-03-14 NOTE — PROGRESS NOTES
[facility]    ICU TEAM Progress Note    Name: Rajiv Strong   : 1940   MRN: [id number]   Date: 3/14/2023           ICU Assessment & Plan of Care     Rajiv Strong Is a [de-identified] y.o. female with PMH s/f DM2 who is admitted 23 for N/V, lactic acidosis, and concern for new diagnosis heart failure. 3/14: S/P CARDIAC CATH THIS AM  Now in NSR        CARDIAC  #. Acute exacerbation of new diagnosis HFrEF  Cath reveals ef of 40%  D/c cardizem  Continue metoprolol  Continue lasix  D/c amiodarone drip     RESPIRATORY  #. Acute hypoxic respiratory failure on NC  #. Pulmonary edema  #. Pulmonary effusions R>L  Taper down fio2 as tolerated  Suspect still has pul edema from rapid a fib  Continue lasix po      RENAL  #. OLGA, ?cardiorenal, resolved  #. AGMA, osm gap, LA + ketoacidosis, resolved  #. Hypervolemic hyponatremia, resolved   Replace electrolytes     GASTROINTESTINAL  #. Nausea, vomiting; resolved  #. Significant transaminitis, now downtrending  #. New ascites  #. At risk for malnutrition based on acute illness  - GI following -- greatly appreciate assistance  - no cirrhotic morphology on CT, no e/o clot on repeat RUQUS (flows not done on first)  - cardiohepatic / severe passive liver congestion      HEMATOLOGIC  #. Mild normocytic anemia  - unknown baseline   - no indication for intervention           ENDOCRINE  - accuchecks   - SSI        Mobility: activity as tolerated  PT/OT:  consulted      DVT Prophylaxis: started on eliquis for dvt prophylaxis  U - Ulcer Prophylaxis: not indicated  G - Glycemic Control: Insulin prn  B - Bowel Regimen: prn    40 minutes of critical care time spent  Subjective:   Progress Note: 3/14/2023      Reason for ICU Admission: concern for sepsis     HPI:  per prior clinician  Rajiv Strong is a [de-identified] y.o. female with PMH s/f T2DM (A1c 7.1, 2018), HTN, chronic pain/arthritis who presented to the ED with N/V, anorexia x 2 days.        In ED, workup notable for marked and varied lab abnmls, including LA 11, WBC 17, Na 125, K 5.7, CO2 16, AG 17, Cr 1.8, AST/ALT unreadably high, alkphos 176, Tbili 1.7. Pan-scan without contrast pending per ED physician. Given 2 L IVF in ED and started on antibiotics. ICU consulted for admission. Upon my arrival to bedside, pt awake and alert, /97, HR . Finishing first L of IVF. C/o nausea and no appetite. Will be admitted to the ICU for further management. Arrived to ICU after CT scans. Pt now SOB with RR in the 30s requiring 4 L NC. Bedside ultrasound of lungs and echo done. Lungs with bilateral B lines. Bedside echo significant for dilated LV with reduced systolic function. Possible wall motion abnormalities but a little hard to determine bc pt's HR still in the 130s. Official Echo ordered for the morning. First troponin only 141 but will resend in 3 hours to r/o NSTEMI. Denies chest pain. 3/8 - able to diurese with lasix 80.   3/9 - pt with recurrent, refractory RVR yesterday afternoon. Metop tried. Switched off dobuta to milrinone. Avoided amio due to liver injury. Cards consulted and dig loaded. Cardizem started overnight; remains in 130s this AM.  3/10 - worsening R pleural effusion despite net neg. R pigtail placed with 750cc out before turning to water seal. Restarted diuresis and now net neg 3L with improvement in renal fxn, BP, and resp status. 3/11 : heart rate well controlled on diltiazem drip  3/12 : off milrinone and cardizem,looking comfortable  3/13: hospitalist requested to take the patient back to ICU as she is in rapid a fib with \" soft\" BP  Patient is sleeping comfortably at present  3/14: in NSR, s/p cardiac cath    Home Medications:     Prior to Admission medications    Medication Sig Start Date End Date Taking? Authorizing Provider   hydroCHLOROthiazide (HYDRODIURIL) 25 mg tablet Take 25 mg by mouth daily.    Yes Provider, Historical   dorzolamide (TRUSOPT) 2 % ophthalmic solution INSTILL 1 DROP IN BOTH EYES TWICE DAILY 2/27/23  Yes Provider, Historical   atorvastatin (LIPITOR) 10 mg tablet Take 10 mg by mouth nightly. Yes Provider, Historical   timolol (TIMOPTIC) 0.5 % ophthalmic solution Administer 1 Drop to both eyes daily. Yes Provider, Historical   latanoprost (XALATAN) 0.005 % ophthalmic solution Administer 1 Drop to both eyes nightly. Indications: OPEN ANGLE GLAUCOMA   Yes Provider, Historical   prednisoLONE acetate (PRED FORTE) 1 % ophthalmic suspension Administer 1 Drop to left eye daily. Indications: SEVERE OCULAR INFLAMMATION   Yes Provider, Historical   metFORMIN (GLUCOPHAGE) 500 mg tablet Take 1,000 mg by mouth two (2) times daily (with meals). Indications: type 2 diabetes mellitus   Yes Provider, Historical   lisinopril (PRINIVIL, ZESTRIL) 5 mg tablet Take 5 mg by mouth daily. Indications: HYPERTENSION   Yes Provider, Historical   acyclovir (ZOVIRAX) 800 mg tablet Take 800 mg by mouth nightly. Indications: shingles prevention   Yes Provider, Historical   multivitamin with iron tablet Take 1 Tab by mouth daily.    Yes Provider, Historical       Current Meds:     Current Facility-Administered Medications   Medication Dose Route Frequency    apixaban (ELIQUIS) tablet 5 mg  5 mg Oral BID    [START ON 3/15/2023] insulin glargine (LANTUS) injection 12 Units  12 Units SubCUTAneous DAILY    amiodarone (CORDARONE) 375 mg/250 mL D5W infusion  0.5 mg/min IntraVENous CONTINUOUS    metoprolol succinate (TOPROL-XL) XL tablet 75 mg  75 mg Oral DAILY    lisinopriL (PRINIVIL, ZESTRIL) tablet 2.5 mg  2.5 mg Oral DAILY    furosemide (LASIX) tablet 20 mg  20 mg Oral BID    glucose chewable tablet 16 g  4 Tablet Oral PRN    glucagon (GLUCAGEN) injection 1 mg  1 mg IntraMUSCular PRN    insulin lispro (HUMALOG) injection   SubCUTAneous AC&HS    [Held by provider] dilTIAZem IR (CARDIZEM) tablet 60 mg  60 mg Oral AC&HS    polyethylene glycol (MIRALAX) packet 17 g  17 g Oral DAILY    senna-docusate (PERICOLACE) 8.6-50 mg per tablet 2 Tablet  2 Tablet Oral DAILY    bisacodyL (DULCOLAX) suppository 10 mg  10 mg Rectal DAILY PRN    dextrose 10% infusion 0-250 mL  0-250 mL IntraVENous PRN    metoprolol (LOPRESSOR) injection 5 mg  5 mg IntraVENous PRN    albuterol-ipratropium (DUO-NEB) 2.5 MG-0.5 MG/3 ML  3 mL Nebulization Q4H PRN    lidocaine 4 % patch 1 Patch  1 Patch TransDERmal Q24H    alcohol 62% (NOZIN) nasal  1 Ampule  1 Ampule Topical Q12H    balsam peru-castor oiL (VENELEX) ointment   Topical BID    latanoprost (XALATAN) 0.005 % ophthalmic solution 1 Drop  1 Drop Both Eyes QPM    timolol (TIMOPTIC) 0.5 % ophthalmic solution 1 Drop  1 Drop Both Eyes DAILY    dorzolamide (TRUSOPT) 2 % ophthalmic solution 1 Drop  1 Drop Both Eyes BID    prednisoLONE acetate (PRED FORTE) 1 % ophthalmic suspension 1 Drop  1 Drop Left Eye DAILY    sodium chloride (NS) flush 5-10 mL  5-10 mL IntraVENous PRN    sodium chloride (NS) flush 5-10 mL  5-10 mL IntraVENous PRN    sodium chloride (NS) flush 5-40 mL  5-40 mL IntraVENous PRN    ondansetron (ZOFRAN) injection 4 mg  4 mg IntraVENous Q6H PRN       Objective:   Vital Signs:  Visit Vitals  BP (!) 102/59   Pulse 63   Temp 97.9 °F (36.6 °C)   Resp 13   Ht 5' 3\" (1.6 m)   Wt 67 kg (147 lb 11.3 oz)   SpO2 95%   Breastfeeding No   BMI 26.17 kg/m²    O2 Flow Rate (L/min): 5 l/min O2 Device: Hi flow nasal cannula Temp (24hrs), Av.3 °F (36.8 °C), Min:97.9 °F (36.6 °C), Max:98.9 °F (37.2 °C)           Intake/Output:     Intake/Output Summary (Last 24 hours) at 3/14/2023 0948  Last data filed at 3/14/2023 0548  Gross per 24 hour   Intake 1465.47 ml   Output 1040 ml   Net 425.47 ml       Physical Exam:  Elderly female, awake, alert, lying in bed, NAD, appearing slightly more active  Persistent L ptosis from remote VZV  Resps even and unlabored, symmetric chest rise on NC, mild rales throughout, clearer R  HEART : S1,S2  Abd soft, nontender, nondistended  NICOLAS, extremities warm to touch  Calm and cooperative      LABS AND  DATA:   Personally reviewed    MEDS:   Reviewed    Multidisciplinary Rounds Completed:  yes    ABCDEF Bundle/Checklist Completed:  Yes    SPECIAL EQUIPMENT  None    DISPOSITION  TRANSFER TO ivcu    CRITICAL CARE CONSULTANT NOTE  I had a face to face encounter with the patient, reviewed and interpreted patient data including clinical events, labs, images, vital signs, I/O's, and examined patient. I have discussed the case and the plan and management of the patient's care with the consulting services, the bedside nurses and the respiratory therapist.      NOTE OF PERSONAL INVOLVEMENT IN CARE   This patient has a high probability of imminent, clinically significant deterioration, which requires the highest level of preparedness to intervene urgently. I participated in the decision-making and personally managed or directed the management of the following life and organ supporting interventions that required my frequent assessment to treat or prevent imminent deterioration.     Tania Murillo MD  Intensivist  3/14/2023

## 2023-03-14 NOTE — PROGRESS NOTES
Problem: Mobility Impaired (Adult and Pediatric)  Goal: *Acute Goals and Plan of Care (Insert Text)  Description: FUNCTIONAL STATUS PRIOR TO ADMISSION: Patient was independent and active without use of DME.    HOME SUPPORT PRIOR TO ADMISSION: The patient lived with  but did not require assist.    Physical Therapy Goals  Initiated 3/14/2023  1. Patient will move from supine to sit and sit to supine  in bed with modified independence within 7 day(s). 2.  Patient will transfer from bed to chair and chair to bed with modified independence using the least restrictive device within 7 day(s). 3.  Patient will perform sit to stand with modified independence within 7 day(s). 4.  Patient will ambulate with modified independence for 200 feet with the least restrictive device within 7 day(s). Outcome: Progressing Towards Goal   PHYSICAL THERAPY EVALUATION  Patient: Gennaro Gómez ([de-identified] y.o. female)  Date: 3/14/2023  Primary Diagnosis: Sepsis ([facility] [address] [A41.9]  Procedure(s) (LRB):  LEFT HEART CATH / CORONARY ANGIOGRAPHY (N/A)  ULTRASOUND GUIDED VASCULAR ACCESS (N/A) Day of Surgery   Precautions:   Fall    ASSESSMENT  Based on the objective data described below, the patient presents with decreased functional mobility from baseline level of function. Patient currently limited by gait instability, impaired balance, generalized weakness and decreased activity tolerance. Currently needing Aarti to come to stand. Has fair balance in standing without RW. With RW has improved balance. Amb approx 30 feet with RW and Aarti-CGA with no overt LOB but slow hawa. Reports she is feeling very weak but is anxious to stay out of the bed. Patient is currently below her baseline and was completely independent at baseline. Recommend IPR to progress to more independent level of function.     Other factors to consider for discharge: at risk for falls, below baseline     Patient will benefit from skilled therapy intervention to address the above noted impairments. PLAN :  Recommendations and Planned Interventions: bed mobility training, transfer training, gait training, therapeutic exercises, neuromuscular re-education, patient and family training/education, and therapeutic activities      Frequency/Duration: Patient will be followed by physical therapy:  5 times a week to address goals. Recommendation for discharge: (in order for the patient to meet his/her long term goals)  Therapy 3 hours per day 5-7 days per week        IF patient discharges home will need the following DME: to be determined (TBD)         SUBJECTIVE:   Patient stated I'm doing better but I am really weak.     OBJECTIVE DATA SUMMARY:   HISTORY:    Past Medical History:   Diagnosis Date    Arthritis     Chronic pain     arthritic    Diabetes ([facility] [address]     Hypertension     Ill-defined condition     shingles 1.5 years ago 8/2014    Other ill-defined conditions(799.89)     high cholesterol    Other ill-defined conditions(799.89)     shingles     Past Surgical History:   Procedure Laterality Date    COLONOSCOPY N/A 10/25/2016    COLONOSCOPY performed by Chiara Kirby MD at [facility] ENDOSCOPY    HX APPENDECTOMY      HX CATARACT REMOVAL Left     HX HYSTERECTOMY      HX KNEE REPLACEMENT Right     parital    HX KNEE REPLACEMENT Left     HX ORTHOPAEDIC      right hip replacement    HX TONSILLECTOMY      HX TUBAL LIGATION      HX UROLOGICAL      bladder tacking       Personal factors and/or comorbidities impacting plan of care:     Home Situation  Home Environment: Private residence  # Steps to Enter: 5  Rails to Enter: Yes  Hand Rails : Bilateral  One/Two Story Residence: One story  Living Alone: No  Support Systems: Spouse/Significant Other  Patient Expects to be Discharged to[de-identified] Home  Current DME Used/Available at Home: Cane, straight, Walker, rolling, Grab bars, Raised toilet seat  Tub or Shower Type: Shower    EXAMINATION/PRESENTATION/DECISION MAKING:   Critical Behavior:  Neurologic State: Alert  Orientation Level: Oriented X4  Cognition: Follows commands  Safety/Judgement: Fall prevention, Home safety, Insight into deficits  Hearing: Auditory  Auditory Impairment: None    Range Of Motion:  AROM: Generally decreased, functional                       Strength:    Strength: Generally decreased, functional                    Tone & Sensation:   Tone: Normal              Sensation:  (NT)               Coordination:  Coordination: Within functional limits  Vision:   Acuity: Within Defined Limits (with glasses, 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)  Corrective Lenses: Glasses (bifocals)  Functional Mobility:  Bed Mobility:  Rolling:  (seated at bedside chair upon arrival)           Transfers:  Sit to Stand: Minimum assistance  Stand to Sit: Minimum assistance        Bed to Chair: Minimum assistance (with RW)              Balance:   Sitting: Intact  Standing: Impaired  Standing - Static: Constant support;Good  Standing - Dynamic : Constant support; Fair  Ambulation/Gait Training:  Distance (ft): 30 Feet (ft)  Assistive Device: Gait belt;Walker, rolling  Ambulation - Level of Assistance: Minimal assistance     Gait Description (WDL): Exceptions to WDL  Gait Abnormalities: Decreased step clearance;Shuffling gait        Base of Support: Widened     Speed/Beverly: Slow;Shuffled;Pace decreased (<100 feet/min)  Step Length: Left shortened;Right shortened       Pain Rating:  No c/o pain    Activity Tolerance:   Fair and requires rest breaks    After treatment patient left in no apparent distress:   Sitting in chair, Call bell within reach, and Caregiver / family present    COMMUNICATION/EDUCATION:   The patients plan of care was discussed with: Physical therapist, Occupational therapist, and Registered nurse. Fall prevention education was provided and the patient/caregiver indicated understanding., Patient/family have participated as able in goal setting and plan of care. , and Patient/family agree to work toward stated goals and plan of care.     Thank you for this referral.  Scarlet Sinha, PT, DPT   Time Calculation: 19 mins

## 2023-03-14 NOTE — PROGRESS NOTES
Patient is willing and compliant with all the plans in place for optimization and progressing towards a safe discharge.

## 2023-03-15 ENCOUNTER — TRANSCRIBE ORDER (OUTPATIENT)
Dept: CARDIAC REHAB | Age: 83
End: 2023-03-15

## 2023-03-15 DIAGNOSIS — I50.22 CHRONIC SYSTOLIC HEART FAILURE (HCC): Primary | ICD-10-CM

## 2023-03-15 LAB
ALBUMIN SERPL-MCNC: 2.3 G/DL (ref 3.5–5)
ALBUMIN/GLOB SERPL: 0.7 (ref 1.1–2.2)
ALP SERPL-CCNC: 119 U/L (ref 45–117)
ALT SERPL-CCNC: 588 U/L (ref 12–78)
ANION GAP SERPL CALC-SCNC: 5 MMOL/L (ref 5–15)
APTT PPP: 27.1 SEC (ref 22.1–31)
AST SERPL-CCNC: 68 U/L (ref 15–37)
BILIRUB DIRECT SERPL-MCNC: 0.3 MG/DL (ref 0–0.2)
BILIRUB SERPL-MCNC: 0.8 MG/DL (ref 0.2–1)
BUN SERPL-MCNC: 23 MG/DL (ref 6–20)
BUN/CREAT SERPL: 33 (ref 12–20)
CALCIUM SERPL-MCNC: 8 MG/DL (ref 8.5–10.1)
CHLORIDE SERPL-SCNC: 99 MMOL/L (ref 97–108)
CO2 SERPL-SCNC: 31 MMOL/L (ref 21–32)
CREAT SERPL-MCNC: 0.69 MG/DL (ref 0.55–1.02)
ERYTHROCYTE [DISTWIDTH] IN BLOOD BY AUTOMATED COUNT: 16.2 % (ref 11.5–14.5)
GLOBULIN SER CALC-MCNC: 3.2 G/DL (ref 2–4)
GLUCOSE BLD STRIP.AUTO-MCNC: 140 MG/DL (ref 65–117)
GLUCOSE BLD STRIP.AUTO-MCNC: 145 MG/DL (ref 65–117)
GLUCOSE BLD STRIP.AUTO-MCNC: 176 MG/DL (ref 65–117)
GLUCOSE BLD STRIP.AUTO-MCNC: 235 MG/DL (ref 65–117)
GLUCOSE SERPL-MCNC: 122 MG/DL (ref 65–100)
HCT VFR BLD AUTO: 34.6 % (ref 35–47)
HGB BLD-MCNC: 10.9 G/DL (ref 11.5–16)
INR PPP: 1.3 (ref 0.9–1.1)
MAGNESIUM SERPL-MCNC: 1.7 MG/DL (ref 1.6–2.4)
MCH RBC QN AUTO: 27.5 PG (ref 26–34)
MCHC RBC AUTO-ENTMCNC: 31.5 G/DL (ref 30–36.5)
MCV RBC AUTO: 87.2 FL (ref 80–99)
NRBC # BLD: 0 K/UL (ref 0–0.01)
NRBC BLD-RTO: 0 PER 100 WBC
PHOSPHATE SERPL-MCNC: 3.4 MG/DL (ref 2.6–4.7)
PLATELET # BLD AUTO: 285 K/UL (ref 150–400)
PMV BLD AUTO: 10.6 FL (ref 8.9–12.9)
POTASSIUM SERPL-SCNC: 3.6 MMOL/L (ref 3.5–5.1)
PROT SERPL-MCNC: 5.5 G/DL (ref 6.4–8.2)
PROTHROMBIN TIME: 13 SEC (ref 9–11.1)
RBC # BLD AUTO: 3.97 M/UL (ref 3.8–5.2)
SERVICE CMNT-IMP: ABNORMAL
SODIUM SERPL-SCNC: 135 MMOL/L (ref 136–145)
THERAPEUTIC RANGE,PTTT: NORMAL SECS (ref 58–77)
WBC # BLD AUTO: 13.3 K/UL (ref 3.6–11)

## 2023-03-15 PROCEDURE — 82962 GLUCOSE BLOOD TEST: CPT

## 2023-03-15 PROCEDURE — 99231 SBSQ HOSP IP/OBS SF/LOW 25: CPT | Performed by: CLINICAL NURSE SPECIALIST

## 2023-03-15 PROCEDURE — 85730 THROMBOPLASTIN TIME PARTIAL: CPT

## 2023-03-15 PROCEDURE — 77010033678 HC OXYGEN DAILY

## 2023-03-15 PROCEDURE — 97116 GAIT TRAINING THERAPY: CPT

## 2023-03-15 PROCEDURE — 74011636637 HC RX REV CODE- 636/637: Performed by: CLINICAL NURSE SPECIALIST

## 2023-03-15 PROCEDURE — 80048 BASIC METABOLIC PNL TOTAL CA: CPT

## 2023-03-15 PROCEDURE — 85610 PROTHROMBIN TIME: CPT

## 2023-03-15 PROCEDURE — 99497 ADVNCD CARE PLAN 30 MIN: CPT | Performed by: NURSE PRACTITIONER

## 2023-03-15 PROCEDURE — 83735 ASSAY OF MAGNESIUM: CPT

## 2023-03-15 PROCEDURE — 97530 THERAPEUTIC ACTIVITIES: CPT

## 2023-03-15 PROCEDURE — 99231 SBSQ HOSP IP/OBS SF/LOW 25: CPT | Performed by: NURSE PRACTITIONER

## 2023-03-15 PROCEDURE — 74011250637 HC RX REV CODE- 250/637: Performed by: STUDENT IN AN ORGANIZED HEALTH CARE EDUCATION/TRAINING PROGRAM

## 2023-03-15 PROCEDURE — 99498 ADVNCD CARE PLAN ADDL 30 MIN: CPT | Performed by: NURSE PRACTITIONER

## 2023-03-15 PROCEDURE — 97535 SELF CARE MNGMENT TRAINING: CPT | Performed by: OCCUPATIONAL THERAPIST

## 2023-03-15 PROCEDURE — 36415 COLL VENOUS BLD VENIPUNCTURE: CPT

## 2023-03-15 PROCEDURE — 80076 HEPATIC FUNCTION PANEL: CPT

## 2023-03-15 PROCEDURE — 65270000046 HC RM TELEMETRY

## 2023-03-15 PROCEDURE — 84100 ASSAY OF PHOSPHORUS: CPT

## 2023-03-15 PROCEDURE — 74011250637 HC RX REV CODE- 250/637: Performed by: INTERNAL MEDICINE

## 2023-03-15 PROCEDURE — 74011250637 HC RX REV CODE- 250/637

## 2023-03-15 PROCEDURE — 85027 COMPLETE CBC AUTOMATED: CPT

## 2023-03-15 RX ADMIN — APIXABAN 5 MG: 5 TABLET, FILM COATED ORAL at 21:56

## 2023-03-15 RX ADMIN — Medication 1 AMPULE: at 21:56

## 2023-03-15 RX ADMIN — METOPROLOL SUCCINATE 75 MG: 50 TABLET, EXTENDED RELEASE ORAL at 08:45

## 2023-03-15 RX ADMIN — Medication 2 UNITS: at 16:08

## 2023-03-15 RX ADMIN — DORZOLAMIDE HYDROCHLORIDE 1 DROP: 20 SOLUTION/ DROPS OPHTHALMIC at 18:57

## 2023-03-15 RX ADMIN — TIMOLOL MALEATE 1 DROP: 5 SOLUTION OPHTHALMIC at 08:49

## 2023-03-15 RX ADMIN — APIXABAN 5 MG: 5 TABLET, FILM COATED ORAL at 08:45

## 2023-03-15 RX ADMIN — FUROSEMIDE 40 MG: 40 TABLET ORAL at 18:56

## 2023-03-15 RX ADMIN — DORZOLAMIDE HYDROCHLORIDE 1 DROP: 20 SOLUTION/ DROPS OPHTHALMIC at 08:49

## 2023-03-15 RX ADMIN — FUROSEMIDE 40 MG: 40 TABLET ORAL at 08:45

## 2023-03-15 RX ADMIN — PREDNISOLONE ACETATE 1 DROP: 10 SUSPENSION/ DROPS OPHTHALMIC at 08:48

## 2023-03-15 RX ADMIN — SENNOSIDES AND DOCUSATE SODIUM 2 TABLET: 50; 8.6 TABLET ORAL at 08:45

## 2023-03-15 RX ADMIN — CASTOR OIL AND BALSAM, PERU: 788; 87 OINTMENT TOPICAL at 08:47

## 2023-03-15 RX ADMIN — CASTOR OIL AND BALSAM, PERU: 788; 87 OINTMENT TOPICAL at 22:00

## 2023-03-15 RX ADMIN — POLYETHYLENE GLYCOL 3350 17 G: 17 POWDER, FOR SOLUTION ORAL at 08:45

## 2023-03-15 RX ADMIN — INSULIN GLARGINE 12 UNITS: 100 INJECTION, SOLUTION SUBCUTANEOUS at 08:45

## 2023-03-15 RX ADMIN — LATANOPROST 1 DROP: 50 SOLUTION OPHTHALMIC at 18:57

## 2023-03-15 RX ADMIN — Medication 1 AMPULE: at 08:47

## 2023-03-15 RX ADMIN — LISINOPRIL 2.5 MG: 5 TABLET ORAL at 08:45

## 2023-03-15 NOTE — PROGRESS NOTES
Hospitalist Progress Note    Subjective:     Patient was seen and examined. No acute events overnight. Discussed with RN overnight events. All patient's questions were answered.     \"Feeling much better\"    Review of Systems:  Symptom Y/N Comments  Symptom Y/N Comments   Fever/Chills n   Chest Pain n    Poor Appetite    Edema     Cough n   Abdominal Pain n    Sputum    Joint Pain     SOB/DENTON n   Pruritis/Rash     Nausea/vomit n   Tolerating PT/OT     Diarrhea    Tolerating Diet y    Constipation    Other       Could NOT obtain due to:          Current Facility-Administered Medications   Medication Dose Route Frequency    apixaban (ELIQUIS) tablet 5 mg  5 mg Oral BID    insulin glargine (LANTUS) injection 12 Units  12 Units SubCUTAneous DAILY    furosemide (LASIX) tablet 40 mg  40 mg Oral BID    amiodarone (CORDARONE) 375 mg/250 mL D5W infusion  0.5 mg/min IntraVENous CONTINUOUS    metoprolol succinate (TOPROL-XL) XL tablet 75 mg  75 mg Oral DAILY    lisinopriL (PRINIVIL, ZESTRIL) tablet 2.5 mg  2.5 mg Oral DAILY    glucose chewable tablet 16 g  4 Tablet Oral PRN    glucagon (GLUCAGEN) injection 1 mg  1 mg IntraMUSCular PRN    insulin lispro (HUMALOG) injection   SubCUTAneous AC&HS    [Held by provider] dilTIAZem IR (CARDIZEM) tablet 60 mg  60 mg Oral AC&HS    polyethylene glycol (MIRALAX) packet 17 g  17 g Oral DAILY    senna-docusate (PERICOLACE) 8.6-50 mg per tablet 2 Tablet  2 Tablet Oral DAILY    bisacodyL (DULCOLAX) suppository 10 mg  10 mg Rectal DAILY PRN    dextrose 10% infusion 0-250 mL  0-250 mL IntraVENous PRN    metoprolol (LOPRESSOR) injection 5 mg  5 mg IntraVENous PRN    albuterol-ipratropium (DUO-NEB) 2.5 MG-0.5 MG/3 ML  3 mL Nebulization Q4H PRN    lidocaine 4 % patch 1 Patch  1 Patch TransDERmal Q24H    alcohol 62% (NOZIN) nasal  1 Ampule  1 Ampule Topical Q12H    balsam peru-castor oiL (VENELEX) ointment   Topical BID    latanoprost (XALATAN) 0.005 % ophthalmic solution 1 Drop  1 Drop Both Eyes QPM    timolol (TIMOPTIC) 0.5 % ophthalmic solution 1 Drop  1 Drop Both Eyes DAILY    dorzolamide (TRUSOPT) 2 % ophthalmic solution 1 Drop  1 Drop Both Eyes BID    prednisoLONE acetate (PRED FORTE) 1 % ophthalmic suspension 1 Drop  1 Drop Left Eye DAILY    sodium chloride (NS) flush 5-10 mL  5-10 mL IntraVENous PRN    sodium chloride (NS) flush 5-10 mL  5-10 mL IntraVENous PRN    sodium chloride (NS) flush 5-40 mL  5-40 mL IntraVENous PRN    ondansetron (ZOFRAN) injection 4 mg  4 mg IntraVENous Q6H PRN            Objective:     Visit Vitals  /63   Pulse 82   Temp 98.1 °F (36.7 °C)   Resp 24   Ht 5' 3\" (1.6 m)   Wt 62.3 kg (137 lb 4.8 oz)   SpO2 96%   Breastfeeding No   BMI 24.32 kg/m²    O2 Flow Rate (L/min): 2 l/min O2 Device: Nasal cannula    Temp (24hrs), Av.9 °F (36.6 °C), Min:97.5 °F (36.4 °C), Max:98.1 °F (36.7 °C)      03/15 0701 - 03/15 1900  In: 1122.7 [P.O.:480; I.V.:642.7]  Out: 350 [Urine:350]  1901 - 03/15 0700  In: 1208.3 [P.O.:940; I.V.:268.3]  Out: 2210 [Urine:2210]    PHYSICAL EXAM:    Physical Exam  Constitutional:       General: She is not in acute distress. Comments: Elderly adult female   HENT:      Head: Normocephalic and atraumatic. Mouth/Throat:      Mouth: Mucous membranes are moist.   Eyes:      Pupils: Pupils are equal, round, and reactive to light. Cardiovascular:      Rate and Rhythm: Tachycardia present. Rhythm irregular. Pulses:           Radial pulses are 2+ on the right side and 2+ on the left side. Dorsalis pedis pulses are 1+ on the right side and 1+ on the left side. Heart sounds: Normal heart sounds. No murmur heard. Pulmonary:      Effort: Pulmonary effort is normal. No respiratory distress. Breath sounds: No decreased breath sounds. Comments: O2 @ 2L in use  Abdominal:      General: Bowel sounds are normal. There is no distension. Palpations: Abdomen is soft.    Genitourinary:     Comments: Casiano catheter in place draining clear dark yellow urine  Musculoskeletal:      Right lower leg: No edema. Left lower leg: No edema. Skin:     General: Skin is warm and dry. Capillary Refill: Capillary refill takes less than 2 seconds. Coloration: Skin is pale. Neurological:      Mental Status: She is alert and oriented to person, place, and time.           Data Review    Recent Results (from the past 24 hour(s))   GLUCOSE, POC    Collection Time: 03/14/23  5:16 PM   Result Value Ref Range    Glucose (POC) 164 (H) 65 - 117 mg/dL    Performed by Elizabeth Moore RN    GLUCOSE, POC    Collection Time: 03/14/23 11:52 PM   Result Value Ref Range    Glucose (POC) 154 (H) 65 - 117 mg/dL    Performed by Mykel Frey RN    MAGNESIUM    Collection Time: 03/15/23  4:42 AM   Result Value Ref Range    Magnesium 1.7 1.6 - 2.4 mg/dL   PHOSPHORUS    Collection Time: 03/15/23  4:42 AM   Result Value Ref Range    Phosphorus 3.4 2.6 - 4.7 MG/DL   PROTHROMBIN TIME + INR    Collection Time: 03/15/23  4:42 AM   Result Value Ref Range    INR 1.3 (H) 0.9 - 1.1      Prothrombin time 13.0 (H) 9.0 - 11.1 sec   PTT    Collection Time: 03/15/23  4:42 AM   Result Value Ref Range    aPTT 27.1 22.1 - 31.0 sec    aPTT, therapeutic range     58.0 - 77.0 SECS   CBC W/O DIFF    Collection Time: 03/15/23  4:42 AM   Result Value Ref Range    WBC 13.3 (H) 3.6 - 11.0 K/uL    RBC 3.97 3.80 - 5.20 M/uL    HGB 10.9 (L) 11.5 - 16.0 g/dL    HCT 34.6 (L) 35.0 - 47.0 %    MCV 87.2 80.0 - 99.0 FL    MCH 27.5 26.0 - 34.0 PG    MCHC 31.5 30.0 - 36.5 g/dL    RDW 16.2 (H) 11.5 - 14.5 %    PLATELET 805 747 - 448 K/uL    MPV 10.6 8.9 - 12.9 FL    NRBC 0.0 0  WBC    ABSOLUTE NRBC 0.00 0.00 - 1.25 K/uL   METABOLIC PANEL, BASIC    Collection Time: 03/15/23  4:42 AM   Result Value Ref Range    Sodium 135 (L) 136 - 145 mmol/L    Potassium 3.6 3.5 - 5.1 mmol/L    Chloride 99 97 - 108 mmol/L    CO2 31 21 - 32 mmol/L    Anion gap 5 5 - 15 mmol/L    Glucose 122 (H) 65 - 100 mg/dL    BUN 23 (H) 6 - 20 MG/DL    Creatinine 0.69 0.55 - 1.02 MG/DL    BUN/Creatinine ratio 33 (H) 12 - 20      eGFR >60 >60 ml/min/1.73m2    Calcium 8.0 (L) 8.5 - 10.1 MG/DL   HEPATIC FUNCTION PANEL    Collection Time: 03/15/23  4:42 AM   Result Value Ref Range    Protein, total 5.5 (L) 6.4 - 8.2 g/dL    Albumin 2.3 (L) 3.5 - 5.0 g/dL    Globulin 3.2 2.0 - 4.0 g/dL    A-G Ratio 0.7 (L) 1.1 - 2.2      Bilirubin, total 0.8 0.2 - 1.0 MG/DL    Bilirubin, direct 0.3 (H) 0.0 - 0.2 MG/DL    Alk. phosphatase 119 (H) 45 - 117 U/L    AST (SGOT) 68 (H) 15 - 37 U/L    ALT (SGPT) 588 (H) 12 - 78 U/L   GLUCOSE, POC    Collection Time: 03/15/23  8:42 AM   Result Value Ref Range    Glucose (POC) 145 (H) 65 - 117 mg/dL    Performed by Amrik Vargas RN    GLUCOSE, POC    Collection Time: 03/15/23 11:55 AM   Result Value Ref Range    Glucose (POC) 140 (H) 65 - 117 mg/dL    Performed by Amrik Vargas RN        XR CHEST PORT   Final Result   No change. XR CHEST PORT   Final Result      1. Mild RIGHT basilar atelectasis status post pleural drain removal. No   pneumothorax. 2. Persistent, small left-sided effusion. US ABD LTD   Final Result      1. Gallbladder is distended and contains multiple gallstones. No ductal   dilatation      XR CHEST PORT   Final Result   Pigtail drainage catheter on the right with diminished right-sided effusion. Persistent left effusion. XR CHEST PORT   Final Result   Increased bilateral pleural effusions right greater than left. CT ABD PELV WO CONT   Final Result      1. Congestive heart failure, with large right and moderate left pleural   effusions, bibasilar atelectasis, and pulmonary edema. 2. Multiple small pulmonary nodules. These may be a component of pulmonary   edema, recommend follow-up chest CT after acute issues are resolved. 3. Prominent distended gallbladder, without other findings of acute   cholecystitis.       CT CHEST WO CONT Final Result      1. Congestive heart failure, with large right and moderate left pleural   effusions, bibasilar atelectasis, and pulmonary edema. 2. Multiple small pulmonary nodules. These may be a component of pulmonary   edema, recommend follow-up chest CT after acute issues are resolved. 3. Prominent distended gallbladder, without other findings of acute   cholecystitis. US ABD LTD   Final Result      1. Marked gallbladder distention with cholelithiasis but no wall thickening. 2. No biliary ductal dilatation is documented. 3. Small ascites. 4. Right pleural effusion. XR CHEST PORT   Final Result      Small right greater than left bilateral pleural effusions. Active Problems:    Sepsis ([address] (3/7/2023)      Goals of care, counseling/discussion (3/13/2023)      Physical debility (3/13/2023)      Weakness (3/13/2023)      Frailty (3/13/2023)      Anorexia (3/13/2023)      Constipation, unspecified constipation type (3/13/2023)      Palliative care encounter (3/13/2023)      Assessment/Plan:   Acute Decompensated HF  Afib with RVR now rate controlled  Runs of NSVT  Cardiology consulted and following  Required IV milrinone and cardizem gtts - weaned off 3/11  Echo with EF 20-25%  SBPs variable but soft today, low 100s during my visit  Continued diuresis with lasixas tolerated  Perhaps addition of midodrine could support BP while further adjustments made with beta blocker for better HR s/p cath which showed no significant CAD. Elevated LVEDP. Oxygen weaned down from 15 to 2 L NC. Awaiting transfer to stepdown. Heparin GTT switched to Eliquis.      Pleural effusions, questionably malignant  Possible lymphoma not confirmed  Chest Tube - removed 3/11  Hem/Onc consulted and following  Further hem/onc workup anticipated early this week to evaluate possible lymphoma as was suggested by pleural fluid findings, plans to cont evaluation as outpatient   Pt required chest tube for recurring right sided pleural effusion, this was discontinued 3/11  -Chest x-ray showed stable pleural effusion        Suspected severe sepsis with hypovolemic shock on admission  Leukocytosis, lactic acidosis, OLGA, and hypervolemic hyponatremia  LA 11.75 now 1.6 - resolved  OLGA likely 2/2 hypovolemia, Cr resolving 1.81 now 0.45  Na 125 - resolved  WBC 17.6 -resolved  No source of infection has been isolated  Urine cx (-), BC drawn 3/7 prelim (-), Resp PCR (-), pleural fluid 3/9 prelim (-)  Pt was initially treated with Zosyn, Flagyl but with no infectious source these were discontinued and WBC has trended down without abx, will continue to monitor    Elevated LFTs suspect cardiohepatic syndrome  Severe passive congestion  GI consulted and following  LFTs elevated 2/2 severe passive congestion, \"unreadably high\" on admission, now ALT 1394, , alk ph 114  Continuing to improve  Continue to monitor    Constipation x1 week per pt report  Added daily scheduled miralax and stool softeners  1x dose MOM now  PRN bisacodyl suppository    Diabetes mellitus type 2  Accuchecks ACHS  Sliding scale insulin  Monitor for s/s hypo/hyperglycemia  Hypoglycemia treatment per protocol  Diabetes management consulted    Hypokalemia, hypomagnesemia -  Suspect 2/2 diuresis  Replace as needed    Arthritis - avoiding tylenol d/t liver dz and ibuprofen/NSAIDs due to HFrEF; pt denies pain at this time    Palliative care consulted and following    Disposition: Pending hospital course  DVT Prophylaxis: Heparin gtt  Code Status:  DNR  Jen Busby -  - [phone]     Time Spent: 37 minutes    _______________________________________________________________    Marcello Payan MD

## 2023-03-15 NOTE — PROGRESS NOTES
0700 Bedside and Verbal shift change report given to Bebeto Chairez RN (oncoming nurse) by Mary Kim RN (offgoing nurse). Report included the following information SBAR, Kardex, ED Summary, Procedure Summary, Intake/Output, and Recent Results. 0800 Shift assessment completed. Patient resting quietly waiting for breakfast    0900 Palliative NP at bedside. Code status change to DNR. Forms signed.  at bedside    1145 PT/OT at bedside. Patient walked around room and up in recliner    1200 Reassessment completed. No new changes    1400 Patient returned back to bed    1445 CM at bedside    1530 MD Alonzo at bedside    1600 Reassessment completed. No new changes. Casiano Removed    1615 TRANSFER - OUT REPORT:    Verbal report given to JORDI Baltazar(name) on [facility] and the [place]  being transferred to [facility](unit) for routine progression of care       Report consisted of patients Situation, Background, Assessment and   Recommendations(SBAR). Information from the following report(s) SBAR, Kardex, ED Summary, OR Summary, Intake/Output, and Recent Results was reviewed with the receiving nurse. Lines:   Peripheral IV 03/13/23 Posterior;Right Hand (Active)   Site Assessment Clean, dry, & intact 03/15/23 1200   Phlebitis Assessment 0 03/15/23 1200   Infiltration Assessment 0 03/15/23 1200   Dressing Status Clean, dry, & intact 03/15/23 1200   Dressing Type Transparent;Tape 03/15/23 1200   Hub Color/Line Status Blue; Infusing 03/15/23 1200   Action Taken Open ports on tubing capped 03/15/23 1200   Alcohol Cap Used Yes 03/15/23 1200       Peripheral IV 03/13/23 Anterior;Right Forearm (Active)   Site Assessment Clean, dry, & intact 03/15/23 1200   Phlebitis Assessment 0 03/15/23 1200   Infiltration Assessment 0 03/15/23 1200   Dressing Status Clean, dry, & intact 03/15/23 1200   Dressing Type Transparent;Tape 03/15/23 1200   Hub Color/Line Status Blue;Capped 03/15/23 1200   Alcohol Cap Used Yes 03/15/23 1200       Extended Dwell Peripheral IV (Active)   Criteria for Appropriate Use Limited/no vessel suitable for conventional peripheral access 03/15/23 1200   Site Assessment Clean, dry, & intact 03/15/23 1200   Phlebitis Assessment 0 03/15/23 1200   Infiltration Assessment 0 03/15/23 1200   External Catheter Length (cm) 0 centimeters 03/15/23 0400   Line Status Capped 03/15/23 1200   Line Care Cap changed 03/15/23 1200   Alcohol Cap Used Yes 03/15/23 1200   Date of Last Dressing Change 03/10/23 03/15/23 0800   Dressing Type Disk with Chlorhexadine gluconate (CHG) 03/15/23 1200   Dressing Status Clean, dry, & intact 03/15/23 1200   Dressing Intervention New dressing 03/13/23 0800        Opportunity for questions and clarification was provided.       Patient transported with:   Monitor  O2 @ 2 liters

## 2023-03-15 NOTE — PROGRESS NOTES
Problem: Self Care Deficits Care Plan (Adult)  Goal: *Acute Goals and Plan of Care (Insert Text)  Description: FUNCTIONAL STATUS PRIOR TO ADMISSION: Patient was independent and active without use of DME for all ADLS and IADLS    HOME SUPPORT PRIOR TO ADMISSION: The patient lived with  and has a daughter that lives locally. Occupational Therapy Goals:  Initiated 3/14/2023  1. Patient will perform grooming standing with modified independence within 7 days. 2. Patient will perform upper body dressing and lower body dressing with modified independence within 7 days. 3. Patient will perform toileting with modified independence within 7 days. 4. Patient will demonstrate independence with home exercise program within 7 days. 5. Patient will transfer from toilet with modified independence using the least restrictive device and appropriate durable medical equipment within 7 days. Outcome: Progressing Towards Goal  OCCUPATIONAL THERAPY TREATMENT  Patient: Ryann Oconnell ([de-identified] y.o. female)  Date: 3/15/2023  Diagnosis: Sepsis ([facility] [address] [A41.9] <principal problem not specified>  Procedure(s) (LRB):  LEFT HEART CATH / CORONARY ANGIOGRAPHY (N/A)  ULTRASOUND GUIDED VASCULAR ACCESS (N/A) 1 Day Post-Op  Precautions: Fall  Chart, occupational therapy assessment, plan of care, and goals were reviewed. ASSESSMENT  Patient continues with skilled OT services and is progressing towards goals. Pt was supine upon arrival and remains motivated to participate. Attempted to wean pt to room air and O2 sat after supine to sit was 88%. O2 sat on 2L NC was 91-94% with activity this session. CGA overall for mobility with RW for support and for standing to groom. Flexed posture in standing and seated rest breaks needed due to weakness. Continue to recommend rehab at discharge.       Current Level of Function Impacting Discharge (ADLs): CGA mobility with RW and standing grooming    Other factors to consider for discharge: none PLAN :  Patient continues to benefit from skilled intervention to address the above impairments. Continue treatment per established plan of care to address goals. Recommend with staff: mobilize    Recommend next OT session: standing ADLS, mobility    Recommendation for discharge: (in order for the patient to meet his/her long term goals)  Therapy 3 hours per day 5-7 days per week    This discharge recommendation:  Has been made in collaboration with the attending provider and/or case management    IF patient discharges home will need the following DME: none       SUBJECTIVE:   Patient stated I need to sit.     OBJECTIVE DATA SUMMARY:   Cognitive/Behavioral Status:  Neurologic State: Alert  Orientation Level: Oriented X4  Cognition: Follows commands  Perception: Cues to maintain midline in standing  Perseveration: No perseveration noted  Safety/Judgement: Fall prevention    Functional Mobility and Transfers for ADLs:  Bed Mobility:  Rolling: Stand-by assistance  Supine to Sit: Minimum assistance  Scooting: Stand-by assistance    Transfers:  Sit to Stand: Contact guard assistance  Functional Transfers  Bathroom Mobility: Contact guard assistance (with RW)  Bed to Chair: Contact guard assistance    Balance:  Sitting: Intact  Standing: Impaired  Standing - Static: Good  Standing - Dynamic : Fair;Constant support    ADL Intervention:       Grooming  Grooming Assistance: Set-up (seated washing hair with shampoo cap at bedside chair)  Brushing Teeth: Contact guard assistance (standing at sink)  Brushing/Combing Hair: Set-up (seated, need t complete seated due to decreased endurance)      Cognitive Retraining  Safety/Judgement: Fall prevention      Activity Tolerance:   Fair    After treatment patient left in no apparent distress:   Sitting in chair, Call bell within reach, Bed / chair alarm activated, and Caregiver / family present    COMMUNICATION/COLLABORATION:   The patients plan of care was discussed with: Physical therapist, Registered nurse, and patient and her  .      Lauren Kemp, OTR/L  Time Calculation: 28 mins

## 2023-03-15 NOTE — PROGRESS NOTES
1630TRANSFER - IN REPORT:    Verbal report received from JORDI Lewis(name) on [facility] and the [place]  being received from [facility](unit) for routine progression of care      Report consisted of patients Situation, Background, Assessment and   Recommendations(SBAR). Information from the following report(s) SBAR, MAR, and Cardiac Rhythm    was reviewed with the receiving nurse. Opportunity for questions and clarification was provided. Assessment completed upon patients arrival to unit and care assumed.

## 2023-03-15 NOTE — PROGRESS NOTES
[facility]  [address]  [phone]  WWW. [employer]    Cardiology Progress Note      3/15/2023 11:05 AM    Admit Date: 3/7/2023    Admit Diagnosis:   Sepsis ([address] [A41.9]    Subjective: The patient was seen and examined at the bedside. Feels tired still although breathing is better.      Remains in sinus rhythm overnight    Visit Vitals  /65   Pulse 77   Temp 98 °F (36.7 °C)   Resp 20   Ht 5' 3\" (1.6 m)   Wt 67 kg (147 lb 11.3 oz)   SpO2 95%   Breastfeeding No   BMI 26.17 kg/m²       Current Facility-Administered Medications   Medication Dose Route Frequency    apixaban (ELIQUIS) tablet 5 mg  5 mg Oral BID    insulin glargine (LANTUS) injection 12 Units  12 Units SubCUTAneous DAILY    furosemide (LASIX) tablet 40 mg  40 mg Oral BID    amiodarone (CORDARONE) 375 mg/250 mL D5W infusion  0.5 mg/min IntraVENous CONTINUOUS    metoprolol succinate (TOPROL-XL) XL tablet 75 mg  75 mg Oral DAILY    lisinopriL (PRINIVIL, ZESTRIL) tablet 2.5 mg  2.5 mg Oral DAILY    glucose chewable tablet 16 g  4 Tablet Oral PRN    glucagon (GLUCAGEN) injection 1 mg  1 mg IntraMUSCular PRN    insulin lispro (HUMALOG) injection   SubCUTAneous AC&HS    [Held by provider] dilTIAZem IR (CARDIZEM) tablet 60 mg  60 mg Oral AC&HS    polyethylene glycol (MIRALAX) packet 17 g  17 g Oral DAILY    senna-docusate (PERICOLACE) 8.6-50 mg per tablet 2 Tablet  2 Tablet Oral DAILY    bisacodyL (DULCOLAX) suppository 10 mg  10 mg Rectal DAILY PRN    dextrose 10% infusion 0-250 mL  0-250 mL IntraVENous PRN    metoprolol (LOPRESSOR) injection 5 mg  5 mg IntraVENous PRN    albuterol-ipratropium (DUO-NEB) 2.5 MG-0.5 MG/3 ML  3 mL Nebulization Q4H PRN    lidocaine 4 % patch 1 Patch  1 Patch TransDERmal Q24H    alcohol 62% (NOZIN) nasal  1 Ampule  1 Ampule Topical Q12H    balsam peru-castor oiL (VENELEX) ointment   Topical BID    latanoprost (XALATAN) 0.005 % ophthalmic solution 1 Drop  1 Drop Both Eyes QPM    timolol (TIMOPTIC) 0.5 % ophthalmic solution 1 Drop  1 Drop Both Eyes DAILY    dorzolamide (TRUSOPT) 2 % ophthalmic solution 1 Drop  1 Drop Both Eyes BID    prednisoLONE acetate (PRED FORTE) 1 % ophthalmic suspension 1 Drop  1 Drop Left Eye DAILY    sodium chloride (NS) flush 5-10 mL  5-10 mL IntraVENous PRN    sodium chloride (NS) flush 5-10 mL  5-10 mL IntraVENous PRN    sodium chloride (NS) flush 5-40 mL  5-40 mL IntraVENous PRN    ondansetron (ZOFRAN) injection 4 mg  4 mg IntraVENous Q6H PRN       Objective:      Physical Assessment:   General Appearance:  alert, cooperative, well nourished, well developed; appears stated age  Eyes: sclera anicteric  Mouth/Throat: moist mucous membranes; oral pharynx clear  Neck: supple; no JVD or bruit  Pulmonary:  minimal crackles posteriorly bilaterally; good effort  Cardiovascular: regular rate and rhythm; no murmur, click, rub, or gallop  Abdomen: soft, non-tender, non-distended; bowel sounds normal  Musculoskeletal: no swelling or deformity; moves all extremities  Extremities: no edema  Skin: warm and dry  Neuro: grossly normal  Psych: normal mood and affect given the setting      Data Review:   Recent Labs     03/15/23  0442 03/14/23  0007 03/13/23  0902   WBC 13.3* 18.3* 17.6*   HGB 10.9* 10.9* 11.5   HCT 34.6* 34.3* 36.8    296 313     Recent Labs     03/15/23  0442 03/14/23  0007 03/13/23  0402   * 135* 134*   K 3.6 3.5 4.5   CL 99 95* 95*   CO2 31 35* 31   * 185* 232*   BUN 23* 28* 22*   CREA 0.69 0.77 0.67   CA 8.0* 8.1* 8.4*   MG 1.7 1.9 2.1   PHOS 3.4 2.5* 2.6   ALB 2.3* 2.5* 2.8*   TBILI 0.8 0.8 1.0   * 855* 1,161*   INR 1.3*  --  1.2*       No results for input(s): TROPHS, CPK, CKMB in the last 72 hours.       Intake/Output Summary (Last 24 hours) at 3/15/2023 1105  Last data filed at 3/15/2023 0800  Gross per 24 hour   Intake 500 ml   Output 1350 ml   Net -850 ml          Assessment: Paroxysmal atrial fibrillation with rapid ventricular response (currently in sinus)  Acute on chronic systolic heart failure (resolved)  Nonischemic cardiomyopathy (likely tachycardia mediated)  Congestive hepatopathy  Diabetes mellitus  Exudative pleural effusion (work up for malignancy underway)    Good fluid status and heart rate control. Renal function stable. Okay for outpatient follow up from cardiology standpoint. Continue furosemide, apixaban, metoprolol and lisinopril at current doses. Thank you for allowing us to participate in the care of this patient. Please call with questions.      Signed By: Augie De Oliveira MD     March 15, 2023

## 2023-03-15 NOTE — PROGRESS NOTES
Problem: Mobility Impaired (Adult and Pediatric)  Goal: *Acute Goals and Plan of Care (Insert Text)  Description: FUNCTIONAL STATUS PRIOR TO ADMISSION: Patient was independent and active without use of DME.    HOME SUPPORT PRIOR TO ADMISSION: The patient lived with  but did not require assist.    Physical Therapy Goals  Initiated 3/14/2023  1. Patient will move from supine to sit and sit to supine  in bed with modified independence within 7 day(s). 2.  Patient will transfer from bed to chair and chair to bed with modified independence using the least restrictive device within 7 day(s). 3.  Patient will perform sit to stand with modified independence within 7 day(s). 4.  Patient will ambulate with modified independence for 200 feet with the least restrictive device within 7 day(s). Outcome: Progressing Towards Goal   PHYSICAL THERAPY TREATMENT  Patient: Val Miranda ([de-identified] y.o. female)  Date: 3/15/2023  Diagnosis: Sepsis ([facility] [address] [A41.9] <principal problem not specified>  Procedure(s) (LRB):  LEFT HEART CATH / CORONARY ANGIOGRAPHY (N/A)  ULTRASOUND GUIDED VASCULAR ACCESS (N/A) 1 Day Post-Op  Precautions: Fall  Chart, physical therapy assessment, plan of care and goals were reviewed. ASSESSMENT  Patient continues with skilled PT services and is progressing towards goals. Patient resting in bed upon arrival, agreeable to PT session. Patient on 2L NC at start of session with SpO2 >96% at rest. Trialed RA while sitting EOB, SpO2 down to 90-92% on RA at rest. Replaced 2L NC for mobility. Patient completes supine>sit with Min A and required increased time to complete transfer. Patient demonstrates good sitting balance. Patient completes multiple sit<>stand with CGA and cuing for hand placement. Ambulation x15ft, x40ft with RW CGA and cuing to keep walker closer to PEDRO especially while negotiating turns.  Patient completes static standing intermittently without UE assist. Demonstrates good overall balance with no LOB and unsteadiness noted during standing activity. SpO2 90-% following activity on 2L NC. Patient reports feeling quite fatigued after this session demonstrating decreased activity tolerance. Continue to recommend rehab at discharge. Current Level of Function Impacting Discharge (mobility/balance): Min A bed mobility; CGA sit<>stand; Ambulation x40ft with RW CGA    Other factors to consider for discharge: O2 requirements         PLAN :  Patient continues to benefit from skilled intervention to address the above impairments. Continue treatment per established plan of care. to address goals. Recommendation for discharge: (in order for the patient to meet his/her long term goals)  Therapy 3 hours per day 5-7 days per week    This discharge recommendation:  Has been made in collaboration with the attending provider and/or case management    IF patient discharges home will need the following DME: to be determined (TBD)     SUBJECTIVE:   Patient stated It feels good to be sitting in this chair.     OBJECTIVE DATA SUMMARY:   Critical Behavior:  Neurologic State: Alert  Orientation Level: Oriented X4  Cognition: Follows commands  Safety/Judgement: Fall prevention, Home safety, Insight into deficits  Functional Mobility Training:  Bed Mobility:  Rolling: Stand-by assistance  Supine to Sit: Minimum assistance  Scooting: Stand-by assistance  Transfers:  Sit to Stand: Contact guard assistance  Stand to Sit: Contact guard assistance  Bed to Chair: Contact guard assistance  Balance:  Sitting: Intact  Standing: Impaired  Standing - Static: Good  Standing - Dynamic : Fair;Constant support  Ambulation/Gait Training:  Distance (ft): 40 Feet (ft)  Assistive Device: Walker, rolling  Ambulation - Level of Assistance: Contact guard assistance  Gait Abnormalities: Decreased step clearance; Other (Excessive L knee flexion during stance phase)  Base of Support: Widened  Speed/Beverly: Pace decreased (<100 feet/min)  Step Length: Right shortened;Left shortened    Pain Rating:  Denies pain    Activity Tolerance:   Fair and requires rest breaks    After treatment patient left in no apparent distress:   Sitting in chair, Call bell within reach, and Caregiver / family present    COMMUNICATION/COLLABORATION:   The patients plan of care was discussed with: Occupational therapist and Registered nurse.      Kayla Church, DONNY   Time Calculation: 28 mins

## 2023-03-15 NOTE — PROGRESS NOTES
1900 - Bedside and Verbal shift change report given to Tamara Cabrera RN (oncoming nurse) by Charles Franco (offgoing nurse). Report included the following information SBAR, Kardex, OR Summary, Procedure Summary, Intake/Output, MAR, Recent Results, Med Rec Status, and Cardiac Rhythm NSR .    1930 - Seen and examined the patient. Assessment done and documented, see flow sheet. Patient fully conscious and oriented. On nasal cannula at 2LPM.  No complaints made. 0000 - Re-assessment done and documented, see flow sheet. 0400 - Re-assessment done, no new changes from previous. 0500 - Leaking noticed from the puncture site (previous chest tube). Dressing changed. Vitally stable. No complaints of pain.    0700 - Bedside and Verbal shift change report given to Jessica Leonardo RN (oncoming nurse) by Tamara Cabrera RN (offgoing nurse). Report included the following information SBAR, Kardex, ED Summary, Procedure Summary, Intake/Output, MAR, Recent Results, Med Rec Status, and Cardiac Rhythm NSR .

## 2023-03-15 NOTE — PROGRESS NOTES
Palliative Medicine Consult  Steven: [phone] ([phone])    Patient Name: Britney Cheema  YOB: 1940    Date of Initial Consult: 3/10/23  Date of Today's Visit: 3/15/2023  Reason for Consult: Care Decisions  Requesting Provider: Cesar Carcamo MD   Primary Care Physician: Karina Butts MD     SUMMARY:   Britney Cheema is a [de-identified] y.o. with a past history of DM2, HTN and chronic pain from arthritis, who was admitted on 3/7/2023 from home with a diagnosis of acute liver failure, CHF, tachycardia and severe sepsis. She was having nausea vomiting and anorexia for 2 days which prompted her visit to the ED, but on arrival she was found to have extremely elevated LFTs, an elevated white count, and lactic acidosis. She was admitted to the ICU and started to deteriorate from a respiratory standpoint. She had a bedside echo that showed dilated LV with reduces systolic function. She was started on dobutamine the next morning, and diuresed to try and help alleviate her pulmonary effusions and ease her work of breathing. Changed to milrinone on 3/9. Also started on dig  as she has liver failure so no amio  Unfortunately despite diuresing her effusions worsened, so she had a thoracentesis with chest tube placement on 3/10. GI and oncology have been consulted for her  liver injury as well as possible malignant effusions- hopefully further work up from them and cardiology next week when she is a little more stable    Current medical issues leading to Palliative Medicine involvement include: goals of care and emotional support in this sweet lady who has received MANY new diagnoses this admission. PALLIATIVE DIAGNOSES:   Goals of care  Physical debility  Weakness  Frailty  Anorexia  Constipation  Palliative Medicine     PLAN:   Met with Mrs Bertha Granados along with her daughter and her  who were at bedside. Looking even better today!   She is anxious to be discharged soon as she is ready to get started with rehab so she can start getting stronger  I went through [address] with her- see ACP not for more information on her decisions  Goals clear at this point- hopefully will be discharged soon  Initial consult note routed to primary continuity provider and/or primary health care team members  Communicated plan of care with: Palliative IDT, [facility] [address] Team     GOALS OF CARE / TREATMENT PREFERENCES:     GOALS OF CARE:  Patient/Health Care Proxy Stated Goals: Rehabilitation    TREATMENT PREFERENCES:   Code Status: DNR    Advance Care Planning:  [x] [facility] Interdisciplinary Team has updated the ACP Navigator with Health Care Decision Maker and Patient Capacity      Primary Decision MakerRosia Smart Spouse - [phone]    Medical Interventions: Full interventions       Other:    As far as possible, the palliative care team has discussed with patient / health care proxy about goals of care / treatment preferences for patient. HISTORY:     History obtained from: patient, chart    CHIEF COMPLAINT: \"Have you heard if I am leaving soon? \"  HPI/SUBJECTIVE:    The patient is:   [x] Verbal and participatory  [] Non-participatory due to:        Clinical Pain Assessment (nonverbal scale for severity on nonverbal patients):   Clinical Pain Assessment  Severity: 0          Duration: for how long has pt been experiencing pain (e.g., 2 days, 1 month, years)  Frequency: how often pain is an issue (e.g., several times per day, once every few days, constant)     FUNCTIONAL ASSESSMENT:     Palliative Performance Scale (PPS):  PPS: 30       PSYCHOSOCIAL/SPIRITUAL SCREENING:     Palliative IDT has assessed this patient for cultural preferences / practices and a referral made as appropriate to needs (Cultural Services, Patient Advocacy, Ethics, etc.)    Any spiritual / Episcopal concerns:  [] Yes /  [x] No   If \"Yes\" to discuss with pastoral care during IDT     Does caregiver feel burdened by caring for their loved one:   [] Yes /  [x] No /  [] No Caregiver Present/Available [] No Caregiver [] Pt Lives at Facility  If \"Yes\" to discuss with social work during IDT    Anticipatory grief assessment:   [x] Normal  / [] Maladaptive     If \"Maladaptive\" to discuss with social work during IDT    ESAS Anxiety: Anxiety: 0    ESAS Depression: Depression: 3        REVIEW OF SYSTEMS:     Positive and pertinent negative findings in ROS are noted above in HPI. The following systems were [x] reviewed / [] unable to be reviewed as noted in HPI  Other findings are noted below. Systems: constitutional, ears/nose/mouth/throat, respiratory, gastrointestinal, genitourinary, musculoskeletal, integumentary, neurologic, psychiatric, endocrine. Positive findings noted below. Modified ESAS Completed by: provider   Fatigue: 5     Depression: 3 Pain: 0   Anxiety: 0 Nausea: 0   Anorexia: 4 Dyspnea: 2           Stool Occurrence(s): 1        PHYSICAL EXAM:     From RN flowsheet:  Wt Readings from Last 3 Encounters:   03/13/23 147 lb 11.3 oz (67 kg)   01/15/18 152 lb 1.9 oz (69 kg)   01/08/18 153 lb 7 oz (69.6 kg)     Blood pressure 128/65, pulse 77, temperature 98 °F (36.7 °C), resp. rate 20, height 5' 3\" (1.6 m), weight 147 lb 11.3 oz (67 kg), SpO2 95 %, not currently breastfeeding.     Pain Scale 1: Numeric (0 - 10)  Pain Intensity 1: 0              Pain Intervention(s) 1: Relaxation technique, Repositioned  Last bowel movement, if known:     Constitutional: frail, weak appearing, but easy smile and amenable disposition  Eyes: pupils equal, anicteric  ENMT: no nasal discharge, moist mucous membranes  Cardiovascular: regular rhythm, distal pulses intact  Respiratory: breathing not labored, symmetric  Gastrointestinal: soft non-tender, +bowel sounds  Musculoskeletal: no deformity, no tenderness to palpation  Skin: warm, dry  Neurologic: following commands, moving all extremities  Psychiatric: full affect, no hallucinations  Other:       HISTORY:     Active Problems:    Sepsis ([facility] [address] (3/7/2023)      Goals of care, counseling/discussion (3/13/2023)      Physical debility (3/13/2023)      Weakness (3/13/2023)      Frailty (3/13/2023)      Anorexia (3/13/2023)      Constipation, unspecified constipation type (3/13/2023)      Palliative care encounter (3/13/2023)    Past Medical History:   Diagnosis Date    Arthritis     Chronic pain     arthritic    Diabetes ([facility] [address]     Hypertension     Ill-defined condition     shingles 1.5 years ago 8/2014    Other ill-defined conditions(799.89)     high cholesterol    Other ill-defined conditions(799.89)     shingles      Past Surgical History:   Procedure Laterality Date    COLONOSCOPY N/A 10/25/2016    COLONOSCOPY performed by Michael Barraza MD at [facility]COPY    HX APPENDECTOMY      HX CATARACT REMOVAL Left     HX HYSTERECTOMY      HX KNEE REPLACEMENT Right     parital    HX KNEE REPLACEMENT Left     HX ORTHOPAEDIC      right hip replacement    HX TONSILLECTOMY      HX TUBAL LIGATION      HX UROLOGICAL      bladder tacking      Family History   Problem Relation Age of Onset    Cancer Father         leukemia    Stroke Sister       History reviewed, no pertinent family history.   Social History     Tobacco Use    Smoking status: Never    Smokeless tobacco: Never   Substance Use Topics    Alcohol use: No     No Known Allergies   Current Facility-Administered Medications   Medication Dose Route Frequency    apixaban (ELIQUIS) tablet 5 mg  5 mg Oral BID    insulin glargine (LANTUS) injection 12 Units  12 Units SubCUTAneous DAILY    furosemide (LASIX) tablet 40 mg  40 mg Oral BID    amiodarone (CORDARONE) 375 mg/250 mL D5W infusion  0.5 mg/min IntraVENous CONTINUOUS    metoprolol succinate (TOPROL-XL) XL tablet 75 mg  75 mg Oral DAILY    lisinopriL (PRINIVIL, ZESTRIL) tablet 2.5 mg  2.5 mg Oral DAILY    glucose chewable tablet 16 g  4 Tablet Oral PRN    glucagon (GLUCAGEN) injection 1 mg  1 mg IntraMUSCular PRN    insulin lispro (HUMALOG) injection   SubCUTAneous AC&HS    [Held by provider] dilTIAZem IR (CARDIZEM) tablet 60 mg  60 mg Oral AC&HS    polyethylene glycol (MIRALAX) packet 17 g  17 g Oral DAILY    senna-docusate (PERICOLACE) 8.6-50 mg per tablet 2 Tablet  2 Tablet Oral DAILY    bisacodyL (DULCOLAX) suppository 10 mg  10 mg Rectal DAILY PRN    dextrose 10% infusion 0-250 mL  0-250 mL IntraVENous PRN    metoprolol (LOPRESSOR) injection 5 mg  5 mg IntraVENous PRN    albuterol-ipratropium (DUO-NEB) 2.5 MG-0.5 MG/3 ML  3 mL Nebulization Q4H PRN    lidocaine 4 % patch 1 Patch  1 Patch TransDERmal Q24H    alcohol 62% (NOZIN) nasal  1 Ampule  1 Ampule Topical Q12H    balsam peru-castor oiL (VENELEX) ointment   Topical BID    latanoprost (XALATAN) 0.005 % ophthalmic solution 1 Drop  1 Drop Both Eyes QPM    timolol (TIMOPTIC) 0.5 % ophthalmic solution 1 Drop  1 Drop Both Eyes DAILY    dorzolamide (TRUSOPT) 2 % ophthalmic solution 1 Drop  1 Drop Both Eyes BID    prednisoLONE acetate (PRED FORTE) 1 % ophthalmic suspension 1 Drop  1 Drop Left Eye DAILY    sodium chloride (NS) flush 5-10 mL  5-10 mL IntraVENous PRN    sodium chloride (NS) flush 5-10 mL  5-10 mL IntraVENous PRN    sodium chloride (NS) flush 5-40 mL  5-40 mL IntraVENous PRN    ondansetron (ZOFRAN) injection 4 mg  4 mg IntraVENous Q6H PRN          LAB AND IMAGING FINDINGS:     Lab Results   Component Value Date/Time    WBC 13.3 (H) 03/15/2023 04:42 AM    HGB 10.9 (L) 03/15/2023 04:42 AM    PLATELET 600 62/15/6552 04:42 AM     Lab Results   Component Value Date/Time    Sodium 135 (L) 03/15/2023 04:42 AM    Potassium 3.6 03/15/2023 04:42 AM    Chloride 99 03/15/2023 04:42 AM    CO2 31 03/15/2023 04:42 AM    BUN 23 (H) 03/15/2023 04:42 AM    Creatinine 0.69 03/15/2023 04:42 AM    Calcium 8.0 (L) 03/15/2023 04:42 AM    Magnesium 1.7 03/15/2023 04:42 AM    Phosphorus 3.4 03/15/2023 04:42 AM      Lab Results   Component Value Date/Time    Alk.  phosphatase 119 (H) 03/15/2023 04:42 AM    Protein, total 5.5 (L) 03/15/2023 04:42 AM    Albumin 2.3 (L) 03/15/2023 04:42 AM    Globulin 3.2 03/15/2023 04:42 AM     (H) 03/09/2023 02:33 AM     Lab Results   Component Value Date/Time    INR 1.3 (H) 03/15/2023 04:42 AM    Prothrombin time 13.0 (H) 03/15/2023 04:42 AM    aPTT 27.1 03/15/2023 04:42 AM      No results found for: IRON, FE, TIBC, IBCT, PSAT, FERR   No results found for: PH, PCO2, PO2  No components found for: [place]   Lab Results   Component Value Date/Time    CK 66 03/10/2023 05:25 AM                Total time:   Counseling / coordination time, spent as noted above:   > 50% counseling / coordination?:     Prolonged service was provided for  []30 min   []75 min in face to face time in the presence of the patient, spent as noted above. Time Start:   Time End:   Note: this can only be billed with 55861 (initial) or 91809 (follow up). If multiple start / stop times, list each separately.

## 2023-03-15 NOTE — ACP (ADVANCE CARE PLANNING)
[address] [facility]  [phone]      Completed POST with Ms Bertha Granados along with her  and daughter. She did not do an AMD as she would like her  to be primary, and her two children to be secondary together      Primary Decision Maker: Idalmis Castillo - Spouse - [phone]    She has elected DNR in the event of death  While she still has a pulse and is still breathing she wants limited interventions including NO intubation  She is not interested in a feeding tube if she does not have her mental faculties about her and is not expected to recover them, but would be OK with it temporarily if it was a bridge to improvement. Overall she is not interested in any medical interventions that would just serve to prolong her life in a way that means she would no longer be able to care for herself. I encouraged her to re-visit her POST form every 6 months to a year, as her health and situation will likely change over time and she may want to adjust some of her decisions    I also gave a blank AMD to her  along with a DDNR as he is interested in completing documents with his doctor. They plan to get a will put together after this adventure, and I encouraged them also to designate financial POA as well    Medical Interventions:   []  Comfort Measures: Treat with dignity and respect. Keep warm and dry. Use medication by any route, positioning, wound care and other measures to relieve pain and suffering. Use oxygen, suction and manual treatment of airway obstruction as needed for comfort. Transfer to hospital only if comfort needs cannot be met in current location. Also see \"Other Instructions\" if indicated below. [x]  Limited Additional Interventions: Include comfort measures described above. Do not use intubation or mechanical ventilation. May consider less invasive airway support (e.g., CPAP or BiPAP).  Use additional medical treatments, antibiotics, IV fluids, and cardiac monitoring as indicated. [facility] transfer if indicated. Avoid intensive care unit if possible.) Also see \"Other Instructions\" if indicated below. []  Full Interventions: In addition to comfort measures above, use intubation, mechanical ventilation, cardioversion as indicated. Transfer to hospital if indicated. Include intensive care unit. Also see \"Other Instructions\" if indicated below. Other Instructions:    Artificially Administered Nutrition:   []  NO feeding tube (Not consistent with patient's goals given current medical condition). [x]  Feeding tube for a defined trial period (specific goal to be determined in consultation with treating physician). []  Feeding tube long-term if indicated.         I spent 47 min of separately identifiable ACP time with Ms Bertha Granados and her family who participated voluntarily

## 2023-03-15 NOTE — CARDIO/PULMONARY
Cardiopulmonary Rehab:    Chart reviewed. Pt is a [de-identified] y.o. F admitted with Sepsis ([facility] [address] [A41.9]. S/p cath, no significant CAD. Pt with new CHF. Echo on 3/8/23 indicated LVEF of 20-25%. Nonsmoker. Pt visited. Living with Heart Failure Booklet given to [facility] and the [place]. Educated using teach back method. Discussed diagnosis definition and assessed patient understanding. Reviewed importance of daily weight monitoring and Low Sodium diet (1772-1647 mg. daily). Encouraged activity and rest periods within symptom limitations and as ordered by physician. Reviewed furosemide, purpose of medication, potential side effects, compliance, and what to do if dose is missed. Discussed importance of reporting signs and symptoms of exacerbation, and when to report them to the doctor, to prevent re-hospitalization. [facility] and the [facility] was encouraged to keep all appointments with doctor. Discussed the Cardiac Rehab Program, benefits, format, and encouraged enrollment, when eligible. Pt have been scheduled to begin cardiac rehab on 5/3/23 @ 1300. Pt verbalized understating.

## 2023-03-15 NOTE — DIABETES MGMT
[facility] [facility]  DIABETES MANAGEMENT CONSULT    Consulted by Provider for advanced nursing evaluation and care for inpatient blood glucose management. Evaluation and Action Plan   This [de-identified]year old  female was admitted with concern for new diagnosis of heart failure. Issues of Afib intermittently since admission so was placed back on an amio infusion. Underwent cardiac cath earlier 3/14/23 with no significant CAD noted. Cardiology recommended medical management with furosemide, metoprolol & lisinopril and switching to apixaban. Remains on 02 this morning and eating without incident. As for BG management, a corrective insulin approach had been unsuccessful in achieving BG targets so basal insulin was added 3/13/23.  this morning. Eating about 50% of meals offered (e.g., 45 gram carb-controlled meals). Recommend restarting metformin after cath dye has time to clear. BG control @goal at the time of this admission; return to home diabetes regimen at discharge. Management Rationale Action Plan   Medication   Basal needs Using 0.3 units/kg/D based on BG pattern Continue Lantus insulin 12 units D for two (2) full days and then . .. Restart metformin 3/16/23   Nutritional needs  NA   Corrective insulin Using HIGH sensitivity based on insulin naivete    Additional orders   Carb controlled meals (45 gms/meal)     Diabetes Discharge Plan   Medication  Metformin 1000mg twice daily   Referral  [x]        Outpatient diabetes education to expand meal choices (MNT)   Additional orders            Initial Presentation   Britney Cheema is a [de-identified] y.o. female admitted from ER 3/7/23 after experiencing nausea, vomiting and anorexia. Abnormal labs included elevated white count, hyponatremia, & elevated liver enzymes and lactic acid. Became dyspneic upon transfer to ICU & placed on 02. Echo revealed dilated LV. Troponin 141. Concern for new diagnosis of heart failure.   CXR: Pulmonary edema and effusions R>L  US ABD: Enlarged gall bladder. No cirrhosis. Likely liver congestion    HX:   Past Medical History:   Diagnosis Date    Arthritis     Chronic pain     arthritic    Diabetes ([facility] [address]     Hypertension     Ill-defined condition     shingles 1.5 years ago 8/2014    Other ill-defined conditions(799.89)     high cholesterol    Other ill-defined conditions(799.89)     shingles      INITIAL DX:   Sepsis ([facility] [address] [A41.9]   Heart failure  Afib    Current Treatment     TX: Amio infusion. Clot prevention. Diuresis. BP meds. [address] progress has been complicated by need for ICU level of care. 3/12/23 GI consult regarding elevated LFTs:   LFTs are getting better as noted above  Serologies: CMV IgG, EBV IgG. Nuc ag +. Others are still pending.  elevate. No GI complaints. Continue current treatment. Follow  daily LFTs. Expect these to improve as cardiac function improves. 3/13/23 Afib with RVR => amio infusion restarted. Heart cath delayed  3/14/23 Cardiac cath completed with no significant evidence of CAD; BP management recommended  3/15/23 Wants to get OOB    Diabetes History   Type 2 diabetes treated with an oral agent  No family history of diabetes  Patient has basically eliminated starch to normalize Bgs  A1c 6.8%    Diabetes-related Medical History  Other associated conditions     Right cataract removal  Shingles affected left eye causing glaucoma    Diabetes Medication History  Key Antihyperglycemic Medications               metFORMIN (GLUCOPHAGE) 500 mg tablet (Taking) Take 1,000 mg by mouth two (2) times daily (with meals). Indications: type 2 diabetes mellitus           Diabetes self-management practices:   Eating pattern - Has eliminated carbohydrate from starch sources; will consume fruit   [x] Eating a carbohydrate-controlled meal plan  [x] Breakfast  Scrambled egg with fruit OR oatmeal or banana with peanut butter.  Drinks coffee  [x] Lunch   Vegetable soup (does not include noodles or rice)  [x] Dinner   Chicken or fish with non-starchy vegetables (e.g., green beans, broccoli or cauliflower. Drinks DF lemonade or iced tea  [x] Dentition status Intact  Physical activity pattern   [x] Employing a physical activity program to control BG  [x] Aerobic exercise 15 minutes per day on exercise bike  Monitoring pattern   [x] Testing BGs sufficiently to inform self-management adjustments  [x] Breakfast  160s  Taking medications pattern  [x] Consistent administration  [x] Affordable  Reducing risks  [] Influenza:   Immunization History   Administered Date(s) Administered    Influenza Vaccine 10/01/2017   [] Pneumonia:   Immunization History   Administered Date(s) Administered    Pneumococcal Vaccine (Unspecified Type) 06/18/2011   [] Hepatitis: There is no immunization history for the selected administration types on file for this patient. Social determinants of health impacting diabetes self-management practices   None identified  Overall evaluation:    [x] Achieving A1c target with drug therapy & self-care practices    Subjective   I hope to get out of bed today.     Objective   Physical exam  General Normal weight female in no acute distress. Conversant and cooperative  Neuro  Alert, oriented   Vital Signs Visit Vitals  /65   Pulse 77   Temp 98 °F (36.7 °C)   Resp 20   Ht 5' 3\" (1.6 m)   Wt 67 kg (147 lb 11.3 oz)   SpO2 95%   Breastfeeding No   BMI 26.17 kg/m²     Skin  Warm and dry. No acanthosis noted along neckline. Heart   Afib   Extremities No foot wounds.  Feet cool bilaterally    Diabetic foot exam:    Left Foot     Visual Exam: normal Mottling (not new per nursing)   Pulse DP: 1+ (weak)     Right Foot   Visual Exam: normal Mottling (not new per nursing)   Pulse DP: 1+ (weak)    Laboratory  Recent Labs     03/15/23  0442 03/14/23  0007 03/13/23  0902 03/13/23  0402   * 185*  --  232*   AGAP 5 5  --  8   WBC 13.3* 18.3* 17.6* 18.0*   CREA 0.69 0.77  --  0.67 AST 68* 103*  --  166*   * 855*  --  1,161*     Factors impacting BG management  Factor Dose Comments   Nutrition:  Standard meal   45 gram CHO meals   Eating 50% of offered meals   Drugs:  Heparin in D5W infusion     Other:   Kidney function  Liver function   Creatinine normalized  ALT markedly elevated     Continuing to trend down     Blood glucose pattern    Significant diabetes-related events over the past 24-72 hours  3/7/23 Admission . A1c 6.8%. Hyponatremic  3/8-13/23 Corrective insulin approach  Using 12-20 units of correction per day  3/14/23  on Lantus insulin 20 units   3/15/23 Lantus insulin dose reduced to 12 units to facilitate transition to home diabetes regimen    Assessment and Nursing Intervention   Nursing Diagnosis Risk for unstable blood glucose pattern   Nursing Intervention Domain 5256 Decision-making Support   Nursing Interventions Examined current inpatient diabetes/blood glucose control   Explored factors facilitating and impeding inpatient management  Explored corrective strategies with patient and responsible inpatient provider   Informed patient of rational for insulin strategy while hospitalized       Billing Code(s)   [] 78506 IP subsequent hospital care - 50 minutes   [] [phone] IP subsequent hospital care - 35 minutes   [x] 46510 IP subsequent hospital care - 25 minutes   [] [id number] IP initial hospital care - 40 minutes     Before making these care recommendations, I personally reviewed the hospitalization record, including notes, laboratory & diagnostic data and current medications, and examined the patient at the bedside (circumstances permitting) before determining care. More than fifty (50) percent of the time was spent in patient counseling and/or care coordination.   Total minutes: [address], CNS  Diabetes Clinical Nurse Specialist  [facility]  Access via [address] [place] [address]

## 2023-03-16 PROBLEM — I50.22 CHRONIC SYSTOLIC HEART FAILURE (HCC): Status: ACTIVE | Noted: 2023-03-16

## 2023-03-16 LAB
ALBUMIN SERPL-MCNC: 2.2 G/DL (ref 3.5–5)
ALBUMIN/GLOB SERPL: 0.6 (ref 1.1–2.2)
ALP SERPL-CCNC: 110 U/L (ref 45–117)
ALT SERPL-CCNC: 451 U/L (ref 12–78)
ANION GAP SERPL CALC-SCNC: 6 MMOL/L (ref 5–15)
APTT PPP: 23.9 SEC (ref 22.1–31)
AST SERPL-CCNC: 65 U/L (ref 15–37)
BILIRUB DIRECT SERPL-MCNC: 0.1 MG/DL (ref 0–0.2)
BILIRUB SERPL-MCNC: 0.8 MG/DL (ref 0.2–1)
BUN SERPL-MCNC: 14 MG/DL (ref 6–20)
BUN/CREAT SERPL: 25 (ref 12–20)
CALCIUM SERPL-MCNC: 8 MG/DL (ref 8.5–10.1)
CHLORIDE SERPL-SCNC: 101 MMOL/L (ref 97–108)
CO2 SERPL-SCNC: 28 MMOL/L (ref 21–32)
CREAT SERPL-MCNC: 0.56 MG/DL (ref 0.55–1.02)
ERYTHROCYTE [DISTWIDTH] IN BLOOD BY AUTOMATED COUNT: 16.2 % (ref 11.5–14.5)
GLOBULIN SER CALC-MCNC: 3.4 G/DL (ref 2–4)
GLUCOSE BLD STRIP.AUTO-MCNC: 105 MG/DL (ref 65–117)
GLUCOSE BLD STRIP.AUTO-MCNC: 113 MG/DL (ref 65–117)
GLUCOSE BLD STRIP.AUTO-MCNC: 121 MG/DL (ref 65–117)
GLUCOSE BLD STRIP.AUTO-MCNC: 185 MG/DL (ref 65–117)
GLUCOSE SERPL-MCNC: 117 MG/DL (ref 65–100)
HCT VFR BLD AUTO: 35.1 % (ref 35–47)
HGB BLD-MCNC: 11 G/DL (ref 11.5–16)
INR PPP: 1.2 (ref 0.9–1.1)
MAGNESIUM SERPL-MCNC: 1.6 MG/DL (ref 1.6–2.4)
MCH RBC QN AUTO: 27.2 PG (ref 26–34)
MCHC RBC AUTO-ENTMCNC: 31.3 G/DL (ref 30–36.5)
MCV RBC AUTO: 86.7 FL (ref 80–99)
NRBC # BLD: 0 K/UL (ref 0–0.01)
NRBC BLD-RTO: 0 PER 100 WBC
PHOSPHATE SERPL-MCNC: 3.5 MG/DL (ref 2.6–4.7)
PLATELET # BLD AUTO: 311 K/UL (ref 150–400)
PMV BLD AUTO: 10.1 FL (ref 8.9–12.9)
POTASSIUM SERPL-SCNC: 3.8 MMOL/L (ref 3.5–5.1)
PROT SERPL-MCNC: 5.6 G/DL (ref 6.4–8.2)
PROTHROMBIN TIME: 12.7 SEC (ref 9–11.1)
RBC # BLD AUTO: 4.05 M/UL (ref 3.8–5.2)
SERVICE CMNT-IMP: ABNORMAL
SERVICE CMNT-IMP: ABNORMAL
SERVICE CMNT-IMP: NORMAL
SERVICE CMNT-IMP: NORMAL
SODIUM SERPL-SCNC: 135 MMOL/L (ref 136–145)
THERAPEUTIC RANGE,PTTT: NORMAL SECS (ref 58–77)
WBC # BLD AUTO: 11.2 K/UL (ref 3.6–11)

## 2023-03-16 PROCEDURE — 80076 HEPATIC FUNCTION PANEL: CPT

## 2023-03-16 PROCEDURE — 83735 ASSAY OF MAGNESIUM: CPT

## 2023-03-16 PROCEDURE — 85027 COMPLETE CBC AUTOMATED: CPT

## 2023-03-16 PROCEDURE — 74011250637 HC RX REV CODE- 250/637: Performed by: STUDENT IN AN ORGANIZED HEALTH CARE EDUCATION/TRAINING PROGRAM

## 2023-03-16 PROCEDURE — 82962 GLUCOSE BLOOD TEST: CPT

## 2023-03-16 PROCEDURE — 74011250637 HC RX REV CODE- 250/637: Performed by: INTERNAL MEDICINE

## 2023-03-16 PROCEDURE — 85610 PROTHROMBIN TIME: CPT

## 2023-03-16 PROCEDURE — 74011000250 HC RX REV CODE- 250: Performed by: STUDENT IN AN ORGANIZED HEALTH CARE EDUCATION/TRAINING PROGRAM

## 2023-03-16 PROCEDURE — 65270000046 HC RM TELEMETRY

## 2023-03-16 PROCEDURE — 36415 COLL VENOUS BLD VENIPUNCTURE: CPT

## 2023-03-16 PROCEDURE — 97116 GAIT TRAINING THERAPY: CPT

## 2023-03-16 PROCEDURE — 84100 ASSAY OF PHOSPHORUS: CPT

## 2023-03-16 PROCEDURE — 80048 BASIC METABOLIC PNL TOTAL CA: CPT

## 2023-03-16 PROCEDURE — 74011250637 HC RX REV CODE- 250/637

## 2023-03-16 PROCEDURE — 85730 THROMBOPLASTIN TIME PARTIAL: CPT

## 2023-03-16 PROCEDURE — 74011000250 HC RX REV CODE- 250: Performed by: INTERNAL MEDICINE

## 2023-03-16 PROCEDURE — 74011636637 HC RX REV CODE- 636/637: Performed by: CLINICAL NURSE SPECIALIST

## 2023-03-16 PROCEDURE — 77010033678 HC OXYGEN DAILY

## 2023-03-16 RX ORDER — TRAMADOL HYDROCHLORIDE 50 MG/1
50 TABLET ORAL
Status: DISCONTINUED | OUTPATIENT
Start: 2023-03-16 | End: 2023-03-18 | Stop reason: HOSPADM

## 2023-03-16 RX ORDER — HYDRALAZINE HYDROCHLORIDE 20 MG/ML
10 INJECTION INTRAMUSCULAR; INTRAVENOUS
Status: DISCONTINUED | OUTPATIENT
Start: 2023-03-16 | End: 2023-03-18 | Stop reason: HOSPADM

## 2023-03-16 RX ORDER — METOPROLOL TARTRATE 5 MG/5ML
1.25 INJECTION INTRAVENOUS AS NEEDED
Status: DISCONTINUED | OUTPATIENT
Start: 2023-03-16 | End: 2023-03-18 | Stop reason: HOSPADM

## 2023-03-16 RX ORDER — LANOLIN ALCOHOL/MO/W.PET/CERES
3 CREAM (GRAM) TOPICAL
Status: DISCONTINUED | OUTPATIENT
Start: 2023-03-16 | End: 2023-03-18 | Stop reason: HOSPADM

## 2023-03-16 RX ADMIN — POLYETHYLENE GLYCOL 3350 17 G: 17 POWDER, FOR SOLUTION ORAL at 09:08

## 2023-03-16 RX ADMIN — CASTOR OIL AND BALSAM, PERU: 788; 87 OINTMENT TOPICAL at 09:26

## 2023-03-16 RX ADMIN — SODIUM CHLORIDE, PRESERVATIVE FREE 10 ML: 5 INJECTION INTRAVENOUS at 21:36

## 2023-03-16 RX ADMIN — FUROSEMIDE 40 MG: 40 TABLET ORAL at 09:07

## 2023-03-16 RX ADMIN — Medication 1 AMPULE: at 09:07

## 2023-03-16 RX ADMIN — LISINOPRIL 2.5 MG: 5 TABLET ORAL at 09:07

## 2023-03-16 RX ADMIN — PREDNISOLONE ACETATE 1 DROP: 10 SUSPENSION/ DROPS OPHTHALMIC at 14:32

## 2023-03-16 RX ADMIN — INSULIN GLARGINE 12 UNITS: 100 INJECTION, SOLUTION SUBCUTANEOUS at 09:08

## 2023-03-16 RX ADMIN — DORZOLAMIDE HYDROCHLORIDE 1 DROP: 20 SOLUTION/ DROPS OPHTHALMIC at 17:54

## 2023-03-16 RX ADMIN — SENNOSIDES AND DOCUSATE SODIUM 2 TABLET: 50; 8.6 TABLET ORAL at 09:07

## 2023-03-16 RX ADMIN — TIMOLOL MALEATE 1 DROP: 5 SOLUTION OPHTHALMIC at 14:34

## 2023-03-16 RX ADMIN — MELATONIN 3 MG: at 21:36

## 2023-03-16 RX ADMIN — APIXABAN 5 MG: 5 TABLET, FILM COATED ORAL at 21:36

## 2023-03-16 RX ADMIN — Medication 1 AMPULE: at 21:36

## 2023-03-16 RX ADMIN — LATANOPROST 1 DROP: 50 SOLUTION OPHTHALMIC at 17:53

## 2023-03-16 RX ADMIN — APIXABAN 5 MG: 5 TABLET, FILM COATED ORAL at 09:07

## 2023-03-16 RX ADMIN — METOPROLOL SUCCINATE 75 MG: 50 TABLET, EXTENDED RELEASE ORAL at 06:53

## 2023-03-16 RX ADMIN — CASTOR OIL AND BALSAM, PERU: 788; 87 OINTMENT TOPICAL at 21:36

## 2023-03-16 RX ADMIN — FUROSEMIDE 40 MG: 40 TABLET ORAL at 17:54

## 2023-03-16 NOTE — PROGRESS NOTES
Pt weaned down from 2L n/c  to 1L n/c for an hour and sats  stayed 95-97. O2 turned off at this moment and will monitor pt and re-assess if need be to be placed back on O2.

## 2023-03-16 NOTE — PROGRESS NOTES
Problem: Diabetes Self-Management  Goal: *Disease process and treatment process  Description: Define diabetes and identify own type of diabetes; list 3 options for treating diabetes. Outcome: Progressing Towards Goal  Goal: *Incorporating nutritional management into lifestyle  Description: Describe effect of type, amount and timing of food on blood glucose; list 3 methods for planning meals. Outcome: Progressing Towards Goal  Goal: *Incorporating physical activity into lifestyle  Description: State effect of exercise on blood glucose levels. Outcome: Progressing Towards Goal  Goal: *Developing strategies to promote health/change behavior  Description: Define the ABC's of diabetes; identify appropriate screenings, schedule and personal plan for screenings. Outcome: Progressing Towards Goal  Goal: *Using medications safely  Description: State effect of diabetes medications on diabetes; name diabetes medication taking, action and side effects. Outcome: Progressing Towards Goal  Goal: *Monitoring blood glucose, interpreting and using results  Description: Identify recommended blood glucose targets  and personal targets. Outcome: Progressing Towards Goal  Goal: *Prevention, detection, treatment of acute complications  Description: List symptoms of hyper- and hypoglycemia; describe how to treat low blood sugar and actions for lowering  high blood glucose level. Outcome: Progressing Towards Goal  Goal: *Prevention, detection and treatment of chronic complications  Description: Define the natural course of diabetes and describe the relationship of blood glucose levels to long term complications of diabetes.   Outcome: Progressing Towards Goal  Goal: *Developing strategies to address psychosocial issues  Description: Describe feelings about living with diabetes; identify support needed and support network  Outcome: Progressing Towards Goal  Goal: *Insulin pump training  Outcome: Progressing Towards Goal  Goal: *Sick day guidelines  Outcome: Progressing Towards Goal  Goal: *Patient Specific Goal (EDIT GOAL, INSERT TEXT)  Outcome: Progressing Towards Goal     Problem: Patient Education: Go to Patient Education Activity  Goal: Patient/Family Education  Outcome: Progressing Towards Goal     Problem: Patient Education: Go to Patient Education Activity  Goal: Patient/Family Education  Outcome: Progressing Towards Goal     Problem: Sepsis: Day of Diagnosis  Goal: Off Pathway (Use only if patient is Off Pathway)  Outcome: Progressing Towards Goal  Goal: *Fluid resuscitation  Outcome: Progressing Towards Goal  Goal: *Paired blood cultures prior to first dose of antibiotic  Outcome: Progressing Towards Goal  Goal: *First dose of  appropriate antibiotic within 3 hours of arrival to ED, within 1 hour of arrival to ICU  Outcome: Progressing Towards Goal  Goal: *Lactic acid with first set of blood cultures  Outcome: Progressing Towards Goal  Goal: *Pneumococcal immunization (if eligible)  Outcome: Progressing Towards Goal  Goal: *Influenza immunization (if eligible)  Outcome: Progressing Towards Goal  Goal: Activity/Safety  Outcome: Progressing Towards Goal  Goal: Consults, if ordered  Outcome: Progressing Towards Goal  Goal: Diagnostic Test/Procedures  Outcome: Progressing Towards Goal  Goal: Nutrition/Diet  Outcome: Progressing Towards Goal  Goal: Discharge Planning  Outcome: Progressing Towards Goal  Goal: Medications  Outcome: Progressing Towards Goal  Goal: Respiratory  Outcome: Progressing Towards Goal  Goal: Treatments/Interventions/Procedures  Outcome: Progressing Towards Goal  Goal: Psychosocial  Outcome: Progressing Towards Goal     Problem: Sepsis: Day 2  Goal: Off Pathway (Use only if patient is Off Pathway)  Outcome: Progressing Towards Goal  Goal: *Central Venous Pressure maintained at 8-12 mm Hg  Outcome: Progressing Towards Goal  Goal: *Hemodynamically stable  Outcome: Progressing Towards Goal  Goal: *Tolerating diet  Outcome: Progressing Towards Goal  Goal: Activity/Safety  Outcome: Progressing Towards Goal  Goal: Consults, if ordered  Outcome: Progressing Towards Goal  Goal: Diagnostic Test/Procedures  Outcome: Progressing Towards Goal  Goal: Nutrition/Diet  Outcome: Progressing Towards Goal  Goal: Discharge Planning  Outcome: Progressing Towards Goal  Goal: Medications  Outcome: Progressing Towards Goal  Goal: Respiratory  Outcome: Progressing Towards Goal  Goal: Treatments/Interventions/Procedures  Outcome: Progressing Towards Goal  Goal: Psychosocial  Outcome: Progressing Towards Goal     Problem: Sepsis: Day 3  Goal: Off Pathway (Use only if patient is Off Pathway)  Outcome: Progressing Towards Goal  Goal: *Central Venous Pressure maintained at 8-12 mm Hg  Outcome: Progressing Towards Goal  Goal: *Oxygen saturation within defined limits  Outcome: Progressing Towards Goal  Goal: *Vital sign stability  Outcome: Progressing Towards Goal  Goal: *Tolerating diet  Outcome: Progressing Towards Goal  Goal: *Demonstrates progressive activity  Outcome: Progressing Towards Goal  Goal: Activity/Safety  Outcome: Progressing Towards Goal  Goal: Consults, if ordered  Outcome: Progressing Towards Goal  Goal: Diagnostic Test/Procedures  Outcome: Progressing Towards Goal  Goal: Nutrition/Diet  Outcome: Progressing Towards Goal  Goal: Discharge Planning  Outcome: Progressing Towards Goal  Goal: Medications  Outcome: Progressing Towards Goal  Goal: Respiratory  Outcome: Progressing Towards Goal  Goal: Treatments/Interventions/Procedures  Outcome: Progressing Towards Goal  Goal: Psychosocial  Outcome: Progressing Towards Goal     Problem: Sepsis: Day 4  Goal: Off Pathway (Use only if patient is Off Pathway)  Outcome: Progressing Towards Goal  Goal: Activity/Safety  Outcome: Progressing Towards Goal  Goal: Consults, if ordered  Outcome: Progressing Towards Goal  Goal: Diagnostic Test/Procedures  Outcome: Progressing Towards Goal  Goal: Nutrition/Diet  Outcome: Progressing Towards Goal  Goal: Discharge Planning  Outcome: Progressing Towards Goal  Goal: Medications  Outcome: Progressing Towards Goal  Goal: Respiratory  Outcome: Progressing Towards Goal  Goal: Treatments/Interventions/Procedures  Outcome: Progressing Towards Goal  Goal: Psychosocial  Outcome: Progressing Towards Goal  Goal: *Oxygen saturation within defined limits  Outcome: Progressing Towards Goal  Goal: *Hemodynamically stable  Outcome: Progressing Towards Goal  Goal: *Vital signs within defined limits  Outcome: Progressing Towards Goal  Goal: *Tolerating diet  Outcome: Progressing Towards Goal  Goal: *Demonstrates progressive activity  Outcome: Progressing Towards Goal  Goal: *Fluid volume maintenance  Outcome: Progressing Towards Goal     Problem: Sepsis: Day 5  Goal: Off Pathway (Use only if patient is Off Pathway)  Outcome: Progressing Towards Goal  Goal: *Oxygen saturation within defined limits  Outcome: Progressing Towards Goal  Goal: *Vital signs within defined limits  Outcome: Progressing Towards Goal  Goal: *Tolerating diet  Outcome: Progressing Towards Goal  Goal: *Demonstrates progressive activity  Outcome: Progressing Towards Goal  Goal: *Discharge plan identified  Outcome: Progressing Towards Goal  Goal: Activity/Safety  Outcome: Progressing Towards Goal  Goal: Consults, if ordered  Outcome: Progressing Towards Goal  Goal: Diagnostic Test/Procedures  Outcome: Progressing Towards Goal  Goal: Nutrition/Diet  Outcome: Progressing Towards Goal  Goal: Discharge Planning  Outcome: Progressing Towards Goal  Goal: Medications  Outcome: Progressing Towards Goal  Goal: Respiratory  Outcome: Progressing Towards Goal  Goal: Treatments/Interventions/Procedures  Outcome: Progressing Towards Goal  Goal: Psychosocial  Outcome: Progressing Towards Goal     Problem: Sepsis: Day 6  Goal: Off Pathway (Use only if patient is Off Pathway)  Outcome: Progressing Towards Goal  Goal: *Oxygen saturation within defined limits  Outcome: Progressing Towards Goal  Goal: *Vital signs within defined limits  Outcome: Progressing Towards Goal  Goal: *Tolerating diet  Outcome: Progressing Towards Goal  Goal: *Demonstrates progressive activity  Outcome: Progressing Towards Goal  Goal: Influenza immunization  Outcome: Progressing Towards Goal  Goal: *Pneumococcal immunization  Outcome: Progressing Towards Goal  Goal: Activity/Safety  Outcome: Progressing Towards Goal  Goal: Diagnostic Test/Procedures  Outcome: Progressing Towards Goal  Goal: Nutrition/Diet  Outcome: Progressing Towards Goal  Goal: Discharge Planning  Outcome: Progressing Towards Goal  Goal: Medications  Outcome: Progressing Towards Goal  Goal: Respiratory  Outcome: Progressing Towards Goal  Goal: Treatments/Interventions/Procedures  Outcome: Progressing Towards Goal  Goal: Psychosocial  Outcome: Progressing Towards Goal     Problem: Sepsis: Discharge Outcomes  Goal: *Vital signs within defined limits  Outcome: Progressing Towards Goal  Goal: *Tolerating diet  Outcome: Progressing Towards Goal  Goal: *Verbalizes understanding and describes prescribed diet  Outcome: Progressing Towards Goal  Goal: *Demonstrates progressive activity  Outcome: Progressing Towards Goal  Goal: *Describes follow-up/return visits to physicians  Outcome: Progressing Towards Goal  Goal: *Verbalizes name, dosage, time, side effects, and number of days to continue medications  Outcome: Progressing Towards Goal  Goal: *Influenza immunization (Oct-Mar only)  Outcome: Progressing Towards Goal  Goal: *Pneumococcal immunization  Outcome: Progressing Towards Goal  Goal: *Lungs clear or at baseline  Outcome: Progressing Towards Goal  Goal: *Oxygen saturation returns to baseline or 90% or better on room air  Outcome: Progressing Towards Goal  Goal: *Glycemic control  Outcome: Progressing Towards Goal  Goal: *Absence of deep venous thrombosis signs and symptoms(Stroke Metric)  Outcome: Progressing Towards Goal  Goal: *Describes available resources and support systems  Outcome: Progressing Towards Goal  Goal: *Optimal pain control at patient's stated goal  Outcome: Progressing Towards Goal     Problem: Infection - Risk of, Urinary Catheter-Associated Urinary Tract Infection  Goal: *Absence of infection signs and symptoms  Outcome: Progressing Towards Goal     Problem: Patient Education: Go to Patient Education Activity  Goal: Patient/Family Education  Outcome: Progressing Towards Goal     Problem: Falls - Risk of  Goal: *Absence of Falls  Description: Document Daniel Fall Risk and appropriate interventions in the flowsheet. Outcome: Progressing Towards Goal  Note: Fall Risk Interventions:                                Problem: Patient Education: Go to Patient Education Activity  Goal: Patient/Family Education  Outcome: Progressing Towards Goal     Problem: Pressure Injury - Risk of  Goal: *Prevention of pressure injury  Description: Document Edin Scale and appropriate interventions in the flowsheet.   Outcome: Progressing Towards Goal     Problem: Patient Education: Go to Patient Education Activity  Goal: Patient/Family Education  Outcome: Progressing Towards Goal     Problem: Patient Education: Go to Patient Education Activity  Goal: Patient/Family Education  Outcome: Progressing Towards Goal     Problem: Patient Education: Go to Patient Education Activity  Goal: Patient/Family Education  Outcome: Progressing Towards Goal

## 2023-03-16 NOTE — PROGRESS NOTES
Problem: Pressure Injury - Risk of  Goal: *Prevention of pressure injury  Description: Document Edin Scale and appropriate interventions in the flowsheet. Outcome: Progressing Towards Goal  Note: Pressure Injury Interventions:  Sensory Interventions: Assess changes in LOC, Assess need for specialty bed, Avoid rigorous massage over bony prominences    Moisture Interventions: Absorbent underpads, Internal/External urinary devices, Limit adult briefs, Maintain skin hydration (lotion/cream), Minimize layers    Activity Interventions: Assess need for specialty bed, Increase time out of bed, Pressure redistribution bed/mattress(bed type), PT/OT evaluation    Mobility Interventions: Assess need for specialty bed, Float heels, HOB 30 degrees or less, Pressure redistribution bed/mattress (bed type), PT/OT evaluation    Nutrition Interventions: Document food/fluid/supplement intake    Friction and Shear Interventions: Apply protective barrier, creams and emollients, Lift sheet, Lift team/patient mobility team, Minimize layers                Problem: Falls - Risk of  Goal: *Absence of Falls  Description: Document Daniel Fall Risk and appropriate interventions in the flowsheet.   Outcome: Progressing Towards Goal  Note: Fall Risk Interventions:     Call bell within reach    Gripper socks    walker

## 2023-03-16 NOTE — PROGRESS NOTES
End of Shift Note    Bedside shift change report given to [address] (oncoming nurse) by Buddy Díaz RN (offgoing nurse). Report included the following information SBAR, Kardex, MAR, and Recent Results    Shift worked:  7p-7a     Shift summary and any significant changes:     Pt slept most of the night. AM labs drawn. Pt Hr sustaining in 120-130s. Messaged provider, Am metoprolol given early. Concerns for physician to address:       Zone phone for oncoming shift:          Activity:  Activity Level: Up with Assistance  Number times ambulated in hallways past shift: 0  Number of times OOB to chair past shift: 0    Cardiac:   Cardiac Monitoring: Yes      Cardiac Rhythm: Sinus Rhythm    Access:  Current line(s): PIV     Genitourinary:   Urinary status: voiding and external catheter    Respiratory:   O2 Device: Nasal cannula  Chronic home O2 use?: NO  Incentive spirometer at bedside: YES       GI:  Last Bowel Movement Date: 03/14/23  Current diet:  ADULT ORAL NUTRITION SUPPLEMENT Breakfast, Lunch, Dinner;  Low Calorie/High Protein  ADULT DIET Regular; 3 carb choices (45 gm/meal); please only send chocolate ensure high protein  DIET ONE TIME MESSAGE  Passing flatus: YES  Tolerating current diet: YES       Pain Management:   Patient states pain is manageable on current regimen: YES    Skin:  Edin Score: 16  Interventions: speciality bed, float heels, increase time out of bed, PT/OT consult, limit briefs, and internal/external urinary devices    Patient Safety:  Fall Score:    Interventions: bed/chair alarm, assistive device (walker, cane, etc), gripper socks, pt to call before getting OOB, and stay with me (per policy)       Length of Stay:  Expected LOS: 5d 0h  Actual LOS: 9      Buddy Díaz, RN

## 2023-03-16 NOTE — PROGRESS NOTES
Problem: Mobility Impaired (Adult and Pediatric)  Goal: *Acute Goals and Plan of Care (Insert Text)  Description: FUNCTIONAL STATUS PRIOR TO ADMISSION: Patient was independent and active without use of DME.    HOME SUPPORT PRIOR TO ADMISSION: The patient lived with  but did not require assist.    Physical Therapy Goals  Initiated 3/14/2023  1. Patient will move from supine to sit and sit to supine  in bed with modified independence within 7 day(s). 2.  Patient will transfer from bed to chair and chair to bed with modified independence using the least restrictive device within 7 day(s). 3.  Patient will perform sit to stand with modified independence within 7 day(s). 4.  Patient will ambulate with modified independence for 200 feet with the least restrictive device within 7 day(s). Outcome: Progressing Towards Goal     PHYSICAL THERAPY TREATMENT  Patient: Aldo Giles ([de-identified] y.o. female)  Date: 3/16/2023  Diagnosis: Sepsis ([facility] [address] [A41.9] <principal problem not specified>  Procedure(s) (LRB):  LEFT HEART CATH / CORONARY ANGIOGRAPHY (N/A)  ULTRASOUND GUIDED VASCULAR ACCESS (N/A) 2 Days Post-Op  Precautions: Fall  Chart, physical therapy assessment, plan of care and goals were reviewed. ASSESSMENT  Patient continues with skilled PT services and is progressing towards goals. Vitals stable. She is able to increase gait distance and activity tolerance today with OOB efforts, amb. To bathroom, and then gait in room and short distance into hallway. No LOB, cuing to stay closer inside RW frame. Pt seated in bedside chair earlier in the day per nurse. Continue to follow. Patient Vitals for the past 4 hrs:   Pulse SpO2   03/16/23 1500 78 98 %   03/16/23 1438 81 --   03/16/23 1200 95 --           Current Level of Function Impacting Discharge (mobility/balance): bed mob. S ; transfers SBA ; gait CGA-SBA with RW use    Other factors to consider for discharge: pt has spouse, ?  If he is able to assist at discharge         PLAN :  Patient continues to benefit from skilled intervention to address the above impairments. Continue treatment per established plan of care. to address goals. Recommendation for discharge: (in order for the patient to meet his/her long term goals)  Therapy 3 hours per day 5-7 days per week (per chart) ; if home, HHPT and S for safety    This discharge recommendation:  Has been made in collaboration with the attending provider and/or case management    IF patient discharges home will need the following DME: to be determined (TBD), currently RW use       SUBJECTIVE:   Patient stated Claudia Spence will get up and walk.     OBJECTIVE DATA SUMMARY:   Critical Behavior:  Neurologic State: Alert  Orientation Level: Oriented X4  Cognition: Follows commands  Safety/Judgement: Fall prevention  Functional Mobility Training:  Bed Mobility:  Rolling: Supervision  Supine to Sit: Supervision  Sit to Supine: Supervision  Scooting: Supervision        Transfers:  Sit to Stand: Stand-by assistance  Stand to Sit: Stand-by assistance                             Balance:  Sitting: Intact  Standing: Impaired  Standing - Static: Constant support;Good (RW)  Standing - Dynamic : Constant support; Fair (RW)    Ambulation/Gait Training:  Distance (ft): 60 Feet (ft)  Assistive Device: Gait belt;Walker, rolling  Ambulation - Level of Assistance: Contact guard assistance;Stand-by assistance     Gait Description (WDL): Exceptions to WDL  Gait Abnormalities: Decreased step clearance        Base of Support: Widened     Speed/Beverly: Pace decreased (<100 feet/min)  Step Length: Left shortened;Right shortened        Interventions: Safety awareness training;Verbal cues       Pain Rating:  No c/o pain    Activity Tolerance:   Fair and Improving    After treatment patient left in no apparent distress:   Supine in bed, Call bell within reach, Bed / chair alarm activated, Side rails x 3, and nurse notified     COMMUNICATION/COLLABORATION: The patients plan of care was discussed with: Registered nurse.      Russ Angeles, PT, DPT   Time Calculation: 27 mins

## 2023-03-16 NOTE — PROGRESS NOTES
1641 Right wrist IV and Right forearm IV were removed due to leaking. No pain or redness at the sites. 1850 Assisted pt to the bathroom, pt voided and had a BM. Pt tolerated ambulation with the use of a walker.      1915 Bedside shift report given to Jayme Rodríguez

## 2023-03-16 NOTE — PROGRESS NOTES
Hospitalist Progress Note    Subjective:     Patient was seen and examined. No acute events overnight. Discussed with RN overnight events. All patient's questions were answered.     \"Doing ok but I couldn't sleep last night\"    Review of Systems:  Symptom Y/N Comments  Symptom Y/N Comments   Fever/Chills n   Chest Pain n    Poor Appetite    Edema     Cough n   Abdominal Pain n    Sputum    Joint Pain     SOB/DENTON n   Pruritis/Rash     Nausea/vomit n   Tolerating PT/OT     Diarrhea    Tolerating Diet y    Constipation    Other       Could NOT obtain due to:          Current Facility-Administered Medications   Medication Dose Route Frequency    metoprolol (LOPRESSOR) injection 1.25 mg  1.25 mg IntraVENous PRN    apixaban (ELIQUIS) tablet 5 mg  5 mg Oral BID    insulin glargine (LANTUS) injection 12 Units  12 Units SubCUTAneous DAILY    furosemide (LASIX) tablet 40 mg  40 mg Oral BID    amiodarone (CORDARONE) 375 mg/250 mL D5W infusion  0.5 mg/min IntraVENous CONTINUOUS    metoprolol succinate (TOPROL-XL) XL tablet 75 mg  75 mg Oral DAILY    lisinopriL (PRINIVIL, ZESTRIL) tablet 2.5 mg  2.5 mg Oral DAILY    glucose chewable tablet 16 g  4 Tablet Oral PRN    glucagon (GLUCAGEN) injection 1 mg  1 mg IntraMUSCular PRN    insulin lispro (HUMALOG) injection   SubCUTAneous AC&HS    [Held by provider] dilTIAZem IR (CARDIZEM) tablet 60 mg  60 mg Oral AC&HS    polyethylene glycol (MIRALAX) packet 17 g  17 g Oral DAILY    senna-docusate (PERICOLACE) 8.6-50 mg per tablet 2 Tablet  2 Tablet Oral DAILY    bisacodyL (DULCOLAX) suppository 10 mg  10 mg Rectal DAILY PRN    dextrose 10% infusion 0-250 mL  0-250 mL IntraVENous PRN    albuterol-ipratropium (DUO-NEB) 2.5 MG-0.5 MG/3 ML  3 mL Nebulization Q4H PRN    lidocaine 4 % patch 1 Patch  1 Patch TransDERmal Q24H    alcohol 62% (NOZIN) nasal  1 Ampule  1 Ampule Topical Q12H    balsam peru-castor oiL (VENELEX) ointment   Topical BID    latanoprost (XALATAN) 0.005 % ophthalmic solution 1 Drop  1 Drop Both Eyes QPM    timolol (TIMOPTIC) 0.5 % ophthalmic solution 1 Drop  1 Drop Both Eyes DAILY    dorzolamide (TRUSOPT) 2 % ophthalmic solution 1 Drop  1 Drop Both Eyes BID    prednisoLONE acetate (PRED FORTE) 1 % ophthalmic suspension 1 Drop  1 Drop Left Eye DAILY    sodium chloride (NS) flush 5-10 mL  5-10 mL IntraVENous PRN    sodium chloride (NS) flush 5-10 mL  5-10 mL IntraVENous PRN    sodium chloride (NS) flush 5-40 mL  5-40 mL IntraVENous PRN    ondansetron (ZOFRAN) injection 4 mg  4 mg IntraVENous Q6H PRN            Objective:     Visit Vitals  BP (!) 112/59 (BP 1 Location: Left upper arm, BP Patient Position: Sitting)   Pulse 68   Temp 98 °F (36.7 °C)   Resp 19   Ht 5' 3\" (1.6 m)   Wt 62.3 kg (137 lb 4.8 oz)   SpO2 96%   Breastfeeding No   BMI 24.32 kg/m²    O2 Flow Rate (L/min): 2 l/min O2 Device: Nasal cannula    Temp (24hrs), Av.2 °F (36.8 °C), Min:98 °F (36.7 °C), Max:98.6 °F (37 °C)      No intake/output data recorded.  1901 -  0700  In: 1522.7 [P.O.:880; I.V.:642.7]  Out: 1850 [Urine:1850]    PHYSICAL EXAM:    Physical Exam  Constitutional:       General: She is not in acute distress. Comments: Elderly adult female   HENT:      Head: Normocephalic and atraumatic. Mouth/Throat:      Mouth: Mucous membranes are moist.   Eyes:      Pupils: Pupils are equal, round, and reactive to light. Cardiovascular:      Rate and Rhythm: Tachycardia present. Rhythm irregular. Pulses:           Radial pulses are 2+ on the right side and 2+ on the left side. Dorsalis pedis pulses are 1+ on the right side and 1+ on the left side. Heart sounds: Normal heart sounds. No murmur heard. Pulmonary:      Effort: Pulmonary effort is normal. No respiratory distress. Breath sounds: No decreased breath sounds. Comments: O2 @ 2L in use  Abdominal:      General: Bowel sounds are normal. There is no distension. Palpations: Abdomen is soft. Genitourinary:     Comments: Casiano catheter in place draining clear dark yellow urine  Musculoskeletal:      Right lower leg: No edema. Left lower leg: No edema. Skin:     General: Skin is warm and dry. Capillary Refill: Capillary refill takes less than 2 seconds. Coloration: Skin is pale. Neurological:      Mental Status: She is alert and oriented to person, place, and time.           Data Review    Recent Results (from the past 24 hour(s))   GLUCOSE, POC    Collection Time: 03/15/23 11:55 AM   Result Value Ref Range    Glucose (POC) 140 (H) 65 - 117 mg/dL    Performed by Gale Jiang RN    GLUCOSE, POC    Collection Time: 03/15/23  3:59 PM   Result Value Ref Range    Glucose (POC) 235 (H) 65 - 117 mg/dL    Performed by Gale Jiang RN    GLUCOSE, POC    Collection Time: 03/15/23  9:25 PM   Result Value Ref Range    Glucose (POC) 176 (H) 65 - 117 mg/dL    Performed by Eleanor ARIAS    PROTHROMBIN TIME + INR    Collection Time: 03/16/23  3:12 AM   Result Value Ref Range    INR 1.2 (H) 0.9 - 1.1      Prothrombin time 12.7 (H) 9.0 - 11.1 sec   PTT    Collection Time: 03/16/23  3:12 AM   Result Value Ref Range    aPTT 23.9 22.1 - 31.0 sec    aPTT, therapeutic range     58.0 - 77.0 SECS   CBC W/O DIFF    Collection Time: 03/16/23  3:12 AM   Result Value Ref Range    WBC 11.2 (H) 3.6 - 11.0 K/uL    RBC 4.05 3.80 - 5.20 M/uL    HGB 11.0 (L) 11.5 - 16.0 g/dL    HCT 35.1 35.0 - 47.0 %    MCV 86.7 80.0 - 99.0 FL    MCH 27.2 26.0 - 34.0 PG    MCHC 31.3 30.0 - 36.5 g/dL    RDW 16.2 (H) 11.5 - 14.5 %    PLATELET 009 036 - 636 K/uL    MPV 10.1 8.9 - 12.9 FL    NRBC 0.0 0  WBC    ABSOLUTE NRBC 0.00 0.00 - 4.75 K/uL   METABOLIC PANEL, BASIC    Collection Time: 03/16/23  3:12 AM   Result Value Ref Range    Sodium 135 (L) 136 - 145 mmol/L    Potassium 3.8 3.5 - 5.1 mmol/L    Chloride 101 97 - 108 mmol/L    CO2 28 21 - 32 mmol/L    Anion gap 6 5 - 15 mmol/L    Glucose 117 (H) 65 - 100 mg/dL BUN 14 6 - 20 MG/DL    Creatinine 0.56 0.55 - 1.02 MG/DL    BUN/Creatinine ratio 25 (H) 12 - 20      eGFR >60 >60 ml/min/1.73m2    Calcium 8.0 (L) 8.5 - 10.1 MG/DL   HEPATIC FUNCTION PANEL    Collection Time: 03/16/23  3:12 AM   Result Value Ref Range    Protein, total 5.6 (L) 6.4 - 8.2 g/dL    Albumin 2.2 (L) 3.5 - 5.0 g/dL    Globulin 3.4 2.0 - 4.0 g/dL    A-G Ratio 0.6 (L) 1.1 - 2.2      Bilirubin, total 0.8 0.2 - 1.0 MG/DL    Bilirubin, direct 0.1 0.0 - 0.2 MG/DL    Alk. phosphatase 110 45 - 117 U/L    AST (SGOT) 65 (H) 15 - 37 U/L    ALT (SGPT) 451 (H) 12 - 78 U/L   MAGNESIUM    Collection Time: 03/16/23  3:12 AM   Result Value Ref Range    Magnesium 1.6 1.6 - 2.4 mg/dL   PHOSPHORUS    Collection Time: 03/16/23  3:12 AM   Result Value Ref Range    Phosphorus 3.5 2.6 - 4.7 MG/DL   GLUCOSE, POC    Collection Time: 03/16/23  7:33 AM   Result Value Ref Range    Glucose (POC) 113 65 - 117 mg/dL    Performed by [facility] PCT    GLUCOSE, POC    Collection Time: 03/16/23 11:31 AM   Result Value Ref Range    Glucose (POC) 185 (H) 65 - 117 mg/dL    Performed by [facility] PCT        XR CHEST PORT   Final Result   No change. XR CHEST PORT   Final Result      1. Mild RIGHT basilar atelectasis status post pleural drain removal. No   pneumothorax. 2. Persistent, small left-sided effusion. US ABD LTD   Final Result      1. Gallbladder is distended and contains multiple gallstones. No ductal   dilatation      XR CHEST PORT   Final Result   Pigtail drainage catheter on the right with diminished right-sided effusion. Persistent left effusion. XR CHEST PORT   Final Result   Increased bilateral pleural effusions right greater than left. CT ABD PELV WO CONT   Final Result      1. Congestive heart failure, with large right and moderate left pleural   effusions, bibasilar atelectasis, and pulmonary edema. 2. Multiple small pulmonary nodules.  These may be a component of pulmonary edema, recommend follow-up chest CT after acute issues are resolved. 3. Prominent distended gallbladder, without other findings of acute   cholecystitis. CT CHEST WO CONT   Final Result      1. Congestive heart failure, with large right and moderate left pleural   effusions, bibasilar atelectasis, and pulmonary edema. 2. Multiple small pulmonary nodules. These may be a component of pulmonary   edema, recommend follow-up chest CT after acute issues are resolved. 3. Prominent distended gallbladder, without other findings of acute   cholecystitis. US ABD LTD   Final Result      1. Marked gallbladder distention with cholelithiasis but no wall thickening. 2. No biliary ductal dilatation is documented. 3. Small ascites. 4. Right pleural effusion. XR CHEST PORT   Final Result      Small right greater than left bilateral pleural effusions. Active Problems:    Sepsis ([address] (3/7/2023)      Goals of care, counseling/discussion (3/13/2023)      Physical debility (3/13/2023)      Weakness (3/13/2023)      Frailty (3/13/2023)      Anorexia (3/13/2023)      Constipation, unspecified constipation type (3/13/2023)      Palliative care encounter (3/13/2023)      Chronic systolic heart failure ([address] (3/16/2023)      Assessment/Plan:   Acute Decompensated HF  Afib with RVR now rate controlled  Runs of NSVT  Acute Respiratory failure with hypoxia  Cardiology consulted and following  Required IV milrinone and cardizem gtts - weaned off 3/11  Echo with EF 20-25%  SBPs variable but soft today, low 100s during my visit  Continued diuresis with lasixas tolerated  Perhaps addition of midodrine could support BP while further adjustments made with beta blocker for better HR s/p cath which showed no significant CAD. Elevated LVEDP. Oxygen weaned down from 15 to 2 L NC. Heparin GTT switched to Eliquis.    Awaiting rehab now  Home O2 challange    Pleural effusions, questionably malignant  Possible lymphoma not confirmed  Chest Tube - removed 3/11  Hem/Onc consulted and following  Further hem/onc workup anticipated early this week to evaluate possible lymphoma as was suggested by pleural fluid findings, plans to cont evaluation as outpatient   Pt required chest tube for recurring right sided pleural effusion, this was discontinued 3/11  -Chest x-ray showed stable pleural effusion        Suspected severe sepsis with hypovolemic shock on admission  Leukocytosis, lactic acidosis, OLGA, and hypervolemic hyponatremia  LA 11.75 now 1.6 - resolved  OLGA likely 2/2 hypovolemia, Cr resolving 1.81 now 0.45  Na 125 - resolved  WBC 17.6 -resolved  No source of infection has been isolated  Urine cx (-), BC drawn 3/7 prelim (-), Resp PCR (-), pleural fluid 3/9 prelim (-)  Pt was initially treated with Zosyn, Flagyl but with no infectious source these were discontinued and WBC has trended down without abx, will continue to monitor    Elevated LFTs suspect cardiohepatic syndrome  Severe passive congestion  GI consulted and following  LFTs elevated 2/2 severe passive congestion, \"unreadably high\" on admission, now ALT 1394, , alk ph 114  Continuing to improve  Continue to monitor    Constipation x1 week per pt report  Added daily scheduled miralax and stool softeners  1x dose MOM now  PRN bisacodyl suppository    Diabetes mellitus type 2  Accuchecks ACHS  Sliding scale insulin  Monitor for s/s hypo/hyperglycemia  Hypoglycemia treatment per protocol  Diabetes management consulted    Hypokalemia, hypomagnesemia -  Suspect 2/2 diuresis  Replace as needed    Arthritis - avoiding tylenol d/t liver dz and ibuprofen/NSAIDs due to HFrEF; pt denies pain at this time    Palliative care consulted and following    Disposition: medically stable to be discharged to rehab once available  DVT Prophylaxis: eliquis  Code Status:  DNR  ALEXANDRArobassam Cummings - sebastian - [phone]     Time Spent: 40 minutes    _______________________________________________________________    Ramsay Grosser, MD

## 2023-03-17 LAB
ALBUMIN SERPL-MCNC: 2.3 G/DL (ref 3.5–5)
ALBUMIN/GLOB SERPL: 0.7 (ref 1.1–2.2)
ALP SERPL-CCNC: 99 U/L (ref 45–117)
ALT SERPL-CCNC: 353 U/L (ref 12–78)
ANION GAP SERPL CALC-SCNC: 7 MMOL/L (ref 5–15)
APTT PPP: 24.8 SEC (ref 22.1–31)
AST SERPL-CCNC: 48 U/L (ref 15–37)
BILIRUB DIRECT SERPL-MCNC: 0.2 MG/DL (ref 0–0.2)
BILIRUB SERPL-MCNC: 0.7 MG/DL (ref 0.2–1)
BUN SERPL-MCNC: 15 MG/DL (ref 6–20)
BUN/CREAT SERPL: 29 (ref 12–20)
CALCIUM SERPL-MCNC: 8.3 MG/DL (ref 8.5–10.1)
CHLORIDE SERPL-SCNC: 100 MMOL/L (ref 97–108)
CO2 SERPL-SCNC: 29 MMOL/L (ref 21–32)
CREAT SERPL-MCNC: 0.52 MG/DL (ref 0.55–1.02)
ERYTHROCYTE [DISTWIDTH] IN BLOOD BY AUTOMATED COUNT: 16.4 % (ref 11.5–14.5)
GLOBULIN SER CALC-MCNC: 3.2 G/DL (ref 2–4)
GLUCOSE BLD STRIP.AUTO-MCNC: 115 MG/DL (ref 65–117)
GLUCOSE BLD STRIP.AUTO-MCNC: 116 MG/DL (ref 65–117)
GLUCOSE BLD STRIP.AUTO-MCNC: 186 MG/DL (ref 65–117)
GLUCOSE BLD STRIP.AUTO-MCNC: 72 MG/DL (ref 65–117)
GLUCOSE SERPL-MCNC: 70 MG/DL (ref 65–100)
HCT VFR BLD AUTO: 32.8 % (ref 35–47)
HGB BLD-MCNC: 10.4 G/DL (ref 11.5–16)
INR PPP: 1.2 (ref 0.9–1.1)
MAGNESIUM SERPL-MCNC: 1.4 MG/DL (ref 1.6–2.4)
MCH RBC QN AUTO: 27.2 PG (ref 26–34)
MCHC RBC AUTO-ENTMCNC: 31.7 G/DL (ref 30–36.5)
MCV RBC AUTO: 85.9 FL (ref 80–99)
NRBC # BLD: 0 K/UL (ref 0–0.01)
NRBC BLD-RTO: 0 PER 100 WBC
PHOSPHATE SERPL-MCNC: 3.5 MG/DL (ref 2.6–4.7)
PLATELET # BLD AUTO: 316 K/UL (ref 150–400)
PMV BLD AUTO: 10.6 FL (ref 8.9–12.9)
POTASSIUM SERPL-SCNC: 3.1 MMOL/L (ref 3.5–5.1)
PROT SERPL-MCNC: 5.5 G/DL (ref 6.4–8.2)
PROTHROMBIN TIME: 12.6 SEC (ref 9–11.1)
RBC # BLD AUTO: 3.82 M/UL (ref 3.8–5.2)
SARS-COV-2 RDRP RESP QL NAA+PROBE: NOT DETECTED
SERVICE CMNT-IMP: ABNORMAL
SERVICE CMNT-IMP: NORMAL
SODIUM SERPL-SCNC: 136 MMOL/L (ref 136–145)
SOURCE, COVRS: NORMAL
THERAPEUTIC RANGE,PTTT: NORMAL SECS (ref 58–77)
WBC # BLD AUTO: 9.7 K/UL (ref 3.6–11)

## 2023-03-17 PROCEDURE — 80076 HEPATIC FUNCTION PANEL: CPT

## 2023-03-17 PROCEDURE — 80048 BASIC METABOLIC PNL TOTAL CA: CPT

## 2023-03-17 PROCEDURE — 97535 SELF CARE MNGMENT TRAINING: CPT

## 2023-03-17 PROCEDURE — 84100 ASSAY OF PHOSPHORUS: CPT

## 2023-03-17 PROCEDURE — 83735 ASSAY OF MAGNESIUM: CPT

## 2023-03-17 PROCEDURE — 74011250637 HC RX REV CODE- 250/637: Performed by: INTERNAL MEDICINE

## 2023-03-17 PROCEDURE — 82962 GLUCOSE BLOOD TEST: CPT

## 2023-03-17 PROCEDURE — 97530 THERAPEUTIC ACTIVITIES: CPT

## 2023-03-17 PROCEDURE — 74011250637 HC RX REV CODE- 250/637: Performed by: STUDENT IN AN ORGANIZED HEALTH CARE EDUCATION/TRAINING PROGRAM

## 2023-03-17 PROCEDURE — 65270000046 HC RM TELEMETRY

## 2023-03-17 PROCEDURE — 74011636637 HC RX REV CODE- 636/637: Performed by: CLINICAL NURSE SPECIALIST

## 2023-03-17 PROCEDURE — 74011250637 HC RX REV CODE- 250/637

## 2023-03-17 PROCEDURE — 85027 COMPLETE CBC AUTOMATED: CPT

## 2023-03-17 PROCEDURE — 74011250636 HC RX REV CODE- 250/636: Performed by: STUDENT IN AN ORGANIZED HEALTH CARE EDUCATION/TRAINING PROGRAM

## 2023-03-17 PROCEDURE — 74011000250 HC RX REV CODE- 250: Performed by: INTERNAL MEDICINE

## 2023-03-17 PROCEDURE — 77010033678 HC OXYGEN DAILY

## 2023-03-17 PROCEDURE — 85730 THROMBOPLASTIN TIME PARTIAL: CPT

## 2023-03-17 PROCEDURE — 87635 SARS-COV-2 COVID-19 AMP PRB: CPT

## 2023-03-17 PROCEDURE — 97116 GAIT TRAINING THERAPY: CPT

## 2023-03-17 PROCEDURE — 74011250637 HC RX REV CODE- 250/637: Performed by: CLINICAL NURSE SPECIALIST

## 2023-03-17 PROCEDURE — 36415 COLL VENOUS BLD VENIPUNCTURE: CPT

## 2023-03-17 PROCEDURE — 85610 PROTHROMBIN TIME: CPT

## 2023-03-17 RX ORDER — POTASSIUM CHLORIDE 20 MEQ/1
40 TABLET, EXTENDED RELEASE ORAL
Status: COMPLETED | OUTPATIENT
Start: 2023-03-17 | End: 2023-03-17

## 2023-03-17 RX ORDER — LANOLIN ALCOHOL/MO/W.PET/CERES
400 CREAM (GRAM) TOPICAL ONCE
Status: COMPLETED | OUTPATIENT
Start: 2023-03-17 | End: 2023-03-17

## 2023-03-17 RX ORDER — POTASSIUM CHLORIDE 7.45 MG/ML
10 INJECTION INTRAVENOUS
Status: DISCONTINUED | OUTPATIENT
Start: 2023-03-17 | End: 2023-03-17

## 2023-03-17 RX ORDER — IBUPROFEN 200 MG
4 TABLET ORAL AS NEEDED
Status: DISCONTINUED | OUTPATIENT
Start: 2023-03-17 | End: 2023-03-18 | Stop reason: HOSPADM

## 2023-03-17 RX ORDER — DEXTROSE MONOHYDRATE 100 MG/ML
0-250 INJECTION, SOLUTION INTRAVENOUS AS NEEDED
Status: DISCONTINUED | OUTPATIENT
Start: 2023-03-17 | End: 2023-03-18 | Stop reason: HOSPADM

## 2023-03-17 RX ORDER — POTASSIUM CHLORIDE 7.45 MG/ML
10 INJECTION INTRAVENOUS
Status: DISPENSED | OUTPATIENT
Start: 2023-03-17 | End: 2023-03-17

## 2023-03-17 RX ORDER — INSULIN LISPRO 100 [IU]/ML
INJECTION, SOLUTION INTRAVENOUS; SUBCUTANEOUS
Status: DISCONTINUED | OUTPATIENT
Start: 2023-03-17 | End: 2023-03-18 | Stop reason: HOSPADM

## 2023-03-17 RX ORDER — METFORMIN HYDROCHLORIDE 500 MG/1
500 TABLET ORAL 2 TIMES DAILY WITH MEALS
Status: DISCONTINUED | OUTPATIENT
Start: 2023-03-17 | End: 2023-03-18 | Stop reason: HOSPADM

## 2023-03-17 RX ADMIN — LATANOPROST 1 DROP: 50 SOLUTION OPHTHALMIC at 17:01

## 2023-03-17 RX ADMIN — POLYETHYLENE GLYCOL 3350 17 G: 17 POWDER, FOR SOLUTION ORAL at 09:15

## 2023-03-17 RX ADMIN — POTASSIUM CHLORIDE 10 MEQ: 7.46 INJECTION, SOLUTION INTRAVENOUS at 16:59

## 2023-03-17 RX ADMIN — APIXABAN 5 MG: 5 TABLET, FILM COATED ORAL at 21:37

## 2023-03-17 RX ADMIN — SENNOSIDES AND DOCUSATE SODIUM 2 TABLET: 50; 8.6 TABLET ORAL at 08:57

## 2023-03-17 RX ADMIN — POTASSIUM CHLORIDE 10 MEQ: 7.46 INJECTION, SOLUTION INTRAVENOUS at 09:36

## 2023-03-17 RX ADMIN — Medication 400 MG: at 09:02

## 2023-03-17 RX ADMIN — TIMOLOL MALEATE 1 DROP: 5 SOLUTION OPHTHALMIC at 09:08

## 2023-03-17 RX ADMIN — POTASSIUM CHLORIDE 40 MEQ: 1500 TABLET, EXTENDED RELEASE ORAL at 09:02

## 2023-03-17 RX ADMIN — INSULIN GLARGINE 12 UNITS: 100 INJECTION, SOLUTION SUBCUTANEOUS at 09:05

## 2023-03-17 RX ADMIN — DORZOLAMIDE HYDROCHLORIDE 1 DROP: 20 SOLUTION/ DROPS OPHTHALMIC at 09:21

## 2023-03-17 RX ADMIN — CASTOR OIL AND BALSAM, PERU: 788; 87 OINTMENT TOPICAL at 09:28

## 2023-03-17 RX ADMIN — Medication 1 AMPULE: at 21:36

## 2023-03-17 RX ADMIN — DORZOLAMIDE HYDROCHLORIDE 1 DROP: 20 SOLUTION/ DROPS OPHTHALMIC at 17:01

## 2023-03-17 RX ADMIN — PREDNISOLONE ACETATE 1 DROP: 10 SUSPENSION/ DROPS OPHTHALMIC at 09:29

## 2023-03-17 RX ADMIN — POTASSIUM CHLORIDE 40 MEQ: 1500 TABLET, EXTENDED RELEASE ORAL at 18:10

## 2023-03-17 RX ADMIN — APIXABAN 5 MG: 5 TABLET, FILM COATED ORAL at 08:57

## 2023-03-17 RX ADMIN — FUROSEMIDE 40 MG: 40 TABLET ORAL at 17:00

## 2023-03-17 RX ADMIN — CASTOR OIL AND BALSAM, PERU: 788; 87 OINTMENT TOPICAL at 21:38

## 2023-03-17 RX ADMIN — METFORMIN HYDROCHLORIDE 500 MG: 500 TABLET ORAL at 17:00

## 2023-03-17 NOTE — PROGRESS NOTES
Problem: Diabetes Self-Management  Goal: *Disease process and treatment process  Description: Define diabetes and identify own type of diabetes; list 3 options for treating diabetes. Outcome: Progressing Towards Goal  Goal: *Incorporating nutritional management into lifestyle  Description: Describe effect of type, amount and timing of food on blood glucose; list 3 methods for planning meals. Outcome: Progressing Towards Goal  Goal: *Incorporating physical activity into lifestyle  Description: State effect of exercise on blood glucose levels. Outcome: Progressing Towards Goal  Goal: *Developing strategies to promote health/change behavior  Description: Define the ABC's of diabetes; identify appropriate screenings, schedule and personal plan for screenings. Outcome: Progressing Towards Goal  Goal: *Using medications safely  Description: State effect of diabetes medications on diabetes; name diabetes medication taking, action and side effects. Outcome: Progressing Towards Goal  Goal: *Monitoring blood glucose, interpreting and using results  Description: Identify recommended blood glucose targets  and personal targets. Outcome: Progressing Towards Goal  Goal: *Prevention, detection, treatment of acute complications  Description: List symptoms of hyper- and hypoglycemia; describe how to treat low blood sugar and actions for lowering  high blood glucose level. Outcome: Progressing Towards Goal  Goal: *Prevention, detection and treatment of chronic complications  Description: Define the natural course of diabetes and describe the relationship of blood glucose levels to long term complications of diabetes.   Outcome: Progressing Towards Goal  Goal: *Developing strategies to address psychosocial issues  Description: Describe feelings about living with diabetes; identify support needed and support network  Outcome: Progressing Towards Goal  Goal: *Insulin pump training  Outcome: Progressing Towards Goal  Goal: *Sick day guidelines  Outcome: Progressing Towards Goal  Goal: *Patient Specific Goal (EDIT GOAL, INSERT TEXT)  Outcome: Progressing Towards Goal     Problem: Patient Education: Go to Patient Education Activity  Goal: Patient/Family Education  Outcome: Progressing Towards Goal     Problem: Patient Education: Go to Patient Education Activity  Goal: Patient/Family Education  Outcome: Progressing Towards Goal     Problem: Sepsis: Day of Diagnosis  Goal: Off Pathway (Use only if patient is Off Pathway)  Outcome: Progressing Towards Goal  Goal: *Fluid resuscitation  Outcome: Progressing Towards Goal  Goal: *Paired blood cultures prior to first dose of antibiotic  Outcome: Progressing Towards Goal  Goal: *First dose of  appropriate antibiotic within 3 hours of arrival to ED, within 1 hour of arrival to ICU  Outcome: Progressing Towards Goal  Goal: *Lactic acid with first set of blood cultures  Outcome: Progressing Towards Goal  Goal: *Pneumococcal immunization (if eligible)  Outcome: Progressing Towards Goal  Goal: *Influenza immunization (if eligible)  Outcome: Progressing Towards Goal  Goal: Activity/Safety  Outcome: Progressing Towards Goal  Goal: Consults, if ordered  Outcome: Progressing Towards Goal  Goal: Diagnostic Test/Procedures  Outcome: Progressing Towards Goal  Goal: Nutrition/Diet  Outcome: Progressing Towards Goal  Goal: Discharge Planning  Outcome: Progressing Towards Goal  Goal: Medications  Outcome: Progressing Towards Goal  Goal: Respiratory  Outcome: Progressing Towards Goal  Goal: Treatments/Interventions/Procedures  Outcome: Progressing Towards Goal  Goal: Psychosocial  Outcome: Progressing Towards Goal     Problem: Sepsis: Day 2  Goal: Off Pathway (Use only if patient is Off Pathway)  Outcome: Progressing Towards Goal  Goal: *Central Venous Pressure maintained at 8-12 mm Hg  Outcome: Progressing Towards Goal  Goal: *Hemodynamically stable  Outcome: Progressing Towards Goal  Goal: *Tolerating diet  Outcome: Progressing Towards Goal  Goal: Activity/Safety  Outcome: Progressing Towards Goal  Goal: Consults, if ordered  Outcome: Progressing Towards Goal  Goal: Diagnostic Test/Procedures  Outcome: Progressing Towards Goal  Goal: Nutrition/Diet  Outcome: Progressing Towards Goal  Goal: Discharge Planning  Outcome: Progressing Towards Goal  Goal: Medications  Outcome: Progressing Towards Goal  Goal: Respiratory  Outcome: Progressing Towards Goal  Goal: Treatments/Interventions/Procedures  Outcome: Progressing Towards Goal  Goal: Psychosocial  Outcome: Progressing Towards Goal     Problem: Sepsis: Day 3  Goal: Off Pathway (Use only if patient is Off Pathway)  Outcome: Progressing Towards Goal  Goal: *Central Venous Pressure maintained at 8-12 mm Hg  Outcome: Progressing Towards Goal  Goal: *Oxygen saturation within defined limits  Outcome: Progressing Towards Goal  Goal: *Vital sign stability  Outcome: Progressing Towards Goal  Goal: *Tolerating diet  Outcome: Progressing Towards Goal  Goal: *Demonstrates progressive activity  Outcome: Progressing Towards Goal  Goal: Activity/Safety  Outcome: Progressing Towards Goal  Goal: Consults, if ordered  Outcome: Progressing Towards Goal  Goal: Diagnostic Test/Procedures  Outcome: Progressing Towards Goal  Goal: Nutrition/Diet  Outcome: Progressing Towards Goal  Goal: Discharge Planning  Outcome: Progressing Towards Goal  Goal: Medications  Outcome: Progressing Towards Goal  Goal: Respiratory  Outcome: Progressing Towards Goal  Goal: Treatments/Interventions/Procedures  Outcome: Progressing Towards Goal  Goal: Psychosocial  Outcome: Progressing Towards Goal     Problem: Sepsis: Day 4  Goal: Off Pathway (Use only if patient is Off Pathway)  Outcome: Progressing Towards Goal  Goal: Activity/Safety  Outcome: Progressing Towards Goal  Goal: Consults, if ordered  Outcome: Progressing Towards Goal  Goal: Diagnostic Test/Procedures  Outcome: Progressing Towards Goal  Goal: Nutrition/Diet  Outcome: Progressing Towards Goal  Goal: Discharge Planning  Outcome: Progressing Towards Goal  Goal: Medications  Outcome: Progressing Towards Goal  Goal: Respiratory  Outcome: Progressing Towards Goal  Goal: Treatments/Interventions/Procedures  Outcome: Progressing Towards Goal  Goal: Psychosocial  Outcome: Progressing Towards Goal  Goal: *Oxygen saturation within defined limits  Outcome: Progressing Towards Goal  Goal: *Hemodynamically stable  Outcome: Progressing Towards Goal  Goal: *Vital signs within defined limits  Outcome: Progressing Towards Goal  Goal: *Tolerating diet  Outcome: Progressing Towards Goal  Goal: *Demonstrates progressive activity  Outcome: Progressing Towards Goal  Goal: *Fluid volume maintenance  Outcome: Progressing Towards Goal     Problem: Sepsis: Day 5  Goal: Off Pathway (Use only if patient is Off Pathway)  Outcome: Progressing Towards Goal  Goal: *Oxygen saturation within defined limits  Outcome: Progressing Towards Goal  Goal: *Vital signs within defined limits  Outcome: Progressing Towards Goal  Goal: *Tolerating diet  Outcome: Progressing Towards Goal  Goal: *Demonstrates progressive activity  Outcome: Progressing Towards Goal  Goal: *Discharge plan identified  Outcome: Progressing Towards Goal  Goal: Activity/Safety  Outcome: Progressing Towards Goal  Goal: Consults, if ordered  Outcome: Progressing Towards Goal  Goal: Diagnostic Test/Procedures  Outcome: Progressing Towards Goal  Goal: Nutrition/Diet  Outcome: Progressing Towards Goal  Goal: Discharge Planning  Outcome: Progressing Towards Goal  Goal: Medications  Outcome: Progressing Towards Goal  Goal: Respiratory  Outcome: Progressing Towards Goal  Goal: Treatments/Interventions/Procedures  Outcome: Progressing Towards Goal  Goal: Psychosocial  Outcome: Progressing Towards Goal     Problem: Sepsis: Day 6  Goal: Off Pathway (Use only if patient is Off Pathway)  Outcome: Progressing Towards Goal  Goal: *Oxygen saturation within defined limits  Outcome: Progressing Towards Goal  Goal: *Vital signs within defined limits  Outcome: Progressing Towards Goal  Goal: *Tolerating diet  Outcome: Progressing Towards Goal  Goal: *Demonstrates progressive activity  Outcome: Progressing Towards Goal  Goal: Influenza immunization  Outcome: Progressing Towards Goal  Goal: *Pneumococcal immunization  Outcome: Progressing Towards Goal  Goal: Activity/Safety  Outcome: Progressing Towards Goal  Goal: Diagnostic Test/Procedures  Outcome: Progressing Towards Goal  Goal: Nutrition/Diet  Outcome: Progressing Towards Goal  Goal: Discharge Planning  Outcome: Progressing Towards Goal  Goal: Medications  Outcome: Progressing Towards Goal  Goal: Respiratory  Outcome: Progressing Towards Goal  Goal: Treatments/Interventions/Procedures  Outcome: Progressing Towards Goal  Goal: Psychosocial  Outcome: Progressing Towards Goal     Problem: Sepsis: Discharge Outcomes  Goal: *Vital signs within defined limits  Outcome: Progressing Towards Goal  Goal: *Tolerating diet  Outcome: Progressing Towards Goal  Goal: *Verbalizes understanding and describes prescribed diet  Outcome: Progressing Towards Goal  Goal: *Demonstrates progressive activity  Outcome: Progressing Towards Goal  Goal: *Describes follow-up/return visits to physicians  Outcome: Progressing Towards Goal  Goal: *Verbalizes name, dosage, time, side effects, and number of days to continue medications  Outcome: Progressing Towards Goal  Goal: *Influenza immunization (Oct-Mar only)  Outcome: Progressing Towards Goal  Goal: *Pneumococcal immunization  Outcome: Progressing Towards Goal  Goal: *Lungs clear or at baseline  Outcome: Progressing Towards Goal  Goal: *Oxygen saturation returns to baseline or 90% or better on room air  Outcome: Progressing Towards Goal  Goal: *Glycemic control  Outcome: Progressing Towards Goal  Goal: *Absence of deep venous thrombosis signs and symptoms(Stroke Metric)  Outcome: Progressing Towards Goal  Goal: *Describes available resources and support systems  Outcome: Progressing Towards Goal  Goal: *Optimal pain control at patient's stated goal  Outcome: Progressing Towards Goal     Problem: Infection - Risk of, Urinary Catheter-Associated Urinary Tract Infection  Goal: *Absence of infection signs and symptoms  Outcome: Progressing Towards Goal     Problem: Patient Education: Go to Patient Education Activity  Goal: Patient/Family Education  Outcome: Progressing Towards Goal     Problem: Falls - Risk of  Goal: *Absence of Falls  Description: Document Daniel Fall Risk and appropriate interventions in the flowsheet. Outcome: Progressing Towards Goal  Note: Fall Risk Interventions:                                Problem: Patient Education: Go to Patient Education Activity  Goal: Patient/Family Education  Outcome: Progressing Towards Goal     Problem: Pressure Injury - Risk of  Goal: *Prevention of pressure injury  Description: Document Edin Scale and appropriate interventions in the flowsheet.   Outcome: Progressing Towards Goal  Note: Pressure Injury Interventions:  Sensory Interventions: Assess changes in LOC, Assess need for specialty bed, Avoid rigorous massage over bony prominences    Moisture Interventions: Absorbent underpads, Internal/External urinary devices, Limit adult briefs, Maintain skin hydration (lotion/cream), Minimize layers    Activity Interventions: Assess need for specialty bed, Increase time out of bed, Pressure redistribution bed/mattress(bed type), PT/OT evaluation    Mobility Interventions: Assess need for specialty bed, Float heels, HOB 30 degrees or less, Pressure redistribution bed/mattress (bed type), PT/OT evaluation    Nutrition Interventions: Document food/fluid/supplement intake    Friction and Shear Interventions: Apply protective barrier, creams and emollients, HOB 30 degrees or less, Lift sheet, Lift team/patient mobility team, Minimize layers Problem: Patient Education: Go to Patient Education Activity  Goal: Patient/Family Education  Outcome: Progressing Towards Goal     Problem: Patient Education: Go to Patient Education Activity  Goal: Patient/Family Education  Outcome: Progressing Towards Goal     Problem: Patient Education: Go to Patient Education Activity  Goal: Patient/Family Education  Outcome: Progressing Towards Goal

## 2023-03-17 NOTE — DIABETES MGMT
[facility]  DIABETES MANAGEMENT  Discharge recommendations      Evaluation and Action Plan   This [de-identified]year old  female was admitted with concern for new diagnosis of heart failure. Issues of Afib intermittently since admission so was placed back on an amio infusion. Underwent cardiac cath 3/14/23 with no significant CAD noted. Cardiology recommended medical management with furosemide, metoprolol & lisinopril and switching to apixaban. As for BG management, a corrective insulin approach had been unsuccessful in achieving BG targets so basal insulin was added 3/13/23. Insulin dosing controlled Bgs. Dosing reduced 3/15/23 in anticipation of transitioning to home diabetes regimen. BG pattern: At this point, would stop basal insulin & restart metformin. Signing off.     Inderjit Casarez DNP, RN, ACNS-BC, BC-ADM, [facility][note 3]  Clinical Nurse Specialist-Diabetes & Endocrine disorders    [facility] (In-patient CNS consult service)  [phone]

## 2023-03-17 NOTE — PROGRESS NOTES
Problem: Mobility Impaired (Adult and Pediatric)  Goal: *Acute Goals and Plan of Care (Insert Text)  Description: FUNCTIONAL STATUS PRIOR TO ADMISSION: Patient was independent and active without use of DME.    HOME SUPPORT PRIOR TO ADMISSION: The patient lived with  but did not require assist.    Physical Therapy Goals  Initiated 3/14/2023  1. Patient will move from supine to sit and sit to supine  in bed with modified independence within 7 day(s). 2.  Patient will transfer from bed to chair and chair to bed with modified independence using the least restrictive device within 7 day(s). 3.  Patient will perform sit to stand with modified independence within 7 day(s). 4.  Patient will ambulate with modified independence for 200 feet with the least restrictive device within 7 day(s). Outcome: Progressing Towards Goal   PHYSICAL THERAPY TREATMENT  Patient: Geovanny Delgado ([de-identified] y.o. female)  Date: 3/17/2023  Diagnosis: Sepsis ([facility] [address] [A41.9] <principal problem not specified>  Procedure(s) (LRB):  LEFT HEART CATH / CORONARY ANGIOGRAPHY (N/A)  ULTRASOUND GUIDED VASCULAR ACCESS (N/A) 3 Days Post-Op  Precautions: Fall  Chart, physical therapy assessment, plan of care and goals were reviewed. ASSESSMENT  Patient continues with skilled PT services and is progressing towards goals. Pt received in bed on 1 L of O2 and o2 sats % at rest.  Pt agreeable to PT session and O2 removed. At rest pt 99%. She demonstrates mobility at S to I level with use of RW for ambulation and transfers. O2 sats remained 96-97% on room t/o session. No physical assistance required for mobility, but pt did require vc for safety with body position in RW, RW management, and hand placement with sit<>stand.  presents and voices that pt is doing well. She is too high functioning or rehab and would be safe to discharge home with HHPT and support from her . Pt left sitting in recliner on room air. .     Current Level of Function Impacting Discharge (mobility/balance): S to I    Other factors to consider for discharge: progressing well, weaned to room air         PLAN :  Patient continues to benefit from skilled intervention to address the above impairments. Continue treatment per established plan of care. to address goals. Recommendation for discharge: (in order for the patient to meet his/her long term goals)  Physical therapy at least 2 days/week in the home and S with out of bed activity    This discharge recommendation:  Has been made in collaboration with the attending provider and/or case management    IF patient discharges home will need the following DME: patient owns DME required for discharge       SUBJECTIVE:   Patient stated Luis Angle is the best news I have gotten since I've been here.     OBJECTIVE DATA SUMMARY:   Critical Behavior:  Neurologic State: Alert, Drowsy  Orientation Level: Oriented X4  Cognition: Follows commands  Safety/Judgement: Fall prevention  Functional Mobility Training:  Bed Mobility:  Rolling: Independent  Supine to Sit: Independent     Scooting: Independent        Transfers:  Sit to Stand: Supervision  Stand to Sit: Supervision            Balance:  Sitting: Intact  Standing: Impaired  Standing - Static: Good;Fair;Occasional  Standing - Dynamic : Good;Constant support (RW)  Ambulation/Gait Training:  Distance (ft): 120 Feet (ft)  Assistive Device: Gait belt;Walker, rolling  Ambulation - Level of Assistance: Supervision        Gait Abnormalities: Decreased step clearance              Speed/Beverly: Pace decreased (<100 feet/min)  Step Length: Right shortened;Left shortened        Interventions: Safety awareness training;Verbal cues     Stairs:   Pt has only one sujit home and has adequate BLE ROM/strength to perform safely      Pain Rating:  none    Activity Tolerance:   Good and SpO2 stable on RA    After treatment patient left in no apparent distress:   Sitting in chair, Call bell within reach, Bed / chair alarm activated, and Caregiver / family present    COMMUNICATION/COLLABORATION:   The patients plan of care was discussed with: Occupational therapist, Registered nurse, and Case management.      Lb Valente, PT   Time Calculation: 24 mins

## 2023-03-17 NOTE — PROGRESS NOTES
Hospitalist Progress Note    Subjective:     Patient was seen and examined. Feels ok today. No SOB or chest pain. Discussed with RN overnight events. Review of Systems:  Full ROS assessed and neg except as noted above      Objective:     Visit Vitals  BP (!) 141/64   Pulse 81   Temp 97.2 °F (36.2 °C)   Resp 22   Ht 5' 3\" (1.6 m)   Wt 62.7 kg (138 lb 3.7 oz)   SpO2 97%   Breastfeeding No   BMI 24.49 kg/m²    O2 Flow Rate (L/min): 1 l/min O2 Device: Nasal cannula    Temp (24hrs), Av.7 °F (36.5 °C), Min:97.2 °F (36.2 °C), Max:98.1 °F (36.7 °C)      No intake/output data recorded. No intake/output data recorded. PHYSICAL EXAM:  General:          WD, WN. Alert, cooperative, no acute distress    EENT:              EOMI. Anicteric sclerae. MMM  Resp:               CTA bilaterally, no wheezing or rales. No accessory muscle use  CV:                  Regular  rate,  No edema  GI:                   Soft, Non distended, Non tender. +Bowel sounds  Neurologic:       EOMs intact. Alert and oriented X 4. Skin:                No rashes. No jaundice    Data reviewed, noted in plan below  WBC 9.7  HgB 10.4 stable  K 3.1  Mag 1.4       Assessment/Plan:   Acute Decompensated HF  Afib with RVR now rate controlled  Runs of NSVT  Acute Respiratory failure with hypoxia  Cardiology consulted and following  Required IV milrinone and cardizem gtts - weaned off 3/11  Echo with EF 20-25%  Continued diuresis with lasix as tolerated  Perhaps addition of midodrine could support BP while further adjustments made with beta blocker for better HR s/p cath which showed no significant CAD. Elevated LVEDP. Oxygen weaned down from 15 to 2 L NC. Heparin GTT switched to Eliquis.    Awaiting rehab now  Home O2 challenge ordered  - no O2 required with PT 3/17/23    Pleural effusions, questionably malignant  Possible lymphoma not confirmed  Chest Tube - removed 3/11  Hem/Onc consulted and following  Further hem/onc workup anticipated early this week to evaluate possible lymphoma as was suggested by pleural fluid findings, plans to cont evaluation as outpatient   Pt required chest tube for recurring right sided pleural effusion, this was discontinued 3/11  -Chest x-ray showed stable pleural effusion    Suspected severe sepsis with hypovolemic shock on admission  Leukocytosis, lactic acidosis, OLGA, and hypervolemic hyponatremia  LA 11.75 now 1.6 - resolved  OLGA likely 2/2 hypovolemia, Cr resolving 1.81 now 0.45  Na 125 - resolved  WBC 17.6 -resolved  No source of infection has been isolated  Urine cx (-), BC drawn 3/7 prelim (-), Resp PCR (-), pleural fluid 3/9 prelim (-)  Pt was initially treated with Zosyn, Flagyl but with no infectious source these were discontinued and WBC has trended down without abx, will continue to monitor    Elevated LFTs suspect cardiohepatic syndrome  Severe passive congestion  GI consulted and following  LFTs elevated 2/2 severe passive congestion, \"unreadably high\" on admission, now ALT 1394, , alk ph 114  Continuing to improve  Continue to monitor    Constipation x1 week per pt report  Added daily scheduled miralax and stool softeners  1x dose MOM now  PRN bisacodyl suppository    Diabetes mellitus type 2  Accuchecks ACHS  Sliding scale insulin  Monitor for s/s hypo/hyperglycemia  Hypoglycemia treatment per protocol  Diabetes management consulted    Hypokalemia, hypomagnesemia -  Suspect 2/2 diuresis  Repleted both 3/17/23    Arthritis - avoiding tylenol d/t liver dz and ibuprofen/NSAIDs due to HFrEF; pt denies pain at this time    Palliative care consulted and following    Disposition: medically stable to be discharged.  HH vs IPR per CM  RAINA: 3/18/23  DVT Prophylaxis: eliquis  Code Status:  DNR  POA: Zenia Sanchez -  - [phone]     _______________________________________________________________    Brent Joseph MD

## 2023-03-17 NOTE — PROGRESS NOTES
Transition of Care Plan:     RUR:13%     Disposition: IPR     If SNF or IPR: Date FOC offered:N/A     Date [facility] received:N/A     Date authorization started with reference number:N/A     Date authorization received and expires:N/A     Accepting [address]     Follow up appointments: To be done by facility when discharged. DME needed:None     Transportation at [address] [place] [address] or means to access home:  has keys         IM Medicare Letter: To be given prior to discharge. Is patient a [place] and connected with the [place]? No              If yes, was [employer] transfer form completed and [facility] notified? N/A      Update: 6:06 pmPatient agreed to go home when discharged. PT recommended Home with HH . [facility] was notified and they also had called to ask after reading PT note. I have put in 3 Referrals for [facility] and will follow up on this before DC. AM note:CM received call from [address] [place] [address] and she said there should be a bed available tomorrow. PCR Covid needs to be obtained. Errol Padilla.     Marcia Jonas RN [address]

## 2023-03-17 NOTE — PROGRESS NOTES
Comprehensive Nutrition Assessment    Type and Reason for Visit: Reassess    Nutrition Recommendations/Plan:   Continue current diet and supplements  Please document % meals and supplements consumed in flowsheet I/O's under intake      Malnutrition Assessment:  Malnutrition Status: At risk for malnutrition (specify) (03/08/23 1335)    Context:  Acute illness     Findings of the 6 clinical characteristics of malnutrition:   Energy Intake:  Mild decrease in energy intake (specify) (only admits to poor appetite x 48h PTA due to n/v)  Weight Loss:  No significant weight loss (per pt report)     Body Fat Loss:  No significant body fat loss,     Muscle Mass Loss:  No significant muscle mass loss,    Fluid Accumulation:   ,     Strength:  Not performed     Nutrition Assessment:     Chart reviewed for reassessment. Pt medically noted for acute decompensated heart failure, acute hypoxic respiratory failure, pleural effusions, possible lymphoma, sepsis, DM2. Pt reports her appetite is improving and she is eating most of the food from each meal. Fair intake documented on the supplements as well. BG has been well controlled today. No new nutrition concerns at this time. Pt is medically ready for discharge pending placement/bed availability. Patient Vitals for the past 168 hrs:   % Diet Eaten   03/15/23 1856 51 - 75%   03/15/23 0800 76 - 100%   03/14/23 1800 51 - 75%   03/14/23 1101 26 - 50%   03/13/23 1745 26 - 50%   03/12/23 1309 26 - 50%   03/12/23 1000 51 - 75%   03/11/23 0800 51 - 75%   03/10/23 1600 76 - 100%     Wt Readings from Last 5 Encounters:   03/17/23 62.7 kg (138 lb 3.7 oz)   01/15/18 69 kg (152 lb 1.9 oz)   01/08/18 69.6 kg (153 lb 7 oz)   09/11/17 67 kg (147 lb 11.3 oz)   09/05/17 68.5 kg (151 lb)   ]    Nutrition Related Findings:    Labs: K 3.1, Mg 1.4, elevated LFTs, -. Meds: lasix, lantus, humalog, miralax, pericolace. BM 3/16.    Wound Type: None    Current Nutrition Intake & Therapies:  Average Meal Intake: 51-75%  Average Supplement Intake: 26-50%  ADULT ORAL NUTRITION SUPPLEMENT Breakfast, Lunch, Dinner; Low Calorie/High Protein  ADULT DIET Regular; 3 carb choices (45 gm/meal); please only send chocolate ensure high protein  DIET ONE TIME MESSAGE    Anthropometric Measures:  Height: 5' 3\" (160 cm)  Ideal Body Weight (IBW): 115 lbs (52 kg)     Current Body Wt:  62.7 kg (138 lb 3.7 oz), 124.2 % IBW. Standing scale  Current BMI (kg/m2): 24.5        Weight Adjustment: No adjustment                 BMI Category: Normal weight (BMI 22.0-24.9) age over 72    Estimated Daily Nutrient Needs:  Energy Requirements Based On: Formula  Weight Used for Energy Requirements: Current  Energy (kcal/day):    Weight Used for Protein Requirements: Current  Protein (g/day): 65-78g (1-1.2gPro/kg)  Method Used for Fluid Requirements: Other (comment)  Fluid (ml/day): per MD    Nutrition Diagnosis:   Inadequate protein-energy intake related to altered GI function, catabolic illness as evidenced by NPO or clear liquid status due to medical condition  Dx resolved. Diet advanced & PO intake improving.      Nutrition Interventions:   Food and/or Nutrient Delivery: Continue current diet, Continue oral nutrition supplement  Nutrition Education/Counseling: No recommendations at this time  Coordination of Nutrition Care: Continue to monitor while inpatient, Interdisciplinary rounds       Goals:  Previous Goal Met: Goal(s) achieved  Goals: PO intake 75% or greater, by next RD assessment       Nutrition Monitoring and Evaluation:   Behavioral-Environmental Outcomes: None identified  Food/Nutrient Intake Outcomes: Food and nutrient intake, Supplement intake  Physical Signs/Symptoms Outcomes: Biochemical data, Nutrition focused physical findings, Skin, Weight, GI status, Fluid status or edema, Hemodynamic status    Discharge Planning:    Continue current diet, Continue oral nutrition supplement    Lynne Dupree RD  Contact: [phone]

## 2023-03-17 NOTE — PROGRESS NOTES
Occupational Therapy    Resting 02 at RA- 96%  Standing 02 at RA- 98%  RA 02 with ambulation- 97%  Post ambulation RA 02- 97%    Full note to follow.     Thank you,  Kristel Webber, OT

## 2023-03-17 NOTE — PROGRESS NOTES
Problem: Self Care Deficits Care Plan (Adult)  Goal: *Acute Goals and Plan of Care (Insert Text)  Description: FUNCTIONAL STATUS PRIOR TO ADMISSION: Patient was independent and active without use of DME for all ADLS and IADLS    HOME SUPPORT PRIOR TO ADMISSION: The patient lived with  and has a daughter that lives locally. Occupational Therapy Goals:  Initiated 3/14/2023  1. Patient will perform grooming standing with modified independence within 7 days. 2. Patient will perform upper body dressing and lower body dressing with modified independence within 7 days. 3. Patient will perform toileting with modified independence within 7 days. 4. Patient will demonstrate independence with home exercise program within 7 days. 5. Patient will transfer from toilet with modified independence using the least restrictive device and appropriate durable medical equipment within 7 days. Outcome: Progressing Towards Goal    OCCUPATIONAL THERAPY TREATMENT  Patient: Val Miranda ([de-identified] y.o. female)  Date: 3/17/2023  Diagnosis: Sepsis ([facility] [address] [A41.9] <principal problem not specified>  Procedure(s) (LRB):  LEFT HEART CATH / CORONARY ANGIOGRAPHY (N/A)  ULTRASOUND GUIDED VASCULAR ACCESS (N/A) 3 Days Post-Op  Precautions: Fall  Chart, occupational therapy assessment, plan of care, and goals were reviewed. ASSESSMENT  Patient continues with skilled OT services and is progressing towards goals. Pt noted with progressive mobility/balance and activity tolerance, evidenced by successful engagement with Supervision level standing hand hygiene and ADL/IADL item gathering, with no LOB and RA 02 97% (RN aware and following). Given pt's great functional progression, recommend HHOT and increased ADL/IADL Supervision  upon discharge, with acute OT to continue to follow during acute hospitalization.      Current Level of Function Impacting Discharge (ADLs): Supervision    Other factors to consider for discharge: Supervision PLAN :  Patient continues to benefit from skilled intervention to address the above impairments. Continue treatment per established plan of care to address goals. Recommend with staff: OOB meals, Active ADL engagement    Recommend next OT session: POC progression    Recommendation for discharge: (in order for the patient to meet his/her long term goals)  Occupational therapy at least 2 days/week in the home AND ensure assist and/or supervision for safety with ADL/IADL    This discharge recommendation:  Has been made in collaboration with the attending provider and/or case management    IF patient discharges home will need the following DME: patient owns DME required for discharge       SUBJECTIVE:   Patient stated I feel better today.     OBJECTIVE DATA SUMMARY:   Cognitive/Behavioral Status:  Neurologic State: Alert  Orientation Level: Oriented X4  Cognition: Appropriate decision making; Appropriate for age attention/concentration; Appropriate safety awareness  Perception: Appears intact  Perseveration: No perseveration noted  Safety/Judgement: Awareness of environment;Home safety; Insight into deficits    Functional Mobility and Transfers for ADLs:  Bed Mobility:  Rolling: Independent  Supine to Sit: Independent  Scooting: Independent    Transfers:  Sit to Stand: Supervision  Functional Transfers  Bathroom Mobility: Supervision/set up       Balance:  Sitting: Intact  Standing: Impaired  Standing - Static: Good;Fair;Occasional  Standing - Dynamic : Good;Constant support (RW)    ADL Intervention:  Grooming  Grooming Assistance: Supervision  Position Performed: Standing (RW)  Washing Hands: Supervision    Cognitive Retraining  Safety/Judgement: Awareness of environment;Home safety; Insight into deficits    Therapeutic Activities:  Completed RW level ADL/IADL item gathering, challenging higher level mobility/balance with multi-direcitonal changes and sustained reach outside base of support, with Supervision, no LOB and good PLB noted. Pain:  No c/o pain    Activity Tolerance:   Good, Fair, and requires rest breaks    After treatment patient left in no apparent distress:   Sitting in chair, Call bell within reach, Bed / chair alarm activated, and Caregiver / family present    COMMUNICATION/COLLABORATION:   The patients plan of care was discussed with: Physical therapist, Registered nurse, and Case management.      Thierry Blankenship OT  Time Calculation: 28 mins

## 2023-03-17 NOTE — PROGRESS NOTES
End of Shift Note    Bedside shift change report given to Per Meléndez (oncoming nurse) by Jay Hollis RN (offgoing nurse). Report included the following information SBAR, Kardex, MAR, and Recent Results    Shift worked:  7p-7a     Shift summary and any significant changes:     Pt given Prn melatonin. AM labs drawn. Pt started to desat during the night, pt put back on 1L NC. Concerns for physician to address:       Zone phone for oncoming shift:          Activity:  Activity Level: Up with Assistance  Number times ambulated in hallways past shift: 0  Number of times OOB to chair past shift: 0    Cardiac:   Cardiac Monitoring: Yes      Cardiac Rhythm: Sinus Rhythm    Access:  Current line(s): PIV     Genitourinary:   Urinary status: voiding    Respiratory:   O2 Device: Nasal cannula  Chronic home O2 use?: NO  Incentive spirometer at bedside: YES       GI:  Last Bowel Movement Date: 03/16/23  Current diet:  ADULT ORAL NUTRITION SUPPLEMENT Breakfast, Lunch, Dinner;  Low Calorie/High Protein  ADULT DIET Regular; 3 carb choices (45 gm/meal); please only send chocolate ensure high protein  DIET ONE TIME MESSAGE  Passing flatus: YES  Tolerating current diet: YES       Pain Management:   Patient states pain is manageable on current regimen: YES    Skin:  Edin Score: 16  Interventions: turn team, speciality bed, increase time out of bed, PT/OT consult, and internal/external urinary devices    Patient Safety:  Fall Score:    Interventions: bed/chair alarm, assistive device (walker, cane, etc), gripper socks, pt to call before getting OOB, and stay with me (per policy)       Length of Stay:  Expected LOS: 5d 0h  Actual LOS: 10      Jay Hollis, RN

## 2023-03-18 VITALS
TEMPERATURE: 97.1 F | RESPIRATION RATE: 23 BRPM | HEIGHT: 63 IN | OXYGEN SATURATION: 96 % | BODY MASS INDEX: 24.49 KG/M2 | HEART RATE: 84 BPM | DIASTOLIC BLOOD PRESSURE: 54 MMHG | WEIGHT: 138.23 LBS | SYSTOLIC BLOOD PRESSURE: 111 MMHG

## 2023-03-18 LAB
ALBUMIN SERPL-MCNC: 2.5 G/DL (ref 3.5–5)
ALBUMIN/GLOB SERPL: 0.7 (ref 1.1–2.2)
ALP SERPL-CCNC: 100 U/L (ref 45–117)
ALT SERPL-CCNC: 302 U/L (ref 12–78)
ANION GAP SERPL CALC-SCNC: 2 MMOL/L (ref 5–15)
APTT PPP: 24.9 SEC (ref 22.1–31)
AST SERPL-CCNC: 50 U/L (ref 15–37)
BILIRUB DIRECT SERPL-MCNC: 0.3 MG/DL (ref 0–0.2)
BILIRUB SERPL-MCNC: 0.7 MG/DL (ref 0.2–1)
BUN SERPL-MCNC: 12 MG/DL (ref 6–20)
BUN/CREAT SERPL: 21 (ref 12–20)
CALCIUM SERPL-MCNC: 8.2 MG/DL (ref 8.5–10.1)
CHLORIDE SERPL-SCNC: 100 MMOL/L (ref 97–108)
CO2 SERPL-SCNC: 31 MMOL/L (ref 21–32)
CREAT SERPL-MCNC: 0.56 MG/DL (ref 0.55–1.02)
ERYTHROCYTE [DISTWIDTH] IN BLOOD BY AUTOMATED COUNT: 16.1 % (ref 11.5–14.5)
GLOBULIN SER CALC-MCNC: 3.5 G/DL (ref 2–4)
GLUCOSE BLD STRIP.AUTO-MCNC: 141 MG/DL (ref 65–117)
GLUCOSE BLD STRIP.AUTO-MCNC: 70 MG/DL (ref 65–117)
GLUCOSE SERPL-MCNC: 86 MG/DL (ref 65–100)
HCT VFR BLD AUTO: 34.2 % (ref 35–47)
HGB BLD-MCNC: 11 G/DL (ref 11.5–16)
INR PPP: 1.2 (ref 0.9–1.1)
MAGNESIUM SERPL-MCNC: 1.6 MG/DL (ref 1.6–2.4)
MCH RBC QN AUTO: 27.6 PG (ref 26–34)
MCHC RBC AUTO-ENTMCNC: 32.2 G/DL (ref 30–36.5)
MCV RBC AUTO: 85.7 FL (ref 80–99)
NRBC # BLD: 0 K/UL (ref 0–0.01)
NRBC BLD-RTO: 0 PER 100 WBC
PHOSPHATE SERPL-MCNC: 2.6 MG/DL (ref 2.6–4.7)
PLATELET # BLD AUTO: 336 K/UL (ref 150–400)
PMV BLD AUTO: 10.3 FL (ref 8.9–12.9)
POTASSIUM SERPL-SCNC: 3.7 MMOL/L (ref 3.5–5.1)
PROT SERPL-MCNC: 6 G/DL (ref 6.4–8.2)
PROTHROMBIN TIME: 12.3 SEC (ref 9–11.1)
RBC # BLD AUTO: 3.99 M/UL (ref 3.8–5.2)
SERVICE CMNT-IMP: ABNORMAL
SERVICE CMNT-IMP: NORMAL
SODIUM SERPL-SCNC: 133 MMOL/L (ref 136–145)
THERAPEUTIC RANGE,PTTT: NORMAL SECS (ref 58–77)
WBC # BLD AUTO: 9.2 K/UL (ref 3.6–11)

## 2023-03-18 PROCEDURE — 82962 GLUCOSE BLOOD TEST: CPT

## 2023-03-18 PROCEDURE — 77010033678 HC OXYGEN DAILY

## 2023-03-18 PROCEDURE — 80076 HEPATIC FUNCTION PANEL: CPT

## 2023-03-18 PROCEDURE — 94760 N-INVAS EAR/PLS OXIMETRY 1: CPT

## 2023-03-18 PROCEDURE — 80048 BASIC METABOLIC PNL TOTAL CA: CPT

## 2023-03-18 PROCEDURE — 74011250637 HC RX REV CODE- 250/637: Performed by: CLINICAL NURSE SPECIALIST

## 2023-03-18 PROCEDURE — 85027 COMPLETE CBC AUTOMATED: CPT

## 2023-03-18 PROCEDURE — 36415 COLL VENOUS BLD VENIPUNCTURE: CPT

## 2023-03-18 PROCEDURE — 74011250637 HC RX REV CODE- 250/637

## 2023-03-18 PROCEDURE — 83735 ASSAY OF MAGNESIUM: CPT

## 2023-03-18 PROCEDURE — 74011000250 HC RX REV CODE- 250: Performed by: INTERNAL MEDICINE

## 2023-03-18 PROCEDURE — 74011250637 HC RX REV CODE- 250/637: Performed by: STUDENT IN AN ORGANIZED HEALTH CARE EDUCATION/TRAINING PROGRAM

## 2023-03-18 PROCEDURE — 85730 THROMBOPLASTIN TIME PARTIAL: CPT

## 2023-03-18 PROCEDURE — 85610 PROTHROMBIN TIME: CPT

## 2023-03-18 PROCEDURE — 84100 ASSAY OF PHOSPHORUS: CPT

## 2023-03-18 RX ORDER — METFORMIN HYDROCHLORIDE 500 MG/1
500 TABLET ORAL 2 TIMES DAILY WITH MEALS
Qty: 60 TABLET | Refills: 0 | Status: SHIPPED | OUTPATIENT
Start: 2023-03-18 | End: 2023-04-17

## 2023-03-18 RX ORDER — FUROSEMIDE 40 MG/1
40 TABLET ORAL 2 TIMES DAILY
Qty: 60 TABLET | Refills: 0 | Status: SHIPPED | OUTPATIENT
Start: 2023-03-18 | End: 2023-04-17

## 2023-03-18 RX ORDER — METOPROLOL SUCCINATE 25 MG/1
75 TABLET, EXTENDED RELEASE ORAL DAILY
Qty: 90 TABLET | Refills: 0 | Status: SHIPPED | OUTPATIENT
Start: 2023-03-19 | End: 2023-04-18

## 2023-03-18 RX ADMIN — METOPROLOL SUCCINATE 75 MG: 50 TABLET, EXTENDED RELEASE ORAL at 08:44

## 2023-03-18 RX ADMIN — FUROSEMIDE 40 MG: 40 TABLET ORAL at 08:43

## 2023-03-18 RX ADMIN — SENNOSIDES AND DOCUSATE SODIUM 2 TABLET: 50; 8.6 TABLET ORAL at 08:43

## 2023-03-18 RX ADMIN — CASTOR OIL AND BALSAM, PERU: 788; 87 OINTMENT TOPICAL at 08:51

## 2023-03-18 RX ADMIN — PREDNISOLONE ACETATE 1 DROP: 10 SUSPENSION/ DROPS OPHTHALMIC at 08:55

## 2023-03-18 RX ADMIN — TIMOLOL MALEATE 1 DROP: 5 SOLUTION OPHTHALMIC at 08:54

## 2023-03-18 RX ADMIN — METFORMIN HYDROCHLORIDE 500 MG: 500 TABLET ORAL at 08:43

## 2023-03-18 RX ADMIN — APIXABAN 5 MG: 5 TABLET, FILM COATED ORAL at 08:43

## 2023-03-18 RX ADMIN — DORZOLAMIDE HYDROCHLORIDE 1 DROP: 20 SOLUTION/ DROPS OPHTHALMIC at 08:52

## 2023-03-18 NOTE — DISCHARGE SUMMARY
Hospitalist Discharge Summary     Patient ID:  Paula Lu  [id number]  [de-identified] y.o.  1940  3/7/2023    PCP on record: Jose Blackburn MD    Admit date: 3/7/2023  Discharge date and time: 3/18/2023    DISCHARGE DIAGNOSIS:    Acute Decompensated HF  Afib with RVR now rate controlled  Runs of NSVT  Acute Respiratory failure with hypoxia  Pleural effusions  Suspected severe sepsis with hypovolemic shock on admission  Leukocytosis, lactic acidosis, OLGA, and hypervolemic hyponatremia  Elevated LFTs suspect cardiohepatic syndrome  Constipation  DM2  Hypokalemia, hypomagnesemia  Arthritis      CONSULTATIONS:  IP CONSULT TO GASTROENTEROLOGY  IP CONSULT TO ONCOLOGY  IP CONSULT TO CARDIOLOGY    Excerpted HPI from H&P of Willy Miller MD:  HPI: per patient, EMR, colleague report  Paula Lu is a [de-identified] y.o. female with PMH s/f T2DM (A1c 7.1, 2018), HTN, chronic pain/arthritis who presented to the ED with N/V, anorexia x 2 days. In ED, workup notable for marked and varied lab abnmls, including LA 11, WBC 17, Na 125, K 5.7, CO2 16, AG 17, Cr 1.8, AST/ALT unreadably high, alkphos 176, Tbili 1.7. Pan-scan without contrast pending per ED physician. Given 2 L IVF in ED and started on antibiotics. ICU consulted for admission. Upon my arrival to bedside, pt awake and alert, /97, HR . Finishing first L of IVF. C/o nausea and no appetite. Will be admitted to the ICU for further management. Arrived to ICU after CT scans. Pt now SOB with RR in the 30s requiring 4 L NC. Bedside ultrasound of lungs and echo done. Lungs with bilateral B lines. Bedside echo significant for dilated LV with reduced systolic function. Possible wall motion abnormalities but a little hard to determine bc pt's HR still in the 130s. Official Echo ordered for the morning. First troponin only 141 but will resend in 3 hours to r/o NSTEMI.   Denies chest pain.    ______________________________________________________________________  DISCHARGE SUMMARY/HOSPITAL COURSE:  for full details see H&P, daily progress notes, labs, consult notes. Acute Decompensated HF  Afib with RVR now rate controlled  Runs of NSVT  Acute Respiratory failure with hypoxia  Cardiology consulted and following  Required IV milrinone and cardizem gtts - weaned off 3/11  Echo with EF 20-25%  Continued diuresis with lasix as tolerated  Perhaps addition of midodrine could support BP while further adjustments made with beta blocker for better HR s/p cath which showed no significant CAD. Elevated LVEDP. Oxygen weaned off. Heparin GTT switched to Eliquis.    Home O2 challenge ordered  - no O2 required with PT 3/17/23  - outpatient followup with Dr. Donny Vazquez     Pleural effusions, questionably malignant  Possible lymphoma not confirmed  Chest Tube - removed 3/11  Hem/Onc consulted and following  Further hem/onc workup anticipated early this week to evaluate possible lymphoma as was suggested by pleural fluid findings, plans to cont evaluation as outpatient   Pt required chest tube for recurring right sided pleural effusion, this was discontinued 3/11  -Chest x-ray showed stable pleural effusion  - outpatient Oncology followup with Dr. Andreia Chow     Suspected severe sepsis with hypovolemic shock on admission  Leukocytosis, lactic acidosis, OLGA, and hypervolemic hyponatremia  LA 11.75 now 1.6 - resolved  OLGA likely 2/2 hypovolemia, Cr resolving 1.81 now 0.45  Na 125 - resolved  WBC 17.6 -resolved  No source of infection has been isolated  Urine cx (-), BC drawn 3/7 prelim (-), Resp PCR (-), pleural fluid 3/9 prelim (-)  Pt was initially treated with Zosyn, Flagyl but with no infectious source these were discontinued and WBC has trended down without abx, will continue to monitor     Elevated LFTs suspect cardiohepatic syndrome  Severe passive congestion  GI consulted and following  LFTs elevated 2/2 severe passive congestion, \"unreadably high\" on admission, now ALT 1394, , alk ph 114  Continuing to improve     Constipation x1 week per pt report  Added daily scheduled miralax and stool softeners  1x dose MOM  PRN bisacodyl suppository     Diabetes mellitus type 2  Accuchecks ACHS  Sliding scale insulin  Monitor for s/s hypo/hyperglycemia  Hypoglycemia treatment per protocol  Diabetes management consulted     Hypokalemia, hypomagnesemia -  Suspect 2/2 diuresis  Repleted both 3/17/23     Arthritis - avoiding tylenol d/t liver dz and ibuprofen/NSAIDs due to HFrEF; pt denies pain at this time    _______________________________________________________________________  Patient seen and examined by me on discharge day. Pertinent Findings:  Gen:    Not in distress  Chest: Clear lungs  CVS:   Regular rhythm. No edema  Abd:  Soft, not distended, not tender  Neuro:  Alert,   _______________________________________________________________________  DISCHARGE MEDICATIONS:   Discharge Medication List as of 3/18/2023 11:50 AM        START taking these medications    Details   apixaban (ELIQUIS) 5 mg tablet Take 1 Tablet by mouth two (2) times a day for 30 days. , Normal, Disp-60 Tablet, R-0      furosemide (LASIX) 40 mg tablet Take 1 Tablet by mouth two (2) times a day for 30 days. , Normal, Disp-60 Tablet, R-0      metoprolol succinate (TOPROL-XL) 25 mg XL tablet Take 3 Tablets by mouth daily for 30 days. , Normal, Disp-90 Tablet, R-0           CONTINUE these medications which have CHANGED    Details   metFORMIN (GLUCOPHAGE) 500 mg tablet Take 1 Tablet by mouth two (2) times daily (with meals) for 30 days. Indications: type 2 diabetes mellitus, Normal, Disp-60 Tablet, R-0           CONTINUE these medications which have NOT CHANGED    Details   dorzolamide (TRUSOPT) 2 % ophthalmic solution INSTILL 1 DROP IN BOTH EYES TWICE DAILY, Historical Med      atorvastatin (LIPITOR) 10 mg tablet Take 10 mg by mouth nightly. , Historical Med      timolol (TIMOPTIC) 0.5 % ophthalmic solution Administer 1 Drop to both eyes daily. , Historical Med      latanoprost (XALATAN) 0.005 % ophthalmic solution Administer 1 Drop to both eyes nightly. Indications: OPEN ANGLE GLAUCOMA, Historical Med      prednisoLONE acetate (PRED FORTE) 1 % ophthalmic suspension Administer 1 Drop to left eye daily. Indications: SEVERE OCULAR INFLAMMATION, Historical Med      acyclovir (ZOVIRAX) 800 mg tablet Take 800 mg by mouth nightly. Indications: shingles prevention, Historical Med      multivitamin with iron tablet Take 1 Tab by mouth daily. , Historical Med           STOP taking these medications       hydroCHLOROthiazide (HYDRODIURIL) 25 mg tablet Comments:   Reason for Stopping:         lisinopril (PRINIVIL, ZESTRIL) 5 mg tablet Comments:   Reason for Stopping:                 Patient Follow Up Instructions:    Activity: Activity as tolerated  Diet: Cardiac Diet and Diabetic Diet      Follow-up Information       Follow up With Specialties Details Why Contact Info    Salome Katz MD Family Medicine Follow up in 2 week(s) routine post-hospitalization needs, med refills [address]  Follow up [place] [address] [address]   Phone: [phone]    Crystal Cary MD Cardiovascular Disease Physician Follow up in 2 week(s) for your heart disease [address]  [address] [address]  [phone]      Dann Rees MD Hematology and Oncology, Hematology Physician, Oncology, Internal Medicine Physician Follow up in 2 week(s) for possible cancer testing [address]  [facility]  [phone]            ________________________________________________________________    Risk of deterioration: Low    Condition at Discharge: Stable  __________________________________________________________________    Disposition  Home with family and home health services    ____________________________________________________________________    Code Status: DNR/DNI  ___________________________________________________________________      Total time in minutes spent coordinating this discharge (includes going over instructions, follow-up, prescriptions, and preparing report for sign off to her PCP) :  >30 minutes    Signed:  Skyler Ott MD

## 2023-03-18 NOTE — DISCHARGE INSTRUCTIONS
You were treated for respiratory failure due to your heart not functioning as well as normal, causing fluid to back up. Your breathing improved with medications. Please take the medications as listed and followup with Dr. Bayron Barrow office in Cardiology. There was a concern that the lung fluid showed evidence of possible cancer, please followup with Dr. Ab Parker office for further testing.

## 2023-03-18 NOTE — PROGRESS NOTES
Problem: Patient Education: Go to Patient Education Activity  Goal: Patient/Family Education  Outcome: Progressing Towards Goal     Problem: Patient Education: Go to Patient Education Activity  Goal: Patient/Family Education  Outcome: Progressing Towards Goal     Problem: Sepsis: Day of Diagnosis  Goal: *Fluid resuscitation  Outcome: Progressing Towards Goal

## 2023-03-18 NOTE — PROGRESS NOTES
End of Shift Note    Bedside shift change report given to Alexx RN (oncoming nurse) by Jose Rafael Castillo RN (offgoing nurse). Report included the following information SBAR, Kardex, Intake/Output, MAR, and Cardiac Rhythm      Shift worked:  7p-7a     Shift summary and any significant changes:     Patient rested well thru the night,up to bathroom with walker,vss. Concerns for physician to address:  Pt requesting to know when she will be discharged     Zone phone for oncoming shift:          Activity:  Activity Level: Up with Assistance  Number times ambulated in hallways past shift: 0  Number of times OOB to chair past shift: 3    Cardiac:   Cardiac Monitoring: Yes      Cardiac Rhythm: Sinus Rhythm, BBB    Access:  Current line(s): no access     Genitourinary:   Urinary status: voiding    Respiratory:   O2 Device: None (Room air)  Chronic home O2 use?: NO  Incentive spirometer at bedside: NO       GI:  Last Bowel Movement Date: 03/16/23  Current diet:  ADULT ORAL NUTRITION SUPPLEMENT Breakfast, Lunch, Dinner; Low Calorie/High Protein  ADULT DIET Regular; 3 carb choices (45 gm/meal); please only send chocolate ensure high protein  DIET ONE TIME MESSAGE  Passing flatus: YES  Tolerating current diet: YES       Pain Management:   Patient states pain is manageable on current regimen: YES    Skin:  Edin Score: 18  Interventions: increase time out of bed    Patient Safety:  Fall Score:  Total Score: 3  Interventions: bed/chair alarm, gripper socks, and pt to call before getting OOB  High Fall Risk: Yes    Length of Stay:  Expected LOS: 5d 0h  Actual LOS: [address] [facility] JORDI Reynoso

## 2023-03-18 NOTE — PROGRESS NOTES
Transition of Care Plan:    RUR: 11%-\"Low Risk\"  Disposition: Home with [facility] has accepted  If SNF or IPR: Date FOC offered: N/A  Follow up appointments: PCP & Specialists as indicated   DME needed: N/A  Transportation at Discharge: The patient's daughter is at bedside and will be transporting her home  [place] or means to access home: Yes       IM Medicare Letter: Given & signed on 3/17/23  Is patient a [place] and connected with the [place]? N/A              If yes, was [employer] transfer form completed and [facility] notified? Caregiver Contact: Bernardo Yi, Daughter, Phone: [phone]  Discharge Caregiver contacted prior to discharge? Yes, CM spoke to the patient's daughter at [facility] needed?: No    CM was alerted that the patient is being discharged today, 3/18/23. CM met with the patient and her daughter at bedside and confirmed that the patient will be discharging today. [facility] has accepted the patient and CM added the contact information for Ofelia Pate to the patient's AVS. The patient's daughter also confirmed that she had spoken with a liaison from [facility] today, 3/18/23. The patient's daughter will be transporting her home. 2nd IM letter ws given & signed on 3/17/23. From CM perspective, the patient can be discharged.      Louis Gibbs 169, [address]

## 2023-05-01 ENCOUNTER — APPOINTMENT (OUTPATIENT)
Facility: HOSPITAL | Age: 83
End: 2023-05-01
Payer: MEDICARE

## 2023-05-03 ENCOUNTER — HOSPITAL ENCOUNTER (OUTPATIENT)
Dept: CARDIAC REHAB | Age: 83
Discharge: HOME | End: 2023-05-03
Payer: MEDICARE

## 2023-05-03 VITALS — BODY MASS INDEX: 23.74 KG/M2 | HEIGHT: 63 IN | WEIGHT: 134 LBS

## 2023-05-03 PROCEDURE — 9990 CHARGE CONVERSION

## 2023-05-03 PROCEDURE — 93798 PHYS/QHP OP CAR RHAB W/ECG: CPT

## 2023-05-03 RX ORDER — DORZOLAMIDE HCL/PF 2 %
1 DROPS OPHTHALMIC (EYE)
COMMUNITY

## 2023-05-03 RX ORDER — METFORMIN HYDROCHLORIDE 500 MG/1
500 TABLET ORAL 2 TIMES DAILY WITH MEALS
COMMUNITY

## 2023-05-03 RX ORDER — FUROSEMIDE 40 MG/1
40 TABLET ORAL DAILY
COMMUNITY

## 2023-05-08 ENCOUNTER — HOSPITAL ENCOUNTER (OUTPATIENT)
Facility: HOSPITAL | Age: 83
Setting detail: RECURRING SERIES
Discharge: HOME | End: 2023-05-11
Payer: MEDICARE

## 2023-05-08 VITALS — BODY MASS INDEX: 23.74 KG/M2 | WEIGHT: 134 LBS

## 2023-05-08 PROCEDURE — 93798 PHYS/QHP OP CAR RHAB W/ECG: CPT

## 2023-05-08 ASSESSMENT — EXERCISE STRESS TEST
PEAK_HR: 132
PEAK_METS: 1.8
PEAK_RPE: 9

## 2023-05-11 ENCOUNTER — HOSPITAL ENCOUNTER (OUTPATIENT)
Facility: HOSPITAL | Age: 83
Setting detail: RECURRING SERIES
Discharge: HOME | End: 2023-05-14
Payer: MEDICARE

## 2023-05-11 VITALS — BODY MASS INDEX: 23.13 KG/M2 | WEIGHT: 130.6 LBS

## 2023-05-11 PROCEDURE — 93798 PHYS/QHP OP CAR RHAB W/ECG: CPT

## 2023-05-11 ASSESSMENT — EXERCISE STRESS TEST
PEAK_HR: 101
PEAK_METS: 1.8
PEAK_RPE: 11

## 2023-05-15 ENCOUNTER — HOSPITAL ENCOUNTER (OUTPATIENT)
Facility: HOSPITAL | Age: 83
Setting detail: RECURRING SERIES
Discharge: HOME | End: 2023-05-18
Payer: MEDICARE

## 2023-05-15 VITALS — WEIGHT: 133.6 LBS | BODY MASS INDEX: 23.67 KG/M2

## 2023-05-15 PROCEDURE — 93798 PHYS/QHP OP CAR RHAB W/ECG: CPT

## 2023-05-15 ASSESSMENT — EXERCISE STRESS TEST
PEAK_METS: 1.8
PEAK_RPE: 11
PEAK_HR: 111

## 2023-05-17 NOTE — PROGRESS NOTES
Problem: Self Care Deficits Care Plan (Adult)  Goal: *Acute Goals and Plan of Care (Insert Text)  Occupational Therapy EVALUATION/discharge  Patient: Prudence Linn (71 y.o. female)  Date: 1/16/2018  Primary Diagnosis: LEFT HIP OA  S/P hip replacement  Procedure(s) (LRB):  LEFT TOTAL HIP ARTHROPLASTY ANTERIOR APPROACH WITH NAVIGATION (Left) 1 Day Post-Op   Precautions: anterior hip precautions, standard       ASSESSMENT:   Based on the objective data described below, the patient presents with controlled pain, mildly decreased balance, anterior hip precautions following L LILLIAN POD1. Pt is functioning at [facility] for adls and IADLs. Pt was issued a kit of adaptive aids to increase independence, comfort and safety, and maintain hip precautions during adls and educated in using. Pt has no equipment needs at this time. Pt has no concerns re: returning home at discharge. .  Further skilled acute occupational therapy is not indicated at this time.   Discharge Recommendations: None  Further Equipment Recommendations for Discharge: none      SUBJECTIVE:   Patient reported no concerns re: returning home at discharge    OBJECTIVE DATA SUMMARY:   HISTORY:   Past Medical History:   Diagnosis Date    Arthritis     Chronic pain     arthritic    Diabetes ([facility] [address]     Hypertension     Ill-defined condition     shingles 1.5 years ago 8/2014    Other ill-defined conditions(799.89)     high cholesterol    Other ill-defined conditions(799.89)     shingles     Past Surgical History:   Procedure Laterality Date    COLONOSCOPY N/A 10/25/2016    COLONOSCOPY performed by Liz Stubbs MD at \[facility]\"" ENDOSCOPY    HX APPENDECTOMY      HX CATARACT REMOVAL Left     HX HYSTERECTOMY      HX KNEE REPLACEMENT Right     parital    HX KNEE REPLACEMENT Left     HX ORTHOPAEDIC      right hip replacement    HX TONSILLECTOMY      HX TUBAL LIGATION      HX UROLOGICAL      bladder tacking       Prior Level of Function/Environment/Context: Pt lives with her  who is able to assist her with adls/IADLs. She has been independent prior to surgery. She is familiar with recovering from surgery (has had prior Total joint surgeries)  Occupations in which the patient is/was successful, what are the barriers preventing that success: new hip surgery. Had recent hip surgery in 9/2017  Performance Patterns (routines, roles, habits, and rituals):   Personal Interests and/or values: she and her  enjoy sitting in their FL room  Expanded or extensive additional review of patient history: knee replacement ,  hip replacement : 9/2017, arthritis, DM    Home Situation  Home Environment: Private residence  # Steps to Enter: 5  Rails to Enter: Yes  Hand Rails : Bilateral  Wheelchair Ramp: No  One/Two Story Residence: One story  Living Alone: No  Support Systems: Spouse/Significant Other/Partner  Patient Expects to be Discharged to[de-identified] Private residence  Current DME Used/Available at Home: Cane, straight, Walker, rolling  Tub or Shower Type: Shower  [x]  Right hand dominant   []  Left hand dominant    EXAMINATION OF PERFORMANCE DEFICITS:  Cognitive/Behavioral Status:  Neurologic State: Alert  Orientation Level: Appropriate for age  Cognition: Appropriate for age attention/concentration; Follows commands  Perception: Appears intact  Perseveration: No perseveration noted  Safety/Judgement: Awareness of environment; Fall prevention;Home safety; Insight into deficits    Skin: intact incision site    Edema: expected at surgical site--using ice packs    Hearing:       Vision/Perceptual:    Tracking:  (able to scan environment)                      Acuity: Impaired near vision; Impaired far vision (up to date )    Corrective Lenses: Glasses    Range of Motion:  BUEs:  Within functional limits                            Strength:  BUES :  Within functional limits                   Coordination:     Fine Motor Skills-Upper: Left Intact; Right Intact    Gross Motor Skills-Upper: Left Intact; Right Intact    Tone & Sensation: Tone is normal  Sensation is intact                            Balance:  Sitting: Intact  Standing: Intact; With support    Functional Mobility and Transfers for ADLs:  Bed Mobility:  Supine to Sit: Stand-by asssistance    Transfers:  Sit to Stand: Contact guard assistance  Stand to Sit: Contact guard assistance  Toilet Transfer : Supervision  Shower Transfer:  (declined need to practice)    ADL Assessment:  Feeding: Independent    Oral Facial Hygiene/Grooming: Independent (set up)    Bathing: Contact guard assistance    Upper Body Dressing: Setup    Lower Body Dressing: Contact guard assistance (using adaptive aids)    Toileting: Stand by assistance  Meal Preparation:  (educated on square up to work and maintain alignment)             ADL Intervention and task modifications:        Pt participated in OT Evaluation and was issued a kit of adaptive aids (at her request), educated in using equipment, home safety and fall prevention as well as maintaining good hip alignment and avoiding extreme ROM of surgical hip during adls and IADls. Pt needed cues and initially physical assistance to align hip/knee/ankle during seated tasks. Educated pt on safe use of RW during adls and IADls. Pt verbalized understanding. Pt's  was present to assist in reinforcing education provided this date. Cognitive Retraining  Safety/Judgement: Awareness of environment; Fall prevention;Home safety; Insight into deficits    Therapeutic Exercise:  Encouraged balancing rest and activity and completing pre surgery exercises   Functional Measure:  Barthel Index:    Bathin  Bladder: 10  Bowels: 10  Groomin  Dressin  Feeding: 10  Mobility: 10  Stairs: 5  Toilet Use: 5  Transfer (Bed to Chair and Back): 10  Total: 70       Barthel and G-code impairment scale:  Percentage of impairment CH  0% CI  1-19% CJ  20-39% CK  40-59% CL  60-79% CM  80-99% CN  100%   Barthel Score 0-100 100 99-80 79-60 59-40 20-39 1-19   0   Barthel Score 0-20 20 17-19 13-16 9-12 5-8 1-4 0      The Barthel ADL Index: Guidelines  1. The index should be used as a record of what a patient does, not as a record of what a patient could do. 2. The main aim is to establish degree of independence from any help, physical or verbal, however minor and for whatever reason. 3. The need for supervision renders the patient not independent. 4. A patient's performance should be established using the best available evidence. Asking the patient, friends/relatives and nurses are the usual sources, but direct observation and common sense are also important. However direct testing is not needed. 5. Usually the patient's performance over the preceding 24-48 hours is important, but occasionally longer periods will be relevant. 6. Middle categories imply that the patient supplies over 50 per cent of the effort. 7. Use of aids to be independent is allowed. Sharonda Chaudhary., Barthel, D.W. (0318). Functional evaluation: the Barthel Index. [address] [employer] (14)2. Jace Krishnan bartolo WON Flores, Tiara Frank, Carlos Leonardo., Rishi, [address] [place] [address] (1999). Measuring the change indisability after inpatient rehabilitation; comparison of the responsiveness of the Barthel Index and Functional [place] Measure. Journal of Neurology, Neurosurgery, and Psychiatry, 66(4), 770-401. Paz Dubose, [place].JENNIE, GUILHERME Jarrett, & Damon Caba, M.A. (2004.) Assessment of post-stroke quality of life in cost-effectiveness studies: The usefulness of the Barthel Index and the EuroQoL-5D. Quality of Life Research, 13, 878-04       G codes: In compliance with CMSs Claims Based Outcome Reporting, the following G-code set was chosen for this patient based on their primary functional limitation being treated:     The outcome measure chosen to determine the severity of the functional limitation was the Barthel Index with a score of 70/100 which was correlated with the impairment scale. ? Self Care:     - CURRENT STATUS: CJ - 20%-39% impaired, limited or restricted    - GOAL STATUS: CJ - 20%-39% impaired, limited or restricted    - D/C STATUS:  CJ - 20%-39% impaired, limited or restricted     Occupational Therapy Evaluation Charge Determination   History Examination Decision-Making   LOW Complexity : Brief history review  MEDIUM Complexity : 3-5 performance deficits relating to physical, cognitive , or psychosocial skils that result in activity limitations and / or participation restrictions MEDIUM Complexity : Patient may present with comorbidities that affect occupational performnce. Miniml to moderate modification of tasks or assistance (eg, physical or verbal ) with assesment(s) is necessary to enable patient to complete evaluation       Based on the above components, the patient evaluation is determined to be of the following complexity level: LOW   Pain:  Pain Scale 1: Numeric (0 - 10)  Pain Intensity 1: 0              Activity Tolerance:   Good, no complaints during adls and simulated IADLs this date    After treatment:   [x]  Patient left in no apparent distress sitting up in chair  []  Patient left in no apparent distress in bed  [x]  Call bell left within reach  [x]  Nursing notified  [x]  Caregiver present  []  Bed alarm activated    COMMUNICATION/EDUCATION:   Communication/Collaboration:  [x]      Home safety education was provided and the patient/caregiver indicated understanding. [x]      Patient/family have participated as able and agree with findings and recommendations. []      Patient is unable to participate in plan of care at this time.   Findings and recommendations were discussed with: Physical Therapist and Registered Nurse    SOCORRO Ha/L  Time Calculation: 36 mins Cyclosporine Counseling:  I discussed with the patient the risks of cyclosporine including but not limited to hypertension, gingival hyperplasia,myelosuppression, immunosuppression, liver damage, kidney damage, neurotoxicity, lymphoma, and serious infections. The patient understands that monitoring is required including baseline blood pressure, CBC, CMP, lipid panel and uric acid, and then 1-2 times monthly CMP and blood pressure.

## 2023-05-18 ENCOUNTER — HOSPITAL ENCOUNTER (OUTPATIENT)
Facility: HOSPITAL | Age: 83
Setting detail: RECURRING SERIES
Discharge: HOME | End: 2023-05-21
Payer: MEDICARE

## 2023-05-18 VITALS — WEIGHT: 134 LBS | BODY MASS INDEX: 23.74 KG/M2

## 2023-05-18 PROCEDURE — 93798 PHYS/QHP OP CAR RHAB W/ECG: CPT

## 2023-05-18 ASSESSMENT — EXERCISE STRESS TEST
PEAK_METS: 1.8
PEAK_HR: 101
PEAK_RPE: 11

## 2023-05-22 ENCOUNTER — HOSPITAL ENCOUNTER (OUTPATIENT)
Facility: HOSPITAL | Age: 83
Setting detail: RECURRING SERIES
Discharge: HOME | End: 2023-05-25
Payer: MEDICARE

## 2023-05-22 VITALS — BODY MASS INDEX: 23.74 KG/M2 | WEIGHT: 134 LBS

## 2023-05-22 PROCEDURE — 93798 PHYS/QHP OP CAR RHAB W/ECG: CPT

## 2023-05-22 ASSESSMENT — EXERCISE STRESS TEST
PEAK_RPE: 11
PEAK_METS: 1.8
PEAK_HR: 100

## 2023-05-24 ENCOUNTER — HOSPITAL ENCOUNTER (OUTPATIENT)
Facility: HOSPITAL | Age: 83
Setting detail: RECURRING SERIES
Discharge: HOME | End: 2023-05-27
Payer: MEDICARE

## 2023-05-24 VITALS — BODY MASS INDEX: 23.74 KG/M2 | WEIGHT: 134 LBS

## 2023-05-24 PROCEDURE — 93798 PHYS/QHP OP CAR RHAB W/ECG: CPT

## 2023-05-24 ASSESSMENT — EXERCISE STRESS TEST
PEAK_METS: 1.8
PEAK_RPE: 11
PEAK_HR: 106

## 2023-05-31 ENCOUNTER — HOSPITAL ENCOUNTER (OUTPATIENT)
Facility: HOSPITAL | Age: 83
Setting detail: RECURRING SERIES
Discharge: HOME | End: 2023-06-03
Payer: MEDICARE

## 2023-05-31 VITALS — WEIGHT: 133.8 LBS | BODY MASS INDEX: 23.7 KG/M2

## 2023-05-31 PROCEDURE — 93798 PHYS/QHP OP CAR RHAB W/ECG: CPT

## 2023-05-31 ASSESSMENT — EJECTION FRACTION: EF_VALUE: 25

## 2023-05-31 ASSESSMENT — EXERCISE STRESS TEST
PEAK_METS: 2.2
PEAK_HR: 102
PEAK_RPE: 11

## 2023-06-05 ENCOUNTER — HOSPITAL ENCOUNTER (OUTPATIENT)
Facility: HOSPITAL | Age: 83
Setting detail: RECURRING SERIES
Discharge: HOME | End: 2023-06-08
Payer: MEDICARE

## 2023-06-05 VITALS — BODY MASS INDEX: 24.06 KG/M2 | WEIGHT: 135.8 LBS

## 2023-06-05 PROCEDURE — 93798 PHYS/QHP OP CAR RHAB W/ECG: CPT

## 2023-06-05 ASSESSMENT — EXERCISE STRESS TEST
PEAK_METS: 2.2
PEAK_HR: 104
PEAK_RPE: 11

## 2023-06-07 ENCOUNTER — HOSPITAL ENCOUNTER (OUTPATIENT)
Facility: HOSPITAL | Age: 83
Setting detail: RECURRING SERIES
Discharge: HOME | End: 2023-06-10
Payer: MEDICARE

## 2023-06-07 VITALS — WEIGHT: 134.4 LBS | BODY MASS INDEX: 23.81 KG/M2

## 2023-06-07 PROCEDURE — 93798 PHYS/QHP OP CAR RHAB W/ECG: CPT

## 2023-06-07 ASSESSMENT — EXERCISE STRESS TEST
PEAK_METS: 2.2
PEAK_HR: 103
PEAK_RPE: 11

## 2023-06-19 ENCOUNTER — HOSPITAL ENCOUNTER (OUTPATIENT)
Facility: HOSPITAL | Age: 83
Setting detail: RECURRING SERIES
Discharge: HOME | End: 2023-06-22
Payer: MEDICARE

## 2023-06-19 VITALS — BODY MASS INDEX: 23.95 KG/M2 | WEIGHT: 135.2 LBS

## 2023-06-19 PROCEDURE — 93798 PHYS/QHP OP CAR RHAB W/ECG: CPT

## 2023-06-19 ASSESSMENT — EXERCISE STRESS TEST
PEAK_RPE: 11
PEAK_METS: 2.2
PEAK_HR: 104

## 2023-06-21 ENCOUNTER — HOSPITAL ENCOUNTER (OUTPATIENT)
Facility: HOSPITAL | Age: 83
Setting detail: RECURRING SERIES
Discharge: HOME | End: 2023-06-24
Payer: MEDICARE

## 2023-06-21 VITALS — WEIGHT: 133.8 LBS | BODY MASS INDEX: 23.7 KG/M2

## 2023-06-21 PROCEDURE — 93798 PHYS/QHP OP CAR RHAB W/ECG: CPT

## 2023-06-21 ASSESSMENT — EXERCISE STRESS TEST
PEAK_RPE: 11
PEAK_HR: 104
PEAK_METS: 2.2

## 2023-06-26 ENCOUNTER — HOSPITAL ENCOUNTER (OUTPATIENT)
Facility: HOSPITAL | Age: 83
Setting detail: RECURRING SERIES
Discharge: HOME | End: 2023-06-29
Payer: MEDICARE

## 2023-06-26 VITALS — BODY MASS INDEX: 23.52 KG/M2 | WEIGHT: 132.8 LBS

## 2023-06-26 PROCEDURE — 93798 PHYS/QHP OP CAR RHAB W/ECG: CPT

## 2023-06-26 ASSESSMENT — EXERCISE STRESS TEST
PEAK_METS: 2.2
PEAK_HR: 103
PEAK_RPE: 11

## 2023-06-28 ENCOUNTER — HOSPITAL ENCOUNTER (OUTPATIENT)
Facility: HOSPITAL | Age: 83
Setting detail: RECURRING SERIES
Discharge: HOME | End: 2023-07-01
Payer: MEDICARE

## 2023-06-28 VITALS — BODY MASS INDEX: 23.49 KG/M2 | WEIGHT: 132.6 LBS

## 2023-06-28 PROCEDURE — 93798 PHYS/QHP OP CAR RHAB W/ECG: CPT

## 2023-06-28 ASSESSMENT — EJECTION FRACTION: EF_VALUE: 25

## 2023-06-28 ASSESSMENT — EXERCISE STRESS TEST
PEAK_HR: 101
PEAK_RPE: 11
PEAK_METS: 2.2

## 2023-07-03 ENCOUNTER — HOSPITAL ENCOUNTER (OUTPATIENT)
Facility: HOSPITAL | Age: 83
Setting detail: RECURRING SERIES
Discharge: HOME | End: 2023-07-06
Payer: MEDICARE

## 2023-07-03 VITALS — BODY MASS INDEX: 23.13 KG/M2 | WEIGHT: 130.6 LBS

## 2023-07-03 PROCEDURE — 93798 PHYS/QHP OP CAR RHAB W/ECG: CPT

## 2023-07-03 ASSESSMENT — EXERCISE STRESS TEST
PEAK_METS: 2.2
PEAK_RPE: 11
PEAK_HR: 107

## 2023-07-05 ENCOUNTER — HOSPITAL ENCOUNTER (OUTPATIENT)
Facility: HOSPITAL | Age: 83
Setting detail: RECURRING SERIES
Discharge: HOME | End: 2023-07-08
Payer: MEDICARE

## 2023-07-05 VITALS — WEIGHT: 131.4 LBS | BODY MASS INDEX: 23.28 KG/M2

## 2023-07-05 PROCEDURE — 93798 PHYS/QHP OP CAR RHAB W/ECG: CPT

## 2023-07-05 ASSESSMENT — EXERCISE STRESS TEST
PEAK_RPE: 11
PEAK_METS: 2.2
PEAK_HR: 107

## 2023-07-10 ENCOUNTER — HOSPITAL ENCOUNTER (OUTPATIENT)
Facility: HOSPITAL | Age: 83
Setting detail: RECURRING SERIES
Discharge: HOME | End: 2023-07-13
Payer: MEDICARE

## 2023-07-10 VITALS — WEIGHT: 131.2 LBS | BODY MASS INDEX: 23.24 KG/M2

## 2023-07-10 PROCEDURE — 93798 PHYS/QHP OP CAR RHAB W/ECG: CPT

## 2023-07-10 ASSESSMENT — EXERCISE STRESS TEST
PEAK_METS: 2.2
PEAK_RPE: 11
PEAK_HR: 99

## 2023-07-12 ENCOUNTER — HOSPITAL ENCOUNTER (OUTPATIENT)
Facility: HOSPITAL | Age: 83
Setting detail: RECURRING SERIES
Discharge: HOME | End: 2023-07-15
Payer: MEDICARE

## 2023-07-12 VITALS — WEIGHT: 131.6 LBS | BODY MASS INDEX: 23.31 KG/M2

## 2023-07-12 PROCEDURE — 93798 PHYS/QHP OP CAR RHAB W/ECG: CPT

## 2023-07-12 ASSESSMENT — EXERCISE STRESS TEST
PEAK_RPE: 11
PEAK_METS: 202
PEAK_HR: 101

## 2023-07-17 ENCOUNTER — HOSPITAL ENCOUNTER (OUTPATIENT)
Facility: HOSPITAL | Age: 83
Setting detail: RECURRING SERIES
Discharge: HOME | End: 2023-07-20
Payer: MEDICARE

## 2023-07-17 VITALS — BODY MASS INDEX: 23.38 KG/M2 | WEIGHT: 132 LBS

## 2023-07-17 PROCEDURE — 93798 PHYS/QHP OP CAR RHAB W/ECG: CPT

## 2023-07-17 ASSESSMENT — EXERCISE STRESS TEST
PEAK_RPE: 11
PEAK_METS: 2.8
PEAK_HR: 101

## 2023-07-19 ENCOUNTER — HOSPITAL ENCOUNTER (OUTPATIENT)
Facility: HOSPITAL | Age: 83
Setting detail: RECURRING SERIES
Discharge: HOME | End: 2023-07-22
Payer: MEDICARE

## 2023-07-19 VITALS — BODY MASS INDEX: 23.33 KG/M2 | WEIGHT: 131.7 LBS

## 2023-07-19 PROCEDURE — 93798 PHYS/QHP OP CAR RHAB W/ECG: CPT

## 2023-07-19 ASSESSMENT — EXERCISE STRESS TEST
PEAK_RPE: 12
PEAK_METS: 2.8
PEAK_HR: 107

## 2023-07-24 ENCOUNTER — HOSPITAL ENCOUNTER (OUTPATIENT)
Facility: HOSPITAL | Age: 83
Setting detail: RECURRING SERIES
Discharge: HOME | End: 2023-07-27
Payer: MEDICARE

## 2023-07-24 VITALS — WEIGHT: 131.8 LBS | BODY MASS INDEX: 23.35 KG/M2

## 2023-07-24 PROCEDURE — 93798 PHYS/QHP OP CAR RHAB W/ECG: CPT

## 2023-07-24 ASSESSMENT — EXERCISE STRESS TEST
PEAK_HR: 103
PEAK_METS: 2.8
PEAK_RPE: 12

## 2023-07-26 ENCOUNTER — HOSPITAL ENCOUNTER (OUTPATIENT)
Facility: HOSPITAL | Age: 83
Setting detail: RECURRING SERIES
Discharge: HOME | End: 2023-07-29
Payer: MEDICARE

## 2023-07-26 VITALS — WEIGHT: 131.8 LBS | BODY MASS INDEX: 23.35 KG/M2

## 2023-07-26 PROCEDURE — 93798 PHYS/QHP OP CAR RHAB W/ECG: CPT

## 2023-07-26 ASSESSMENT — EXERCISE STRESS TEST
PEAK_HR: 101
PEAK_RPE: 12
PEAK_METS: 2.8

## 2023-07-26 ASSESSMENT — EJECTION FRACTION: EF_VALUE: 25

## 2023-07-31 ENCOUNTER — HOSPITAL ENCOUNTER (OUTPATIENT)
Facility: HOSPITAL | Age: 83
Setting detail: RECURRING SERIES
Discharge: HOME | End: 2023-08-03
Payer: MEDICARE

## 2023-07-31 VITALS — BODY MASS INDEX: 23.35 KG/M2 | WEIGHT: 131.8 LBS

## 2023-07-31 PROCEDURE — 93798 PHYS/QHP OP CAR RHAB W/ECG: CPT

## 2023-07-31 ASSESSMENT — EXERCISE STRESS TEST
PEAK_HR: 95
PEAK_RPE: 11
PEAK_METS: 2.8

## 2023-08-02 ENCOUNTER — HOSPITAL ENCOUNTER (OUTPATIENT)
Facility: HOSPITAL | Age: 83
Setting detail: RECURRING SERIES
Discharge: HOME | End: 2023-08-05
Payer: MEDICARE

## 2023-08-02 VITALS — WEIGHT: 131 LBS | BODY MASS INDEX: 23.21 KG/M2

## 2023-08-02 PROCEDURE — 93798 PHYS/QHP OP CAR RHAB W/ECG: CPT

## 2023-08-02 ASSESSMENT — EXERCISE STRESS TEST
PEAK_RPE: 11
PEAK_HR: 101
PEAK_METS: 2.8

## 2023-08-07 ENCOUNTER — HOSPITAL ENCOUNTER (OUTPATIENT)
Facility: HOSPITAL | Age: 83
Setting detail: RECURRING SERIES
Discharge: HOME | End: 2023-08-10
Payer: MEDICARE

## 2023-08-07 VITALS — WEIGHT: 131.8 LBS | BODY MASS INDEX: 23.35 KG/M2

## 2023-08-07 PROCEDURE — 93798 PHYS/QHP OP CAR RHAB W/ECG: CPT

## 2023-08-07 ASSESSMENT — EXERCISE STRESS TEST
PEAK_RPE: 11
PEAK_HR: 101
PEAK_METS: 2.8

## 2023-08-09 ENCOUNTER — HOSPITAL ENCOUNTER (OUTPATIENT)
Facility: HOSPITAL | Age: 83
Setting detail: RECURRING SERIES
Discharge: HOME | End: 2023-08-12
Payer: MEDICARE

## 2023-08-09 VITALS — WEIGHT: 131.1 LBS | BODY MASS INDEX: 23.22 KG/M2

## 2023-08-09 PROCEDURE — 93798 PHYS/QHP OP CAR RHAB W/ECG: CPT

## 2023-08-09 ASSESSMENT — EXERCISE STRESS TEST
PEAK_METS: 2.8
PEAK_HR: 101
PEAK_RPE: 11

## 2023-08-14 ENCOUNTER — HOSPITAL ENCOUNTER (OUTPATIENT)
Facility: HOSPITAL | Age: 83
Setting detail: RECURRING SERIES
Discharge: HOME | End: 2023-08-17
Payer: MEDICARE

## 2023-08-14 VITALS — WEIGHT: 132 LBS | BODY MASS INDEX: 23.38 KG/M2

## 2023-08-14 PROCEDURE — 93798 PHYS/QHP OP CAR RHAB W/ECG: CPT

## 2023-08-14 ASSESSMENT — EXERCISE STRESS TEST
PEAK_RPE: 11
PEAK_METS: 2.8
PEAK_HR: 100

## 2023-08-16 ENCOUNTER — HOSPITAL ENCOUNTER (OUTPATIENT)
Facility: HOSPITAL | Age: 83
Setting detail: RECURRING SERIES
Discharge: HOME | End: 2023-08-19
Payer: MEDICARE

## 2023-08-16 VITALS — WEIGHT: 130.8 LBS | BODY MASS INDEX: 23.17 KG/M2

## 2023-08-16 PROCEDURE — 93798 PHYS/QHP OP CAR RHAB W/ECG: CPT

## 2023-08-16 ASSESSMENT — EXERCISE STRESS TEST
PEAK_METS: 2.8
PEAK_HR: 105
PEAK_RPE: 11

## 2023-08-21 ENCOUNTER — HOSPITAL ENCOUNTER (OUTPATIENT)
Facility: HOSPITAL | Age: 83
Setting detail: RECURRING SERIES
Discharge: HOME | End: 2023-08-24
Payer: MEDICARE

## 2023-08-21 VITALS — BODY MASS INDEX: 23.13 KG/M2 | WEIGHT: 130.6 LBS

## 2023-08-21 PROCEDURE — 93798 PHYS/QHP OP CAR RHAB W/ECG: CPT

## 2023-08-21 ASSESSMENT — EXERCISE STRESS TEST
PEAK_RPE: 11
PEAK_METS: 2.8
PEAK_HR: 100

## 2023-08-23 ENCOUNTER — HOSPITAL ENCOUNTER (OUTPATIENT)
Facility: HOSPITAL | Age: 83
Setting detail: RECURRING SERIES
Discharge: HOME | End: 2023-08-26
Payer: MEDICARE

## 2023-08-23 VITALS — BODY MASS INDEX: 23.03 KG/M2 | WEIGHT: 130 LBS

## 2023-08-23 PROCEDURE — 93798 PHYS/QHP OP CAR RHAB W/ECG: CPT

## 2023-08-23 ASSESSMENT — PATIENT HEALTH QUESTIONNAIRE - PHQ9
SUM OF ALL RESPONSES TO PHQ QUESTIONS 1-9: 3
9. THOUGHTS THAT YOU WOULD BE BETTER OFF DEAD, OR OF HURTING YOURSELF: 0
4. FEELING TIRED OR HAVING LITTLE ENERGY: 0
3. TROUBLE FALLING OR STAYING ASLEEP: 3
6. FEELING BAD ABOUT YOURSELF - OR THAT YOU ARE A FAILURE OR HAVE LET YOURSELF OR YOUR FAMILY DOWN: 0
SUM OF ALL RESPONSES TO PHQ QUESTIONS 1-9: 3
7. TROUBLE CONCENTRATING ON THINGS, SUCH AS READING THE NEWSPAPER OR WATCHING TELEVISION: 0
SUM OF ALL RESPONSES TO PHQ9 QUESTIONS 1 & 2: 0
5. POOR APPETITE OR OVEREATING: 0
SUM OF ALL RESPONSES TO PHQ QUESTIONS 1-9: 3
1. LITTLE INTEREST OR PLEASURE IN DOING THINGS: 0
10. IF YOU CHECKED OFF ANY PROBLEMS, HOW DIFFICULT HAVE THESE PROBLEMS MADE IT FOR YOU TO DO YOUR WORK, TAKE CARE OF THINGS AT HOME, OR GET ALONG WITH OTHER PEOPLE: 0
2. FEELING DOWN, DEPRESSED OR HOPELESS: 0
SUM OF ALL RESPONSES TO PHQ QUESTIONS 1-9: 3
8. MOVING OR SPEAKING SO SLOWLY THAT OTHER PEOPLE COULD HAVE NOTICED. OR THE OPPOSITE, BEING SO FIGETY OR RESTLESS THAT YOU HAVE BEEN MOVING AROUND A LOT MORE THAN USUAL: 0

## 2023-08-23 ASSESSMENT — EXERCISE STRESS TEST
PEAK_RPE: 11
PEAK_METS: 2.8
PEAK_HR: 98

## 2023-08-23 ASSESSMENT — EJECTION FRACTION: EF_VALUE: 25

## 2023-08-28 ENCOUNTER — APPOINTMENT (OUTPATIENT)
Facility: HOSPITAL | Age: 83
End: 2023-08-28
Payer: MEDICARE

## 2023-08-30 ENCOUNTER — HOSPITAL ENCOUNTER (OUTPATIENT)
Facility: HOSPITAL | Age: 83
Setting detail: RECURRING SERIES
Discharge: HOME | End: 2023-09-02
Payer: MEDICARE

## 2023-08-30 VITALS — BODY MASS INDEX: 23.22 KG/M2 | WEIGHT: 131.1 LBS

## 2023-08-30 PROCEDURE — 93798 PHYS/QHP OP CAR RHAB W/ECG: CPT

## 2023-08-30 ASSESSMENT — EXERCISE STRESS TEST
PEAK_METS: 2.8
PEAK_HR: 108
PEAK_RPE: 12
PEAK_RPE: 11
PEAK_RPD: 0

## 2023-08-30 ASSESSMENT — LIFESTYLE VARIABLES: SMOKELESS_TOBACCO: NO

## 2023-08-30 ASSESSMENT — EJECTION FRACTION: EF_VALUE: 25

## 2023-08-30 NOTE — CARDIO/PULMONARY
DISCHARGE SUMMARY NOTE  2023      NAME: Aleta Bryan : 1940 AGE: 82 y.o. GENDER: female    CARDIAC REHAB ADMITTING DIAGNOSIS: Chronic systolic (congestive) heart failure [I50.22]    REFERRING PHYSICIAN: Domingo Garcia MD    MEDICAL HX:  Past Medical History:   Diagnosis Date    Arthritis     Chronic pain     arthritic    Diabetes ([address])     Hypertension     Ill-defined condition     shingles 1.5 years ago 2014    Other ill-defined conditions(799.89)     shingles    Other ill-defined conditions(799.89)     high cholesterol       LABS:     No results found for: HBA1C, TJP4TNAE  No results found for: CHOL, CHOLPOCT, CHOLX, CHLST, CHOLV, HDL, HDLPOC, HDLC, LDL, LDLC, VLDLC, VLDL, TGLX, TRIGL      ANTHROPOMETRICS:      Ht Readings from Last 1 Encounters:   23 1.6 m (5' 3\")      Wt Readings from Last 1 Encounters:   23 59 kg (130 lb)        WAIST: 32         PROGRAM SUMMARY:    Sydnee Graham completed 33/36 sessions in the Cardiac Rehab program. S/he will continue exercising 5 x week and focus on diet and nutrition at home. She attended 0 classes and is aware of his/her cardiac risk factors and cardiac medications. Weight loss was 3 lbs. MET level increase from 1.8 to 2.8. 6MWT distance increased from 302 m to 322 m. Questions addressed. Discharge plans discussed. Sydnee Graham verbalized understanding.       Joe Montano RN

## 2023-10-07 NOTE — PROGRESS NOTES
Bed: ED11  Expected date:   Expected time:   Means of arrival:   Comments:  EMS/SICK   Transition of Care Plan:     RUR:14%     Disposition: IPR     If SNF or IPR: Date FOC offered:N/A     Date [facility] received:N/A     Date authorization started with reference number:N/A     Date authorization received and expires:N/A     Accepting facility:N/A     Follow up appointments: To be done by facility when discharged. DME needed:None     Transportation at [address] [place] [address] or means to access home:  has keys         IM Medicare Letter: To be given prior to discharge. Is patient a  and connected with the [place]? No              If yes, was [employer] transfer form completed and VA notified? N/A      Spoke with patient and her  who is at bedside re [facility]. They would like inpatient rehab prior to going home. Choice given and they would like [facility]. Referral sent and will await their response. They will be thinking of other facilities and will get back with me. Leelee Sorensen RN BSN CRM        [phone]

## 2024-02-21 ENCOUNTER — HOSPITAL ENCOUNTER (INPATIENT)
Facility: HOSPITAL | Age: 84
LOS: 10 days | Discharge: HOME | DRG: 853 | End: 2024-03-02
Attending: STUDENT IN AN ORGANIZED HEALTH CARE EDUCATION/TRAINING PROGRAM | Admitting: INTERNAL MEDICINE
Payer: MEDICARE

## 2024-02-21 ENCOUNTER — APPOINTMENT (OUTPATIENT)
Facility: HOSPITAL | Age: 84
DRG: 853 | End: 2024-02-21
Payer: MEDICARE

## 2024-02-21 DIAGNOSIS — A41.9 SEPTIC SHOCK (HCC): ICD-10-CM

## 2024-02-21 DIAGNOSIS — I48.19 PERSISTENT ATRIAL FIBRILLATION (HCC): ICD-10-CM

## 2024-02-21 DIAGNOSIS — R65.21 SEPTIC SHOCK (HCC): ICD-10-CM

## 2024-02-21 DIAGNOSIS — T68.XXXA HYPOTHERMIA, INITIAL ENCOUNTER: ICD-10-CM

## 2024-02-21 DIAGNOSIS — I48.91 ATRIAL FIBRILLATION WITH TACHYCARDIC VENTRICULAR RATE (HCC): Primary | ICD-10-CM

## 2024-02-21 DIAGNOSIS — I42.0 DILATED CARDIOMYOPATHY (HCC): ICD-10-CM

## 2024-02-21 DIAGNOSIS — I49.9 ABNORMAL HEART RHYTHM: ICD-10-CM

## 2024-02-21 DIAGNOSIS — I48.91 ATRIAL FIBRILLATION WITH RVR (HCC): ICD-10-CM

## 2024-02-21 LAB
ALBUMIN SERPL-MCNC: 3.4 G/DL (ref 3.5–5)
ALBUMIN SERPL-MCNC: 4 G/DL (ref 3.5–5)
ALBUMIN/GLOB SERPL: 1 (ref 1.1–2.2)
ALBUMIN/GLOB SERPL: 1.2 (ref 1.1–2.2)
ALP SERPL-CCNC: 55 U/L (ref 45–117)
ALP SERPL-CCNC: 63 U/L (ref 45–117)
ALT SERPL-CCNC: 22 U/L (ref 12–78)
ALT SERPL-CCNC: 22 U/L (ref 12–78)
ANION GAP BLD CALC-SCNC: 14 (ref 10–20)
ANION GAP SERPL CALC-SCNC: 18 MMOL/L (ref 5–15)
ANION GAP SERPL CALC-SCNC: 19 MMOL/L (ref 5–15)
APPEARANCE UR: ABNORMAL
AST SERPL-CCNC: 31 U/L (ref 15–37)
AST SERPL-CCNC: 36 U/L (ref 15–37)
BACTERIA URNS QL MICRO: ABNORMAL /HPF
BASE DEFICIT BLD-SCNC: 7.3 MMOL/L
BASOPHILS # BLD: 0.1 K/UL (ref 0–0.1)
BASOPHILS NFR BLD: 0 % (ref 0–1)
BILIRUB SERPL-MCNC: 0.9 MG/DL (ref 0.2–1)
BILIRUB SERPL-MCNC: 0.9 MG/DL (ref 0.2–1)
BILIRUB UR QL: NEGATIVE
BUN SERPL-MCNC: 23 MG/DL (ref 6–20)
BUN SERPL-MCNC: 24 MG/DL (ref 6–20)
BUN/CREAT SERPL: 17 (ref 12–20)
BUN/CREAT SERPL: 18 (ref 12–20)
CA-I BLD-MCNC: 1.06 MMOL/L (ref 1.12–1.32)
CALCIUM SERPL-MCNC: 8.3 MG/DL (ref 8.5–10.1)
CALCIUM SERPL-MCNC: 9.3 MG/DL (ref 8.5–10.1)
CHLORIDE BLD-SCNC: 99 MMOL/L (ref 100–108)
CHLORIDE SERPL-SCNC: 100 MMOL/L (ref 97–108)
CHLORIDE SERPL-SCNC: 96 MMOL/L (ref 97–108)
CO2 BLD-SCNC: 18 MMOL/L (ref 19–24)
CO2 SERPL-SCNC: 17 MMOL/L (ref 21–32)
CO2 SERPL-SCNC: 17 MMOL/L (ref 21–32)
COLOR UR: ABNORMAL
CREAT SERPL-MCNC: 1.31 MG/DL (ref 0.55–1.02)
CREAT SERPL-MCNC: 1.33 MG/DL (ref 0.55–1.02)
CREAT UR-MCNC: 1 MG/DL (ref 0.6–1.3)
DIFFERENTIAL METHOD BLD: ABNORMAL
EOSINOPHIL # BLD: 0 K/UL (ref 0–0.4)
EOSINOPHIL NFR BLD: 0 % (ref 0–7)
EPITH CASTS URNS QL MICRO: ABNORMAL /LPF
ERYTHROCYTE [DISTWIDTH] IN BLOOD BY AUTOMATED COUNT: 13.4 % (ref 11.5–14.5)
GLOBULIN SER CALC-MCNC: 3.3 G/DL (ref 2–4)
GLOBULIN SER CALC-MCNC: 3.4 G/DL (ref 2–4)
GLUCOSE BLD STRIP.AUTO-MCNC: 233 MG/DL (ref 74–106)
GLUCOSE BLD STRIP.AUTO-MCNC: 237 MG/DL (ref 65–117)
GLUCOSE SERPL-MCNC: 232 MG/DL (ref 65–100)
GLUCOSE SERPL-MCNC: 254 MG/DL (ref 65–100)
GLUCOSE UR STRIP.AUTO-MCNC: >1000 MG/DL
HCO3 BLDA-SCNC: 18 MMOL/L
HCT VFR BLD AUTO: 44 % (ref 35–47)
HGB BLD-MCNC: 14 G/DL (ref 11.5–16)
HGB UR QL STRIP: NEGATIVE
IMM GRANULOCYTES # BLD AUTO: 0.1 K/UL (ref 0–0.04)
IMM GRANULOCYTES NFR BLD AUTO: 1 % (ref 0–0.5)
KETONES UR QL STRIP.AUTO: 80 MG/DL
LACTATE BLD-SCNC: 1.72 MMOL/L (ref 0.4–2)
LACTATE BLD-SCNC: 5.98 MMOL/L (ref 0.4–2)
LEUKOCYTE ESTERASE UR QL STRIP.AUTO: ABNORMAL
LIPASE SERPL-CCNC: 36 U/L (ref 13–75)
LYMPHOCYTES # BLD: 3.4 K/UL (ref 0.8–3.5)
LYMPHOCYTES NFR BLD: 24 % (ref 12–49)
MAGNESIUM SERPL-MCNC: 2 MG/DL (ref 1.6–2.4)
MAGNESIUM SERPL-MCNC: 2.1 MG/DL (ref 1.6–2.4)
MCH RBC QN AUTO: 31.4 PG (ref 26–34)
MCHC RBC AUTO-ENTMCNC: 31.8 G/DL (ref 30–36.5)
MCV RBC AUTO: 98.7 FL (ref 80–99)
MONOCYTES # BLD: 0.4 K/UL (ref 0–1)
MONOCYTES NFR BLD: 3 % (ref 5–13)
NEUTS SEG # BLD: 10.3 K/UL (ref 1.8–8)
NEUTS SEG NFR BLD: 72 % (ref 32–75)
NITRITE UR QL STRIP.AUTO: POSITIVE
NRBC # BLD: 0 K/UL (ref 0–0.01)
NRBC BLD-RTO: 0 PER 100 WBC
NT PRO BNP: 1376 PG/ML
PCO2 BLDV: 36.6 MMHG (ref 41–51)
PH BLDV: 7.31 (ref 7.32–7.42)
PH UR STRIP: 5 (ref 5–8)
PHOSPHATE SERPL-MCNC: 4.1 MG/DL (ref 2.6–4.7)
PLATELET # BLD AUTO: 296 K/UL (ref 150–400)
PMV BLD AUTO: 11.6 FL (ref 8.9–12.9)
PO2 BLDV: 31 MMHG (ref 25–40)
POTASSIUM BLD-SCNC: 4.2 MMOL/L (ref 3.5–5.5)
POTASSIUM SERPL-SCNC: 4.5 MMOL/L (ref 3.5–5.1)
POTASSIUM SERPL-SCNC: 4.5 MMOL/L (ref 3.5–5.1)
PROCALCITONIN SERPL-MCNC: <0.05 NG/ML
PROT SERPL-MCNC: 6.7 G/DL (ref 6.4–8.2)
PROT SERPL-MCNC: 7.4 G/DL (ref 6.4–8.2)
PROT UR STRIP-MCNC: 30 MG/DL
RBC # BLD AUTO: 4.46 M/UL (ref 3.8–5.2)
RBC #/AREA URNS HPF: ABNORMAL /HPF (ref 0–5)
SAO2 % BLD: 53 %
SERVICE CMNT-IMP: ABNORMAL
SODIUM BLD-SCNC: 131 MMOL/L (ref 136–145)
SODIUM SERPL-SCNC: 132 MMOL/L (ref 136–145)
SODIUM SERPL-SCNC: 135 MMOL/L (ref 136–145)
SP GR UR REFRACTOMETRY: 1.02
SPECIMEN SITE: ABNORMAL
TROPONIN I SERPL HS-MCNC: 532 NG/L (ref 0–51)
TROPONIN I SERPL HS-MCNC: 722 NG/L (ref 0–51)
URINE CULTURE IF INDICATED: ABNORMAL
UROBILINOGEN UR QL STRIP.AUTO: 0.2 EU/DL (ref 0.2–1)
WBC # BLD AUTO: 14.3 K/UL (ref 3.6–11)
WBC URNS QL MICRO: ABNORMAL /HPF (ref 0–4)

## 2024-02-21 PROCEDURE — 81001 URINALYSIS AUTO W/SCOPE: CPT

## 2024-02-21 PROCEDURE — 83735 ASSAY OF MAGNESIUM: CPT

## 2024-02-21 PROCEDURE — 96367 TX/PROPH/DG ADDL SEQ IV INF: CPT

## 2024-02-21 PROCEDURE — 99285 EMERGENCY DEPT VISIT HI MDM: CPT

## 2024-02-21 PROCEDURE — 84100 ASSAY OF PHOSPHORUS: CPT

## 2024-02-21 PROCEDURE — 2000000000 HC ICU R&B

## 2024-02-21 PROCEDURE — 71045 X-RAY EXAM CHEST 1 VIEW: CPT

## 2024-02-21 PROCEDURE — 84145 PROCALCITONIN (PCT): CPT

## 2024-02-21 PROCEDURE — 84132 ASSAY OF SERUM POTASSIUM: CPT

## 2024-02-21 PROCEDURE — 83880 ASSAY OF NATRIURETIC PEPTIDE: CPT

## 2024-02-21 PROCEDURE — 6360000002 HC RX W HCPCS: Performed by: STUDENT IN AN ORGANIZED HEALTH CARE EDUCATION/TRAINING PROGRAM

## 2024-02-21 PROCEDURE — 82947 ASSAY GLUCOSE BLOOD QUANT: CPT

## 2024-02-21 PROCEDURE — 87040 BLOOD CULTURE FOR BACTERIA: CPT

## 2024-02-21 PROCEDURE — 96365 THER/PROPH/DIAG IV INF INIT: CPT

## 2024-02-21 PROCEDURE — 2580000003 HC RX 258: Performed by: STUDENT IN AN ORGANIZED HEALTH CARE EDUCATION/TRAINING PROGRAM

## 2024-02-21 PROCEDURE — 92960 CARDIOVERSION ELECTRIC EXT: CPT

## 2024-02-21 PROCEDURE — 85025 COMPLETE CBC W/AUTO DIFF WBC: CPT

## 2024-02-21 PROCEDURE — 93005 ELECTROCARDIOGRAM TRACING: CPT | Performed by: INTERNAL MEDICINE

## 2024-02-21 PROCEDURE — 82330 ASSAY OF CALCIUM: CPT

## 2024-02-21 PROCEDURE — 83605 ASSAY OF LACTIC ACID: CPT

## 2024-02-21 PROCEDURE — 93005 ELECTROCARDIOGRAM TRACING: CPT | Performed by: STUDENT IN AN ORGANIZED HEALTH CARE EDUCATION/TRAINING PROGRAM

## 2024-02-21 PROCEDURE — 96366 THER/PROPH/DIAG IV INF ADDON: CPT

## 2024-02-21 PROCEDURE — 2580000003 HC RX 258: Performed by: INTERNAL MEDICINE

## 2024-02-21 PROCEDURE — 6360000002 HC RX W HCPCS: Performed by: INTERNAL MEDICINE

## 2024-02-21 PROCEDURE — 84295 ASSAY OF SERUM SODIUM: CPT

## 2024-02-21 PROCEDURE — 36415 COLL VENOUS BLD VENIPUNCTURE: CPT

## 2024-02-21 PROCEDURE — 82962 GLUCOSE BLOOD TEST: CPT

## 2024-02-21 PROCEDURE — 96368 THER/DIAG CONCURRENT INF: CPT

## 2024-02-21 PROCEDURE — 83690 ASSAY OF LIPASE: CPT

## 2024-02-21 PROCEDURE — 2580000003 HC RX 258: Performed by: NURSE PRACTITIONER

## 2024-02-21 PROCEDURE — 2500000003 HC RX 250 WO HCPCS: Performed by: STUDENT IN AN ORGANIZED HEALTH CARE EDUCATION/TRAINING PROGRAM

## 2024-02-21 PROCEDURE — 6370000000 HC RX 637 (ALT 250 FOR IP): Performed by: EMERGENCY MEDICINE

## 2024-02-21 PROCEDURE — 84484 ASSAY OF TROPONIN QUANT: CPT

## 2024-02-21 PROCEDURE — 93005 ELECTROCARDIOGRAM TRACING: CPT | Performed by: EMERGENCY MEDICINE

## 2024-02-21 PROCEDURE — 80053 COMPREHEN METABOLIC PANEL: CPT

## 2024-02-21 PROCEDURE — 87086 URINE CULTURE/COLONY COUNT: CPT

## 2024-02-21 PROCEDURE — 82803 BLOOD GASES ANY COMBINATION: CPT

## 2024-02-21 PROCEDURE — 96375 TX/PRO/DX INJ NEW DRUG ADDON: CPT

## 2024-02-21 RX ORDER — INSULIN LISPRO 100 [IU]/ML
0-8 INJECTION, SOLUTION INTRAVENOUS; SUBCUTANEOUS EVERY 6 HOURS
Status: DISCONTINUED | OUTPATIENT
Start: 2024-02-21 | End: 2024-02-22

## 2024-02-21 RX ORDER — ONDANSETRON 4 MG/1
4 TABLET, ORALLY DISINTEGRATING ORAL EVERY 8 HOURS PRN
Status: DISCONTINUED | OUTPATIENT
Start: 2024-02-21 | End: 2024-03-02 | Stop reason: HOSPADM

## 2024-02-21 RX ORDER — ONDANSETRON 4 MG/1
4 TABLET, ORALLY DISINTEGRATING ORAL ONCE
Status: COMPLETED | OUTPATIENT
Start: 2024-02-21 | End: 2024-02-21

## 2024-02-21 RX ORDER — NOREPINEPHRINE BITARTRATE 0.06 MG/ML
1-100 INJECTION, SOLUTION INTRAVENOUS CONTINUOUS
Status: DISCONTINUED | OUTPATIENT
Start: 2024-02-21 | End: 2024-02-22

## 2024-02-21 RX ORDER — POLYETHYLENE GLYCOL 3350 17 G/17G
17 POWDER, FOR SOLUTION ORAL DAILY PRN
Status: DISCONTINUED | OUTPATIENT
Start: 2024-02-21 | End: 2024-03-02 | Stop reason: HOSPADM

## 2024-02-21 RX ORDER — ACETAMINOPHEN 650 MG/1
650 SUPPOSITORY RECTAL EVERY 6 HOURS PRN
Status: DISCONTINUED | OUTPATIENT
Start: 2024-02-21 | End: 2024-03-02 | Stop reason: HOSPADM

## 2024-02-21 RX ORDER — SODIUM CHLORIDE 0.9 % (FLUSH) 0.9 %
5-40 SYRINGE (ML) INJECTION PRN
Status: DISCONTINUED | OUTPATIENT
Start: 2024-02-21 | End: 2024-03-02 | Stop reason: HOSPADM

## 2024-02-21 RX ORDER — SODIUM CHLORIDE 0.9 % (FLUSH) 0.9 %
5-40 SYRINGE (ML) INJECTION EVERY 12 HOURS SCHEDULED
Status: DISCONTINUED | OUTPATIENT
Start: 2024-02-21 | End: 2024-03-02 | Stop reason: HOSPADM

## 2024-02-21 RX ORDER — ACETAMINOPHEN 325 MG/1
650 TABLET ORAL EVERY 6 HOURS PRN
Status: DISCONTINUED | OUTPATIENT
Start: 2024-02-21 | End: 2024-03-02 | Stop reason: HOSPADM

## 2024-02-21 RX ORDER — SODIUM CHLORIDE 9 MG/ML
INJECTION, SOLUTION INTRAVENOUS PRN
Status: DISCONTINUED | OUTPATIENT
Start: 2024-02-21 | End: 2024-03-02 | Stop reason: HOSPADM

## 2024-02-21 RX ORDER — ONDANSETRON 2 MG/ML
4 INJECTION INTRAMUSCULAR; INTRAVENOUS EVERY 6 HOURS PRN
Status: DISCONTINUED | OUTPATIENT
Start: 2024-02-21 | End: 2024-03-02 | Stop reason: HOSPADM

## 2024-02-21 RX ADMIN — ONDANSETRON 4 MG: 4 TABLET, ORALLY DISINTEGRATING ORAL at 17:18

## 2024-02-21 RX ADMIN — SODIUM CHLORIDE, PRESERVATIVE FREE 10 ML: 5 INJECTION INTRAVENOUS at 21:21

## 2024-02-21 RX ADMIN — PIPERACILLIN AND TAZOBACTAM 4500 MG: 4; .5 INJECTION, POWDER, LYOPHILIZED, FOR SOLUTION INTRAVENOUS at 21:32

## 2024-02-21 RX ADMIN — SODIUM CHLORIDE 5 MCG/MIN: 9 INJECTION, SOLUTION INTRAVENOUS at 17:30

## 2024-02-21 RX ADMIN — AMIODARONE HYDROCHLORIDE 1 MG/MIN: 50 INJECTION, SOLUTION INTRAVENOUS at 18:09

## 2024-02-21 RX ADMIN — AMIODARONE HYDROCHLORIDE 150 MG: 1.5 INJECTION, SOLUTION INTRAVENOUS at 17:54

## 2024-02-21 RX ADMIN — VANCOMYCIN HYDROCHLORIDE 1750 MG: 10 INJECTION, POWDER, LYOPHILIZED, FOR SOLUTION INTRAVENOUS at 19:06

## 2024-02-21 RX ADMIN — PIPERACILLIN AND TAZOBACTAM 3375 MG: 3; .375 INJECTION, POWDER, FOR SOLUTION INTRAVENOUS at 18:13

## 2024-02-21 ASSESSMENT — LIFESTYLE VARIABLES
HOW OFTEN DO YOU HAVE A DRINK CONTAINING ALCOHOL: NEVER
HOW MANY STANDARD DRINKS CONTAINING ALCOHOL DO YOU HAVE ON A TYPICAL DAY: PATIENT DOES NOT DRINK

## 2024-02-21 NOTE — ED NOTES
External cardioversion @ 125J with capture on. Patient converted to controlled A-fib for 5 minutes and then it accelerated again to 150's BPM. New orders received.

## 2024-02-22 ENCOUNTER — APPOINTMENT (OUTPATIENT)
Facility: HOSPITAL | Age: 84
DRG: 853 | End: 2024-02-22
Attending: INTERNAL MEDICINE
Payer: MEDICARE

## 2024-02-22 PROBLEM — R53.83 OTHER FATIGUE: Status: ACTIVE | Noted: 2024-02-22

## 2024-02-22 PROBLEM — Z51.5 PALLIATIVE CARE BY SPECIALIST: Status: ACTIVE | Noted: 2024-02-22

## 2024-02-22 PROBLEM — R53.1 GENERALIZED WEAKNESS: Status: ACTIVE | Noted: 2024-02-22

## 2024-02-22 LAB
ANION GAP SERPL CALC-SCNC: 9 MMOL/L (ref 5–15)
BUN SERPL-MCNC: 25 MG/DL (ref 6–20)
BUN/CREAT SERPL: 19 (ref 12–20)
CALCIUM SERPL-MCNC: 8.7 MG/DL (ref 8.5–10.1)
CHLORIDE SERPL-SCNC: 104 MMOL/L (ref 97–108)
CO2 SERPL-SCNC: 24 MMOL/L (ref 21–32)
CREAT SERPL-MCNC: 1.33 MG/DL (ref 0.55–1.02)
ERYTHROCYTE [DISTWIDTH] IN BLOOD BY AUTOMATED COUNT: 13.8 % (ref 11.5–14.5)
ERYTHROCYTE [DISTWIDTH] IN BLOOD BY AUTOMATED COUNT: 13.9 % (ref 11.5–14.5)
GLUCOSE BLD STRIP.AUTO-MCNC: 135 MG/DL (ref 65–117)
GLUCOSE BLD STRIP.AUTO-MCNC: 159 MG/DL (ref 65–117)
GLUCOSE BLD STRIP.AUTO-MCNC: 193 MG/DL (ref 65–117)
GLUCOSE BLD STRIP.AUTO-MCNC: 199 MG/DL (ref 65–117)
GLUCOSE BLD STRIP.AUTO-MCNC: 214 MG/DL (ref 65–117)
GLUCOSE BLD STRIP.AUTO-MCNC: 245 MG/DL (ref 65–117)
GLUCOSE SERPL-MCNC: 224 MG/DL (ref 65–100)
HCT VFR BLD AUTO: 36.8 % (ref 35–47)
HCT VFR BLD AUTO: 36.9 % (ref 35–47)
HGB BLD-MCNC: 11.7 G/DL (ref 11.5–16)
HGB BLD-MCNC: 11.9 G/DL (ref 11.5–16)
MAGNESIUM SERPL-MCNC: 2.3 MG/DL (ref 1.6–2.4)
MCH RBC QN AUTO: 31.4 PG (ref 26–34)
MCH RBC QN AUTO: 31.6 PG (ref 26–34)
MCHC RBC AUTO-ENTMCNC: 31.8 G/DL (ref 30–36.5)
MCHC RBC AUTO-ENTMCNC: 32.2 G/DL (ref 30–36.5)
MCV RBC AUTO: 97.9 FL (ref 80–99)
MCV RBC AUTO: 98.7 FL (ref 80–99)
NRBC # BLD: 0 K/UL (ref 0–0.01)
NRBC # BLD: 0 K/UL (ref 0–0.01)
NRBC BLD-RTO: 0 PER 100 WBC
NRBC BLD-RTO: 0 PER 100 WBC
PHOSPHATE SERPL-MCNC: 4.8 MG/DL (ref 2.6–4.7)
PLATELET # BLD AUTO: 268 K/UL (ref 150–400)
PLATELET # BLD AUTO: 319 K/UL (ref 150–400)
PMV BLD AUTO: 10.8 FL (ref 8.9–12.9)
PMV BLD AUTO: 11.4 FL (ref 8.9–12.9)
POTASSIUM SERPL-SCNC: 4.3 MMOL/L (ref 3.5–5.1)
RBC # BLD AUTO: 3.73 M/UL (ref 3.8–5.2)
RBC # BLD AUTO: 3.77 M/UL (ref 3.8–5.2)
SERVICE CMNT-IMP: ABNORMAL
SODIUM SERPL-SCNC: 137 MMOL/L (ref 136–145)
TROPONIN I SERPL HS-MCNC: 1255 NG/L (ref 0–51)
TROPONIN I SERPL HS-MCNC: 1757 NG/L (ref 0–51)
TROPONIN I SERPL HS-MCNC: 1819 NG/L (ref 0–51)
WBC # BLD AUTO: 14.4 K/UL (ref 3.6–11)
WBC # BLD AUTO: 15 K/UL (ref 3.6–11)

## 2024-02-22 PROCEDURE — 36415 COLL VENOUS BLD VENIPUNCTURE: CPT

## 2024-02-22 PROCEDURE — 6360000002 HC RX W HCPCS: Performed by: STUDENT IN AN ORGANIZED HEALTH CARE EDUCATION/TRAINING PROGRAM

## 2024-02-22 PROCEDURE — 80048 BASIC METABOLIC PNL TOTAL CA: CPT

## 2024-02-22 PROCEDURE — 84100 ASSAY OF PHOSPHORUS: CPT

## 2024-02-22 PROCEDURE — 6370000000 HC RX 637 (ALT 250 FOR IP): Performed by: HOSPITALIST

## 2024-02-22 PROCEDURE — 6360000002 HC RX W HCPCS: Performed by: NURSE PRACTITIONER

## 2024-02-22 PROCEDURE — 2500000003 HC RX 250 WO HCPCS: Performed by: NURSE PRACTITIONER

## 2024-02-22 PROCEDURE — 82962 GLUCOSE BLOOD TEST: CPT

## 2024-02-22 PROCEDURE — 2000000000 HC ICU R&B

## 2024-02-22 PROCEDURE — 6370000000 HC RX 637 (ALT 250 FOR IP): Performed by: INTERNAL MEDICINE

## 2024-02-22 PROCEDURE — 6360000002 HC RX W HCPCS: Performed by: HOSPITALIST

## 2024-02-22 PROCEDURE — 2580000003 HC RX 258: Performed by: STUDENT IN AN ORGANIZED HEALTH CARE EDUCATION/TRAINING PROGRAM

## 2024-02-22 PROCEDURE — 85027 COMPLETE CBC AUTOMATED: CPT

## 2024-02-22 PROCEDURE — 2580000003 HC RX 258: Performed by: NURSE PRACTITIONER

## 2024-02-22 PROCEDURE — 84484 ASSAY OF TROPONIN QUANT: CPT

## 2024-02-22 PROCEDURE — 83735 ASSAY OF MAGNESIUM: CPT

## 2024-02-22 PROCEDURE — 99222 1ST HOSP IP/OBS MODERATE 55: CPT | Performed by: FAMILY MEDICINE

## 2024-02-22 RX ORDER — ENOXAPARIN SODIUM 100 MG/ML
1 INJECTION SUBCUTANEOUS EVERY 24 HOURS
Status: DISCONTINUED | OUTPATIENT
Start: 2024-02-22 | End: 2024-02-23

## 2024-02-22 RX ORDER — INSULIN LISPRO 100 [IU]/ML
0-4 INJECTION, SOLUTION INTRAVENOUS; SUBCUTANEOUS NIGHTLY
Status: DISCONTINUED | OUTPATIENT
Start: 2024-02-22 | End: 2024-03-02 | Stop reason: HOSPADM

## 2024-02-22 RX ORDER — NOREPINEPHRINE BITARTRATE 0.06 MG/ML
.5-2 INJECTION, SOLUTION INTRAVENOUS CONTINUOUS
Status: DISCONTINUED | OUTPATIENT
Start: 2024-02-22 | End: 2024-02-23

## 2024-02-22 RX ORDER — ACETAMINOPHEN 325 MG/1
650 TABLET ORAL ONCE
Status: COMPLETED | OUTPATIENT
Start: 2024-02-22 | End: 2024-02-22

## 2024-02-22 RX ORDER — DIPHENHYDRAMINE HYDROCHLORIDE 50 MG/ML
25 INJECTION INTRAMUSCULAR; INTRAVENOUS ONCE
Status: COMPLETED | OUTPATIENT
Start: 2024-02-22 | End: 2024-02-22

## 2024-02-22 RX ORDER — ASPIRIN 81 MG/1
81 TABLET, CHEWABLE ORAL DAILY
Status: DISCONTINUED | OUTPATIENT
Start: 2024-02-22 | End: 2024-03-02 | Stop reason: HOSPADM

## 2024-02-22 RX ORDER — SODIUM CHLORIDE 9 MG/ML
INJECTION, SOLUTION INTRAVENOUS CONTINUOUS
Status: DISCONTINUED | OUTPATIENT
Start: 2024-02-22 | End: 2024-02-22

## 2024-02-22 RX ORDER — INSULIN LISPRO 100 [IU]/ML
0-4 INJECTION, SOLUTION INTRAVENOUS; SUBCUTANEOUS
Status: DISCONTINUED | OUTPATIENT
Start: 2024-02-22 | End: 2024-03-02 | Stop reason: HOSPADM

## 2024-02-22 RX ADMIN — AMIODARONE HYDROCHLORIDE 0.5 MG/MIN: 50 INJECTION, SOLUTION INTRAVENOUS at 00:34

## 2024-02-22 RX ADMIN — ACETAMINOPHEN 650 MG: 325 TABLET ORAL at 00:51

## 2024-02-22 RX ADMIN — DIPHENHYDRAMINE HYDROCHLORIDE 25 MG: 50 INJECTION INTRAMUSCULAR; INTRAVENOUS at 00:52

## 2024-02-22 RX ADMIN — ASPIRIN 81 MG: 81 TABLET, CHEWABLE ORAL at 09:45

## 2024-02-22 RX ADMIN — SODIUM CHLORIDE 9 MCG/MIN: 9 INJECTION, SOLUTION INTRAVENOUS at 15:36

## 2024-02-22 RX ADMIN — PIPERACILLIN AND TAZOBACTAM 3375 MG: 3; .375 INJECTION, POWDER, LYOPHILIZED, FOR SOLUTION INTRAVENOUS at 12:25

## 2024-02-22 RX ADMIN — Medication 1 AMPULE: at 21:33

## 2024-02-22 RX ADMIN — SODIUM CHLORIDE, PRESERVATIVE FREE 10 ML: 5 INJECTION INTRAVENOUS at 21:35

## 2024-02-22 RX ADMIN — SODIUM CHLORIDE 10.03 MCG/MIN: 9 INJECTION, SOLUTION INTRAVENOUS at 00:59

## 2024-02-22 RX ADMIN — AMIODARONE HYDROCHLORIDE 0.5 MG/MIN: 50 INJECTION, SOLUTION INTRAVENOUS at 15:39

## 2024-02-22 RX ADMIN — PIPERACILLIN AND TAZOBACTAM 3375 MG: 3; .375 INJECTION, POWDER, LYOPHILIZED, FOR SOLUTION INTRAVENOUS at 19:57

## 2024-02-22 RX ADMIN — SODIUM CHLORIDE, PRESERVATIVE FREE 10 ML: 5 INJECTION INTRAVENOUS at 09:48

## 2024-02-22 RX ADMIN — SODIUM CHLORIDE: 9 INJECTION, SOLUTION INTRAVENOUS at 01:03

## 2024-02-22 RX ADMIN — ENOXAPARIN SODIUM 70 MG: 100 INJECTION SUBCUTANEOUS at 00:41

## 2024-02-22 NOTE — CONSULTS
[facility]  [address] [place] [address]  [phone]  [email or web address]       CARDIOLOGY CONSULTATION       Date of  Admission: 2/21/2024  4:37 PM     Admission type:Emergency   Primary Care Physician:Sampson Roque MD     Attending Provider: Shaheed Farias MD  Cardiology Provider:   CC/REASON FOR CONSULT: NSTEMI     Subjective:     Yesenia Horan is a 83 y.o. female admitted for Atrial fibrillation with tachycardic ventricular rate (HCC) [I48.91].    The patient was seen and examined at the bedside.  She reports sudden onset of chest pain with nausea and vomiting yesterday up until which time she was in her usual state of health.  Chest pain was located in the center of her chest.  She takes her medications regularly.  Denies any recent change in her medications.  She is able to walk at her baseline.  She reports a history of diabetes mellitus.    Patient Active Problem List    Diagnosis Date Noted   • Atrial fibrillation with tachycardic ventricular rate (HCC) 02/21/2024   • Chronic systolic heart failure (HCC) 03/16/2023   • Goals of care, counseling/discussion 03/13/2023   • Physical debility 03/13/2023   • Weakness 03/13/2023   • Frailty 03/13/2023   • Anorexia 03/13/2023   • Constipation, unspecified constipation type 03/13/2023   • Palliative care encounter 03/13/2023   • Sepsis (HCC) 03/07/2023   • Status post total replacement of left hip 01/15/2018   • Osteoarthritis of hip 09/11/2017   • Osteoarthrosis, localized, primary, knee 01/14/2016   • Knee arthropathy 06/18/2015      Sampson Roque MD  Past Medical History:   Diagnosis Date   • Arthritis    • Chronic pain     arthritic   • Diabetes (HCC)    • Hypertension    • Ill-defined condition     shingles 1.5 years ago 8/2014   • Other ill-defined conditions(799.89)     shingles   • Other ill-defined conditions(799.89)     high cholesterol      Social History     Socioeconomic History   • Marital  status:    • Years of education: 12   Tobacco Use   • Smoking status: Never   • Smokeless tobacco: Never   Substance and Sexual Activity   • Alcohol use: No   • Drug use: No     No Known Allergies   Family History   Problem Relation Age of Onset   • Cancer Father         leukemia   • Stroke Sister       Current Facility-Administered Medications   Medication Dose Route Frequency   • enoxaparin (LOVENOX) injection 70 mg  1 mg/kg SubCUTAneous Q24H   • norepinephrine (LEVOPHED) 16 mg in sodium chloride 0.9 % 250 mL infusion  0.5-20 mcg/min IntraVENous Continuous   • aspirin chewable tablet 81 mg  81 mg Oral Daily   • insulin lispro (HUMALOG) injection vial 0-4 Units  0-4 Units SubCUTAneous TID WC   • insulin lispro (HUMALOG) injection vial 0-4 Units  0-4 Units SubCUTAneous Nightly   • amiodarone (CORDARONE) 450 mg in dextrose 5 % 250 mL infusion ([id number])  0.5 mg/min IntraVENous Continuous   • sodium chloride flush 0.9 % injection 5-40 mL  5-40 mL IntraVENous 2 times per day   • sodium chloride flush 0.9 % injection 5-40 mL  5-40 mL IntraVENous PRN   • 0.9 % sodium chloride infusion   IntraVENous PRN   • ondansetron (ZOFRAN-ODT) disintegrating tablet 4 mg  4 mg Oral Q8H PRN    Or   • ondansetron (ZOFRAN) injection 4 mg  4 mg IntraVENous Q6H PRN   • polyethylene glycol (GLYCOLAX) packet 17 g  17 g Oral Daily PRN   • acetaminophen (TYLENOL) tablet 650 mg  650 mg Oral Q6H PRN    Or   • acetaminophen (TYLENOL) suppository 650 mg  650 mg Rectal Q6H PRN   • insulin lispro (HUMALOG) injection vial 0-8 Units  0-8 Units SubCUTAneous Q6H   • piperacillin-tazobactam (ZOSYN) 3,375 mg in sodium chloride 0.9 % 50 mL IVPB (mini-bag)  3,375 mg IntraVENous Q8H     No current outpatient medications on file.        Cardiovascular ROS: positive for - chest pain         Objective:      Vitals:    02/22/24 1445   BP: 125/62   Pulse: 71   Resp: 16   Temp:    SpO2: 92%       Physical Exam  Constitutional:       General: She is not in

## 2024-02-22 NOTE — ED NOTES
Cardiologist at bedside at this time. States not going to cath with increasing troponin and don't need to trend anymore troponin levels.

## 2024-02-22 NOTE — ED NOTES
Assumed care of pt at this time. Pt resting comfortably in ED stretcher with no complaints. Pt on monitor x3 with call bell in reach. Bed locked and in low position with side rails x2.     0830: Pt resting in ED stretcher with no complaints. Pt remains on monitor x3 with call bell in reach.     0930: Pt resting with no complaints. Pt remains on monitor x3 with call bell in reach.     1030: Pt resting comfortably in ED stretcher and remains on monitor x3 with call bell in reach. Pt bed locked in low position with side rails x2.     1130: Pt resting with no complaints. Pt remains on monitor x3 with call bell in reach.     1230: Pt resting comfortably at this time. Pt remains on monitor x3 with call bell in reach. Pt bed locked in low position with side rails x2.     1330: Pt remains on monitor x3 with call bell in reach. Pt resting in ER stretcher comfortably.     1430: Pt resting with no complaints. Pt on monitor x3 with call bell in reach.

## 2024-02-22 NOTE — PROGRESS NOTES
1515 Verbal shift change report given to Antonina CASTELLANOS RN (oncoming nurse) by Naomi CASTELLANOS RN (offgoing nurse). Report included the following information Nurse Handoff Report.     1600 Shift assessment completed. Patient is alert and oriented x4, on 2L NC, Levo 9mcg/min, amio 0.5 mg/min, patient bathed, and arrived with salazar catheter.     1615 Levo titrated to 7mcg/min    1645 Levo titrated to 5mcg/min    1700 Levo titrated to 3 mcg/min    1830 Precedex ordered per MD Farias for anxiety, patient pulling at mask, and tachypnea.     1900 Bedside and Verbal shift change report given to SHIRLEY Anne (oncoming nurse) by Antonina CASTELLANOS RN (offgoing nurse). Report included the following information Nurse Handoff Report.     Precedex gtt started.

## 2024-02-22 NOTE — CONSULTS
[facility]  [address] [place] [address]  [phone]  [email or web address]       CARDIOLOGY CONSULTATION       Date of  Admission: 2/21/2024  4:37 PM     Admission type:Emergency   Primary Care Physician:Sampson Roque MD     Attending Provider: Shaheed Farias MD  Cardiology Provider:   CC/REASON FOR CONSULT: NSTEMI     Subjective:   53-year-old female with shock:    The patient was seen and examined at the bedside.  She reports sudden onset of chest pressure 1 day prior associated with nausea and vomiting (at 11 AM).  She reports history of diabetes mellitus.  No recent changes in her medications.  She takes her medications regularly.      Patient Active Problem List    Diagnosis Date Noted    Other fatigue 02/22/2024    Generalized weakness 02/22/2024    Palliative care by specialist 02/22/2024    Atrial fibrillation with tachycardic ventricular rate (HCC) 02/21/2024    Chronic systolic heart failure (HCC) 03/16/2023    Goals of care, counseling/discussion 03/13/2023    Physical debility 03/13/2023    Weakness 03/13/2023    Frailty 03/13/2023    Anorexia 03/13/2023    Constipation, unspecified constipation type 03/13/2023    Palliative care encounter 03/13/2023    Sepsis (HCC) 03/07/2023    Status post total replacement of left hip 01/15/2018    Osteoarthritis of hip 09/11/2017    Osteoarthrosis, localized, primary, knee 01/14/2016    Knee arthropathy 06/18/2015      Sampson Roque MD  Past Medical History:   Diagnosis Date    Arthritis     Chronic pain     arthritic    Diabetes (HCC)     Hypertension     Ill-defined condition     shingles 1.5 years ago 8/2014    Other ill-defined conditions(799.89)     shingles    Other ill-defined conditions(799.89)     high cholesterol      Social History     Socioeconomic History    Marital status:     Years of education: 12   Tobacco Use    Smoking status: Never    Smokeless tobacco: Never   Substance and Sexual

## 2024-02-22 NOTE — H&P
CRITICAL CARE NOTE      Name: Yesenia Horan   : 1940   MRN: [id number]   Date: 2024      REASON FOR ICU ADMISSION:  Sepsis/Shock, Afib RVR     PRINCIPAL ICU DIAGNOSIS   Severe Sepsis/Shock  Atrial Fibrillation with RVR  Acute Kidney Injury  Elevated troponin  UTI    BRIEF PATIENT SUMMARY   84 y/o female PMHx of Afib (Eliquis ), HFrEF (EF 20-25 %)  HTN, T2DM presented to ED with generalized weakness, nausea/vomiting onset today.  Found to be hypotensive, hypothermic, tachycardic in atrial fibrillation with RVR, administered 500 cc fluid bolus attempted cardioversion with brief conversion to sinus rhythm then converted to A-fib with RVR and was started on amiodarone infusion.+ Leukocytosis, +lactic acidosis, + DEEPTHI.  Due to ongoing hypotension she was started on peripheral vasopressors, ICU was consulted for admission.     On ICU exam: A/o x 4, endorses acute onset of nausea/vomiting earlier in the day, denies Chest pain/ shortness of breath  Abdominal exam benign,  Levophed 10 mcg/min, on Amio 1 mg/min.      COMPREHENSIVE ASSESSMENT & PLAN:SYSTEM BASED   A-fib RVR likely driven by sepsis/septic shock, continue cautious/gentle crystalloid resuscitation, in the setting of HFrEF.  Continue broad-spectrum antibiotics.  Lactic trending down, trending troponin although likely due to demand ischemia in setting of sepsis  UA concerning for infection-continue Zosyn, likely narrow to ceftriaxone tomorrow pending course    NEUROLOGICAL:   No active issues  Close neurological monitoring, Delirium Precautions    PULMONOLOGY:   Supplemental oxygen for goal saturation greater than 94%  CXR with mild interstitial edema  HOB elevated, aspiration precautions    CARDIOVASCULAR:   Severe sepsis/shock likely urinary in origin  IV crystalloid resuscitation now requiring Levophed  Vasopressors to keep MAP 65 and above for adequate tissue perfusion.     Atrial fibrillation with RVR likely in setting of acute  probability of imminent, clinically significant deterioration, which requires the highest level of preparedness to intervene urgently. I participated in the decision-making and personally managed or directed the management of the following life and organ supporting interventions that required my frequent assessment to treat or prevent imminent deterioration.    I personally spent 57 minutes of critical care time.  This is time spent at this critically ill patient's bedside actively involved in patient care as well as the coordination of care.  This does not include any procedural time which has been billed separately.    PAMELLA Roth - NP   Critical Care Medicine  [facility]

## 2024-02-22 NOTE — ED NOTES
Bedside shift change report given to Skye WATSON (oncoming nurse) by SHIRLEY Palumbo (offgoing nurse). Report included the following information Nurse Handoff Report, ED Encounter Summary, ED SBAR, Intake/Output, MAR, Recent Results, and Med Rec Status.

## 2024-02-22 NOTE — PROGRESS NOTES
ICU Progress Note        Subjective:    - lying comfortably on the bed.      Vital Signs:    /72   Pulse 70   Temp 98 °F (36.7 °C) (Oral)   Resp 19   Wt 66.7 kg (147 lb)   SpO2 94%   BMI 26.04 kg/m²             Temp (24hrs), Av.9 °F (36.1 °C), Min:95.2 °F (35.1 °C), Max:98 °F (36.7 °C)       Intake/Output:   Last shift:      701 - 1900  In: 307.5 [I.V.:307.5]  Out: -   Last 3 shifts: 1901 -  07  In: 51 [I.V.:10]  Out: -     Intake/Output Summary (Last 24 hours) at 2024 0900  Last data filed at 2024 0745  Gross per 24 hour   Intake 358.46 ml   Output --   Net 358.46 ml       Physical Exam:    General: Alert, awake and oriented. Not in acute distress  HEENT:  Anicteric sclerae; pink palpebral conjunctivae; mucosa moist  Resp:  Bilateral air entry +, no crackles or wheeze  CV:  S1, S2 present  GI:  Abdomen soft, non-tender; (+) active bowel sounds  Extremities:  +2 pulses on all extremities; no edema/ cyanosis/ clubbing noted  Skin:  Warm; no rashes/ lesions noted  Neurologic:  Non-focal    DATA:     Current Facility-Administered Medications   Medication Dose Route Frequency    enoxaparin (LOVENOX) injection 70 mg  1 mg/kg SubCUTAneous Q24H    norepinephrine (LEVOPHED) 16 mg in sodium chloride 0.9 % 250 mL infusion  0.5-20 mcg/min IntraVENous Continuous    amiodarone (CORDARONE) 450 mg in dextrose 5 % 250 mL infusion ([id number])  0.5 mg/min IntraVENous Continuous    sodium chloride flush 0.9 % injection 5-40 mL  5-40 mL IntraVENous 2 times per day    sodium chloride flush 0.9 % injection 5-40 mL  5-40 mL IntraVENous PRN    0.9 % sodium chloride infusion   IntraVENous PRN    ondansetron (ZOFRAN-ODT) disintegrating tablet 4 mg  4 mg Oral Q8H PRN    Or    ondansetron (ZOFRAN) injection 4 mg  4 mg IntraVENous Q6H PRN    polyethylene glycol (GLYCOLAX) packet 17 g  17 g Oral Daily PRN    acetaminophen (TYLENOL) tablet 650 mg  650 mg Oral Q6H PRN    Or    acetaminophen  3.80 - 5.20 M/uL    Hemoglobin 11.7 11.5 - 16.0 g/dL    Hematocrit 36.8 35.0 - 47.0 %    MCV 98.7 80.0 - 99.0 FL    MCH 31.4 26.0 - 34.0 PG    MCHC 31.8 30.0 - 36.5 g/dL    RDW 13.8 11.5 - 14.5 %    Platelets 268 150 - 400 K/uL    MPV 10.8 8.9 - 12.9 FL    Nucleated RBCs 0.0 0  WBC    nRBC 0.00 0.00 - 0.01 K/uL   Basic Metabolic Panel    Collection Time: 02/22/24  3:39 AM   Result Value Ref Range    Sodium 137 136 - 145 mmol/L    Potassium 4.3 3.5 - 5.1 mmol/L    Chloride 104 97 - 108 mmol/L    CO2 24 21 - 32 mmol/L    Anion Gap 9 5 - 15 mmol/L    Glucose 224 (H) 65 - 100 mg/dL    BUN 25 (H) 6 - 20 MG/DL    Creatinine 1.33 (H) 0.55 - 1.02 MG/DL    Bun/Cre Ratio 19 12 - 20      Est, Glom Filt Rate 40 (L) >60 ml/min/1.73m2    Calcium 8.7 8.5 - 10.1 MG/DL   Magnesium    Collection Time: 02/22/24  3:39 AM   Result Value Ref Range    Magnesium 2.3 1.6 - 2.4 mg/dL   Phosphorus    Collection Time: 02/22/24  3:39 AM   Result Value Ref Range    Phosphorus 4.8 (H) 2.6 - 4.7 MG/DL       Imaging:    XR CHEST PORTABLE   Final Result      Mild diffuse interstitial prominence, which may be chronic or correlate with   mild interstitial edema. No focal infiltrate.             Assessment and Plan:    84 y/o female PMHx of Afib (Eliquis ), HFrEF (EF 20-25 %)  HTN, T2DM presented to ED with generalized weakness, nausea/vomiting onset today.  Found to be hypotensive, hypothermic, tachycardic in atrial fibrillation with RVR, administered 500 cc fluid bolus attempted cardioversion with brief conversion to sinus rhythm then converted to A-fib with RVR and was started on amiodarone infusion.+ Leukocytosis, +lactic acidosis, + DEEPTHI.  Due to ongoing hypotension she was started on peripheral vasopressors, ICU was consulted for admission.      Shock - Cardiogenic shock vs. Sepsis. Patient presented with chest pain (I took the history this morning), noted to be in a.fib with RVR. Troponin was elevated and continues to worse. EKG without

## 2024-02-22 NOTE — ED PROVIDER NOTES
[facility] EMERGENCY DEPT  EMERGENCY DEPARTMENT ENCOUNTER       Pt Name: Yesenia Horan  MRN: [id number]  Birthdate 1940  Date of evaluation: 2/21/2024  Provider: Rajinder Kam MD   PCP: Sampson Roque MD  Note Started: 9:48 PM 2/21/24     CHIEF COMPLAINT       Chief Complaint   Patient presents with    Emesis     N/V since this AM, EMS got a BP of 60/40 consistently, pt complaining of weakness, denies CP/SOB, v/s as noted        HISTORY OF PRESENT ILLNESS: 1 or more elements      History From: Patient  None     Yesenia Horan is a 83 y.o. female who presents to the emergency department with generalized weakness, nausea, vomiting which began today.  Emesis is nonbloody nonbilious, no coffee-ground emesis.  Patient has a history of CHF with ejection fraction of 20 to 25% on last echocardiogram, also has history of paroxysmal A-fib.     Nursing Notes were all reviewed and agreed with or any disagreements were addressed in the HPI.     REVIEW OF SYSTEMS      Review of Systems     Positives and Pertinent negatives as per HPI.    PAST HISTORY     Past Medical History:  Past Medical History:   Diagnosis Date    Arthritis     Chronic pain     arthritic    Diabetes (HCC)     Hypertension     Ill-defined condition     shingles 1.5 years ago 8/2014    Other ill-defined conditions(799.89)     shingles    Other ill-defined conditions(799.89)     high cholesterol         Past Surgical History:  Past Surgical History:   Procedure Laterality Date    APPENDECTOMY      CATARACT REMOVAL Left     COLONOSCOPY N/A 10/25/2016    COLONOSCOPY performed by Usman Murphy MD at [facility] ENDOSCOPY    HYSTERECTOMY (CERVIX STATUS UNKNOWN)      ORTHOPEDIC SURGERY      right hip replacement    TONSILLECTOMY      TOTAL KNEE ARTHROPLASTY Right     parital    TOTAL KNEE ARTHROPLASTY Left     TUBAL LIGATION      UROLOGICAL SURGERY      bladder tacking       Family History:  Family History   Problem Relation Age of Onset    Cancer Father         leukemia      Followed by   amiodarone (CORDARONE) 450 mg in dextrose 5 % 250 mL infusion ([id number]) (1 mg/min IntraVENous New Bag 2/21/24 1809)     Followed by   amiodarone (CORDARONE) 450 mg in dextrose 5 % 250 mL infusion ([id number]) (has no administration in time range)   sodium chloride flush 0.9 % injection 5-40 mL (10 mLs IntraVENous Given 2/21/24 2121)   sodium chloride flush 0.9 % injection 5-40 mL (has no administration in time range)   0.9 % sodium chloride infusion (has no administration in time range)   ondansetron (ZOFRAN-ODT) disintegrating tablet 4 mg (has no administration in time range)     Or   ondansetron (ZOFRAN) injection 4 mg (has no administration in time range)   polyethylene glycol (GLYCOLAX) packet 17 g (has no administration in time range)   acetaminophen (TYLENOL) tablet 650 mg (has no administration in time range)     Or   acetaminophen (TYLENOL) suppository 650 mg (has no administration in time range)   insulin lispro (HUMALOG) injection vial 0-8 Units ( SubCUTAneous Not Given 2/21/24 2135)   piperacillin-tazobactam (ZOSYN) 3,375 mg in sodium chloride 0.9 % 50 mL IVPB (mini-bag) (has no administration in time range)   piperacillin-tazobactam (ZOSYN) 4,500 mg in sodium chloride 0.9 % 100 mL IVPB (mini-bag) (4,500 mg IntraVENous New Bag 2/21/24 2132)   ondansetron (ZOFRAN-ODT) disintegrating tablet 4 mg (4 mg Oral Given 2/21/24 1718)   vancomycin (VANCOCIN) 1750 mg in sodium chloride 0.9 % 500 mL IVPB (0 mg IntraVENous Stopped 2/21/24 2121)   piperacillin-tazobactam (ZOSYN) 3,375 mg in sodium chloride 0.9 % 50 mL IVPB (mini-bag) (0 mg IntraVENous Stopped 2/21/24 1843)       CONSULTS: (Who and What was discussed)  IP CONSULT TO PALLIATIVE CARE      Chronic Conditions: HFrEF, afib    Social Determinants affecting Dx or Tx: None    Records Reviewed (source and summary of external notes): Nursing Notes and Old Medical Records    CC/HPI Summary, DDx, ED Course, and Reassessment: 83-year-old female

## 2024-02-22 NOTE — CONSULTS
Palliative Medicine  Patient Name: Yesenia Horan  YOB: 1940  MRN: [id number]  Age: 83 y.o.  Gender: female    Date of Initial Consult: 2/22/2024  Date of Service: 2/22/2024  Time: 9:30 PM  Provider: Lacey Sutton MD  Hospital Day: 2  Admit Date: 2/21/2024  Referring Provider: Dr. Kam       Reasons for Consultation:  Goals of Care    HISTORY OF PRESENT ILLNESS (HPI):   Yesenia Horan is a 83 y.o. female with HFrEF (EF 20-25%), AF, HTN, T2DM who was admitted on 2/21/2024 from home with a diagnosis of severe sepsis, Afib with RVR, DEEPTHI, elevated troponin. Patient was initiated on antibiotics, IVF. She was cardioverted in ED and received amiodarone. She has required pressor support. Cardiology is consulted. Echo is ordered.    Psychosocial: Patient lives with her . She has two children, Deepali and Barrington. States she worked for the [employer] prior to having children and worked for the [employer] after her children were older. States she can ambulate without assistance and use an exercise bike.    PALLIATIVE DIAGNOSES:    Encounter for Palliative Care  Goals of Care discussion  Code Status discussion  Physical Debility, Generalized weakness  Fatigue    ASSESSMENT AND PLAN:   I met with patient this morning in the ED. Introduced myself and role of Palliative. She reports she is no longer having chest pain. She feels tired after yesterday's activity but otherwise is feeling okay overall. We reviewed hospital course. Discussed that Cardiology is consulted and should be seeing her soon.  I asked patient who she would like me to call to discuss her medical care, and she told me to call her daughter Deepali.  I did ask patient about code status, and she said that if CPR could help her she would want to try it. Discussed that at her age and with her heart issues CPR would be unlikely to be effective or beneficial. I encouraged her to discuss further with her family.  I called Deepali. Introduced myself and role  temperature 97.8 °F (36.6 °C), temperature source Oral, resp. rate 19, weight 66.7 kg (147 lb), SpO2 93 %.    PHYSICAL ASSESSMENT:   General: [] Oriented x3  [] Well appearing  [] Intubated  []Ill appearing  [x]Other: lying in hospital bed, awake, alert, participates in conversation, NAD, no restlessness  Mental Status: [] Normal mental status exam  [] Drowsy  [] Confused  []Other:  Cardiovascular: [] Regular rate/rhythm  [] Arrhythmia  [x] Other: normal rate  Chest: [x] Effort normal  []Lungs clear  [] Respiratory distress  []Tachypnea  [] Other:  Abdomen: [x] Soft/non-tender  [] Normal appearance  [] Distended  [] Ascites  [] Other:  Neurological: [x] Normal speech  [] Normal sensation  []Deficits present:  Extremity: [x] Normal skin color/temp  [] Clubbing/cyanosis  [] No edema  [] Other:    Wt Readings from Last 15 Encounters:   02/21/24 66.7 kg (147 lb)   08/30/23 59.5 kg (131 lb 1.6 oz)   08/23/23 59 kg (130 lb)   08/21/23 59.2 kg (130 lb 9.6 oz)   08/16/23 59.3 kg (130 lb 12.8 oz)   08/14/23 59.9 kg (132 lb)   08/09/23 59.5 kg (131 lb 1.6 oz)   08/07/23 59.8 kg (131 lb 12.8 oz)   08/02/23 59.4 kg (131 lb)   07/31/23 59.8 kg (131 lb 12.8 oz)   07/26/23 59.8 kg (131 lb 12.8 oz)   07/24/23 59.8 kg (131 lb 12.8 oz)   07/19/23 59.7 kg (131 lb 11.2 oz)   07/17/23 59.9 kg (132 lb)   07/12/23 59.7 kg (131 lb 9.6 oz)        Current Diet: ADULT DIET; Full Liquid; 4 carb choices (60 gm/meal)       PSYCHOSOCIAL/SPIRITUAL SCREENING:   Palliative IDT has assessed this patient for cultural preferences / practices and a referral made as appropriate to needs (Cultural Services, Patient Advocacy, Ethics, etc.)    Spiritual Affiliation: None    Any spiritual / Hinduism concerns:  [] Yes /  [x] No   If \"Yes\" to discuss with pastoral care during IDT     Does caregiver feel burdened by caring for their loved one:   [] Yes /  [] No /  [x] No Caregiver Present/Available [] No Caregiver [] Pt Lives at Facility  If \"Yes\" to discuss

## 2024-02-23 ENCOUNTER — APPOINTMENT (OUTPATIENT)
Facility: HOSPITAL | Age: 84
DRG: 853 | End: 2024-02-23
Attending: INTERNAL MEDICINE
Payer: MEDICARE

## 2024-02-23 LAB
ALBUMIN SERPL-MCNC: 3 G/DL (ref 3.5–5)
ALBUMIN/GLOB SERPL: 0.9 (ref 1.1–2.2)
ALP SERPL-CCNC: 48 U/L (ref 45–117)
ALT SERPL-CCNC: 19 U/L (ref 12–78)
ANION GAP SERPL CALC-SCNC: 6 MMOL/L (ref 5–15)
ANION GAP SERPL CALC-SCNC: 8 MMOL/L (ref 5–15)
AST SERPL-CCNC: 34 U/L (ref 15–37)
BACTERIA SPEC CULT: ABNORMAL
BILIRUB DIRECT SERPL-MCNC: 0.3 MG/DL (ref 0–0.2)
BILIRUB SERPL-MCNC: 1 MG/DL (ref 0.2–1)
BUN SERPL-MCNC: 13 MG/DL (ref 6–20)
BUN SERPL-MCNC: 13 MG/DL (ref 6–20)
BUN/CREAT SERPL: 16 (ref 12–20)
BUN/CREAT SERPL: 16 (ref 12–20)
CALCIUM SERPL-MCNC: 8.3 MG/DL (ref 8.5–10.1)
CALCIUM SERPL-MCNC: 8.5 MG/DL (ref 8.5–10.1)
CC UR VC: ABNORMAL
CHLORIDE SERPL-SCNC: 108 MMOL/L (ref 97–108)
CHLORIDE SERPL-SCNC: 109 MMOL/L (ref 97–108)
CO2 SERPL-SCNC: 23 MMOL/L (ref 21–32)
CO2 SERPL-SCNC: 25 MMOL/L (ref 21–32)
CREAT SERPL-MCNC: 0.79 MG/DL (ref 0.55–1.02)
CREAT SERPL-MCNC: 0.83 MG/DL (ref 0.55–1.02)
ECHO AO ROOT DIAM: 2.9 CM
ECHO AO ROOT INDEX: 1.79 CM/M2
ECHO AV AREA PEAK VELOCITY: 2.6 CM2
ECHO AV AREA/BSA PEAK VELOCITY: 1.6 CM2/M2
ECHO AV PEAK GRADIENT: 8 MMHG
ECHO AV PEAK VELOCITY: 1.4 M/S
ECHO AV VELOCITY RATIO: 0.86
ECHO BSA: 1.63 M2
ECHO EST RA PRESSURE: 3 MMHG
ECHO LA DIAMETER INDEX: 1.6 CM/M2
ECHO LA DIAMETER: 2.6 CM
ECHO LA TO AORTIC ROOT RATIO: 0.9
ECHO LA VOL A-L A4C: 41 ML (ref 22–52)
ECHO LA VOL MOD A4C: 32 ML (ref 22–52)
ECHO LA VOLUME INDEX A-L A4C: 25 ML/M2 (ref 16–34)
ECHO LA VOLUME INDEX MOD A4C: 20 ML/M2 (ref 16–34)
ECHO LV E' SEPTAL VELOCITY: 4 CM/S
ECHO LV EDV A4C: 54 ML
ECHO LV EDV INDEX A4C: 33 ML/M2
ECHO LV EJECTION FRACTION A4C: 51 %
ECHO LV ESV A4C: 26 ML
ECHO LV ESV INDEX A4C: 16 ML/M2
ECHO LV FRACTIONAL SHORTENING: 20 % (ref 28–44)
ECHO LV INTERNAL DIMENSION DIASTOLE INDEX: 2.16 CM/M2
ECHO LV INTERNAL DIMENSION DIASTOLIC: 3.5 CM (ref 3.9–5.3)
ECHO LV INTERNAL DIMENSION SYSTOLIC INDEX: 1.73 CM/M2
ECHO LV INTERNAL DIMENSION SYSTOLIC: 2.8 CM
ECHO LV IVSD: 0.9 CM (ref 0.6–0.9)
ECHO LV MASS 2D: 88.8 G (ref 67–162)
ECHO LV MASS INDEX 2D: 54.8 G/M2 (ref 43–95)
ECHO LV POSTERIOR WALL DIASTOLIC: 0.9 CM (ref 0.6–0.9)
ECHO LV RELATIVE WALL THICKNESS RATIO: 0.51
ECHO LVOT AREA: 3.1 CM2
ECHO LVOT DIAM: 2 CM
ECHO LVOT PEAK GRADIENT: 6 MMHG
ECHO LVOT PEAK VELOCITY: 1.2 M/S
ECHO MV A VELOCITY: 0.73 M/S
ECHO MV E DECELERATION TIME (DT): 110 MS
ECHO MV E VELOCITY: 0.81 M/S
ECHO MV E/A RATIO: 1.11
ECHO RA VOLUME: 37 ML
ECHO RA VOLUME: 39 ML
ECHO RIGHT VENTRICULAR SYSTOLIC PRESSURE (RVSP): 33 MMHG
ECHO RV TAPSE: 2 CM (ref 1.7–?)
ECHO TV REGURGITANT MAX VELOCITY: 2.74 M/S
ECHO TV REGURGITANT PEAK GRADIENT: 30 MMHG
EKG ATRIAL RATE: 131 BPM
EKG ATRIAL RATE: 163 BPM
EKG ATRIAL RATE: 86 BPM
EKG DIAGNOSIS: NORMAL
EKG P AXIS: 80 DEGREES
EKG P-R INTERVAL: 200 MS
EKG Q-T INTERVAL: 290 MS
EKG Q-T INTERVAL: 336 MS
EKG Q-T INTERVAL: 424 MS
EKG QRS DURATION: 134 MS
EKG QRS DURATION: 134 MS
EKG QRS DURATION: 138 MS
EKG QTC CALCULATION (BAZETT): 450 MS
EKG QTC CALCULATION (BAZETT): 507 MS
EKG QTC CALCULATION (BAZETT): 538 MS
EKG R AXIS: -43 DEGREES
EKG R AXIS: -52 DEGREES
EKG R AXIS: -57 DEGREES
EKG T AXIS: 132 DEGREES
EKG T AXIS: 159 DEGREES
EKG T AXIS: 194 DEGREES
EKG VENTRICULAR RATE: 145 BPM
EKG VENTRICULAR RATE: 154 BPM
EKG VENTRICULAR RATE: 86 BPM
ERYTHROCYTE [DISTWIDTH] IN BLOOD BY AUTOMATED COUNT: 14 % (ref 11.5–14.5)
GLOBULIN SER CALC-MCNC: 3.4 G/DL (ref 2–4)
GLUCOSE BLD STRIP.AUTO-MCNC: 152 MG/DL (ref 65–117)
GLUCOSE BLD STRIP.AUTO-MCNC: 181 MG/DL (ref 65–117)
GLUCOSE BLD STRIP.AUTO-MCNC: 228 MG/DL (ref 65–117)
GLUCOSE BLD STRIP.AUTO-MCNC: 235 MG/DL (ref 65–117)
GLUCOSE SERPL-MCNC: 145 MG/DL (ref 65–100)
GLUCOSE SERPL-MCNC: 225 MG/DL (ref 65–100)
HCT VFR BLD AUTO: 37 % (ref 35–47)
HGB BLD-MCNC: 11.6 G/DL (ref 11.5–16)
MAGNESIUM SERPL-MCNC: 2.1 MG/DL (ref 1.6–2.4)
MAGNESIUM SERPL-MCNC: 2.2 MG/DL (ref 1.6–2.4)
MCH RBC QN AUTO: 30.9 PG (ref 26–34)
MCHC RBC AUTO-ENTMCNC: 31.4 G/DL (ref 30–36.5)
MCV RBC AUTO: 98.7 FL (ref 80–99)
NRBC # BLD: 0 K/UL (ref 0–0.01)
NRBC BLD-RTO: 0 PER 100 WBC
PHOSPHATE SERPL-MCNC: 2 MG/DL (ref 2.6–4.7)
PHOSPHATE SERPL-MCNC: 2.4 MG/DL (ref 2.6–4.7)
PLATELET # BLD AUTO: 189 K/UL (ref 150–400)
PMV BLD AUTO: 11 FL (ref 8.9–12.9)
POTASSIUM SERPL-SCNC: 3.4 MMOL/L (ref 3.5–5.1)
POTASSIUM SERPL-SCNC: 4.1 MMOL/L (ref 3.5–5.1)
PROT SERPL-MCNC: 6.4 G/DL (ref 6.4–8.2)
RBC # BLD AUTO: 3.75 M/UL (ref 3.8–5.2)
SERVICE CMNT-IMP: ABNORMAL
SODIUM SERPL-SCNC: 139 MMOL/L (ref 136–145)
SODIUM SERPL-SCNC: 140 MMOL/L (ref 136–145)
TROPONIN I SERPL HS-MCNC: 789 NG/L (ref 0–51)
WBC # BLD AUTO: 11 K/UL (ref 3.6–11)

## 2024-02-23 PROCEDURE — 2580000003 HC RX 258: Performed by: NURSE PRACTITIONER

## 2024-02-23 PROCEDURE — 6370000000 HC RX 637 (ALT 250 FOR IP): Performed by: NURSE PRACTITIONER

## 2024-02-23 PROCEDURE — 2500000003 HC RX 250 WO HCPCS: Performed by: STUDENT IN AN ORGANIZED HEALTH CARE EDUCATION/TRAINING PROGRAM

## 2024-02-23 PROCEDURE — 93005 ELECTROCARDIOGRAM TRACING: CPT | Performed by: INTERNAL MEDICINE

## 2024-02-23 PROCEDURE — 85027 COMPLETE CBC AUTOMATED: CPT

## 2024-02-23 PROCEDURE — 2580000003 HC RX 258: Performed by: STUDENT IN AN ORGANIZED HEALTH CARE EDUCATION/TRAINING PROGRAM

## 2024-02-23 PROCEDURE — 36415 COLL VENOUS BLD VENIPUNCTURE: CPT

## 2024-02-23 PROCEDURE — 6370000000 HC RX 637 (ALT 250 FOR IP): Performed by: INTERNAL MEDICINE

## 2024-02-23 PROCEDURE — 84100 ASSAY OF PHOSPHORUS: CPT

## 2024-02-23 PROCEDURE — 99232 SBSQ HOSP IP/OBS MODERATE 35: CPT | Performed by: FAMILY MEDICINE

## 2024-02-23 PROCEDURE — C8929 TTE W OR WO FOL WCON,DOPPLER: HCPCS

## 2024-02-23 PROCEDURE — 6360000004 HC RX CONTRAST MEDICATION: Performed by: INTERNAL MEDICINE

## 2024-02-23 PROCEDURE — 82962 GLUCOSE BLOOD TEST: CPT

## 2024-02-23 PROCEDURE — 6360000002 HC RX W HCPCS

## 2024-02-23 PROCEDURE — 83735 ASSAY OF MAGNESIUM: CPT

## 2024-02-23 PROCEDURE — 80048 BASIC METABOLIC PNL TOTAL CA: CPT

## 2024-02-23 PROCEDURE — 6360000002 HC RX W HCPCS: Performed by: NURSE PRACTITIONER

## 2024-02-23 PROCEDURE — 84484 ASSAY OF TROPONIN QUANT: CPT

## 2024-02-23 PROCEDURE — 2000000000 HC ICU R&B

## 2024-02-23 PROCEDURE — 6360000002 HC RX W HCPCS: Performed by: STUDENT IN AN ORGANIZED HEALTH CARE EDUCATION/TRAINING PROGRAM

## 2024-02-23 PROCEDURE — 80076 HEPATIC FUNCTION PANEL: CPT

## 2024-02-23 RX ORDER — ACYCLOVIR 400 MG/1
400 TABLET ORAL 2 TIMES DAILY
COMMUNITY

## 2024-02-23 RX ORDER — SACUBITRIL AND VALSARTAN 24; 26 MG/1; MG/1
1 TABLET, FILM COATED ORAL 2 TIMES DAILY
Status: ON HOLD | COMMUNITY
End: 2024-03-02 | Stop reason: HOSPADM

## 2024-02-23 RX ORDER — AMIODARONE HYDROCHLORIDE 200 MG/1
200 TABLET ORAL DAILY
Status: DISCONTINUED | OUTPATIENT
Start: 2024-02-23 | End: 2024-03-02 | Stop reason: HOSPADM

## 2024-02-23 RX ORDER — DIGOXIN 0.25 MG/ML
250 INJECTION INTRAMUSCULAR; INTRAVENOUS EVERY 6 HOURS
Status: COMPLETED | OUTPATIENT
Start: 2024-02-23 | End: 2024-02-24

## 2024-02-23 RX ORDER — ATORVASTATIN CALCIUM 20 MG/1
20 TABLET, FILM COATED ORAL DAILY
Status: ON HOLD | COMMUNITY
End: 2024-03-02

## 2024-02-23 RX ORDER — DIGOXIN 0.25 MG/ML
250 INJECTION INTRAMUSCULAR; INTRAVENOUS ONCE
Status: COMPLETED | OUTPATIENT
Start: 2024-02-23 | End: 2024-02-23

## 2024-02-23 RX ORDER — FUROSEMIDE 40 MG/1
40 TABLET ORAL DAILY
Status: ON HOLD | COMMUNITY
End: 2024-03-02 | Stop reason: HOSPADM

## 2024-02-23 RX ORDER — 0.9 % SODIUM CHLORIDE 0.9 %
500 INTRAVENOUS SOLUTION INTRAVENOUS ONCE
Status: COMPLETED | OUTPATIENT
Start: 2024-02-23 | End: 2024-02-23

## 2024-02-23 RX ORDER — DILTIAZEM HYDROCHLORIDE 5 MG/ML
10 INJECTION INTRAVENOUS ONCE
Status: COMPLETED | OUTPATIENT
Start: 2024-02-23 | End: 2024-02-23

## 2024-02-23 RX ADMIN — SODIUM CHLORIDE 500 ML: 9 INJECTION, SOLUTION INTRAVENOUS at 17:30

## 2024-02-23 RX ADMIN — ASPIRIN 81 MG: 81 TABLET, CHEWABLE ORAL at 08:05

## 2024-02-23 RX ADMIN — DIGOXIN 250 MCG: 250 INJECTION, SOLUTION INTRAMUSCULAR; INTRAVENOUS at 18:26

## 2024-02-23 RX ADMIN — POTASSIUM & SODIUM PHOSPHATES POWDER PACK 280-160-250 MG 500 MG: 280-160-250 PACK at 11:36

## 2024-02-23 RX ADMIN — PERFLUTREN 1.5 ML: 6.52 INJECTION, SUSPENSION INTRAVENOUS at 09:05

## 2024-02-23 RX ADMIN — DIGOXIN 250 MCG: 250 INJECTION, SOLUTION INTRAMUSCULAR; INTRAVENOUS at 19:07

## 2024-02-23 RX ADMIN — AMIODARONE HYDROCHLORIDE 1 MG/MIN: 50 INJECTION, SOLUTION INTRAVENOUS at 17:46

## 2024-02-23 RX ADMIN — APIXABAN 2.5 MG: 2.5 TABLET, FILM COATED ORAL at 20:46

## 2024-02-23 RX ADMIN — DILTIAZEM HYDROCHLORIDE 10 MG: 5 INJECTION INTRAVENOUS at 16:53

## 2024-02-23 RX ADMIN — POTASSIUM BICARBONATE 40 MEQ: 782 TABLET, EFFERVESCENT ORAL at 06:46

## 2024-02-23 RX ADMIN — INSULIN LISPRO 1 UNITS: 100 INJECTION, SOLUTION INTRAVENOUS; SUBCUTANEOUS at 11:37

## 2024-02-23 RX ADMIN — SODIUM CHLORIDE, PRESERVATIVE FREE 10 ML: 5 INJECTION INTRAVENOUS at 20:47

## 2024-02-23 RX ADMIN — AMIODARONE HYDROCHLORIDE 150 MG: 1.5 INJECTION, SOLUTION INTRAVENOUS at 17:01

## 2024-02-23 RX ADMIN — Medication 1 AMPULE: at 08:05

## 2024-02-23 RX ADMIN — AMIODARONE HYDROCHLORIDE 200 MG: 200 TABLET ORAL at 08:05

## 2024-02-23 RX ADMIN — POLYETHYLENE GLYCOL 3350 17 G: 17 POWDER, FOR SOLUTION ORAL at 07:59

## 2024-02-23 RX ADMIN — SODIUM CHLORIDE, PRESERVATIVE FREE 10 ML: 5 INJECTION INTRAVENOUS at 10:00

## 2024-02-23 RX ADMIN — Medication 1 AMPULE: at 21:59

## 2024-02-23 RX ADMIN — PIPERACILLIN AND TAZOBACTAM 3375 MG: 3; .375 INJECTION, POWDER, LYOPHILIZED, FOR SOLUTION INTRAVENOUS at 02:56

## 2024-02-23 RX ADMIN — POTASSIUM & SODIUM PHOSPHATES POWDER PACK 280-160-250 MG 500 MG: 280-160-250 PACK at 21:02

## 2024-02-23 RX ADMIN — ENOXAPARIN SODIUM 70 MG: 100 INJECTION SUBCUTANEOUS at 00:34

## 2024-02-23 NOTE — PROGRESS NOTES
1910 Bedside shift report received from SHIRLEY Sarkar    1930 assessment completed.  Pt passed RN performed swallow evaluation, given dinner tray.    2330 reassessment completed, no significant changes from the previous assessment.  Data base questions completed except pt medication at home list and pharmacy, Pt unsure of all her medications and pharmacy at this time.  Will attempt to complete or pass on to the next shift.    0330 reassessment completed, no significant changes from the previous assessment.      Bedside shift report given to SHIRLEY Wallace          CARE PLAN    Problem: Safety - Adult  Goal: Free from fall injury  Outcome: Progressing

## 2024-02-23 NOTE — PROGRESS NOTES
Spiritual Care Assessment/Progress Note  [facility]    Name: Yesenia Horan MRN: [id number]    Age: 83 y.o.     Sex: female   Language: English     Date: 2/23/2024            Total Time Calculated: 10 min              Spiritual Assessment begun in [facility] 2 CRITICAL CARE  Service Provided For:: Patient  Referral/Consult From:: Rounding  Encounter Overview/Reason : Initial Encounter    Spiritual beliefs:      [] Involved in a honorio tradition/spiritual practice:      [] Supported by a honorio community:      [] Claims no spiritual orientation:      [] Seeking spiritual identity:           [] Adheres to an individual form of spirituality:      [x] Not able to assess:                Identified resources for coping and support system:   Support System: Spouse       [] Prayer                  [] Devotional reading               [] Music                  [] Guided Imagery     [] Pet visits                                        [] Other: (COMMENT)     Specific area/focus of visit   Encounter:    Crisis:    Spiritual/Emotional needs: Type: Spiritual Support  Ritual, Rites and Sacraments:    Grief, Loss, and Adjustments:    Ethics/Mediation:    Behavioral Health:    Palliative Care:    Advance Care Planning:           Narrative:   Stepped into room and asked patient how she was doing. She shared that she is making good progress since she arrived (E/R) a few days ago and says she is moving from CCU to another area of the hospital soon. She is feeling better and is grateful for the good care here at [facility]. I shared that I was glad she is progressing. She thanked me for the visit and I departed.          Subjective:       Patient ID: Philip Alberts is a 45 y.o. male.    Chief Complaint: Diabetes, Hypertension, Follow-up, and Hyperlipidemia     This is the 1st appointment off patient . Philip Alberts with me.  He is already established in the system and used to follow-up with Dr. Hua.  He wants to change physicians at this point.      Underlying medical issues include the following:-    1.-insulin dependent diabetes mellitus  2.-neuropathy secondary to diabetes and currently on gabapentin at a high dosage.  3.-hyperlipidemia was supposed to be on pravastatin but has not been taking it off late.  4.-erectile dysfunction noted to be on Viagra which he uses as needed.    Off late he has been of most of the medications.  He currently on a combination of long and short-acting insulin.  No insulin has been available for the last several weeks or so.  He is looking for a hemoglobin A1c of less than 7.  (I have tampered his expectations and advised him to tread cautiously and 1st aim is to bring the hemoglobin A1c less than 9 before he shoots  for bigger goals. )    It is unclear at this point if he has type 1 or type 2 diabetes but given her his ectomorphic status and requirement for insulin I suspect he might have type 1 diabetes.        Single and and occasionally sexually active and generally in no permanent relationship.    Smoker also.    No alcohol and denies using drugs.    Not working at this point.  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    Diabetes  He presents for his follow-up diabetic visit. He has type 2 diabetes mellitus. MedicAlert identification noted. The initial diagnosis of diabetes was made 11 Years ago. His disease course has been worsening. Hypoglycemia symptoms include dizziness, sleepiness and tremors. Pertinent negatives for hypoglycemia include no confusion, headaches, hunger, mood changes, nervousness/anxiousness, pallor, seizures, speech difficulty or sweats.  Associated symptoms include blurred vision, fatigue, foot paresthesias, foot ulcerations, polydipsia, polyuria, visual change, weakness and weight loss. Pertinent negatives for diabetes include no chest pain and no polyphagia. Hypoglycemia complications include nocturnal hypoglycemia and required glucagon injection. Pertinent negatives for hypoglycemia complications include no blackouts, no hospitalization and no required assistance. Symptoms are worsening. Diabetic complications include impotence, peripheral neuropathy and retinopathy. Pertinent negatives for diabetic complications include no CVA. Risk factors for coronary artery disease include dyslipidemia, family history, hypertension, tobacco exposure and diabetes mellitus. Current diabetic treatment includes insulin injections. He is compliant with treatment most of the time. He is currently taking insulin pre-breakfast and at bedtime. Insulin injections are given by patient. Rotation sites for injection include the abdominal wall. His weight is stable. He has not had a previous visit with a dietitian. He participates in exercise daily. He monitors blood glucose at home 1-2 x per day. He monitors urine at home <1 x per month. Blood glucose monitoring compliance is good. His home blood glucose trend is increasing rapidly. His breakfast blood glucose range is generally >200 mg/dl. His lunch blood glucose range is generally >200 mg/dl. His dinner blood glucose range is generally >200 mg/dl. He does not see a podiatrist.Eye exam is not current.   Hypertension  This is a chronic problem. The current episode started more than 1 year ago. The problem is uncontrolled. Associated symptoms include blurred vision. Pertinent negatives include no chest pain, headaches, palpitations or sweats. Risk factors for coronary artery disease include dyslipidemia, male gender and sedentary lifestyle. Past treatments include nothing. Hypertensive end-organ damage includes  retinopathy. There is no history of CVA.   Hyperlipidemia  This is a chronic problem. The current episode started more than 1 year ago. He has no history of obesity. Pertinent negatives include no chest pain. Current antihyperlipidemic treatment includes statins. The current treatment provides moderate improvement of lipids. Risk factors for coronary artery disease include hypertension and dyslipidemia.     Past Medical History:   Diagnosis Date    Diabetes mellitus, type 2     Encounter for blood transfusion     GERD (gastroesophageal reflux disease)     Hyperlipidemia     Hypertension     Neuropathy     Osteoarthritis      Social History     Socioeconomic History    Marital status: Single   Tobacco Use    Smoking status: Every Day    Smokeless tobacco: Never   Substance and Sexual Activity    Alcohol use: No    Drug use: No    Sexual activity: Yes     Past Surgical History:   Procedure Laterality Date    MANDIBLE FRACTURE SURGERY  07/17/2000    MANDIBLE FRACTURE SURGERY      SPLENECTOMY, TOTAL  07/17/2000     Family History   Problem Relation Age of Onset    Diabetes Mother     Cancer Father        Review of Systems   Constitutional:  Positive for fatigue and weight loss. Negative for activity change, chills, diaphoresis and fever. Unexpected weight change: gained 7 lbs.  HENT:  Negative for congestion, facial swelling, nosebleeds, postnasal drip, sore throat and trouble swallowing.    Eyes:  Positive for blurred vision. Negative for pain, discharge and visual disturbance.   Respiratory:  Negative for cough, chest tightness and wheezing.    Cardiovascular:  Negative for chest pain, palpitations and leg swelling.   Gastrointestinal:  Negative for abdominal distention, abdominal pain, blood in stool and diarrhea.   Endocrine: Positive for polydipsia and polyuria. Negative for cold intolerance, heat intolerance and polyphagia.   Genitourinary:  Positive for impotence. Negative for dysuria, flank pain, frequency,  hematuria, scrotal swelling and urgency.   Musculoskeletal:  Negative for arthralgias, back pain and joint swelling.   Skin:  Negative for color change, pallor and rash.   Allergic/Immunologic: Negative for environmental allergies, food allergies and immunocompromised state.        History of splenectomy.   Neurological:  Positive for dizziness, tremors and weakness. Negative for seizures, syncope, speech difficulty, light-headedness, numbness and headaches.   Hematological:  Negative for adenopathy. Does not bruise/bleed easily.   Psychiatric/Behavioral:  Negative for agitation, behavioral problems, confusion, dysphoric mood and sleep disturbance. The patient is not nervous/anxious.        Objective:      Blood pressure (!) 162/92, pulse 87, height 6' (1.829 m), weight 70.8 kg (156 lb). Body mass index is 21.16 kg/m².  Physical Exam  Vitals and nursing note reviewed.   Constitutional:       General: He is not in acute distress.     Appearance: He is well-developed. He is not ill-appearing, toxic-appearing or diaphoretic.      Comments: BMI is 21.16   HENT:      Head: Normocephalic and atraumatic.      Nose: Nose normal.      Mouth/Throat:      Pharynx: No oropharyngeal exudate or posterior oropharyngeal erythema.      Comments: Edentulous.  Eyes:      Conjunctiva/sclera: Conjunctivae normal.   Neck:      Thyroid: No thyromegaly.      Vascular: No JVD.      Trachea: No tracheal deviation.   Cardiovascular:      Rate and Rhythm: Normal rate and regular rhythm.      Heart sounds: Normal heart sounds. No murmur heard.    No friction rub. No gallop.   Pulmonary:      Effort: Pulmonary effort is normal. No respiratory distress.      Breath sounds: Normal breath sounds. No wheezing or rales.   Abdominal:      General: There is no distension.      Palpations: Abdomen is soft.      Tenderness: There is no abdominal tenderness.   Musculoskeletal:         General: No swelling.      Cervical back: Neck supple. No rigidity.       Right lower leg: No edema.      Left lower leg: No edema.      Right foot: Normal range of motion. No deformity.      Left foot: Normal range of motion. No deformity.        Feet:    Feet:      Right foot:      Protective Sensation:   0 sites sensed.      Skin integrity: No warmth.      Toenail Condition: Right toenails are abnormally thick. Fungal disease present.     Left foot:      Protective Sensation: 5 sites tested.  0 sites sensed.      Skin integrity: Skin breakdown present. No warmth.   Lymphadenopathy:      Cervical: No cervical adenopathy.   Skin:     General: Skin is warm and dry.   Neurological:      Mental Status: He is alert. Mental status is at baseline.      Gait: Gait normal.      Deep Tendon Reflexes: Reflexes are normal and symmetric.   Psychiatric:         Mood and Affect: Mood is anxious.         Feet have been examined.  There seems to be ulceration and breakdown in the skin in the left great toe.    Un trim nails on the right toe with some dystrophy and onychomycosis.  Sensations are diminished.  Assessment:       1. Type 2 diabetes mellitus with diabetic polyneuropathy, with long-term current use of insulin    2. Mixed hyperlipidemia    3. Primary hypertension    4. Need for pneumococcal vaccine    5. History of splenectomy             Lab Visit on 02/10/2023   Component Date Value Ref Range Status    Cholesterol 02/10/2023 224 (H)  120 - 199 mg/dL Final    Triglycerides 02/10/2023 35  30 - 150 mg/dL Final    HDL 02/10/2023 95 (H)  40 - 75 mg/dL Final    LDL Cholesterol 02/10/2023 122.0  63.0 - 159.0 mg/dL Final    HDL/Cholesterol Ratio 02/10/2023 42.4  20.0 - 50.0 % Final    Total Cholesterol/HDL Ratio 02/10/2023 2.4  2.0 - 5.0 Final    Non-HDL Cholesterol 02/10/2023 129  mg/dL Final    Sodium 02/10/2023 135 (L)  136 - 145 mmol/L Final    Potassium 02/10/2023 4.0  3.5 - 5.1 mmol/L Final    Chloride 02/10/2023 97  95 - 110 mmol/L Final    CO2 02/10/2023 29  23 - 29 mmol/L Final    Glucose  02/10/2023 377 (H)  70 - 110 mg/dL Final    BUN 02/10/2023 14  6 - 20 mg/dL Final    Creatinine 02/10/2023 0.7  0.5 - 1.4 mg/dL Final    Calcium 02/10/2023 9.2  8.7 - 10.5 mg/dL Final    Total Protein 02/10/2023 7.3  6.0 - 8.4 g/dL Final    Albumin 02/10/2023 4.0  3.5 - 5.2 g/dL Final    Total Bilirubin 02/10/2023 0.6  0.1 - 1.0 mg/dL Final    Alkaline Phosphatase 02/10/2023 87  55 - 135 U/L Final    AST 02/10/2023 25  10 - 40 U/L Final    ALT 02/10/2023 26  10 - 44 U/L Final    Anion Gap 02/10/2023 9  8 - 16 mmol/L Final    eGFR 02/10/2023 >60.0  >60 mL/min/1.73 m^2 Final    WBC 02/10/2023 9.38  3.90 - 12.70 K/uL Final    RBC 02/10/2023 4.54 (L)  4.60 - 6.20 M/uL Final    Hemoglobin 02/10/2023 13.8 (L)  14.0 - 18.0 g/dL Final    Hematocrit 02/10/2023 42.2  40.0 - 54.0 % Final    MCV 02/10/2023 93  82 - 98 fL Final    MCH 02/10/2023 30.4  27.0 - 31.0 pg Final    MCHC 02/10/2023 32.7  32.0 - 36.0 g/dL Final    RDW 02/10/2023 13.1  11.5 - 14.5 % Final    Platelets 02/10/2023 166  150 - 450 K/uL Final    MPV 02/10/2023 14.0 (H)  9.2 - 12.9 fL Final    Immature Granulocytes 02/10/2023 0.3  0.0 - 0.5 % Final    Gran # (ANC) 02/10/2023 4.9  1.8 - 7.7 K/uL Final    Immature Grans (Abs) 02/10/2023 0.03  0.00 - 0.04 K/uL Final    Lymph # 02/10/2023 3.2  1.0 - 4.8 K/uL Final    Mono # 02/10/2023 0.8  0.3 - 1.0 K/uL Final    Eos # 02/10/2023 0.4  0.0 - 0.5 K/uL Final    Baso # 02/10/2023 0.13  0.00 - 0.20 K/uL Final    nRBC 02/10/2023 0  0 /100 WBC Final    Gran % 02/10/2023 52.6  38.0 - 73.0 % Final    Lymph % 02/10/2023 33.8  18.0 - 48.0 % Final    Mono % 02/10/2023 8.2  4.0 - 15.0 % Final    Eosinophil % 02/10/2023 3.7  0.0 - 8.0 % Final    Basophil % 02/10/2023 1.4  0.0 - 1.9 % Final    Differential Method 02/10/2023 Automated   Final    PSA, Screen 02/10/2023 0.32  0.00 - 4.00 ng/mL Final    Hemoglobin A1C 02/10/2023 12.2 (H)  4.5 - 6.2 % Final    Estimated Avg Glucose 02/10/2023 303 (H)  68 - 131 mg/dL Final  "        Plan:           Type 2 diabetes mellitus with diabetic polyneuropathy, with long-term current use of insulin  -     Microalbumin/Creatinine Ratio, Urine; Future; Expected date: 04/10/2023  -     insulin (LANTUS SOLOSTAR U-100 INSULIN) glargine 100 units/mL SubQ pen; Inject 30 units SQ  at bedtime daily  Dispense: 9 mL; Refill: 5  -     insulin aspart U-100 (NOVOLOG FLEXPEN U-100 INSULIN) 100 unit/mL (3 mL) InPn pen; Inject 10 Units into the skin 4 (four) times daily. Per sliding scale as instructed  Dispense: 12 mL; Refill: 5  -     pen needle, diabetic (BD DENIS 2ND GEN PEN NEEDLE) 32 gauge x 5/32" Ndle; 1 Device by Misc.(Non-Drug; Combo Route) route 4 (four) times daily before meals and nightly.  Dispense: 150 each; Refill: 4  -     Ambulatory referral/consult to Podiatry; Future; Expected date: 04/10/2023  -     Hemoglobin A1C; Future; Expected date: 04/10/2023  -     TRUEPLUS LANCETS 33 gauge Misc; Inject 1 lancet into the skin 4 (four) times daily before meals and nightly.  Dispense: 150 each; Refill: 5  -     blood sugar diagnostic Strp; To check BG qid times daily, to use with insurance preferred meter  Dispense: 150 strip; Refill: 3  -     gabapentin (NEURONTIN) 300 MG capsule; Take 2 capsules (600 mg total) by mouth 3 (three) times daily.  Dispense: 90 capsule; Refill: 5    Mixed hyperlipidemia  -     pravastatin (PRAVACHOL) 20 MG tablet; Take 1 tablet (20 mg total) by mouth every evening.  Dispense: 30 tablet; Refill: 5    Primary hypertension  -     lisinopriL (PRINIVIL,ZESTRIL) 5 MG tablet; Take 1 tablet (5 mg total) by mouth once daily.  Dispense: 30 tablet; Refill: 5    Need for pneumococcal vaccine  -     (In Office Administered) Pneumococcal Conjugate Vaccine (20 Valent) (IM)    History of splenectomy  Comments:  Will need multiple immunizations and I have given him a list.  Today will do the Prevnar 20.     Patient comes back to the clinic after long time and this is his 1st visit to see me.  " He wants a new provider.      Diabetes control continues to be poor as his aspirations to control it to the tightest limit possible conflict with reality.    Insulin prescriptions have been given for Lantus and short-acting and I explained the mechanism of each type of insulin and the role involved.  Longer-acting insulin covers on a baseline and short-acting is for spikes.  He was thinking of using the longer-acting insulin to or 3 times a day.    My goal is to get his hemoglobin A1c for the time being less than 10 or perhaps less than 9 and then we can build upon the initial Foundation and shoot for making castles.    Given his ectomorphic status, thin built and dependence on diabetes I wonder whether he has type 1 diabetes.  I will check broderick 65 antibodies, insulin antibodies and zinc could transport her antibodies in future.  These tests are not available at [facility].    Patient has been advised to quit smoking and he states that he has no pleasure left life especially with the teeth removed and loss of weight.  He is no relationships.  The only pleasure in life he has a smoking.  I have advised him that the advised to quit smoking is aspiration and a general guidance and not a rap on his knuckles.    I have advised him that go management of diabetes also control of lipids as well as blood pressure.  Diabetes does affect kidneys, eyes, heart, circulation and all of these need to be addressed.      Referral to Podiatry also has been made.    Advised Pt about age and season appropriate immunizations/ cancer screenings.  Also seasonal influenza vaccine, update on tetanus diphtheria vaccination every 10 years.  Patient has been advised to watch diet and exercise. Avoid fried and fatty food. Compliance to medications and follow up urged.  Advised Pt. to monitor Blood sugars at home and record them.  Advised Pt  for Anti reflux measures like small feequent meals, avoid spicy and greasy food. Head end  up at night.  keep a close eye on feet and keep them clean. Annual eye examination. Annual influenza vaccine.  Monitor HgbA1c every 3 to 6 months. Monitor urine microalbumin every year.keep LDL less than 100. Monitor blood pressure and target blood pressure 120/70.  Please utilize precautions for current COVID-19 pandemic.  Try to avoid crowds or close contact with multiple people.  Minimize outside interaction.  Wash hands with soap for  frequently upon contact.Use face mask or cover.    Fup couple of months    Spent ramón 53 minutes with patient which involved review of pts medical conditions, labs, medications and with 50% of time face-to-face discussion about medical problems, management and any applicable changes.        Current Outpatient Medications:     blood sugar diagnostic Strp, To check BG qid times daily, to use with insurance preferred meter, Disp: 150 strip, Rfl: 3    EPINEPHrine (EPIPEN) 0.3 mg/0.3 mL AtIn, Inject 0.3 mLs (0.3 mg total) into the muscle once. for 1 dose, Disp: 0.3 mL, Rfl: 2    lancets Misc, To check BGqid times daily, to use with insurance preferred meter (Patient taking differently: 1 lancet by Misc.(Non-Drug; Combo Route) route 4 (four) times daily. To check BGqid times daily, to use with insurance preferred meter), Disp: 200 each, Rfl: 3    sildenafiL (VIAGRA) 100 MG tablet, Take 1 tablet (100 mg total) by mouth daily as needed for Erectile Dysfunction., Disp: 20 tablet, Rfl: 5    blood-glucose meter kit, To check BG qid times daily, to use with insurance preferred meter, Disp: 1 each, Rfl: 0    gabapentin (NEURONTIN) 300 MG capsule, Take 2 capsules (600 mg total) by mouth 3 (three) times daily., Disp: 90 capsule, Rfl: 5    insulin (LANTUS SOLOSTAR U-100 INSULIN) glargine 100 units/mL SubQ pen, Inject 30 units SQ  at bedtime daily, Disp: 9 mL, Rfl: 5    insulin aspart U-100 (NOVOLOG FLEXPEN U-100 INSULIN) 100 unit/mL (3 mL) InPn pen, Inject 10 Units into the skin 4 (four)  "times daily. Per sliding scale as instructed, Disp: 12 mL, Rfl: 5    lisinopriL (PRINIVIL,ZESTRIL) 5 MG tablet, Take 1 tablet (5 mg total) by mouth once daily., Disp: 30 tablet, Rfl: 5    pen needle, diabetic (BD DENIS 2ND GEN PEN NEEDLE) 32 gauge x 5/32" Ndle, 1 Device by Misc.(Non-Drug; Combo Route) route 4 (four) times daily before meals and nightly., Disp: 150 each, Rfl: 4    pravastatin (PRAVACHOL) 20 MG tablet, Take 1 tablet (20 mg total) by mouth every evening., Disp: 30 tablet, Rfl: 5    TRUEPLUS LANCETS 33 gauge Misc, Inject 1 lancet into the skin 4 (four) times daily before meals and nightly., Disp: 150 each, Rfl: 5    "

## 2024-02-23 NOTE — PROGRESS NOTES
antibiotics according to sensitivity     Chronic systolic heart failure - last ECHO 2023 showed EF 20%.   Patient is not on any diuretics at this moment  We will continue monitor blood pressure and volume status.  Input output measurement Daily weights.    Hyperglycemia -case of diabetes  Continue sliding scale insulin  Diabetic diet    Hypokalemia  Replete K.       Medical Decision Making:   I personally reviewed labs: CBC BMP  I personally reviewed imaging: Chest x-ray possible interstitial edema  I personally reviewed EKG: No ST-T changes A-fib  Toxic drug monitoring: Bleeding while on Eliquis  Discussed case with: Patient    Code Status: Full  DVT Prophylaxis: Eliquis  GI Prophylaxis: None for now    Subjective:     Chief Complaint / Reason for Physician Visit  Currently admitted and treated for shock in the setting of A-fib with RVR.  Patient also has UTI.  Her RVR has resolved.  She is getting antibiotics for UTI.  Cardiology is involved.    Labs and chart reviewed and patient examined overnight events noted.  Patient appeared to be comfortable and in no apparent distress.      Objective:     VITALS:   Last 24hrs VS reviewed since prior progress note. Most recent are:  Patient Vitals for the past 24 hrs:   BP Temp Temp src Pulse Resp SpO2 Height Weight   02/23/24 1200 -- 97.7 °F (36.5 °C) Axillary 87 14 -- -- --   02/23/24 1100 103/67 -- -- 87 12 -- -- --   02/23/24 1000 (!) 150/66 -- -- 79 25 -- -- --   02/23/24 0900 135/76 -- -- 91 15 (!) 89 % -- --   02/23/24 0815 138/67 -- -- -- -- -- 1.6 m (5' 2.99\") 59.9 kg (132 lb)   02/23/24 0800 136/67 -- -- 95 21 92 % -- --   02/23/24 0700 138/67 -- -- 87 13 95 % -- --   02/23/24 0600 129/67 -- -- 80 23 95 % -- --   02/23/24 0500 125/68 -- -- 82 12 94 % -- --   02/23/24 0400 124/64 97.9 °F (36.6 °C) Oral 80 14 94 % -- --   02/23/24 0300 116/76 -- -- 78 20 95 % -- --   02/23/24 0200 (!) 120/54 -- -- 79 27 93 % -- --   02/23/24 0100 (!) 109/55 -- -- 77 18 92 % -- --      LABS:  I reviewed today's most current labs and imaging studies.  Pertinent labs include:  Recent Labs     02/22/24  0246 02/22/24 0339 02/23/24 0452   WBC 15.0* 14.4* 11.0   HGB 11.9 11.7 11.6   HCT 36.9 36.8 37.0    268 189     Recent Labs     02/21/24  1704 02/21/24  1705 02/21/24 2023 02/22/24 0339 02/23/24 0452   *  --  135* 137 140   K 4.5  --  4.5 4.3 3.4*   CL 96*  --  100 104 109*   CO2 17*  --  17* 24 23   GLUCOSE 232*  --  254* 224* 145*   BUN 23*  --  24* 25* 13   CREATININE 1.33*   < > 1.31* 1.33* 0.79   CALCIUM 9.3  --  8.3* 8.7 8.5   MG 2.1  --  2.0 2.3 2.2   PHOS  --   --  4.1 4.8* 2.4*   LABALBU 4.0  --  3.4*  --  3.0*   BILITOT 0.9  --  0.9  --  1.0   AST 36  --  31  --  34   ALT 22  --  22  --  19    < > = values in this interval not displayed.       Signed: Erick Sanchez MD

## 2024-02-23 NOTE — PROGRESS NOTES
Attended to rapid response, called for HR in 180s, Afib.  She is awake and alert  BP in 100s.    Given a cardizem 10 mg bolus  Discussed with cardiology Dr. Jc, recommend giving a bolus of 1 liters  Ordered 750 ml NS bolus  Also cardiology recommended giving amiodarone bolus and start drip if needed, amiodarone ordered.  If this doesn't control heart rate, can try digoxin 250 mcg every 6 hours for 4 doses.

## 2024-02-23 NOTE — PROGRESS NOTES
0800 - Bedside report received from SHIRLEY Anne. Patient on 1 L., A & O x 4, follows commands, pulses palpable.       1000 - Patient up to chair 1 assist with nursing. Tolerated well. Not complaints of dizziness/orthostatics.      1200 -1400  Clemens removed. Reassessment completed. Patient up to commode to BM with nursing.  Patient had bm and stated she voided. I was unable to confirm whether or not she voided.      1530 - Med rec completed with patient's daughter, Deepali. Message sent to pharmacy regarding meds.     TRANSFER - OUT REPORT:    Verbal report given to SHIRLEY Cerda on Yesenia Horan  being transferred to [facility] for routine progression of patient care       Report consisted of patient's Situation, Background, Assessment and   Recommendations(SBAR).     Information from the following report(s) Nurse Handoff Report, Index, Intake/Output, MAR, Recent Results, and Med Rec Status was reviewed with the receiving nurse.           Lines:   Peripheral IV 02/21/24 Left Basilic (Active)   Site Assessment Clean, dry & intact 02/23/24 1200   Line Status Capped 02/23/24 1200   Line Care Connections checked and tightened 02/23/24 1200   Phlebitis Assessment No symptoms 02/23/24 1200   Infiltration Assessment 0 02/23/24 1200   Alcohol Cap Used Yes 02/23/24 1200   Dressing Status Clean, dry & intact 02/23/24 1200   Dressing Type Transparent 02/23/24 1200       Peripheral IV 02/23/24 Left Hand (Active)   Site Assessment Clean, dry & intact 02/23/24 1200   Line Status Capped 02/23/24 1200   Line Care Connections checked and tightened 02/23/24 1200   Phlebitis Assessment No symptoms 02/23/24 1200   Infiltration Assessment 0 02/23/24 1200   Alcohol Cap Used Yes 02/23/24 1200   Dressing Status Clean, dry & intact 02/23/24 1200   Dressing Type Transparent 02/23/24 1200        Opportunity for questions and clarification was provided.      Patient transported with:  Registered Nurse

## 2024-02-23 NOTE — PROGRESS NOTES
Rapid response called for return of afib with -170, 2nd BP 90/66. Encourage MD to give 250 cc bolus, but IV infiltrated during treatment. Several attempts to restart IV made. ED Tech placed 18 in left upper arm.   1750 Update- Bolus of Amiodarone given. Starting drip now. Patient remains alert and BP is currently stable. Will continue to monitor.

## 2024-02-23 NOTE — PROGRESS NOTES
Patient transferred from CCU at approximately 1640. Vital signs stable as documented.    1645- Pt in afib, HR in 180s-190s as documented. Rapid response called. Attending also notified.    Dilt bolus, amiodarone bolus, and NS bolus given. Amio drip started. 2 doses of digoxin given. See MAR for meds/dosages given.     Per NP, patient to be moved back to CCU this evening.     Report given to Briana WATSON.

## 2024-02-23 NOTE — PROGRESS NOTES
Hospitalist Rapid Response Note    Was asked by attending Dr Sanchez to evaluate Ms. Yesenia Horan.  Briefly, Ms. Horan is an 83 year old female [facility] PMH significant for HFrEF 20%, Afib on Eliquis, DM2, who was initially admitted with urosepsis and Afib RVR.  She was admitted to ICU 2/21 for pressor support with Levophed, and was transferred out of ICU today 2/23 after being off of pressors since overnight.  Around 0430 this afternoon had had a Rapid Response for Afib RVR.  Cardizem gtt was initially in use, changed to Amiodarone gtt per cardiology recommendations.  Patient has received loading dose and was starting maintenance dose, but HR remained Afib with rates from 120s-170s, and blood pressures soft with systolics high 70s to low 90s. She is awake, alert, in no acute distress.    D/w Dr Sanchez and intensivist, we will give Digoxin 250 mcg q6h x4 doses per Dr. Jc's earlier recommendations.  If pt HR/BP is not improved by ~7pm they request we discuss with intensivist again for return to ICU.    18:55 Lety Mclain NP also at bedside, d/w Denis Mckeon NP cards as HR remains 170s after first dose of digoxin.  Denis VAUGHN advises giving 1x additional dose of Digoxin 250 mcg IV x1 dose now.    1940 Pt status remains unchanged, afib RVR rates 150s but as high as 180s, BP 89/63.  Asymptomatic but clearly refractory to dilt, amio, dig.  Spoke with Renay VAUGHN Intensivist who states they will accept pt for transfer to ICU.    Total Critical Care Time: 100 minutes

## 2024-02-23 NOTE — PROGRESS NOTES
ICU Progress Note        Subjective:    - lying comfortably on the bed.   - lying comfortably on the bed, slept well overnight.       Vital Signs:    /67   Pulse 87   Temp 97.9 °F (36.6 °C) (Oral)   Resp 13   Ht 1.6 m (5' 2.99\")   Wt 59.9 kg (132 lb)   SpO2 95%   BMI 23.39 kg/m²             Temp (24hrs), Av.1 °F (36.7 °C), Min:97.8 °F (36.6 °C), Max:98.5 °F (36.9 °C)       Intake/Output:   Last shift:      No intake/output data recorded.  Last 3 shifts:  190 -  0700  In: 2612 [P.O.:600; I.V.:1240.6]  Out: 1870 [Urine:1870]    Intake/Output Summary (Last 24 hours) at 2024 0845  Last data filed at 2024 0659  Gross per 24 hour   Intake 2294.53 ml   Output 1870 ml   Net 424.53 ml         Physical Exam:    General: Alert, awake and oriented. Not in acute distress  HEENT:  Anicteric sclerae; pink palpebral conjunctivae; mucosa moist  Resp:  Bilateral air entry +, no crackles or wheeze  CV:  S1, S2 present  GI:  Abdomen soft, non-tender; (+) active bowel sounds  Extremities:  +2 pulses on all extremities; no edema/ cyanosis/ clubbing noted  Skin:  Warm; no rashes/ lesions noted  Neurologic:  Non-focal    DATA:     Current Facility-Administered Medications   Medication Dose Route Frequency    amiodarone (CORDARONE) tablet 200 mg  200 mg Oral Daily    enoxaparin (LOVENOX) injection 70 mg  1 mg/kg SubCUTAneous Q24H    aspirin chewable tablet 81 mg  81 mg Oral Daily    insulin lispro (HUMALOG) injection vial 0-4 Units  0-4 Units SubCUTAneous TID WC    insulin lispro (HUMALOG) injection vial 0-4 Units  0-4 Units SubCUTAneous Nightly    alcohol 62% (NOZIN) nasal  1 ampule  1 ampule Nasal Q12H    sodium chloride flush 0.9 % injection 5-40 mL  5-40 mL IntraVENous 2 times per day    sodium chloride flush 0.9 % injection 5-40 mL  5-40 mL IntraVENous PRN    0.9 % sodium chloride infusion   IntraVENous PRN    ondansetron (ZOFRAN-ODT) disintegrating tablet 4 mg  4 mg Oral Q8H  PRN    Or    ondansetron (ZOFRAN) injection 4 mg  4 mg IntraVENous Q6H PRN    polyethylene glycol (GLYCOLAX) packet 17 g  17 g Oral Daily PRN    acetaminophen (TYLENOL) tablet 650 mg  650 mg Oral Q6H PRN    Or    acetaminophen (TYLENOL) suppository 650 mg  650 mg Rectal Q6H PRN    piperacillin-tazobactam (ZOSYN) 3,375 mg in sodium chloride 0.9 % 50 mL IVPB (mini-bag)  3,375 mg IntraVENous Q8H         Recent Results (from the past 24 hour(s))   POCT Glucose    Collection Time: 02/22/24  9:47 AM   Result Value Ref Range    POC Glucose 199 (H) 65 - 117 mg/dL    Performed by: Edy Salgado RN    POCT Glucose    Collection Time: 02/22/24 12:16 PM   Result Value Ref Range    POC Glucose 159 (H) 65 - 117 mg/dL    Performed by: Edy Salgado RN    POCT Glucose    Collection Time: 02/22/24  3:29 PM   Result Value Ref Range    POC Glucose 135 (H) 65 - 117 mg/dL    Performed by: Giorgio MISHRA RN    POCT Glucose    Collection Time: 02/22/24  6:46 PM   Result Value Ref Range    POC Glucose 193 (H) 65 - 117 mg/dL    Performed by: Kevin Damico RN    Troponin    Collection Time: 02/22/24  7:00 PM   Result Value Ref Range    Troponin, High Sensitivity 1,255 (HH) 0 - 51 ng/L   POCT Glucose    Collection Time: 02/22/24  9:39 PM   Result Value Ref Range    POC Glucose 245 (H) 65 - 117 mg/dL    Performed by: Leona Anne RN    CBC    Collection Time: 02/23/24  4:52 AM   Result Value Ref Range    WBC 11.0 3.6 - 11.0 K/uL    RBC 3.75 (L) 3.80 - 5.20 M/uL    Hemoglobin 11.6 11.5 - 16.0 g/dL    Hematocrit 37.0 35.0 - 47.0 %    MCV 98.7 80.0 - 99.0 FL    MCH 30.9 26.0 - 34.0 PG    MCHC 31.4 30.0 - 36.5 g/dL    RDW 14.0 11.5 - 14.5 %    Platelets 189 150 - 400 K/uL    MPV 11.0 8.9 - 12.9 FL    Nucleated RBCs 0.0 0  WBC    nRBC 0.00 0.00 - 0.01 K/uL   Basic Metabolic Panel    Collection Time: 02/23/24  4:52 AM   Result Value Ref Range    Sodium 140 136 - 145 mmol/L    Potassium 3.4 (L) 3.5 - 5.1 mmol/L    Chloride 109

## 2024-02-23 NOTE — PROGRESS NOTES
Rapid response call for HR in the 180's.  O2 sats 97% on room air.  No respiratory needs at this time

## 2024-02-23 NOTE — PROGRESS NOTES
.Critical care interdisciplinary rounds today.  Following members present: Case Management, , Clinical Lead, Diabetes Team, Nursing, Nutrition, Pharmacy, and Physician. Family invited to participate.  Plan of care discussed.  See clinical pathway for plan of care and interventions and desired outcomes.     Transfer today.  Jayleen aguilera/kandis

## 2024-02-23 NOTE — PROGRESS NOTES
Palliative Medicine  Patient Name: Yesenia Horan  YOB: 1940  MRN: [id number]  Age: 83 y.o.  Gender: female    Date of Initial Consult: 2/22/2024  Date of Service: 2/23/2024  Time: 1:58 PM  Provider: Lacey Sutton MD  Hospital Day: 3  Admit Date: 2/21/2024  Referring Provider: Dr. Kam       Reasons for Consultation:  Goals of Care    HISTORY OF PRESENT ILLNESS (HPI):   Yesenia Horan is a 83 y.o. female with HFrEF (EF 20-25%), AF, HTN, T2DM who was admitted on 2/21/2024 from home with a diagnosis of severe sepsis, Afib with RVR, DEEPTHI, elevated troponin. Patient was initiated on antibiotics, IVF. She was cardioverted in ED and received amiodarone. She has required pressor support. Cardiology is consulted. Echo is ordered.    Psychosocial: Patient lives with her . She has two children, Deepali and Barrington. States she worked for the [employer] prior to having children and worked for the [employer] after her children were older. States she can ambulate without assistance and use an exercise bike.    PALLIATIVE DIAGNOSES:    Encounter for Palliative Care  Goals of Care discussion  Code Status discussion  Generalized weakness, improving  Fatigue, improving    ASSESSMENT AND PLAN:   I met with patient this morning in the ICU. Patient is off pressors. She is sitting up in bedside chair and states she feels much better now that she is out of the hospital bed. States she is going to be transferred out of the ICU today.  I did follow up with patient about code status today. Discussed that she is currently Full Code and what this means. Discussed the difference between Full Code and DNR. Discussed that CPR is not likely to be effective or beneficial for her. She wants to talk to her family before making any final medical decisions. She tells me I can call Deepali again today.  I called Deepali this afternoon. Discussed hospital updates. She is pleased her mom has made some improvements. Reviewed my code status  Caregiver Present/Available [] No Caregiver [] Pt Lives at Facility  If \"Yes\" to discuss with social work during IDT    Anticipatory grief assessment:   [x] Normal  / [] Maladaptive     If \"Maladaptive\" to discuss with social work during IDT    ESAS Anxiety: Anxiety Score: Not anxious    ESAS Depression:          LAB AND IMAGING FINDINGS:   Objective data reviewed:  labs, images, records, medication use, vitals, and chart     FINAL COMMENTS   Thank you for allowing Palliative Medicine to participate in the care of Yesenia Horan.    Only check if applicable and billing time based rather than MDM  [x] The total encounter time on this service date was 35 minutes which was spent performing a face-to-face encounter and personally completing the provider-level activities documented in the note. This includes time spent prior to the visit and after the visit in direct care of the patient. This time does not include time spent in any separately reportable services.    Electronically signed by   Lacey Sutton MD  Palliative Care Team  on 2/23/2024 at 1:58 PM

## 2024-02-24 LAB
ANION GAP SERPL CALC-SCNC: 8 MMOL/L (ref 5–15)
BASOPHILS # BLD: 0 K/UL (ref 0–0.1)
BASOPHILS NFR BLD: 0 % (ref 0–1)
BUN SERPL-MCNC: 10 MG/DL (ref 6–20)
BUN/CREAT SERPL: 18 (ref 12–20)
CALCIUM SERPL-MCNC: 8.3 MG/DL (ref 8.5–10.1)
CHLORIDE SERPL-SCNC: 109 MMOL/L (ref 97–108)
CO2 SERPL-SCNC: 21 MMOL/L (ref 21–32)
CREAT SERPL-MCNC: 0.57 MG/DL (ref 0.55–1.02)
DIFFERENTIAL METHOD BLD: ABNORMAL
EKG DIAGNOSIS: NORMAL
EKG Q-T INTERVAL: 268 MS
EKG QRS DURATION: 118 MS
EKG QTC CALCULATION (BAZETT): 462 MS
EKG R AXIS: -38 DEGREES
EKG T AXIS: 149 DEGREES
EKG VENTRICULAR RATE: 179 BPM
EOSINOPHIL # BLD: 0 K/UL (ref 0–0.4)
EOSINOPHIL NFR BLD: 0 % (ref 0–7)
ERYTHROCYTE [DISTWIDTH] IN BLOOD BY AUTOMATED COUNT: 14.4 % (ref 11.5–14.5)
GLUCOSE BLD STRIP.AUTO-MCNC: 158 MG/DL (ref 65–117)
GLUCOSE BLD STRIP.AUTO-MCNC: 159 MG/DL (ref 65–117)
GLUCOSE BLD STRIP.AUTO-MCNC: 220 MG/DL (ref 65–117)
GLUCOSE BLD STRIP.AUTO-MCNC: 228 MG/DL (ref 65–117)
GLUCOSE SERPL-MCNC: 175 MG/DL (ref 65–100)
HCT VFR BLD AUTO: 35.3 % (ref 35–47)
HGB BLD-MCNC: 11.5 G/DL (ref 11.5–16)
IMM GRANULOCYTES # BLD AUTO: 0.1 K/UL (ref 0–0.04)
IMM GRANULOCYTES NFR BLD AUTO: 1 % (ref 0–0.5)
LYMPHOCYTES # BLD: 2.7 K/UL (ref 0.8–3.5)
LYMPHOCYTES NFR BLD: 24 % (ref 12–49)
MAGNESIUM SERPL-MCNC: 2.1 MG/DL (ref 1.6–2.4)
MCH RBC QN AUTO: 31.6 PG (ref 26–34)
MCHC RBC AUTO-ENTMCNC: 32.6 G/DL (ref 30–36.5)
MCV RBC AUTO: 97 FL (ref 80–99)
MONOCYTES # BLD: 0.8 K/UL (ref 0–1)
MONOCYTES NFR BLD: 7 % (ref 5–13)
NEUTS SEG # BLD: 7.5 K/UL (ref 1.8–8)
NEUTS SEG NFR BLD: 68 % (ref 32–75)
NRBC # BLD: 0 K/UL (ref 0–0.01)
NRBC BLD-RTO: 0 PER 100 WBC
PHOSPHATE SERPL-MCNC: 2.6 MG/DL (ref 2.6–4.7)
PLATELET # BLD AUTO: 193 K/UL (ref 150–400)
PMV BLD AUTO: 10.8 FL (ref 8.9–12.9)
POTASSIUM SERPL-SCNC: 4 MMOL/L (ref 3.5–5.1)
RBC # BLD AUTO: 3.64 M/UL (ref 3.8–5.2)
SERVICE CMNT-IMP: ABNORMAL
SODIUM SERPL-SCNC: 138 MMOL/L (ref 136–145)
WBC # BLD AUTO: 11.1 K/UL (ref 3.6–11)

## 2024-02-24 PROCEDURE — 80048 BASIC METABOLIC PNL TOTAL CA: CPT

## 2024-02-24 PROCEDURE — 2580000003 HC RX 258: Performed by: NURSE PRACTITIONER

## 2024-02-24 PROCEDURE — 6360000002 HC RX W HCPCS: Performed by: INTERNAL MEDICINE

## 2024-02-24 PROCEDURE — 83735 ASSAY OF MAGNESIUM: CPT

## 2024-02-24 PROCEDURE — 82962 GLUCOSE BLOOD TEST: CPT

## 2024-02-24 PROCEDURE — 36415 COLL VENOUS BLD VENIPUNCTURE: CPT

## 2024-02-24 PROCEDURE — 6370000000 HC RX 637 (ALT 250 FOR IP): Performed by: INTERNAL MEDICINE

## 2024-02-24 PROCEDURE — 2700000000 HC OXYGEN THERAPY PER DAY

## 2024-02-24 PROCEDURE — 6370000000 HC RX 637 (ALT 250 FOR IP): Performed by: NURSE PRACTITIONER

## 2024-02-24 PROCEDURE — 84100 ASSAY OF PHOSPHORUS: CPT

## 2024-02-24 PROCEDURE — 2580000003 HC RX 258: Performed by: INTERNAL MEDICINE

## 2024-02-24 PROCEDURE — 2000000000 HC ICU R&B

## 2024-02-24 PROCEDURE — 6360000002 HC RX W HCPCS: Performed by: NURSE PRACTITIONER

## 2024-02-24 PROCEDURE — 6360000002 HC RX W HCPCS

## 2024-02-24 PROCEDURE — 85025 COMPLETE CBC W/AUTO DIFF WBC: CPT

## 2024-02-24 RX ORDER — ATORVASTATIN CALCIUM 40 MG/1
80 TABLET, FILM COATED ORAL NIGHTLY
Status: DISCONTINUED | OUTPATIENT
Start: 2024-02-24 | End: 2024-03-02 | Stop reason: HOSPADM

## 2024-02-24 RX ORDER — CASTOR OIL AND BALSAM, PERU 788; 87 MG/G; MG/G
OINTMENT TOPICAL 2 TIMES DAILY
Status: DISCONTINUED | OUTPATIENT
Start: 2024-02-24 | End: 2024-03-02 | Stop reason: HOSPADM

## 2024-02-24 RX ORDER — DIGOXIN 125 MCG
125 TABLET ORAL DAILY
Status: DISCONTINUED | OUTPATIENT
Start: 2024-02-25 | End: 2024-02-26

## 2024-02-24 RX ORDER — METOPROLOL SUCCINATE 25 MG/1
25 TABLET, EXTENDED RELEASE ORAL DAILY
Status: DISCONTINUED | OUTPATIENT
Start: 2024-02-24 | End: 2024-03-02 | Stop reason: HOSPADM

## 2024-02-24 RX ADMIN — METOPROLOL SUCCINATE 25 MG: 25 TABLET, EXTENDED RELEASE ORAL at 08:40

## 2024-02-24 RX ADMIN — DIGOXIN 250 MCG: 250 INJECTION, SOLUTION INTRAMUSCULAR; INTRAVENOUS at 12:05

## 2024-02-24 RX ADMIN — APIXABAN 2.5 MG: 2.5 TABLET, FILM COATED ORAL at 08:37

## 2024-02-24 RX ADMIN — AMIODARONE HYDROCHLORIDE 1 MG/MIN: 50 INJECTION, SOLUTION INTRAVENOUS at 01:33

## 2024-02-24 RX ADMIN — ASPIRIN 81 MG: 81 TABLET, CHEWABLE ORAL at 08:37

## 2024-02-24 RX ADMIN — SODIUM CHLORIDE, PRESERVATIVE FREE 10 ML: 5 INJECTION INTRAVENOUS at 21:03

## 2024-02-24 RX ADMIN — INSULIN LISPRO 1 UNITS: 100 INJECTION, SOLUTION INTRAVENOUS; SUBCUTANEOUS at 12:09

## 2024-02-24 RX ADMIN — Medication 1 AMPULE: at 21:03

## 2024-02-24 RX ADMIN — ATORVASTATIN CALCIUM 80 MG: 40 TABLET, FILM COATED ORAL at 21:03

## 2024-02-24 RX ADMIN — DIGOXIN 250 MCG: 250 INJECTION, SOLUTION INTRAMUSCULAR; INTRAVENOUS at 06:08

## 2024-02-24 RX ADMIN — DIGOXIN 250 MCG: 250 INJECTION, SOLUTION INTRAMUSCULAR; INTRAVENOUS at 00:12

## 2024-02-24 RX ADMIN — AMIODARONE HYDROCHLORIDE 1 MG/MIN: 50 INJECTION, SOLUTION INTRAVENOUS at 23:55

## 2024-02-24 RX ADMIN — Medication 1 AMPULE: at 08:24

## 2024-02-24 RX ADMIN — SODIUM CHLORIDE 1000 MG: 900 INJECTION INTRAVENOUS at 10:15

## 2024-02-24 RX ADMIN — AMIODARONE HYDROCHLORIDE 1 MG/MIN: 50 INJECTION, SOLUTION INTRAVENOUS at 08:48

## 2024-02-24 RX ADMIN — AMIODARONE HYDROCHLORIDE 1 MG/MIN: 50 INJECTION, SOLUTION INTRAVENOUS at 16:13

## 2024-02-24 RX ADMIN — APIXABAN 2.5 MG: 2.5 TABLET, FILM COATED ORAL at 21:03

## 2024-02-24 RX ADMIN — SODIUM CHLORIDE, PRESERVATIVE FREE 10 ML: 5 INJECTION INTRAVENOUS at 08:24

## 2024-02-24 RX ADMIN — Medication: at 21:09

## 2024-02-24 ASSESSMENT — PAIN SCALES - GENERAL
PAINLEVEL_OUTOF10: 0

## 2024-02-24 NOTE — PROGRESS NOTES
ICU Progress Note        Subjective:    - lying comfortably on the bed.   - lying comfortably on the bed, slept well overnight.    - sitting comfortably on the bed, denies any complaints.       Vital Signs:    /87   Pulse (!) 107   Temp 98 °F (36.7 °C) (Oral)   Resp 22   Ht 1.6 m (5' 2.99\")   Wt 59.9 kg (132 lb)   SpO2 91%   BMI 23.39 kg/m²             Temp (24hrs), Av.9 °F (36.6 °C), Min:97.7 °F (36.5 °C), Max:98.1 °F (36.7 °C)       Intake/Output:   Last shift:      No intake/output data recorded.  Last 3 shifts:  1901 -  0700  In: 1227 [P.O.:600; I.V.:527]  Out: 1465 [Urine:1465]    Intake/Output Summary (Last 24 hours) at 2024 0835  Last data filed at 2024 0615  Gross per 24 hour   Intake 326.47 ml   Output 445 ml   Net -118.53 ml         Physical Exam:    General: Alert, awake and oriented. Not in acute distress  HEENT:  Anicteric sclerae; pink palpebral conjunctivae; mucosa moist  Resp:  Bilateral air entry +, no crackles or wheeze  CV:  S1, S2 present  GI:  Abdomen soft, non-tender; (+) active bowel sounds  Extremities:  +2 pulses on all extremities; no edema/ cyanosis/ clubbing noted  Skin:  Warm; no rashes/ lesions noted  Neurologic:  Non-focal    DATA:     Current Facility-Administered Medications   Medication Dose Route Frequency    metoprolol succinate (TOPROL XL) extended release tablet 25 mg  25 mg Oral Daily    [START ON 2024] digoxin (LANOXIN) tablet 125 mcg  125 mcg Oral Daily    [Held by provider] amiodarone (CORDARONE) tablet 200 mg  200 mg Oral Daily    cefTRIAXone (ROCEPHIN) 1,000 mg in sodium chloride 0.9 % 50 mL IVPB (mini-bag)  1,000 mg IntraVENous Q24H    apixaban (ELIQUIS) tablet 2.5 mg  2.5 mg Oral BID    amiodarone (CORDARONE) 450 mg in dextrose 5 % 250 mL infusion ([id number])  1 mg/min IntraVENous Continuous    digoxin (LANOXIN) injection 250 mcg  250 mcg IntraVENous Q6H    aspirin chewable tablet 81 mg  81 mg Oral Daily     ICU was consulted for admission. Transferred to stepdown on 02/23 but due to a.fib with RVR transferred back to ICU.    A.fib with RVR - Treated with amiodarone gtt and digoxin. Troponin was elevated on admission. EKG without ST-T wave abnormality. ECHO on 02/2024 showed EF 35%. On Eliquis. C    Chronic systolic heart failure/CAD - Presented with chest pain, resolved now. Cont. Aspirin, will add statin. Cardiology consulted for possible invasive/non-invasive CAD workup.    Hypotension - likely due to a.fib with RVR. Resolved.     UTI - Cultures shows only 8000 colonies/ml. Cont. Ceftriaxone for total 5 days of empiric treatment.     Hyperglycemia - known DM. RISS.    Full code.     CCM time - 35 minutes.     Shaheed Farias MD,VANNESSA, FCCM, FCCP, ATSF, FACP, DAABIP  Interventional Pulmonology/Critical Care Medicine  [facility] [place] [facility]  [facility]

## 2024-02-24 NOTE — PROGRESS NOTES
2250: TRANSFER - IN REPORT:    Verbal report received from SHIRLEY Warren on Yesenia Horan  being received from [facility] for change in patient condition (afib rvr)      Report consisted of patient's Situation, Background, Assessment and   Recommendations(SBAR).     Information from the following report(s) Nurse Handoff Report was reviewed with the receiving nurse.    Opportunity for questions and clarification was provided.      Assessment completed upon patient's arrival to unit and care assumed.     0030: RN given verbal orders to continue Amio drip at 1mcg/min    0720: Bedside and Verbal shift change report given to SHIRLEY Jones (oncoming nurse) by SHIRLEY Nieto (offgoing nurse). Report included the following information Nurse Handoff Report.

## 2024-02-24 NOTE — PROGRESS NOTES
Or    acetaminophen (TYLENOL) suppository 650 mg  650 mg Rectal Q6H PRN         Objective:      Physical Exam  Physical Exam  Vitals and nursing note reviewed.   Constitutional:       Appearance: Normal appearance.   Cardiovascular:      Rate and Rhythm: Tachycardia present. Rhythm irregular.      Heart sounds: No murmur heard.  Pulmonary:      Effort: Pulmonary effort is normal.      Breath sounds: Normal breath sounds.   Musculoskeletal:      Right lower leg: No edema.      Left lower leg: No edema.   Neurological:      Mental Status: She is alert and oriented to person, place, and time.            Data Review:   Recent Labs     02/22/24 0339 02/23/24 0452 02/24/24 0518   WBC 14.4* 11.0 11.1*   HGB 11.7 11.6 11.5   HCT 36.8 37.0 35.3    189 193     Recent Labs     02/21/24  1704 02/21/24  1705 02/21/24 2023 02/22/24 0339 02/23/24 0452 02/23/24 2055 02/24/24  0518   *  --  135*   < > 140 139 138   K 4.5  --  4.5   < > 3.4* 4.1 4.0   CL 96*  --  100   < > 109* 108 109*   CO2 17*  --  17*   < > 23 25 21   BUN 23*  --  24*   < > 13 13 10   CREATININE 1.33*   < > 1.31*   < > 0.79 0.83 0.57   MG 2.1  --  2.0   < > 2.2 2.1 2.1   PHOS  --   --  4.1   < > 2.4* 2.0* 2.6   ALT 22  --  22  --  19  --   --     < > = values in this interval not displayed.     Recent Labs     02/22/24  0838 02/22/24  1900 02/23/24 0452   TROPHS 1,757* 1,255* 789*         Intake/Output Summary (Last 24 hours) at 2/24/2024 0828  Last data filed at 2/24/2024 0615  Gross per 24 hour   Intake 326.47 ml   Output 445 ml   Net -118.53 ml        Telemetry: Atrial fibrillation rate 104 bpm  EKG:  Cxray:  Echocardiogram:      Left Ventricle: Moderately reduced left ventricular systolic function with a visually estimated EF of 35 - 40%. Left ventricle is smaller than normal. Normal wall thickness. Normal wall motion. See diagram for wall motion findings. Grade II diastolic dysfunction with increased LAP.    Right Ventricle: Hyperdynamic  systolic function.    Mitral Valve: Mild regurgitation.    Tricuspid Valve: The estimated RVSP is 33 mmHg.    Image quality is adequate. Contrast used: Definity.       Cardiac catheterization 2023:    · Mild nonobstructive coronary artery disease.  · Severely elevated left ventricle filling pressure.     Assessment:     Principal Problem:    Atrial fibrillation with tachycardic ventricular rate (HCC)  Active Problems:    Other fatigue    Generalized weakness    Palliative care by specialist  Resolved Problems:    * No resolved hospital problems. *      Plan:     Recurrent atrial fibrillation with RVR: Continue IV digoxin load.  Continue amiodarone 1 mg/min.  Blood pressure permissive to add low-dose beta-blocker.  Continue Eliquis 2.5 mg twice a day.  Given CHF EF 35-40% avoid diltiazem.  If she does not convert by Monday morning will have electrophysiology see patient for AV node ablation and biventricular pacemaker placement.    Systolic heart failure: Starting metoprolol XL.  Blood pressure unlikely permissive for additional GDMT at this time.    Will continue to follow.  Check Digoxin Level AM 2/25/24  PO digoxin starting 9am 2/25/24.       Denis Mckeon DNP,ANP-BC

## 2024-02-24 NOTE — CONSULTS
CRITICAL CARE NOTE    Name: Yesenia Horan   : 1940   MRN: [id number]   Date: 2024      REASON FOR ICU ADMISSION:  AF RVR     PRINCIPAL ICU DIAGNOSIS     AF RVR    BRIEF PATIENT SUMMARY     Ms Yesenia Horan is an 84 yo female with a PMH of AF on eliquis, HFrEF (20-25%), HTN, DM2 who presented to the ED on  with /V. She was in AF w RVR, hypotensive, and hypothermic. She was cardioverted with brief conversion to NSR, but ultimately required amiodarone drip. Se was found to have a UTI. She had initially been started on zosyn, which was changed to ceftriaxone.  She initially required vasopressors, but has now been off for >24h. She was briefly transferred to the hospitalist service on , but was transferred back later that evening due to recurrent AF with RVR.  Prior to her return to ICU, she received 10mg IV dilt, 750ml IVF bolus, amiodarone drip and digoxin.  The digoxin was ordered at 250mcg q6h x4 doses per cardiology.  The patient was ultimately transferred to ICU for continued sustained RVR in 150s-160s.     COMPREHENSIVE ASSESSMENT & PLAN:SYSTEM BASED     24 HOUR EVENTS: As above    NEUROLOGICAL:     Oriented x3    PULMONOLOGY:     Goal sat >90    CARDIOVASCULAR:     AF RVR: Hemodynamically stable, not currently requiring cardioversion.   - Continue digoxin per cardiology recs  - Continue amiodarone  - IV dilt for sustained HR >130  - Cardiology following  - Continue elquis    HFrEF (EF 20-25%)  - Hold on additional IVF  - Cardiology following    GASTROINTESTINAL      ADAT    RENAL/ELECTROLYTE/FLUIDS:     Daily BMP    ENDOCRINE:     Hyperglycemia: SSI    HEMATOLOGY/ONCOLOGY:     Daily CBC    INFECTIOUS DISEASE:     ANTIBIOTICS TO DATE:  Ceftriaxone -    CULTURES TO DATE:  Urine gram stain w GNRs     ICU DAILY CHECKLIST     Code Status:F  DVT Prophylaxis:Elqiuis  T/L/D: N  Lines:N  Drains: N  SUP: N  Diet: As bree  Activity Level:As bree  ABCDEF Bundle/Checklist

## 2024-02-24 NOTE — PROGRESS NOTES
TRANSFER - OUT REPORT:    Verbal report given to Emilia WATSON on Yesenia Horan  being transferred to CCU for change in patient condition (Afib RVR)       Report consisted of patient's Situation, Background, Assessment and   Recommendations(SBAR).     Information from the following report(s) Intake/Output, MAR, Recent Results, and Cardiac Rhythm Afib RVR  was reviewed with the receiving nurse.           Lines:   Peripheral IV 02/23/24 Right Cephalic (Active)        Opportunity for questions and clarification was provided.      Patient transported with:  Monitor, Registered Nurse, and Tech

## 2024-02-24 NOTE — PROGRESS NOTES
0700: Bedside shift report received from SHIRLEY Nieto (offgoing nurse).     0800: Shift assessment completed. Pt is A & O x 4. Pt appropriately follows commands. Pt opens eyes spontaneously and is able to focus and track. Lung sounds are clear bilaterally. Pt is on 3 L/min O2 via NC. Pt is Afib on the monitor. Bowel sounds are active throughout. Pt is continent and uses bedpan to void. Pt does not have a central line or a salazar. Amiodarone infusing @ 1 mg/min.     1200: Shift reassessment completed, no changes to previous assessment. Amiodarone infusing @ 1 mg/min.     1600: Shift assessment completed, no changes to previous assessment. Amiodarone infusing @ 1 mg/min.     1900: Bedside shift report given to SHIRLEY Saeed (oncoming nurse).

## 2024-02-25 LAB
ANION GAP SERPL CALC-SCNC: 5 MMOL/L (ref 5–15)
BASOPHILS # BLD: 0 K/UL (ref 0–0.1)
BASOPHILS NFR BLD: 0 % (ref 0–1)
BUN SERPL-MCNC: 11 MG/DL (ref 6–20)
BUN/CREAT SERPL: 18 (ref 12–20)
CALCIUM SERPL-MCNC: 8.9 MG/DL (ref 8.5–10.1)
CHLORIDE SERPL-SCNC: 107 MMOL/L (ref 97–108)
CO2 SERPL-SCNC: 27 MMOL/L (ref 21–32)
CREAT SERPL-MCNC: 0.6 MG/DL (ref 0.55–1.02)
DIFFERENTIAL METHOD BLD: ABNORMAL
DIGOXIN SERPL-MCNC: 2.5 NG/ML (ref 0.9–2)
EOSINOPHIL # BLD: 0 K/UL (ref 0–0.4)
EOSINOPHIL NFR BLD: 0 % (ref 0–7)
ERYTHROCYTE [DISTWIDTH] IN BLOOD BY AUTOMATED COUNT: 14.2 % (ref 11.5–14.5)
GLUCOSE BLD STRIP.AUTO-MCNC: 164 MG/DL (ref 65–117)
GLUCOSE BLD STRIP.AUTO-MCNC: 209 MG/DL (ref 65–117)
GLUCOSE BLD STRIP.AUTO-MCNC: 235 MG/DL (ref 65–117)
GLUCOSE BLD STRIP.AUTO-MCNC: 243 MG/DL (ref 65–117)
GLUCOSE SERPL-MCNC: 200 MG/DL (ref 65–100)
HCT VFR BLD AUTO: 38.4 % (ref 35–47)
HGB BLD-MCNC: 12 G/DL (ref 11.5–16)
IMM GRANULOCYTES # BLD AUTO: 0.1 K/UL (ref 0–0.04)
IMM GRANULOCYTES NFR BLD AUTO: 1 % (ref 0–0.5)
LYMPHOCYTES # BLD: 2 K/UL (ref 0.8–3.5)
LYMPHOCYTES NFR BLD: 17 % (ref 12–49)
MAGNESIUM SERPL-MCNC: 2.2 MG/DL (ref 1.6–2.4)
MCH RBC QN AUTO: 31.7 PG (ref 26–34)
MCHC RBC AUTO-ENTMCNC: 31.3 G/DL (ref 30–36.5)
MCV RBC AUTO: 101.6 FL (ref 80–99)
MONOCYTES # BLD: 0.7 K/UL (ref 0–1)
MONOCYTES NFR BLD: 6 % (ref 5–13)
NEUTS SEG # BLD: 8.9 K/UL (ref 1.8–8)
NEUTS SEG NFR BLD: 76 % (ref 32–75)
NRBC # BLD: 0 K/UL (ref 0–0.01)
NRBC BLD-RTO: 0 PER 100 WBC
PHOSPHATE SERPL-MCNC: 2.2 MG/DL (ref 2.6–4.7)
PLATELET # BLD AUTO: 216 K/UL (ref 150–400)
PMV BLD AUTO: 11.1 FL (ref 8.9–12.9)
POTASSIUM SERPL-SCNC: 4 MMOL/L (ref 3.5–5.1)
RBC # BLD AUTO: 3.78 M/UL (ref 3.8–5.2)
SERVICE CMNT-IMP: ABNORMAL
SODIUM SERPL-SCNC: 139 MMOL/L (ref 136–145)
WBC # BLD AUTO: 11.6 K/UL (ref 3.6–11)

## 2024-02-25 PROCEDURE — 80048 BASIC METABOLIC PNL TOTAL CA: CPT

## 2024-02-25 PROCEDURE — 85025 COMPLETE CBC W/AUTO DIFF WBC: CPT

## 2024-02-25 PROCEDURE — 6370000000 HC RX 637 (ALT 250 FOR IP): Performed by: NURSE PRACTITIONER

## 2024-02-25 PROCEDURE — 6360000002 HC RX W HCPCS: Performed by: NURSE PRACTITIONER

## 2024-02-25 PROCEDURE — 80162 ASSAY OF DIGOXIN TOTAL: CPT

## 2024-02-25 PROCEDURE — 82962 GLUCOSE BLOOD TEST: CPT

## 2024-02-25 PROCEDURE — 2580000003 HC RX 258: Performed by: INTERNAL MEDICINE

## 2024-02-25 PROCEDURE — 83735 ASSAY OF MAGNESIUM: CPT

## 2024-02-25 PROCEDURE — 2580000003 HC RX 258: Performed by: NURSE PRACTITIONER

## 2024-02-25 PROCEDURE — 84100 ASSAY OF PHOSPHORUS: CPT

## 2024-02-25 PROCEDURE — 6360000002 HC RX W HCPCS: Performed by: INTERNAL MEDICINE

## 2024-02-25 PROCEDURE — 6370000000 HC RX 637 (ALT 250 FOR IP): Performed by: INTERNAL MEDICINE

## 2024-02-25 PROCEDURE — 36415 COLL VENOUS BLD VENIPUNCTURE: CPT

## 2024-02-25 PROCEDURE — 2700000000 HC OXYGEN THERAPY PER DAY

## 2024-02-25 PROCEDURE — 2000000000 HC ICU R&B

## 2024-02-25 RX ORDER — LOSARTAN POTASSIUM 25 MG/1
12.5 TABLET ORAL DAILY
Status: DISCONTINUED | OUTPATIENT
Start: 2024-02-25 | End: 2024-02-27

## 2024-02-25 RX ORDER — DOXYCYCLINE HYCLATE 100 MG
100 TABLET ORAL EVERY 12 HOURS SCHEDULED
Status: DISCONTINUED | OUTPATIENT
Start: 2024-02-25 | End: 2024-02-26

## 2024-02-25 RX ADMIN — ATORVASTATIN CALCIUM 80 MG: 40 TABLET, FILM COATED ORAL at 20:38

## 2024-02-25 RX ADMIN — SODIUM CHLORIDE, PRESERVATIVE FREE 10 ML: 5 INJECTION INTRAVENOUS at 20:39

## 2024-02-25 RX ADMIN — INSULIN LISPRO 1 UNITS: 100 INJECTION, SOLUTION INTRAVENOUS; SUBCUTANEOUS at 18:01

## 2024-02-25 RX ADMIN — DOXYCYCLINE HYCLATE 100 MG: 100 TABLET, COATED ORAL at 12:27

## 2024-02-25 RX ADMIN — APIXABAN 2.5 MG: 2.5 TABLET, FILM COATED ORAL at 09:05

## 2024-02-25 RX ADMIN — Medication 1 AMPULE: at 10:24

## 2024-02-25 RX ADMIN — Medication: at 20:39

## 2024-02-25 RX ADMIN — Medication 1 AMPULE: at 20:38

## 2024-02-25 RX ADMIN — APIXABAN 2.5 MG: 2.5 TABLET, FILM COATED ORAL at 20:39

## 2024-02-25 RX ADMIN — SODIUM CHLORIDE, PRESERVATIVE FREE 10 ML: 5 INJECTION INTRAVENOUS at 10:24

## 2024-02-25 RX ADMIN — ASPIRIN 81 MG: 81 TABLET, CHEWABLE ORAL at 09:06

## 2024-02-25 RX ADMIN — AMIODARONE HYDROCHLORIDE 1 MG/MIN: 50 INJECTION, SOLUTION INTRAVENOUS at 08:58

## 2024-02-25 RX ADMIN — LOSARTAN POTASSIUM 12.5 MG: 25 TABLET, FILM COATED ORAL at 18:09

## 2024-02-25 RX ADMIN — AMIODARONE HYDROCHLORIDE 0.5 MG/MIN: 50 INJECTION, SOLUTION INTRAVENOUS at 22:30

## 2024-02-25 RX ADMIN — INSULIN LISPRO 1 UNITS: 100 INJECTION, SOLUTION INTRAVENOUS; SUBCUTANEOUS at 12:27

## 2024-02-25 RX ADMIN — DOXYCYCLINE HYCLATE 100 MG: 100 TABLET, COATED ORAL at 20:39

## 2024-02-25 RX ADMIN — Medication: at 08:58

## 2024-02-25 RX ADMIN — METOPROLOL SUCCINATE 25 MG: 25 TABLET, EXTENDED RELEASE ORAL at 10:26

## 2024-02-25 ASSESSMENT — PAIN SCALES - GENERAL
PAINLEVEL_OUTOF10: 0

## 2024-02-25 NOTE — PROGRESS NOTES
0700: Bedside shift report received from SHIRLEY Saeed (offgoing nurse).     0800: Shift assessment completed. Pt is A & O x 4. Pt follows commands. Pt opens eyes spontaneously. Pt is able to focus and track with eyes. Lung sounds are clear bilaterally. Pt is on 3 L/min O2 via NC. Pt is sinus rhythm on the monitor. Bowel sounds are active throughout. Pt is up to the bedside commode to urinate. Pt has amiodarone infusing @ 1 mg/min.     1035: Amiodarone weaned per MD order, see MAR for amount.     1200: Shift reassessment completed, no changes to previous assessment. Amiodarone infusing @ 0.5 mg/min.     1600: Shift reassessment completed, no changes to previous assessment. Amiodarone infusing @ 0.5 mg/min.     1900: Bedside shift report given to SHIRLEY Garcia (oncoming nurse).

## 2024-02-25 NOTE — PROGRESS NOTES
[facility]  [address] [place] [address] [phone]  [email or web address]  CARDIOLOGY PROGRESS NOTE    2/25/2024 9:57 AM    Admit Date: 2/21/2024    Admit Diagnosis:   Atrial fibrillation with tachycardic ventricular rate (HCC) [I48.91]    Subjective:     Yesenia Horan out of bed to chair this morning.  She converted to normal sinus rhythm.  Denies dyspnea lightheadedness or dizziness.    BP (!) 106/58   Pulse 81   Temp 97.9 °F (36.6 °C) (Oral)   Resp 27   Ht 1.6 m (5' 2.99\")   Wt 59.9 kg (132 lb)   SpO2 94%   BMI 23.39 kg/m²     Current Facility-Administered Medications   Medication Dose Route Frequency    metoprolol succinate (TOPROL XL) extended release tablet 25 mg  25 mg Oral Daily    digoxin (LANOXIN) tablet 125 mcg  125 mcg Oral Daily    atorvastatin (LIPITOR) tablet 80 mg  80 mg Oral Nightly    balsum peru-castor oil (VENELEX) ointment   Topical BID    [Held by provider] amiodarone (CORDARONE) tablet 200 mg  200 mg Oral Daily    cefTRIAXone (ROCEPHIN) 1,000 mg in sodium chloride 0.9 % 50 mL IVPB (mini-bag)  1,000 mg IntraVENous Q24H    apixaban (ELIQUIS) tablet 2.5 mg  2.5 mg Oral BID    amiodarone (CORDARONE) 450 mg in dextrose 5 % 250 mL infusion ([id number])  1 mg/min IntraVENous Continuous    aspirin chewable tablet 81 mg  81 mg Oral Daily    insulin lispro (HUMALOG) injection vial 0-4 Units  0-4 Units SubCUTAneous TID WC    insulin lispro (HUMALOG) injection vial 0-4 Units  0-4 Units SubCUTAneous Nightly    alcohol 62% (NOZIN) nasal  1 ampule  1 ampule Nasal Q12H    sodium chloride flush 0.9 % injection 5-40 mL  5-40 mL IntraVENous 2 times per day    sodium chloride flush 0.9 % injection 5-40 mL  5-40 mL IntraVENous PRN    0.9 % sodium chloride infusion   IntraVENous PRN    ondansetron (ZOFRAN-ODT) disintegrating tablet 4 mg  4 mg Oral Q8H PRN    Or    ondansetron (ZOFRAN) injection 4 mg  4 mg IntraVENous Q6H PRN    polyethylene  provided she remains in normal sinus rhythm.     Addendum: 17:55  BP stable this afternoon   Ef 35-40% continue Toprol, will start Losartan 12.5mg QD; BP unlikely to tolerate Entresto, would defer SGLT2i given recent UTI.       Denis Mckeon DNP,ANP-BC

## 2024-02-25 NOTE — PROGRESS NOTES
mg/min IntraVENous Continuous    aspirin chewable tablet 81 mg  81 mg Oral Daily    insulin lispro (HUMALOG) injection vial 0-4 Units  0-4 Units SubCUTAneous TID WC    insulin lispro (HUMALOG) injection vial 0-4 Units  0-4 Units SubCUTAneous Nightly    alcohol 62% (NOZIN) nasal  1 ampule  1 ampule Nasal Q12H    sodium chloride flush 0.9 % injection 5-40 mL  5-40 mL IntraVENous 2 times per day    sodium chloride flush 0.9 % injection 5-40 mL  5-40 mL IntraVENous PRN    0.9 % sodium chloride infusion   IntraVENous PRN    ondansetron (ZOFRAN-ODT) disintegrating tablet 4 mg  4 mg Oral Q8H PRN    Or    ondansetron (ZOFRAN) injection 4 mg  4 mg IntraVENous Q6H PRN    polyethylene glycol (GLYCOLAX) packet 17 g  17 g Oral Daily PRN    acetaminophen (TYLENOL) tablet 650 mg  650 mg Oral Q6H PRN    Or    acetaminophen (TYLENOL) suppository 650 mg  650 mg Rectal Q6H PRN         Recent Results (from the past 24 hour(s))   POCT Glucose    Collection Time: 02/24/24 11:59 AM   Result Value Ref Range    POC Glucose 220 (H) 65 - 117 mg/dL    Performed by: Kira Jones RN    POCT Glucose    Collection Time: 02/24/24  5:05 PM   Result Value Ref Range    POC Glucose 159 (H) 65 - 117 mg/dL    Performed by: Kira Jones RN    POCT Glucose    Collection Time: 02/24/24  9:07 PM   Result Value Ref Range    POC Glucose 228 (H) 65 - 117 mg/dL    Performed by: Joi Arora RN    Digoxin Level    Collection Time: 02/25/24  6:05 AM   Result Value Ref Range    Digoxin Lvl 2.5 (H) 0.90 - 2.00 NG/ML   Magnesium    Collection Time: 02/25/24  6:05 AM   Result Value Ref Range    Magnesium 2.2 1.6 - 2.4 mg/dL   CBC with Auto Differential    Collection Time: 02/25/24  6:05 AM   Result Value Ref Range    WBC 11.6 (H) 3.6 - 11.0 K/uL    RBC 3.78 (L) 3.80 - 5.20 M/uL    Hemoglobin 12.0 11.5 - 16.0 g/dL    Hematocrit 38.4 35.0 - 47.0 %    .6 (H) 80.0 - 99.0 FL    MCH 31.7 26.0 - 34.0 PG    MCHC 31.3 30.0 - 36.5 g/dL    RDW 14.2 11.5  Leukocytosis, +lactic acidosis, + DEEPTHI.  Due to ongoing hypotension she was started on peripheral vasopressors, ICU was consulted for admission. Transferred to stepdown on 02/23 but due to a.fib with RVR transferred back to ICU.    A.fib with RVR - Treated with amiodarone gtt and digoxin. Troponin was elevated on admission. EKG without ST-T wave abnormality. ECHO on 02/2024 showed EF 35%. On Eliquis.     Chronic systolic heart failure/CAD - Presented with chest pain, resolved now. Cont. Aspirin and statin. Cardiology consulted for possible invasive/non-invasive CAD workup.    Hypotension - likely due to a.fib with RVR. Resolved.     UTI - Cultures shows only 8000 colonies/ml. Culture GNR. On Ceftriaxone but patient has only 1 IV access and Ceftriaxone is not compatible with amiodarone gtt. I will change to Doxycycline PO x 5 days.      Hyperglycemia - known DM. RISS.    Full code.     CCM time - 35 minutes.     Shaheed Farias MD,VANNESSA, FCCM, FCCP, ATSF, FACP, DAABIP  Interventional Pulmonology/Critical Care Medicine  [facility]  [facility]

## 2024-02-26 LAB
ANION GAP SERPL CALC-SCNC: 3 MMOL/L (ref 5–15)
BASOPHILS # BLD: 0 K/UL (ref 0–0.1)
BASOPHILS NFR BLD: 0 % (ref 0–1)
BUN SERPL-MCNC: 11 MG/DL (ref 6–20)
BUN/CREAT SERPL: 18 (ref 12–20)
CALCIUM SERPL-MCNC: 8.5 MG/DL (ref 8.5–10.1)
CHLORIDE SERPL-SCNC: 108 MMOL/L (ref 97–108)
CO2 SERPL-SCNC: 26 MMOL/L (ref 21–32)
CREAT SERPL-MCNC: 0.6 MG/DL (ref 0.55–1.02)
DIFFERENTIAL METHOD BLD: ABNORMAL
DIGOXIN SERPL-MCNC: 1.6 NG/ML (ref 0.9–2)
EOSINOPHIL # BLD: 0.1 K/UL (ref 0–0.4)
EOSINOPHIL NFR BLD: 1 % (ref 0–7)
ERYTHROCYTE [DISTWIDTH] IN BLOOD BY AUTOMATED COUNT: 14.3 % (ref 11.5–14.5)
GLUCOSE BLD STRIP.AUTO-MCNC: 164 MG/DL (ref 65–117)
GLUCOSE BLD STRIP.AUTO-MCNC: 167 MG/DL (ref 65–117)
GLUCOSE BLD STRIP.AUTO-MCNC: 257 MG/DL (ref 65–117)
GLUCOSE BLD STRIP.AUTO-MCNC: 289 MG/DL (ref 65–117)
GLUCOSE BLD STRIP.AUTO-MCNC: 310 MG/DL (ref 65–117)
GLUCOSE BLD STRIP.AUTO-MCNC: 335 MG/DL (ref 65–117)
GLUCOSE SERPL-MCNC: 187 MG/DL (ref 65–100)
HCT VFR BLD AUTO: 33.6 % (ref 35–47)
HGB BLD-MCNC: 10.7 G/DL (ref 11.5–16)
IMM GRANULOCYTES # BLD AUTO: 0.1 K/UL (ref 0–0.04)
IMM GRANULOCYTES NFR BLD AUTO: 1 % (ref 0–0.5)
LYMPHOCYTES # BLD: 2.3 K/UL (ref 0.8–3.5)
LYMPHOCYTES NFR BLD: 23 % (ref 12–49)
MAGNESIUM SERPL-MCNC: 2.1 MG/DL (ref 1.6–2.4)
MCH RBC QN AUTO: 31.6 PG (ref 26–34)
MCHC RBC AUTO-ENTMCNC: 31.8 G/DL (ref 30–36.5)
MCV RBC AUTO: 99.1 FL (ref 80–99)
MONOCYTES # BLD: 0.7 K/UL (ref 0–1)
MONOCYTES NFR BLD: 7 % (ref 5–13)
NEUTS SEG # BLD: 6.8 K/UL (ref 1.8–8)
NEUTS SEG NFR BLD: 68 % (ref 32–75)
NRBC # BLD: 0 K/UL (ref 0–0.01)
NRBC BLD-RTO: 0 PER 100 WBC
PHOSPHATE SERPL-MCNC: 2.6 MG/DL (ref 2.6–4.7)
PLATELET # BLD AUTO: 219 K/UL (ref 150–400)
PMV BLD AUTO: 11.3 FL (ref 8.9–12.9)
POTASSIUM SERPL-SCNC: 3.6 MMOL/L (ref 3.5–5.1)
RBC # BLD AUTO: 3.39 M/UL (ref 3.8–5.2)
SERVICE CMNT-IMP: ABNORMAL
SODIUM SERPL-SCNC: 137 MMOL/L (ref 136–145)
WBC # BLD AUTO: 10 K/UL (ref 3.6–11)

## 2024-02-26 PROCEDURE — 82962 GLUCOSE BLOOD TEST: CPT

## 2024-02-26 PROCEDURE — 2580000003 HC RX 258: Performed by: NURSE PRACTITIONER

## 2024-02-26 PROCEDURE — 83735 ASSAY OF MAGNESIUM: CPT

## 2024-02-26 PROCEDURE — 6370000000 HC RX 637 (ALT 250 FOR IP): Performed by: STUDENT IN AN ORGANIZED HEALTH CARE EDUCATION/TRAINING PROGRAM

## 2024-02-26 PROCEDURE — 80048 BASIC METABOLIC PNL TOTAL CA: CPT

## 2024-02-26 PROCEDURE — 6370000000 HC RX 637 (ALT 250 FOR IP): Performed by: NURSE PRACTITIONER

## 2024-02-26 PROCEDURE — 84100 ASSAY OF PHOSPHORUS: CPT

## 2024-02-26 PROCEDURE — 36415 COLL VENOUS BLD VENIPUNCTURE: CPT

## 2024-02-26 PROCEDURE — 6370000000 HC RX 637 (ALT 250 FOR IP): Performed by: INTERNAL MEDICINE

## 2024-02-26 PROCEDURE — 80162 ASSAY OF DIGOXIN TOTAL: CPT

## 2024-02-26 PROCEDURE — 2060000000 HC ICU INTERMEDIATE R&B

## 2024-02-26 PROCEDURE — 2700000000 HC OXYGEN THERAPY PER DAY

## 2024-02-26 PROCEDURE — 85025 COMPLETE CBC W/AUTO DIFF WBC: CPT

## 2024-02-26 RX ORDER — DORZOLAMIDE HCL 20 MG/ML
2 SOLUTION/ DROPS OPHTHALMIC 2 TIMES DAILY
COMMUNITY

## 2024-02-26 RX ORDER — TIMOLOL MALEATE 5 MG/ML
1 SOLUTION/ DROPS OPHTHALMIC EVERY MORNING
COMMUNITY

## 2024-02-26 RX ORDER — SENNOSIDES A AND B 8.6 MG/1
1 TABLET, FILM COATED ORAL DAILY
COMMUNITY

## 2024-02-26 RX ORDER — LATANOPROST 50 UG/ML
1 SOLUTION/ DROPS OPHTHALMIC NIGHTLY
COMMUNITY

## 2024-02-26 RX ORDER — GLUCAGON 1 MG/ML
1 KIT INJECTION PRN
Status: DISCONTINUED | OUTPATIENT
Start: 2024-02-26 | End: 2024-03-02 | Stop reason: HOSPADM

## 2024-02-26 RX ORDER — PHENOL 1.4 %
1 AEROSOL, SPRAY (ML) MUCOUS MEMBRANE DAILY
COMMUNITY

## 2024-02-26 RX ORDER — PREDNISOLONE ACETATE 10 MG/ML
1 SUSPENSION/ DROPS OPHTHALMIC DAILY
COMMUNITY

## 2024-02-26 RX ORDER — INSULIN GLARGINE 100 [IU]/ML
15 INJECTION, SOLUTION SUBCUTANEOUS NIGHTLY
Status: DISCONTINUED | OUTPATIENT
Start: 2024-02-26 | End: 2024-03-02 | Stop reason: HOSPADM

## 2024-02-26 RX ORDER — CEPHALEXIN 250 MG/1
500 CAPSULE ORAL EVERY 6 HOURS SCHEDULED
Status: DISPENSED | OUTPATIENT
Start: 2024-02-26 | End: 2024-03-02

## 2024-02-26 RX ORDER — DEXTROSE MONOHYDRATE 100 MG/ML
INJECTION, SOLUTION INTRAVENOUS CONTINUOUS PRN
Status: DISCONTINUED | OUTPATIENT
Start: 2024-02-26 | End: 2024-03-02 | Stop reason: HOSPADM

## 2024-02-26 RX ADMIN — ATORVASTATIN CALCIUM 80 MG: 40 TABLET, FILM COATED ORAL at 21:00

## 2024-02-26 RX ADMIN — METOPROLOL SUCCINATE 25 MG: 25 TABLET, EXTENDED RELEASE ORAL at 08:39

## 2024-02-26 RX ADMIN — ASPIRIN 81 MG: 81 TABLET, CHEWABLE ORAL at 08:39

## 2024-02-26 RX ADMIN — DOXYCYCLINE HYCLATE 100 MG: 100 TABLET, COATED ORAL at 08:39

## 2024-02-26 RX ADMIN — CEPHALEXIN 500 MG: 250 CAPSULE ORAL at 17:03

## 2024-02-26 RX ADMIN — APIXABAN 2.5 MG: 2.5 TABLET, FILM COATED ORAL at 21:00

## 2024-02-26 RX ADMIN — CEPHALEXIN 500 MG: 250 CAPSULE ORAL at 23:35

## 2024-02-26 RX ADMIN — CEPHALEXIN 500 MG: 250 CAPSULE ORAL at 12:18

## 2024-02-26 RX ADMIN — APIXABAN 2.5 MG: 2.5 TABLET, FILM COATED ORAL at 08:39

## 2024-02-26 RX ADMIN — SODIUM CHLORIDE, PRESERVATIVE FREE 10 ML: 5 INJECTION INTRAVENOUS at 21:00

## 2024-02-26 RX ADMIN — INSULIN LISPRO 3 UNITS: 100 INJECTION, SOLUTION INTRAVENOUS; SUBCUTANEOUS at 12:17

## 2024-02-26 RX ADMIN — Medication: at 21:00

## 2024-02-26 RX ADMIN — INSULIN GLARGINE 15 UNITS: 100 INJECTION, SOLUTION SUBCUTANEOUS at 20:59

## 2024-02-26 RX ADMIN — Medication: at 08:41

## 2024-02-26 RX ADMIN — Medication 1 AMPULE: at 20:59

## 2024-02-26 RX ADMIN — LOSARTAN POTASSIUM 12.5 MG: 25 TABLET, FILM COATED ORAL at 08:39

## 2024-02-26 ASSESSMENT — PAIN SCALES - GENERAL
PAINLEVEL_OUTOF10: 0
PAINLEVEL_OUTOF10: 0

## 2024-02-26 NOTE — PROGRESS NOTES
Hospitalist Progress Note    NAME:   Yesenia Horan   : 1940   MRN: [id number]     Date/Time: 2024 1:59 PM  Patient PCP: Sampson Roque MD    Estimated discharge date:   Barriers: PT OT evaluation/stable heart rate    84 y/o female PMHx of Afib (Eliquis ), HFrEF (EF 20-25 %)  HTN, T2DM presented to ED with generalized weakness, nausea/vomiting onset today.  Found to be hypotensive, hypothermic, tachycardic in atrial fibrillation with RVR, administered 500 cc fluid bolus attempted cardioversion with brief conversion to sinus rhythm then converted to A-fib with RVR and was started on amiodarone infusion.+ Leukocytosis, +lactic acidosis, + DEEPTHI.  Due to ongoing hypotension she was started on peripheral vasopressors, ICU was consulted for admission.    She was admitted to ICU on , transferred out to ICU on , however she again had A-fib RVR which was not controlled despite amiodarone drip and Cardizem and was sent back to ICU on .  Status post amiodarone drip and digoxin,, transferred out of ICU last night.     Assessment / Plan:  Shock - Cardiogenic shock vs. Sepsis.   Type II MI  A-fib with RVR on presentation    Was admitted to ICU on pressors on presentation  Came out of ICU on  and went back due to A-fib RVR again  Status post amiodarone drip and digoxin  Currently on sinus rhythm  Continue oral amiodarone, Eliquis  Cardiology has cleared her for discharge with outpatient follow-up to consider ILR to assess AF burden and consider biventricular pacemaker placement  PT OT evaluation  Can probably be discharged tomorrow     UTI   Cultures shows only 8000 colonies/ml.   Culture showing gram-negative rods  Was switched to oral Doxy due to limited access  Given gram-negative rods will give Keflex instead of Doxy, cannot do 3-day course     Chronic systolic heart failure - last ECHO  showed EF 20%.   Patient is not on any diuretics at this moment  We will continue monitor blood

## 2024-02-26 NOTE — PROGRESS NOTES
Pharmacy Medication Reconciliation     The patient was interviewed regarding current PTA medication list, use and drug allergies;  No visitors present in room. The patient was questioned regarding use of any other inhalers, topical products, over the counter medications, herbal medications, vitamin products or ophthalmic/nasal/otic medication use.     Allergy Update: Patient has no known allergies.    Recommendations/Findings:   The following amendments were made to the patient's active medication list on file at [facility]:     1) Additions: latanoprost, prednisolone eye drops, dorzolamide, timolol, senna, multivitamin    2) Deletions: None    3) Changes:   Metoprolol tartrate 25 mg PO TID --> 25 mg PO BID  Furosemide 40 mg PO BID --> 40 mg PO QD      Pertinent Findings:   Patient was unable to list all the medication on top of their head, but was able to provide the dose and frequency   Taking acyclovir 400 mg qd instead of bid as prescribed  She is using 4 eye drops as listed  Only using 2 medications OTC: Senna and multivitamin.     Clarified PTA med list with patient. PTA medication list was corrected to the following:     Prior to Admission Medications   Prescriptions Last Dose Informant   Dapagliflozin Propanediol (FARXIGA PO) Past Week Self   Sig: Take 10 mg by mouth daily   Multiple Vitamins-Minerals (MULTIVITAMIN ADULTS 50+) TABS Past Week Self   Sig: Take 1 tablet by mouth daily   acyclovir (ZOVIRAX) 400 MG tablet Taking Differently Self   Sig: Take 1 tablet by mouth 2 times daily   apixaban (ELIQUIS) 5 MG TABS tablet Past Week Self   Sig: Take 1 tablet by mouth 2 times daily   atorvastatin (LIPITOR) 20 MG tablet Past Week Self   Sig: Take 1 tablet by mouth daily   dorzolamide (TRUSOPT) 2 % ophthalmic solution Past Week Self   Sig: Place 2 drops into both eyes 2 times daily   furosemide (LASIX) 40 MG tablet Past Week Self   Sig: Take 1 tablet by mouth daily   latanoprost (XALATAN) 0.005 % ophthalmic solution

## 2024-02-26 NOTE — PROGRESS NOTES
0600 - Patient received from CCU via bed, accompanied by RN.  Amiodarone gtt infusing at 0.5 mg/min.    0615 - patient ws able to walk from ICU bed to CPU bed with minimal assistance for balance.  Monitors placed, VS obtained. Left arm with shadowed bruising to upper FA, elbow and lower upper arm. Elevated on pillow to help with edema.  Instructed on use of call light with demonstration of understanding.  Pt oriented to room, phone and call light placed within easy reach.    0620 - Tele monitoring notified of patient transfer and need to place pt in CPU monitoring

## 2024-02-26 NOTE — PLAN OF CARE
Problem: Discharge Planning  Goal: Discharge to home or other facility with appropriate resources  Outcome: Progressing  Flowsheets (Taken 2/25/2024 2000 by Radha Bullock, RN)  Discharge to home or other facility with appropriate resources: Identify barriers to discharge with patient and caregiver     Problem: Safety - Adult  Goal: Free from fall injury  Outcome: Progressing     Problem: Chronic Conditions and Co-morbidities  Goal: Patient's chronic conditions and co-morbidity symptoms are monitored and maintained or improved  Outcome: Progressing  Flowsheets (Taken 2/25/2024 2000 by Radha Bullock, RN)  Care Plan - Patient's Chronic Conditions and Co-Morbidity Symptoms are Monitored and Maintained or Improved: Monitor and assess patient's chronic conditions and comorbid symptoms for stability, deterioration, or improvement     Problem: Skin/Tissue Integrity  Goal: Absence of new skin breakdown  Description: 1.  Monitor for areas of redness and/or skin breakdown  2.  Assess vascular access sites hourly  3.  Every 4-6 hours minimum:  Change oxygen saturation probe site  4.  Every 4-6 hours:  If on nasal continuous positive airway pressure, respiratory therapy assess nares and determine need for appliance change or resting period.  Outcome: Progressing     Problem: Pain  Goal: Verbalizes/displays adequate comfort level or baseline comfort level  Outcome: Progressing  Flowsheets  Taken 2/26/2024 0400 by Radha Bullock RN  Verbalizes/displays adequate comfort level or baseline comfort level:   Encourage patient to monitor pain and request assistance   Assess pain using appropriate pain scale  Taken 2/26/2024 0000 by Radha Bullock, RN  Verbalizes/displays adequate comfort level or baseline comfort level:   Encourage patient to monitor pain and request assistance   Assess pain using appropriate pain scale  Taken 2/25/2024 2000 by Radha Bullock, RN  Verbalizes/displays adequate comfort level or baseline

## 2024-02-26 NOTE — CONSULTS
[facility]  [address] [place] [address]  [phone]  [email or web address]     PLEASE NOTE THAT WE CONFIRMED WITH THE REFERRING PHYSICIAN PRIOR TO SEEING THE PATIENT THAT THE PATIENT IS BEING REFERRED FOR INITIAL HOSPITAL EVALUATION AND FOR LONG-TERM ONGOING CARDIAC CARE.      Date of  Admission: 2/21/2024  4:37 PM     Admission type:Emergency    Yesenia Horan is a 83 y.o. female admitted for Atrial fibrillation with tachycardic ventricular rate (HCC) [I48.91]. Pt with AF with rvr in the setting of urosepsis - failed cardioversion but converted to sinus on iv amio. Asked to see regarding recommendations. 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    Osteoarthritis of hip 09/11/2017    Osteoarthrosis, localized, primary, knee 01/14/2016    Knee arthropathy 06/18/2015      Sampson Roque MD  Past Medical History:   Diagnosis Date    Arthritis     Chronic pain     arthritic    Diabetes (HCC)     Hypertension     Ill-defined condition     shingles 1.5 years ago 8/2014    Other ill-defined conditions(799.89)     shingles    Other ill-defined conditions(799.89)     high cholesterol      Past Surgical History:   Procedure Laterality Date    APPENDECTOMY      CATARACT REMOVAL Left     COLONOSCOPY N/A 10/25/2016    COLONOSCOPY performed by Usman Murphy MD at [facility]     negative  Eyes: negative  Ears, nose, mouth, throat, and face: negative  Respiratory: negative  Cardiovascular: negative  Gastrointestinal: negative  Genitourinary: negative  Musculoskeletal: negative  Neurological: negative  Behvioral/Psych: negative  Endocrine: negative     Subjective:      Vitals:    02/26/24 1015   BP: (!) 112/55   Pulse: 76   Resp: 23   Temp: 97.1 °F (36.2 °C)   SpO2: 99%       Physical Exam:  General:  Alert, cooperative, well noursished, well developed, appears stated age   Eyes:  Sclera anicteric. Pupils equally round and reactive to light.   Mouth/Throat: Mucous membranes normal, oral pharynx clear   Neck: Supple   Lungs:   Clear to auscultation bilaterally, good effort   CV:  Regular rate and rhythm,no murmur, click, rub or gallop   Abdomen:   Soft, non-tender. bowel sounds normal. non-distended   Extremities: No cyanosis or edema   Skin: Skin color, texture, turgor normal. no acute rash or lesions   Lymph nodes: Cervical and supraclavicular normal   Musculoskeletal: No swelling or deformity       Psych: Alert and oriented, normal mood affect given the setting         Cardiographics    Telemetry: nsr    ECG: afib with rvr    Echocardiogram: lvef 35-40    Labs:  Recent Labs     02/24/24  0518 02/25/24  0605 02/26/24  0411   WBC 11.1* 11.6* 10.0   HGB 11.5 12.0 10.7*   HCT 35.3 38.4 33.6*    216 219     Recent Labs     02/24/24  0518 02/25/24  0605 02/26/24  0411    139 137   K 4.0 4.0 3.6   * 107 108   CO2 21 27 26   BUN 10 11 11   MG 2.1 2.2 2.1   PHOS 2.6 2.2* 2.6       No results for input(s): \"CPK\", \"CKMB\" in the last 72 hours.    Invalid input(s): \"TROIQ\"      Intake/Output Summary (Last 24 hours) at 2/26/2024 1122  Last data filed at 2/26/2024 0936  Gross per 24 hour   Intake 1221.9 ml   Output 350 ml   Net 871.9 ml         Assessment:     Assessment:       Principal Problem:    Atrial fibrillation with tachycardic ventricular rate (HCC)  Active Problems:    Other

## 2024-02-26 NOTE — PROGRESS NOTES
End of Shift Note    Bedside shift change report given to SHIRLEY Ramon (oncoming nurse) by Nydia Jose RN (offgoing nurse).  Report included the following information SBAR, Kardex, ED Summary, Procedure Summary, Intake/Output, MAR, and Cardiac Rhythm NSR    Shift worked:  7a-7p     Shift summary and any significant changes:    Off amio gtt and transition to PO. Weaned to RA. Ambulated in hallway with walker. Family visited.      Concerns for physician to address:       Zone phone for oncoming shift:

## 2024-02-26 NOTE — PROGRESS NOTES
Brief Note:    83-year-old  female admitted 2/21/2024 for septic shock of urinary origin and atrial fibrillation with rapid ventricular response.  Treated with amiodarone infusion which continues at 0.5 mg/min in addition to digoxin load with digoxin level of 2.5 morning of 2/25.  Patient has subsequently converted back to sinus rhythm and is being followed by cardiology with plans to transition to p.o. medications today.  Urine growing out 8000 colonies of gram-negative rods was treated with ceftriaxone, but due to limited IV access and incompatibility with amiodarone infusion she was transitioned to doxycycline with plans for 5-day course.     Patient stable for transfer to floor and there is acute need for ICU bed overnight.  I was contacted to accept the hospitalist service for more appropriate level of care.    Plan:  Phone report received from intensivist NP, Renay Reyna   Chart reviewed including active orders  Continue care as planned by intensivist service  Accept as transfer to stepdown unit  Formal evaluation and daily progress note to be completed by daytime attending hospitalist

## 2024-02-27 LAB
ANION GAP SERPL CALC-SCNC: 5 MMOL/L (ref 5–15)
BASOPHILS # BLD: 0.1 K/UL (ref 0–0.1)
BASOPHILS NFR BLD: 1 % (ref 0–1)
BUN SERPL-MCNC: 13 MG/DL (ref 6–20)
BUN/CREAT SERPL: 24 (ref 12–20)
CALCIUM SERPL-MCNC: 8.5 MG/DL (ref 8.5–10.1)
CHLORIDE SERPL-SCNC: 109 MMOL/L (ref 97–108)
CO2 SERPL-SCNC: 27 MMOL/L (ref 21–32)
CREAT SERPL-MCNC: 0.55 MG/DL (ref 0.55–1.02)
DIFFERENTIAL METHOD BLD: ABNORMAL
EKG DIAGNOSIS: NORMAL
EKG Q-T INTERVAL: 376 MS
EKG QRS DURATION: 140 MS
EKG QTC CALCULATION (BAZETT): 540 MS
EKG R AXIS: -38 DEGREES
EKG T AXIS: 172 DEGREES
EKG VENTRICULAR RATE: 124 BPM
EOSINOPHIL # BLD: 0.2 K/UL (ref 0–0.4)
EOSINOPHIL NFR BLD: 2 % (ref 0–7)
ERYTHROCYTE [DISTWIDTH] IN BLOOD BY AUTOMATED COUNT: 14.2 % (ref 11.5–14.5)
GLUCOSE BLD STRIP.AUTO-MCNC: 168 MG/DL (ref 65–117)
GLUCOSE BLD STRIP.AUTO-MCNC: 190 MG/DL (ref 65–117)
GLUCOSE BLD STRIP.AUTO-MCNC: 208 MG/DL (ref 65–117)
GLUCOSE BLD STRIP.AUTO-MCNC: 245 MG/DL (ref 65–117)
GLUCOSE SERPL-MCNC: 171 MG/DL (ref 65–100)
HCT VFR BLD AUTO: 31.9 % (ref 35–47)
HGB BLD-MCNC: 10.3 G/DL (ref 11.5–16)
IMM GRANULOCYTES # BLD AUTO: 0 K/UL (ref 0–0.04)
IMM GRANULOCYTES NFR BLD AUTO: 1 % (ref 0–0.5)
LYMPHOCYTES # BLD: 2.4 K/UL (ref 0.8–3.5)
LYMPHOCYTES NFR BLD: 30 % (ref 12–49)
MAGNESIUM SERPL-MCNC: 2 MG/DL (ref 1.6–2.4)
MCH RBC QN AUTO: 32.2 PG (ref 26–34)
MCHC RBC AUTO-ENTMCNC: 32.3 G/DL (ref 30–36.5)
MCV RBC AUTO: 99.7 FL (ref 80–99)
MONOCYTES # BLD: 0.6 K/UL (ref 0–1)
MONOCYTES NFR BLD: 7 % (ref 5–13)
NEUTS SEG # BLD: 5 K/UL (ref 1.8–8)
NEUTS SEG NFR BLD: 59 % (ref 32–75)
NRBC # BLD: 0 K/UL (ref 0–0.01)
NRBC BLD-RTO: 0 PER 100 WBC
PLATELET # BLD AUTO: 225 K/UL (ref 150–400)
PMV BLD AUTO: 10.8 FL (ref 8.9–12.9)
POTASSIUM SERPL-SCNC: 3.1 MMOL/L (ref 3.5–5.1)
RBC # BLD AUTO: 3.2 M/UL (ref 3.8–5.2)
SERVICE CMNT-IMP: ABNORMAL
SODIUM SERPL-SCNC: 141 MMOL/L (ref 136–145)
WBC # BLD AUTO: 8.2 K/UL (ref 3.6–11)

## 2024-02-27 PROCEDURE — 82962 GLUCOSE BLOOD TEST: CPT

## 2024-02-27 PROCEDURE — 36415 COLL VENOUS BLD VENIPUNCTURE: CPT

## 2024-02-27 PROCEDURE — 2500000003 HC RX 250 WO HCPCS: Performed by: NURSE PRACTITIONER

## 2024-02-27 PROCEDURE — 2060000000 HC ICU INTERMEDIATE R&B

## 2024-02-27 PROCEDURE — 6360000002 HC RX W HCPCS: Performed by: NURSE PRACTITIONER

## 2024-02-27 PROCEDURE — 93005 ELECTROCARDIOGRAM TRACING: CPT | Performed by: NURSE PRACTITIONER

## 2024-02-27 PROCEDURE — 6370000000 HC RX 637 (ALT 250 FOR IP): Performed by: STUDENT IN AN ORGANIZED HEALTH CARE EDUCATION/TRAINING PROGRAM

## 2024-02-27 PROCEDURE — 2580000003 HC RX 258: Performed by: STUDENT IN AN ORGANIZED HEALTH CARE EDUCATION/TRAINING PROGRAM

## 2024-02-27 PROCEDURE — 83735 ASSAY OF MAGNESIUM: CPT

## 2024-02-27 PROCEDURE — 2500000003 HC RX 250 WO HCPCS: Performed by: STUDENT IN AN ORGANIZED HEALTH CARE EDUCATION/TRAINING PROGRAM

## 2024-02-27 PROCEDURE — 6370000000 HC RX 637 (ALT 250 FOR IP): Performed by: INTERNAL MEDICINE

## 2024-02-27 PROCEDURE — 85025 COMPLETE CBC W/AUTO DIFF WBC: CPT

## 2024-02-27 PROCEDURE — 6370000000 HC RX 637 (ALT 250 FOR IP): Performed by: NURSE PRACTITIONER

## 2024-02-27 PROCEDURE — 2580000003 HC RX 258: Performed by: NURSE PRACTITIONER

## 2024-02-27 PROCEDURE — 80048 BASIC METABOLIC PNL TOTAL CA: CPT

## 2024-02-27 RX ORDER — POTASSIUM CHLORIDE 7.45 MG/ML
10 INJECTION INTRAVENOUS
Status: COMPLETED | OUTPATIENT
Start: 2024-02-27 | End: 2024-02-27

## 2024-02-27 RX ORDER — METOPROLOL TARTRATE 1 MG/ML
5 INJECTION, SOLUTION INTRAVENOUS ONCE
Status: COMPLETED | OUTPATIENT
Start: 2024-02-27 | End: 2024-02-27

## 2024-02-27 RX ORDER — SODIUM CHLORIDE, SODIUM LACTATE, POTASSIUM CHLORIDE, AND CALCIUM CHLORIDE .6; .31; .03; .02 G/100ML; G/100ML; G/100ML; G/100ML
1000 INJECTION, SOLUTION INTRAVENOUS ONCE
Status: COMPLETED | OUTPATIENT
Start: 2024-02-27 | End: 2024-02-27

## 2024-02-27 RX ORDER — POTASSIUM CHLORIDE 20 MEQ/1
40 TABLET, EXTENDED RELEASE ORAL ONCE
Status: COMPLETED | OUTPATIENT
Start: 2024-02-27 | End: 2024-02-27

## 2024-02-27 RX ADMIN — LOSARTAN POTASSIUM 12.5 MG: 25 TABLET, FILM COATED ORAL at 07:24

## 2024-02-27 RX ADMIN — CEPHALEXIN 500 MG: 250 CAPSULE ORAL at 17:03

## 2024-02-27 RX ADMIN — ATORVASTATIN CALCIUM 80 MG: 40 TABLET, FILM COATED ORAL at 21:13

## 2024-02-27 RX ADMIN — INSULIN LISPRO 1 UNITS: 100 INJECTION, SOLUTION INTRAVENOUS; SUBCUTANEOUS at 17:02

## 2024-02-27 RX ADMIN — SODIUM CHLORIDE, POTASSIUM CHLORIDE, SODIUM LACTATE AND CALCIUM CHLORIDE 1000 ML: 600; 310; 30; 20 INJECTION, SOLUTION INTRAVENOUS at 11:14

## 2024-02-27 RX ADMIN — INSULIN GLARGINE 15 UNITS: 100 INJECTION, SOLUTION SUBCUTANEOUS at 21:13

## 2024-02-27 RX ADMIN — POTASSIUM CHLORIDE 10 MEQ: 7.46 INJECTION, SOLUTION INTRAVENOUS at 07:35

## 2024-02-27 RX ADMIN — AMIODARONE HYDROCHLORIDE 150 MG: 1.5 INJECTION, SOLUTION INTRAVENOUS at 13:16

## 2024-02-27 RX ADMIN — INSULIN LISPRO 1 UNITS: 100 INJECTION, SOLUTION INTRAVENOUS; SUBCUTANEOUS at 11:52

## 2024-02-27 RX ADMIN — METOPROLOL SUCCINATE 25 MG: 25 TABLET, EXTENDED RELEASE ORAL at 07:24

## 2024-02-27 RX ADMIN — POTASSIUM CHLORIDE 10 MEQ: 7.46 INJECTION, SOLUTION INTRAVENOUS at 04:48

## 2024-02-27 RX ADMIN — SODIUM CHLORIDE, PRESERVATIVE FREE 10 ML: 5 INJECTION INTRAVENOUS at 21:14

## 2024-02-27 RX ADMIN — SODIUM CHLORIDE: 9 INJECTION, SOLUTION INTRAVENOUS at 07:34

## 2024-02-27 RX ADMIN — CEPHALEXIN 500 MG: 250 CAPSULE ORAL at 11:14

## 2024-02-27 RX ADMIN — POTASSIUM CHLORIDE 40 MEQ: 1500 TABLET, EXTENDED RELEASE ORAL at 11:14

## 2024-02-27 RX ADMIN — ASPIRIN 81 MG: 81 TABLET, CHEWABLE ORAL at 07:24

## 2024-02-27 RX ADMIN — Medication: at 07:24

## 2024-02-27 RX ADMIN — AMIODARONE HYDROCHLORIDE 200 MG: 200 TABLET ORAL at 07:24

## 2024-02-27 RX ADMIN — CEPHALEXIN 500 MG: 250 CAPSULE ORAL at 23:42

## 2024-02-27 RX ADMIN — Medication: at 21:14

## 2024-02-27 RX ADMIN — APIXABAN 2.5 MG: 2.5 TABLET, FILM COATED ORAL at 07:24

## 2024-02-27 RX ADMIN — METOROPROLOL TARTRATE 5 MG: 5 INJECTION, SOLUTION INTRAVENOUS at 04:40

## 2024-02-27 RX ADMIN — CEPHALEXIN 500 MG: 250 CAPSULE ORAL at 05:48

## 2024-02-27 NOTE — PLAN OF CARE
Problem: Discharge Planning  Goal: Discharge to home or other facility with appropriate resources  Outcome: Progressing     Problem: Safety - Adult  Goal: Free from fall injury  Outcome: Progressing     Problem: Chronic Conditions and Co-morbidities  Goal: Patient's chronic conditions and co-morbidity symptoms are monitored and maintained or improved  Outcome: Progressing     Problem: Skin/Tissue Integrity  Goal: Absence of new skin breakdown  Description: 1.  Monitor for areas of redness and/or skin breakdown  2.  Assess vascular access sites hourly  3.  Every 4-6 hours minimum:  Change oxygen saturation probe site  4.  Every 4-6 hours:  If on nasal continuous positive airway pressure, respiratory therapy assess nares and determine need for appliance change or resting period.  Outcome: Progressing     Problem: Pain  Goal: Verbalizes/displays adequate comfort level or baseline comfort level  Outcome: Progressing

## 2024-02-27 NOTE — FLOWSHEET NOTE
02/27/24 1045   Vital Signs   Blood Pressure Lying 103/55   Pulse Lying 103 PER MINUTE   Blood Pressure Sitting 112/78   Pulse Sitting 107 PER MINUTE   Blood Pressure Standing 129/76   Pulse Standing 119 PER MINUTE

## 2024-02-27 NOTE — PROGRESS NOTES
1900H: Bedside shift change report given to VENTURA WATSON (oncoming nurse) by MILTON WATSON (offgoing nurse). Report included the following information Nurse Handoff Report, Intake/Output, MAR, Recent Results, Med Rec Status, and Cardiac Rhythm SINUS RHYTHM .    0420h: Rhythm changed noted; Afib on telemonitor;Informed hospitalist Tyrel. EKG done.    Metoprolol 5mg IV given as ordered;    0510h: called lab to add magnesium test on previous blood sample sent.    End of Shift Note    Bedside shift change report given to AMANDO WATSON (oncoming nurse) by Ventura Reilly RN (offgoing nurse).  Report included the following information SBAR, Intake/Output, MAR, Recent Results, Med Rec Status, and Cardiac Rhythm AFIB    Shift worked:  1900H-0730H     Shift summary and any significant changes:          Concerns for physician to address:       Zone phone for oncoming shift:          Activity:     Number times ambulated in hallways past shift: 0  Number of times OOB to chair past shift: 0    Cardiac:   Cardiac Monitoring: Yes           Access:  Current line(s): PIV     Genitourinary:   Urinary status: voiding    Respiratory:      Chronic home O2 use?: NO  Incentive spirometer at bedside: N/A       GI:     Current diet:  ADULT DIET; Regular; 5 carb choices (75 gm/meal)  Passing flatus: YES  Tolerating current diet: YES       Pain Management:   Patient states pain is manageable on current regimen: YES    Skin:     Interventions: specialty bed, float heels, internal/external urinary devices, and nutritional support    Patient Safety:  Fall Score:    Interventions: bed/chair alarm, assistive device (walker, cane. etc), gripper socks, and pt to call before getting OOB       Length of Stay:  Expected LOS: 7  Actual LOS: 6      Ventura Reilly, RN

## 2024-02-27 NOTE — PROGRESS NOTES
[facility]  [address], [place] [id number] [phone]  [email or web address]  CARDIOLOGY PROGRESS NOTE    2/27/2024 10:56 AM    Admit Date: 2/21/2024    Admit Diagnosis:   Atrial fibrillation with tachycardic ventricular rate (HCC) [I48.91]    Subjective:     Yesenia Horan was seen and examined at the bedside.  Breathing is better.  She does not report any symptoms after converting back to atrial fibrillation this morning.  At initial presentation, she had palpitations in the setting of nausea and vomiting.  No clear shortness of breath at the time per the patient.    /67   Pulse (!) 104   Temp 97.8 °F (36.6 °C) (Oral)   Resp 18   Ht 1.676 m (5' 6\")   Wt 63.1 kg (139 lb 3.2 oz)   SpO2 94%   BMI 22.47 kg/m²     Current Facility-Administered Medications   Medication Dose Route Frequency    lactated ringers bolus 1,000 mL  1,000 mL IntraVENous Once    potassium chloride (KLOR-CON M) extended release tablet 40 mEq  40 mEq Oral Once    cephALEXin (KEFLEX) capsule 500 mg  500 mg Oral 4 times per day    insulin glargine (LANTUS) injection vial 15 Units  15 Units SubCUTAneous Nightly    glucose chewable tablet 16 g  4 tablet Oral PRN    dextrose bolus 10% 125 mL  125 mL IntraVENous PRN    Or    dextrose bolus 10% 250 mL  250 mL IntraVENous PRN    glucagon injection 1 mg  1 mg SubCUTAneous PRN    dextrose 10 % infusion   IntraVENous Continuous PRN    metoprolol succinate (TOPROL XL) extended release tablet 25 mg  25 mg Oral Daily    atorvastatin (LIPITOR) tablet 80 mg  80 mg Oral Nightly    balsum peru-castor oil (VENELEX) ointment   Topical BID    amiodarone (CORDARONE) tablet 200 mg  200 mg Oral Daily    apixaban (ELIQUIS) tablet 2.5 mg  2.5 mg Oral BID    aspirin chewable tablet 81 mg  81 mg Oral Daily    insulin lispro (HUMALOG) injection vial 0-4 Units  0-4 Units SubCUTAneous TID WC    insulin lispro (HUMALOG) injection vial 0-4 Units  0-4 Units  anticoagulation with apixaban 2.5 mg twice a day and oral amiodarone for now.    Chronic systolic heart failure: Left ventricular ejection fraction 25%    Continue beta blocker, discontinue losartan given resting hypotension.     Thank you for allowing us to participate in the care of this patient.  We will follow.      Ag Jc MD

## 2024-02-27 NOTE — PROGRESS NOTES
End of Shift Note    Bedside shift change report given to SHIRLEY Tello (oncoming nurse) by Nydia Jose RN (offgoing nurse).  Report included the following information SBAR, Kardex, ED Summary, Procedure Summary, Intake/Output, MAR, Recent Results, Med Rec Status, and Cardiac Rhythm AFIB RVR    Shift worked:  7a-7p     Shift summary and any significant changes:    Patient in afib RVR, cards re-consulted. orthostatics completed and LR bolus given. Amio bolus given HR remained the same. Plan for AV node ablation Friday with Dr. Naik. Consents signed.      Concerns for physician to address:       Zone phone for oncoming shift:

## 2024-02-27 NOTE — PROGRESS NOTES
Nursing contacted Nocturnist/cross cover provider and notified patient pt has afib rvr, converted back to afib per nurse on telemetry with -120s, was on amio gtt and was changed to po by cards. Cards note reviewed. No other concerns reported. VSS. Ordered EKG stat- results pending. Appears k was 3.1 this am- ordered kcl 10meq x2 runs iv, and added mag level- results pending, consider replacing mag if needed. On metoprolol, will do x1 dose 5mg lopressor. On bedside exam pt denies any c/o chest pain, h/a, dyspnea, dizziness. She is lying in bed. Sbp 99. Pt concerned she wants to be d/c home today, instructed her to d/w dayshift hospitalist further. Patient denies any further complaints or concerns. No acute distress reported. Nursing to notify Hospitalist for further/continued concerns. Will remain available overnight for further concerns if nursing/patient needs. Will defer further evaluation/management to the day shift primary care team.    Non-billable note.

## 2024-02-27 NOTE — PROGRESS NOTES
Hospitalist Progress Note    NAME:   Yesenia Horan   : 1940   MRN: [id number]     Date/Time: 2024 9:37 AM  Patient PCP: Sampson Roque MD    Estimated discharge date:   Barriers: back into afib with RVR , cards reconsulted     84 y/o female PMHx of Afib (Eliquis ), HFrEF (EF 20-25 %)  HTN, T2DM presented to ED with generalized weakness, nausea/vomiting onset today.  Found to be hypotensive, hypothermic, tachycardic in atrial fibrillation with RVR, administered 500 cc fluid bolus attempted cardioversion with brief conversion to sinus rhythm then converted to A-fib with RVR and was started on amiodarone infusion.+ Leukocytosis, +lactic acidosis, + DEEPTHI.  Due to ongoing hypotension she was started on peripheral vasopressors, ICU was consulted for admission.    She was admitted to ICU on , transferred out to ICU on , however she again had A-fib RVR which was not controlled despite amiodarone drip and Cardizem and was sent back to ICU on .  Status post amiodarone drip and digoxin,, transferred out of ICU last night.     Assessment / Plan:  Shock - Cardiogenic shock vs. Sepsis.   Type II MI  A-fib with RVR on presentation    Was admitted to ICU on pressors on presentation  Came out of ICU on  and went back due to A-fib RVR again  Status post amiodarone drip and digoxin  Currently on sinus rhythm  Continue oral amiodarone, Eliquis  Cardiology has initially cleared her for discharge with outpatient follow-up to consider ILR to assess AF burden and consider biventricular pacemaker placement but patient went back to  A-fib with RVR  IV fluid ordered by cardiology, if recurrent A-fib with RVR, recommended amiodarone bolus  PT OT evaluation       UTI   Cultures shows only 8000 colonies/ml.   Culture showing gram-negative rods  Was switched to oral Doxy due to limited access  Given gram-negative rods will give Keflex instead of Doxy,  do 3-day course     Chronic systolic heart failure - last

## 2024-02-27 NOTE — PROGRESS NOTES
Cardiology Progress Note       [facility]  [address] [place] [address]  [phone]  [email or web address]     2/27/2024 5:15 PM    Admit Date: 2/21/2024    Admit Diagnosis: Atrial fibrillation with tachycardic ventricular rate (HCC) [I48.91]    Subjective:     Yesenia Horan   denies chest pain. Back in AF with rvr since this am    BP (!) 123/93   Pulse (!) 105   Temp 97.6 °F (36.4 °C) (Oral)   Resp 26   Ht 1.676 m (5' 6\")   Wt 63.1 kg (139 lb 3.2 oz)   SpO2 93%   BMI 22.47 kg/m²   Current Facility-Administered Medications   Medication Dose Route Frequency    cephALEXin (KEFLEX) capsule 500 mg  500 mg Oral 4 times per day    insulin glargine (LANTUS) injection vial 15 Units  15 Units SubCUTAneous Nightly    glucose chewable tablet 16 g  4 tablet Oral PRN    dextrose bolus 10% 125 mL  125 mL IntraVENous PRN    Or    dextrose bolus 10% 250 mL  250 mL IntraVENous PRN    glucagon injection 1 mg  1 mg SubCUTAneous PRN    dextrose 10 % infusion   IntraVENous Continuous PRN    metoprolol succinate (TOPROL XL) extended release tablet 25 mg  25 mg Oral Daily    atorvastatin (LIPITOR) tablet 80 mg  80 mg Oral Nightly    balsum peru-castor oil (VENELEX) ointment   Topical BID    amiodarone (CORDARONE) tablet 200 mg  200 mg Oral Daily    apixaban (ELIQUIS) tablet 2.5 mg  2.5 mg Oral BID    aspirin chewable tablet 81 mg  81 mg Oral Daily    insulin lispro (HUMALOG) injection vial 0-4 Units  0-4 Units SubCUTAneous TID     insulin lispro (HUMALOG) injection vial 0-4 Units  0-4 Units SubCUTAneous Nightly    alcohol 62% (NOZIN) nasal  1 ampule  1 ampule Nasal Q12H    sodium chloride flush 0.9 % injection 5-40 mL  5-40 mL IntraVENous 2 times per day    sodium chloride flush 0.9 % injection 5-40 mL  5-40 mL IntraVENous PRN    0.9 % sodium chloride infusion   IntraVENous PRN    ondansetron (ZOFRAN-ODT) disintegrating tablet 4 mg  4 mg Oral Q8H PRN    Or    ondansetron   * No resolved hospital problems. *      Plan:     Yesenia Horan is back in afib with rvr. Not rate controlled well. Stable and compliant with oac. Limited options of med rx in light of relative hypotension and cardiomyopathy. Will plan for av node ablation and biv pacer on Friday. I discussed the risks/benefits/alternatives of the procedure with the patient. Risks include (but are not limited to) bleeding, heart block, infection, cva/mi/tamponade/death. The patient understands and agrees to proceed.       Sebastián Niak MD, FACC, FHRS    2/27/2024

## 2024-02-28 LAB
BACTERIA SPEC CULT: NORMAL
BACTERIA SPEC CULT: NORMAL
GLUCOSE BLD STRIP.AUTO-MCNC: 126 MG/DL (ref 65–117)
GLUCOSE BLD STRIP.AUTO-MCNC: 227 MG/DL (ref 65–117)
GLUCOSE BLD STRIP.AUTO-MCNC: 263 MG/DL (ref 65–117)
GLUCOSE BLD STRIP.AUTO-MCNC: 281 MG/DL (ref 65–117)
SERVICE CMNT-IMP: ABNORMAL
SERVICE CMNT-IMP: NORMAL
SERVICE CMNT-IMP: NORMAL

## 2024-02-28 PROCEDURE — 6370000000 HC RX 637 (ALT 250 FOR IP): Performed by: NURSE PRACTITIONER

## 2024-02-28 PROCEDURE — 6370000000 HC RX 637 (ALT 250 FOR IP): Performed by: INTERNAL MEDICINE

## 2024-02-28 PROCEDURE — 2580000003 HC RX 258: Performed by: NURSE PRACTITIONER

## 2024-02-28 PROCEDURE — 6370000000 HC RX 637 (ALT 250 FOR IP): Performed by: STUDENT IN AN ORGANIZED HEALTH CARE EDUCATION/TRAINING PROGRAM

## 2024-02-28 PROCEDURE — 2060000000 HC ICU INTERMEDIATE R&B

## 2024-02-28 PROCEDURE — 82962 GLUCOSE BLOOD TEST: CPT

## 2024-02-28 RX ADMIN — INSULIN GLARGINE 15 UNITS: 100 INJECTION, SOLUTION SUBCUTANEOUS at 20:43

## 2024-02-28 RX ADMIN — AMIODARONE HYDROCHLORIDE 200 MG: 200 TABLET ORAL at 09:04

## 2024-02-28 RX ADMIN — INSULIN LISPRO 2 UNITS: 100 INJECTION, SOLUTION INTRAVENOUS; SUBCUTANEOUS at 17:06

## 2024-02-28 RX ADMIN — Medication: at 09:05

## 2024-02-28 RX ADMIN — CEPHALEXIN 500 MG: 250 CAPSULE ORAL at 23:51

## 2024-02-28 RX ADMIN — CEPHALEXIN 500 MG: 250 CAPSULE ORAL at 17:06

## 2024-02-28 RX ADMIN — CEPHALEXIN 500 MG: 250 CAPSULE ORAL at 06:01

## 2024-02-28 RX ADMIN — APIXABAN 2.5 MG: 2.5 TABLET, FILM COATED ORAL at 09:04

## 2024-02-28 RX ADMIN — ASPIRIN 81 MG: 81 TABLET, CHEWABLE ORAL at 09:04

## 2024-02-28 RX ADMIN — ATORVASTATIN CALCIUM 80 MG: 40 TABLET, FILM COATED ORAL at 20:43

## 2024-02-28 RX ADMIN — Medication: at 20:44

## 2024-02-28 RX ADMIN — METOPROLOL SUCCINATE 25 MG: 25 TABLET, EXTENDED RELEASE ORAL at 09:04

## 2024-02-28 RX ADMIN — SODIUM CHLORIDE, PRESERVATIVE FREE 10 ML: 5 INJECTION INTRAVENOUS at 20:44

## 2024-02-28 RX ADMIN — CEPHALEXIN 500 MG: 250 CAPSULE ORAL at 11:42

## 2024-02-28 RX ADMIN — INSULIN LISPRO 2 UNITS: 100 INJECTION, SOLUTION INTRAVENOUS; SUBCUTANEOUS at 11:42

## 2024-02-28 RX ADMIN — APIXABAN 2.5 MG: 2.5 TABLET, FILM COATED ORAL at 20:43

## 2024-02-28 ASSESSMENT — PAIN SCALES - GENERAL
PAINLEVEL_OUTOF10: 0
PAINLEVEL_OUTOF10: 0

## 2024-02-28 NOTE — PROGRESS NOTES
0939- pt has reddened heels and sacral area, looks like pictures were taken on the 22nd but no charting done. Areas of concern added to chart and CCL notified. Areas appear better than pictures taken on the 22 nd. Venelex has been ordered prior to my shift and applied. Pt has been provided a waffle pillow, and heel boots. Nursing staff will continue to monitor.

## 2024-02-28 NOTE — PROGRESS NOTES
Hospitalist Progress Note    NAME:   Yesenia Horan   : 1940   MRN: [id number]     Date/Time: 2024 12:42 PM    Patient PCP: Sampson Roque MD    Estimated discharge date: 3/2/2024    Barriers: recurrent afib with RVR , EP planning for AV node ablation/pacer 3/1/2024     Assessment / Plan:    Shock suspected cardiogenic shock POA BP 70/49 in ED, , temp 95.2 rectal  Acute on chronic systolic CHF POA LVEF 35 to 40%, normal wall motion   UTI POA Cx with 8,000 CFU/ml GNR  Paroxysmal A-fib with RVR POA   Elevated HS troponin 532 --> 1819 --> 789 felt type II MI by Dr Jc  Hyperlipidemia POA  Presented with acute weakness, N/V x 1 day  Atrial fib with RVR on presentation  Hypotensive SBP 70, HR 140s in ED  DC cardioverted in ED, then back in Atrial fib with RVR in minutes   IV amiodarone   Pressors started  Admitted to ICU 2024   Antibiotics   Pressors weaned off  Transfer to floor on  briefly  RRT  with atrial fib HR 180s,   Went back due to A-fib RVR again  Status post amiodarone drip and digoxin  HR sustaining 150 to 160's, transferred back to ICU team  Converted to NSR AM 2024  Transfer to stepdown 2024 in NSR  Recurrent atrial fib with RVR 2024  The shock at admit seems more likely cardiogenic with reduced LVEF and atrial fib with RVR   ? UTI, but no other evidence of systemic infection    Admit UA nitrite +, cloudy,  WBC, 0-5 RBC, 3+ bacteria    Urine culture with 8,000 CFU GNR(not speciated)   Admit Procalcitonin < 0.05   Admit blood cultures negative   CXR IMPRESSION:     Mild diffuse interstitial prominence, which may be chronic or correlate with  mild interstitial edema.   No focal infiltrate.  ASA, eliquis, lipitor  Amiodarone PO and toprol XL  Holding entresto with lower BP  PT OT evaluation  Seen by EP, now planning for AV node ablation and pacer on 3/1/2024    UTI POA  Admit UA nitrite +, cloudy,  WBC, 0-5 RBC, 3+ bacteria  Cultures  shows only 8000 colonies/ml GNR  IV ceftriaxone x 1 dose, now on PO keflex   Poor IV access  Admit BC were negative  Admit procalcitonin < 0.05  Suspect low BP moire likely cardiogenic     DM type 2 POA last  A1c 6.8 in March 2023  ? Metformin PTA, held at admit  Continue sliding scale insulin  Diabetic diet  Blood glucose not well-controlled, Lantus added  BS  168, 245, 208, 190   126, 281    Acute kidney injury POA admit creat 1.33 --> 0.5 to 0.6   Suspect due to reduced renal perfusion with shock  Improved as HD improved  Now back at baseline    Body mass index is 22.47 kg/m².    Code Status: Full  DVT Prophylaxis: Eliquis  GI Prophylaxis: None for now       Medical Decision Making:   I personally reviewed labs: CBC BMP  I personally reviewed imaging:  I personally reviewed EKG:   Toxic drug monitoring: Bleeding while on Eliquis  Discussed case with: Pt, NS     Subjective:     Chief Complaint / Reason for Physician Visit  F/u  A-fib RVR, cardiogenic shock  \"No problems\"  Currently in NSR  No CP or SOB      Objective:     VITALS:   Last 24hrs VS reviewed since prior progress note. Most recent are:  Patient Vitals for the past 24 hrs:   BP Temp Temp src Pulse Resp SpO2   02/28/24 1113 (!) 124/58 97.8 °F (36.6 °C) Oral 81 22 --   02/28/24 0710 132/66 98.3 °F (36.8 °C) Oral 80 17 --   02/28/24 0245 119/64 97.8 °F (36.6 °C) Oral 77 20 --   02/27/24 2228 122/60 98.2 °F (36.8 °C) Oral 82 22 96 %   02/27/24 1915 (!) 118/52 98 °F (36.7 °C) -- 80 20 94 %   02/27/24 1430 (!) 123/93 97.6 °F (36.4 °C) Oral (!) 105 26 93 %   02/27/24 1400 127/80 -- -- (!) 110 24 95 %           Intake/Output Summary (Last 24 hours) at 2/28/2024 1242  Last data filed at 2/28/2024 1217  Gross per 24 hour   Intake 2246.97 ml   Output --   Net 2246.97 ml          I had a face to face encounter and independently examined this patient on 2/28/2024, as outlined below:  PHYSICAL EXAM:  General: Alert, cooperative  EENT:  Anicteric sclerae.  Resp:  CTA

## 2024-02-28 NOTE — PROGRESS NOTES
2100 Patient due for scheduled eliquis. Received in report that eliquis was to be held in preparation for ablation/pacemaker on Friday. No orders to hold or mention in the cardiologist notes.    2107 Paged on call cardiologist MD Babita and received VORB for ok to hold tonight's dose, and then will be further clarified in the morning with MD Heena. Will pass on this information to [facility] RN

## 2024-02-29 ENCOUNTER — ANESTHESIA EVENT (OUTPATIENT)
Facility: HOSPITAL | Age: 84
End: 2024-02-29
Payer: MEDICARE

## 2024-02-29 LAB
GLUCOSE BLD STRIP.AUTO-MCNC: 125 MG/DL (ref 65–117)
GLUCOSE BLD STRIP.AUTO-MCNC: 180 MG/DL (ref 65–117)
GLUCOSE BLD STRIP.AUTO-MCNC: 187 MG/DL (ref 65–117)
GLUCOSE BLD STRIP.AUTO-MCNC: 195 MG/DL (ref 65–117)
SERVICE CMNT-IMP: ABNORMAL

## 2024-02-29 PROCEDURE — 6370000000 HC RX 637 (ALT 250 FOR IP): Performed by: INTERNAL MEDICINE

## 2024-02-29 PROCEDURE — 6370000000 HC RX 637 (ALT 250 FOR IP): Performed by: NURSE PRACTITIONER

## 2024-02-29 PROCEDURE — 2580000003 HC RX 258: Performed by: NURSE PRACTITIONER

## 2024-02-29 PROCEDURE — 82962 GLUCOSE BLOOD TEST: CPT

## 2024-02-29 PROCEDURE — 6370000000 HC RX 637 (ALT 250 FOR IP): Performed by: STUDENT IN AN ORGANIZED HEALTH CARE EDUCATION/TRAINING PROGRAM

## 2024-02-29 PROCEDURE — 2060000000 HC ICU INTERMEDIATE R&B

## 2024-02-29 RX ADMIN — METOPROLOL SUCCINATE 25 MG: 25 TABLET, EXTENDED RELEASE ORAL at 08:17

## 2024-02-29 RX ADMIN — CEPHALEXIN 500 MG: 250 CAPSULE ORAL at 06:22

## 2024-02-29 RX ADMIN — Medication: at 08:18

## 2024-02-29 RX ADMIN — APIXABAN 2.5 MG: 2.5 TABLET, FILM COATED ORAL at 08:16

## 2024-02-29 RX ADMIN — CEPHALEXIN 500 MG: 250 CAPSULE ORAL at 12:05

## 2024-02-29 RX ADMIN — INSULIN GLARGINE 15 UNITS: 100 INJECTION, SOLUTION SUBCUTANEOUS at 20:38

## 2024-02-29 RX ADMIN — ASPIRIN 81 MG: 81 TABLET, CHEWABLE ORAL at 08:17

## 2024-02-29 RX ADMIN — Medication: at 20:38

## 2024-02-29 RX ADMIN — ATORVASTATIN CALCIUM 80 MG: 40 TABLET, FILM COATED ORAL at 20:38

## 2024-02-29 RX ADMIN — SODIUM CHLORIDE, PRESERVATIVE FREE 10 ML: 5 INJECTION INTRAVENOUS at 20:39

## 2024-02-29 RX ADMIN — AMIODARONE HYDROCHLORIDE 200 MG: 200 TABLET ORAL at 08:17

## 2024-02-29 RX ADMIN — CEPHALEXIN 500 MG: 250 CAPSULE ORAL at 16:40

## 2024-02-29 ASSESSMENT — PAIN SCALES - GENERAL
PAINLEVEL_OUTOF10: 0

## 2024-02-29 NOTE — PROGRESS NOTES
Comprehensive Nutrition Assessment    Type and Reason for Visit:  Initial, RD Nutrition Re-Screen/LOS    Nutrition Recommendations/Plan:   3 carb choice diet      Malnutrition Assessment:  Malnutrition Status:  No malnutrition (02/29/24 1202)      Nutrition Assessment:    Patient medically noted for AFIB, UTI, CHF, and DM. Chart reviewed for length of stay. Patient reports a good appetite currently and PTA. Denies any significant weight changes PTA and does not consume supplements. Consistently eating % of meals per flowsheets. Plans for ablation and PPM tomorrow. Hyperglycemia noted; will decrease carb allowance in diet to assist in BG control. Encouraged patient to order meals per her preference.     Patient Vitals for the past 120 hrs:   PO Meals Eaten (%)   02/29/24 0857 76 - 100%   02/28/24 1217 51 - 75%   02/28/24 0744 51 - 75%   02/26/24 0936 76 - 100%   02/25/24 1800 76 - 100%   02/25/24 1200 51 - 75%   02/25/24 0800 51 - 75%     Nutrition Related Findings:    [phone]   BM 2/28   2+ edema BLE, trace edema BUE   Atorvastatin, Lantus, Humalog   Wound Type: None       Current Nutrition Intake & Therapies:    Average Meal Intake: %  Average Supplements Intake: None Ordered  ADULT DIET; Regular; 5 carb choices (75 gm/meal)  Diet NPO Exceptions are: Ice Chips, Sips of Water with Meds, Sips of Clear Liquids    Anthropometric Measures:  Height: 167.6 cm (5' 6\")  Ideal Body Weight (IBW): 130 lbs (59 kg)       Current Body Weight: 63.1 kg (139 lb 1.8 oz),   IBW.    Current BMI (kg/m2): 22.5                          BMI Categories: Normal Weight (BMI 18.5-24.9)    Estimated Daily Nutrient Needs:  Energy Requirements Based On: Formula  Weight Used for Energy Requirements: Current  Energy (kcal/day): 1450 kcals (BMR x 1.3AF)  Weight Used for Protein Requirements: Current  Protein (g/day): 63-76g (1.0-1.2 g/kg bw)  Method Used for Fluid Requirements: 1 ml/kcal  Fluid (ml/day): 1450 mL    Nutrition

## 2024-02-29 NOTE — PROGRESS NOTES
Cardiology Progress Note       [facility]  [address] [place] [address]  [phone]  [email or web address]     2/29/2024 9:50 AM    Admit Date: 2/21/2024    Admit Diagnosis: Atrial fibrillation with tachycardic ventricular rate (HCC) [I48.91]    Subjective:     Yesenia Horan   denies chest pain.    /75   Pulse 78   Temp 97.7 °F (36.5 °C) (Oral)   Resp 18   Ht 1.676 m (5' 6\")   Wt 63.1 kg (139 lb 3.2 oz)   SpO2 97%   BMI 22.47 kg/m²   Current Facility-Administered Medications   Medication Dose Route Frequency    cephALEXin (KEFLEX) capsule 500 mg  500 mg Oral 4 times per day    insulin glargine (LANTUS) injection vial 15 Units  15 Units SubCUTAneous Nightly    glucose chewable tablet 16 g  4 tablet Oral PRN    dextrose bolus 10% 125 mL  125 mL IntraVENous PRN    Or    dextrose bolus 10% 250 mL  250 mL IntraVENous PRN    glucagon injection 1 mg  1 mg SubCUTAneous PRN    dextrose 10 % infusion   IntraVENous Continuous PRN    metoprolol succinate (TOPROL XL) extended release tablet 25 mg  25 mg Oral Daily    atorvastatin (LIPITOR) tablet 80 mg  80 mg Oral Nightly    balsum peru-castor oil (VENELEX) ointment   Topical BID    amiodarone (CORDARONE) tablet 200 mg  200 mg Oral Daily    [Held by provider] apixaban (ELIQUIS) tablet 2.5 mg  2.5 mg Oral BID    aspirin chewable tablet 81 mg  81 mg Oral Daily    insulin lispro (HUMALOG) injection vial 0-4 Units  0-4 Units SubCUTAneous TID WC    insulin lispro (HUMALOG) injection vial 0-4 Units  0-4 Units SubCUTAneous Nightly    alcohol 62% (NOZIN) nasal  1 ampule  1 ampule Nasal Q12H    sodium chloride flush 0.9 % injection 5-40 mL  5-40 mL IntraVENous 2 times per day    sodium chloride flush 0.9 % injection 5-40 mL  5-40 mL IntraVENous PRN    0.9 % sodium chloride infusion   IntraVENous PRN    ondansetron (ZOFRAN-ODT) disintegrating tablet 4 mg  4 mg Oral Q8H PRN    Or    ondansetron (ZOFRAN) injection 4 mg

## 2024-03-01 ENCOUNTER — ANESTHESIA (OUTPATIENT)
Facility: HOSPITAL | Age: 84
End: 2024-03-01
Payer: MEDICARE

## 2024-03-01 ENCOUNTER — APPOINTMENT (OUTPATIENT)
Facility: HOSPITAL | Age: 84
DRG: 853 | End: 2024-03-01
Payer: MEDICARE

## 2024-03-01 LAB
ANION GAP SERPL CALC-SCNC: 3 MMOL/L (ref 5–15)
BASOPHILS # BLD: 0.1 K/UL (ref 0–0.1)
BASOPHILS NFR BLD: 1 % (ref 0–1)
BUN SERPL-MCNC: 13 MG/DL (ref 6–20)
BUN/CREAT SERPL: 23 (ref 12–20)
CALCIUM SERPL-MCNC: 8.4 MG/DL (ref 8.5–10.1)
CHLORIDE SERPL-SCNC: 109 MMOL/L (ref 97–108)
CO2 SERPL-SCNC: 26 MMOL/L (ref 21–32)
CREAT SERPL-MCNC: 0.57 MG/DL (ref 0.55–1.02)
DIFFERENTIAL METHOD BLD: ABNORMAL
ECHO BSA: 1.71 M2
EOSINOPHIL # BLD: 0.2 K/UL (ref 0–0.4)
EOSINOPHIL NFR BLD: 3 % (ref 0–7)
ERYTHROCYTE [DISTWIDTH] IN BLOOD BY AUTOMATED COUNT: 14.6 % (ref 11.5–14.5)
GLUCOSE BLD STRIP.AUTO-MCNC: 110 MG/DL (ref 65–117)
GLUCOSE BLD STRIP.AUTO-MCNC: 122 MG/DL (ref 65–117)
GLUCOSE BLD STRIP.AUTO-MCNC: 203 MG/DL (ref 65–117)
GLUCOSE BLD STRIP.AUTO-MCNC: 236 MG/DL (ref 65–117)
GLUCOSE SERPL-MCNC: 121 MG/DL (ref 65–100)
HCT VFR BLD AUTO: 32.2 % (ref 35–47)
HGB BLD-MCNC: 10.2 G/DL (ref 11.5–16)
IMM GRANULOCYTES # BLD AUTO: 0 K/UL (ref 0–0.04)
IMM GRANULOCYTES NFR BLD AUTO: 0 % (ref 0–0.5)
LYMPHOCYTES # BLD: 3.3 K/UL (ref 0.8–3.5)
LYMPHOCYTES NFR BLD: 36 % (ref 12–49)
MAGNESIUM SERPL-MCNC: 2 MG/DL (ref 1.6–2.4)
MCH RBC QN AUTO: 31 PG (ref 26–34)
MCHC RBC AUTO-ENTMCNC: 31.7 G/DL (ref 30–36.5)
MCV RBC AUTO: 97.9 FL (ref 80–99)
MONOCYTES # BLD: 0.7 K/UL (ref 0–1)
MONOCYTES NFR BLD: 8 % (ref 5–13)
NEUTS SEG # BLD: 4.8 K/UL (ref 1.8–8)
NEUTS SEG NFR BLD: 52 % (ref 32–75)
NRBC # BLD: 0 K/UL (ref 0–0.01)
NRBC BLD-RTO: 0 PER 100 WBC
PLATELET # BLD AUTO: 292 K/UL (ref 150–400)
PMV BLD AUTO: 10.4 FL (ref 8.9–12.9)
POTASSIUM SERPL-SCNC: 3.6 MMOL/L (ref 3.5–5.1)
RBC # BLD AUTO: 3.29 M/UL (ref 3.8–5.2)
SERVICE CMNT-IMP: ABNORMAL
SERVICE CMNT-IMP: NORMAL
SODIUM SERPL-SCNC: 138 MMOL/L (ref 136–145)
WBC # BLD AUTO: 9.1 K/UL (ref 3.6–11)

## 2024-03-01 PROCEDURE — 2580000003 HC RX 258: Performed by: NURSE ANESTHETIST, CERTIFIED REGISTERED

## 2024-03-01 PROCEDURE — 3700000000 HC ANESTHESIA ATTENDED CARE: Performed by: INTERNAL MEDICINE

## 2024-03-01 PROCEDURE — 6360000002 HC RX W HCPCS: Performed by: NURSE ANESTHETIST, CERTIFIED REGISTERED

## 2024-03-01 PROCEDURE — 2060000000 HC ICU INTERMEDIATE R&B

## 2024-03-01 PROCEDURE — C1892 INTRO/SHEATH,FIXED,PEEL-AWAY: HCPCS | Performed by: INTERNAL MEDICINE

## 2024-03-01 PROCEDURE — 6360000004 HC RX CONTRAST MEDICATION: Performed by: INTERNAL MEDICINE

## 2024-03-01 PROCEDURE — C1898 LEAD, PMKR, OTHER THAN TRANS: HCPCS | Performed by: INTERNAL MEDICINE

## 2024-03-01 PROCEDURE — 2580000003 HC RX 258: Performed by: NURSE PRACTITIONER

## 2024-03-01 PROCEDURE — 71045 X-RAY EXAM CHEST 1 VIEW: CPT

## 2024-03-01 PROCEDURE — 93650 ICAR CATH ABLTJ AV NODE FUNC: CPT | Performed by: INTERNAL MEDICINE

## 2024-03-01 PROCEDURE — C1732 CATH, EP, DIAG/ABL, 3D/VECT: HCPCS | Performed by: INTERNAL MEDICINE

## 2024-03-01 PROCEDURE — C1769 GUIDE WIRE: HCPCS | Performed by: INTERNAL MEDICINE

## 2024-03-01 PROCEDURE — 6370000000 HC RX 637 (ALT 250 FOR IP): Performed by: INTERNAL MEDICINE

## 2024-03-01 PROCEDURE — 2720000010 HC SURG SUPPLY STERILE: Performed by: INTERNAL MEDICINE

## 2024-03-01 PROCEDURE — C1887 CATHETER, GUIDING: HCPCS | Performed by: INTERNAL MEDICINE

## 2024-03-01 PROCEDURE — 85025 COMPLETE CBC W/AUTO DIFF WBC: CPT

## 2024-03-01 PROCEDURE — 3700000001 HC ADD 15 MINUTES (ANESTHESIA): Performed by: INTERNAL MEDICINE

## 2024-03-01 PROCEDURE — 83735 ASSAY OF MAGNESIUM: CPT

## 2024-03-01 PROCEDURE — 02K83ZZ MAP CONDUCTION MECHANISM, PERCUTANEOUS APPROACH: ICD-10-PCS | Performed by: INTERNAL MEDICINE

## 2024-03-01 PROCEDURE — 33208 INSRT HEART PM ATRIAL & VENT: CPT | Performed by: INTERNAL MEDICINE

## 2024-03-01 PROCEDURE — 6370000000 HC RX 637 (ALT 250 FOR IP): Performed by: NURSE PRACTITIONER

## 2024-03-01 PROCEDURE — 2500000003 HC RX 250 WO HCPCS: Performed by: INTERNAL MEDICINE

## 2024-03-01 PROCEDURE — 6370000000 HC RX 637 (ALT 250 FOR IP): Performed by: STUDENT IN AN ORGANIZED HEALTH CARE EDUCATION/TRAINING PROGRAM

## 2024-03-01 PROCEDURE — 82962 GLUCOSE BLOOD TEST: CPT

## 2024-03-01 PROCEDURE — 4A023FZ MEASUREMENT OF CARDIAC RHYTHM, PERCUTANEOUS APPROACH: ICD-10-PCS | Performed by: INTERNAL MEDICINE

## 2024-03-01 PROCEDURE — 80048 BASIC METABOLIC PNL TOTAL CA: CPT

## 2024-03-01 PROCEDURE — C1894 INTRO/SHEATH, NON-LASER: HCPCS | Performed by: INTERNAL MEDICINE

## 2024-03-01 PROCEDURE — 36415 COLL VENOUS BLD VENIPUNCTURE: CPT

## 2024-03-01 PROCEDURE — C1900 LEAD, CORONARY VENOUS: HCPCS | Performed by: INTERNAL MEDICINE

## 2024-03-01 PROCEDURE — 2580000003 HC RX 258: Performed by: INTERNAL MEDICINE

## 2024-03-01 PROCEDURE — 2709999900 HC NON-CHARGEABLE SUPPLY: Performed by: INTERNAL MEDICINE

## 2024-03-01 PROCEDURE — C1893 INTRO/SHEATH, FIXED,NON-PEEL: HCPCS | Performed by: INTERNAL MEDICINE

## 2024-03-01 PROCEDURE — 02583ZZ DESTRUCTION OF CONDUCTION MECHANISM, PERCUTANEOUS APPROACH: ICD-10-PCS | Performed by: INTERNAL MEDICINE

## 2024-03-01 PROCEDURE — C2621 PMKR, OTHER THAN SING/DUAL: HCPCS | Performed by: INTERNAL MEDICINE

## 2024-03-01 DEVICE — LEFT HEART LEAD
Type: IMPLANTABLE DEVICE | Status: FUNCTIONAL
Brand: QUARTET™

## 2024-03-01 DEVICE — CRT CARDIAC RESYNCHRONIZATION THERAPY PACEMAKER DDDRV
Type: IMPLANTABLE DEVICE | Status: FUNCTIONAL
Brand: QUADRA ALLURE MP™

## 2024-03-01 DEVICE — PACING LEAD
Type: IMPLANTABLE DEVICE | Status: FUNCTIONAL
Brand: TENDRIL™

## 2024-03-01 RX ORDER — SODIUM CHLORIDE 9 MG/ML
INJECTION, SOLUTION INTRAVENOUS PRN
Status: DISCONTINUED | OUTPATIENT
Start: 2024-03-01 | End: 2024-03-02 | Stop reason: HOSPADM

## 2024-03-01 RX ORDER — ACETAMINOPHEN 325 MG/1
650 TABLET ORAL EVERY 4 HOURS PRN
Status: DISCONTINUED | OUTPATIENT
Start: 2024-03-01 | End: 2024-03-02 | Stop reason: HOSPADM

## 2024-03-01 RX ORDER — ONDANSETRON 2 MG/ML
INJECTION INTRAMUSCULAR; INTRAVENOUS PRN
Status: DISCONTINUED | OUTPATIENT
Start: 2024-03-01 | End: 2024-03-01 | Stop reason: SDUPTHER

## 2024-03-01 RX ORDER — SODIUM CHLORIDE 0.9 % (FLUSH) 0.9 %
5-40 SYRINGE (ML) INJECTION PRN
Status: DISCONTINUED | OUTPATIENT
Start: 2024-03-01 | End: 2024-03-01

## 2024-03-01 RX ORDER — DEXAMETHASONE SODIUM PHOSPHATE 4 MG/ML
INJECTION, SOLUTION INTRA-ARTICULAR; INTRALESIONAL; INTRAMUSCULAR; INTRAVENOUS; SOFT TISSUE PRN
Status: DISCONTINUED | OUTPATIENT
Start: 2024-03-01 | End: 2024-03-01 | Stop reason: SDUPTHER

## 2024-03-01 RX ORDER — PHENYLEPHRINE HCL IN 0.9% NACL 0.4MG/10ML
SYRINGE (ML) INTRAVENOUS PRN
Status: DISCONTINUED | OUTPATIENT
Start: 2024-03-01 | End: 2024-03-01 | Stop reason: SDUPTHER

## 2024-03-01 RX ORDER — SODIUM CHLORIDE 0.9 % (FLUSH) 0.9 %
5-40 SYRINGE (ML) INJECTION EVERY 12 HOURS SCHEDULED
Status: DISCONTINUED | OUTPATIENT
Start: 2024-03-01 | End: 2024-03-01

## 2024-03-01 RX ORDER — FENTANYL CITRATE 50 UG/ML
INJECTION, SOLUTION INTRAMUSCULAR; INTRAVENOUS PRN
Status: DISCONTINUED | OUTPATIENT
Start: 2024-03-01 | End: 2024-03-01 | Stop reason: SDUPTHER

## 2024-03-01 RX ADMIN — INSULIN LISPRO 1 UNITS: 100 INJECTION, SOLUTION INTRAVENOUS; SUBCUTANEOUS at 17:09

## 2024-03-01 RX ADMIN — Medication 120 MCG: at 09:00

## 2024-03-01 RX ADMIN — AMIODARONE HYDROCHLORIDE 200 MG: 200 TABLET ORAL at 12:50

## 2024-03-01 RX ADMIN — ASPIRIN 81 MG: 81 TABLET, CHEWABLE ORAL at 12:51

## 2024-03-01 RX ADMIN — VANCOMYCIN HYDROCHLORIDE 1000 MG: 1 INJECTION, POWDER, LYOPHILIZED, FOR SOLUTION INTRAVENOUS at 08:14

## 2024-03-01 RX ADMIN — Medication: at 12:50

## 2024-03-01 RX ADMIN — FENTANYL CITRATE 25 MCG: 50 INJECTION, SOLUTION INTRAMUSCULAR; INTRAVENOUS at 08:14

## 2024-03-01 RX ADMIN — METOPROLOL SUCCINATE 25 MG: 25 TABLET, EXTENDED RELEASE ORAL at 12:50

## 2024-03-01 RX ADMIN — Medication 120 MCG: at 08:34

## 2024-03-01 RX ADMIN — ONDANSETRON HYDROCHLORIDE 4 MG: 2 INJECTION, SOLUTION INTRAMUSCULAR; INTRAVENOUS at 08:14

## 2024-03-01 RX ADMIN — Medication 120 MCG: at 09:19

## 2024-03-01 RX ADMIN — INSULIN GLARGINE 15 UNITS: 100 INJECTION, SOLUTION SUBCUTANEOUS at 21:51

## 2024-03-01 RX ADMIN — CEPHALEXIN 500 MG: 250 CAPSULE ORAL at 12:51

## 2024-03-01 RX ADMIN — CEPHALEXIN 500 MG: 250 CAPSULE ORAL at 00:07

## 2024-03-01 RX ADMIN — DEXAMETHASONE SODIUM PHOSPHATE 4 MG: 4 INJECTION, SOLUTION INTRAMUSCULAR; INTRAVENOUS at 08:14

## 2024-03-01 RX ADMIN — Medication 80 MCG: at 09:10

## 2024-03-01 RX ADMIN — CEPHALEXIN 500 MG: 250 CAPSULE ORAL at 23:53

## 2024-03-01 RX ADMIN — CEPHALEXIN 500 MG: 250 CAPSULE ORAL at 17:07

## 2024-03-01 RX ADMIN — FENTANYL CITRATE 50 MCG: 50 INJECTION, SOLUTION INTRAMUSCULAR; INTRAVENOUS at 08:31

## 2024-03-01 RX ADMIN — SODIUM CHLORIDE, PRESERVATIVE FREE 10 ML: 5 INJECTION INTRAVENOUS at 12:49

## 2024-03-01 RX ADMIN — PROPOFOL 80 MCG/KG/MIN: 10 INJECTION, EMULSION INTRAVENOUS at 08:11

## 2024-03-01 RX ADMIN — Medication 80 MCG: at 09:02

## 2024-03-01 RX ADMIN — FENTANYL CITRATE 25 MCG: 50 INJECTION, SOLUTION INTRAMUSCULAR; INTRAVENOUS at 08:21

## 2024-03-01 RX ADMIN — Medication 200 MCG: at 08:48

## 2024-03-01 RX ADMIN — Medication 120 MCG: at 08:27

## 2024-03-01 RX ADMIN — PROPOFOL 30 MG: 10 INJECTION, EMULSION INTRAVENOUS at 08:14

## 2024-03-01 RX ADMIN — ATORVASTATIN CALCIUM 80 MG: 40 TABLET, FILM COATED ORAL at 21:50

## 2024-03-01 ASSESSMENT — ENCOUNTER SYMPTOMS: SHORTNESS OF BREATH: 1

## 2024-03-01 ASSESSMENT — PAIN SCALES - GENERAL
PAINLEVEL_OUTOF10: 0
PAINLEVEL_OUTOF10: 0

## 2024-03-01 NOTE — PROGRESS NOTES
TRANSFER - IN REPORT:    Verbal report received from Prema on Yesenia Horan  being received from EP for routine care. Report consisted of patient’s Situation, Background, Assessment and Recommendations(SBAR). Information from the following report(s) procedure log was reviewed with the receiving clinician. Opportunity for questions and clarification was provided. Assessment completed upon patient’s arrival to Cardiac Cath Lab RECOVERY AREA and care assumed.       Cardiac Cath Lab Recovery Arrival Note:    Yesenia Horan arrived to CCL recovery area.  Patient procedure= AVN/PPM. Patient on cardiac monitor, non-invasive blood pressure, SPO2 monitor. On 3L NC. Patient status doing well without problems. Patient is A&Ox 4. Patient reports no pain.    PROCEDURE SITE CHECK:    Procedure site:without any bleeding and no hematoma, no pain/discomfort reported at procedure site.     No change in patient status. Continue to monitor patient and status.

## 2024-03-01 NOTE — PROGRESS NOTES
Cardiology Progress Note       [facility]  [address] [place] [address]  [phone]  [email or web address]     3/1/2024 9:11 AM    Admit Date: 2/21/2024    Admit Diagnosis: Atrial fibrillation with tachycardic ventricular rate (HCC) [I48.91]    Subjective:     Yesenia Horan   denies chest pain.    /71   Pulse 73   Temp 97.8 °F (36.6 °C) (Oral)   Resp 16   Ht 1.676 m (5' 6\")   Wt 63.1 kg (139 lb 3.2 oz)   SpO2 94%   BMI 22.47 kg/m²   Current Facility-Administered Medications   Medication Dose Route Frequency    iopamidol (ISOVUE-370) 76 % injection    PRN    lidocaine 1 % injection   IntraDERmal PRN    sodium chloride flush 0.9 % injection 5-40 mL  5-40 mL IntraVENous 2 times per day    sodium chloride flush 0.9 % injection 5-40 mL  5-40 mL IntraVENous PRN    0.9 % sodium chloride infusion   IntraVENous PRN    acetaminophen (TYLENOL) tablet 650 mg  650 mg Oral Q4H PRN    cephALEXin (KEFLEX) capsule 500 mg  500 mg Oral 4 times per day    insulin glargine (LANTUS) injection vial 15 Units  15 Units SubCUTAneous Nightly    glucose chewable tablet 16 g  4 tablet Oral PRN    dextrose bolus 10% 125 mL  125 mL IntraVENous PRN    Or    dextrose bolus 10% 250 mL  250 mL IntraVENous PRN    glucagon injection 1 mg  1 mg SubCUTAneous PRN    dextrose 10 % infusion   IntraVENous Continuous PRN    metoprolol succinate (TOPROL XL) extended release tablet 25 mg  25 mg Oral Daily    atorvastatin (LIPITOR) tablet 80 mg  80 mg Oral Nightly    balsum peru-castor oil (VENELEX) ointment   Topical BID    amiodarone (CORDARONE) tablet 200 mg  200 mg Oral Daily    aspirin chewable tablet 81 mg  81 mg Oral Daily    insulin lispro (HUMALOG) injection vial 0-4 Units  0-4 Units SubCUTAneous TID WC    insulin lispro (HUMALOG) injection vial 0-4 Units  0-4 Units SubCUTAneous Nightly    alcohol 62% (NOZIN) nasal  1 ampule  1 ampule Nasal Q12H    sodium chloride flush 0.9 %

## 2024-03-01 NOTE — ANESTHESIA PRE PROCEDURE
Signs (Current):   Vitals:    02/29/24 1914 02/29/24 2225 03/01/24 0319 03/01/24 0658   BP: 137/77 123/76 130/76 118/71   Pulse: 83 77 78 73   Resp: 14   16   Temp: 97.3 °F (36.3 °C) 97.4 °F (36.3 °C) 97.2 °F (36.2 °C) 97.8 °F (36.6 °C)   TempSrc:  Oral Oral Oral   SpO2: 99% 91% 94%    Weight:       Height:                                                  BP Readings from Last 3 Encounters:   03/01/24 118/71   03/18/23 (!) 111/54       NPO Status:                                                                                 BMI:   Wt Readings from Last 3 Encounters:   02/26/24 63.1 kg (139 lb 3.2 oz)   08/30/23 59.5 kg (131 lb 1.6 oz)   08/23/23 59 kg (130 lb)     Body mass index is 22.47 kg/m².    CBC:   Lab Results   Component Value Date/Time    WBC 9.1 03/01/2024 03:06 AM    RBC 3.29 03/01/2024 03:06 AM    HGB 10.2 03/01/2024 03:06 AM    HCT 32.2 03/01/2024 03:06 AM    MCV 97.9 03/01/2024 03:06 AM    RDW 14.6 03/01/2024 03:06 AM     03/01/2024 03:06 AM       CMP:   Lab Results   Component Value Date/Time     03/01/2024 03:06 AM    K 3.6 03/01/2024 03:06 AM     03/01/2024 03:06 AM    CO2 26 03/01/2024 03:06 AM    BUN 13 03/01/2024 03:06 AM    CREATININE 0.57 03/01/2024 03:06 AM    AGRATIO 0.9 02/23/2024 04:52 AM    AGRATIO 0.7 03/18/2023 02:42 AM    LABGLOM >60 03/01/2024 03:06 AM    GLUCOSE 121 03/01/2024 03:06 AM    PROT 6.4 02/23/2024 04:52 AM    CALCIUM 8.4 03/01/2024 03:06 AM    BILITOT 1.0 02/23/2024 04:52 AM    ALKPHOS 48 02/23/2024 04:52 AM    ALKPHOS 100 03/18/2023 02:42 AM    AST 34 02/23/2024 04:52 AM    ALT 19 02/23/2024 04:52 AM       POC Tests:   Recent Labs     03/01/24  0739   POCGLU 110       Coags:   Lab Results   Component Value Date/Time    PROTIME 12.3 03/18/2023 02:42 AM    INR 1.2 03/18/2023 02:42 AM    APTT 24.9 03/18/2023 02:42 AM       HCG (If Applicable): No results found for: \"PREGTESTUR\", \"PREGSERUM\", \"HCG\", \"HCGQUANT\"     ABGs:   Lab Results   Component Value

## 2024-03-01 NOTE — PROGRESS NOTES
Hospitalist Progress Note    NAME:   Yesenia Horan   : 1940   MRN: [id number]     Date/Time: 2024 9:19 PM    Patient PCP: Sampson Roque MD    Estimated discharge date: 3/2/2024    Barriers: recurrent afib with RVR , EP planning for AV node ablation/pacer 3/1/2024     Assessment / Plan:    Shock suspected cardiogenic shock POA BP 70/49 in ED, , temp 95.2 rectal  Acute on chronic systolic CHF POA LVEF 35 to 40%, normal wall motion   UTI POA Cx with 8,000 CFU/ml GNR  Paroxysmal A-fib with RVR POA   Elevated HS troponin 532 --> 1819 --> 789 felt type II MI by Dr Jc  Hyperlipidemia POA  Presented with acute weakness, N/V x 1 day  Atrial fib with RVR on presentation  Hypotensive SBP 70, HR 140s in ED  DC cardioverted in ED, then back in Atrial fib with RVR in minutes   IV amiodarone   Pressors started  Admitted to ICU 2024   Antibiotics   Pressors weaned off  Transfer to floor on  briefly  RRT  with atrial fib HR 180s,   Went back due to A-fib RVR again  Status post amiodarone drip and digoxin  HR sustaining 150 to 160's, transferred back to ICU team  Converted to NSR AM 2024  Transfer to stepdown 2024 in NSR  Recurrent atrial fib with RVR 2024  The shock at admit seems more likely cardiogenic with reduced LVEF and atrial fib with RVR   ? UTI, but no other evidence of systemic infection    Admit UA nitrite +, cloudy,  WBC, 0-5 RBC, 3+ bacteria    Urine culture with 8,000 CFU GNR(not speciated)   Admit Procalcitonin < 0.05   Admit blood cultures negative   CXR IMPRESSION:     Mild diffuse interstitial prominence, which may be chronic or correlate with  mild interstitial edema.   No focal infiltrate.  ASA, eliquis, lipitor  Amiodarone PO and toprol XL  Holding entresto with lower BP  PT OT evaluation  Seen by EP, now planning for AV node ablation and pacer on 3/1/2024   NPO after midnight    UTI POA  Admit UA nitrite +, cloudy,  WBC, 0-5 RBC, 3+

## 2024-03-01 NOTE — ANESTHESIA POSTPROCEDURE EVALUATION
Department of Anesthesiology  Postprocedure Note    Patient: Yesenia Horan  MRN: [id number]  YOB: 1940  Date of evaluation: 3/1/2024    Procedure Summary       Date: 03/01/24 Room / Location: [facility] EP LAB / [facility] CARDIAC CATH LAB    Anesthesia Start: 0759 Anesthesia Stop: 0949    Procedures:       Ablation AV node      Insert PPM biv multi Diagnosis: Dilated cardiomyopathy (HCC)    Providers: Sebastián Naik MD Responsible Provider: Joy Saldana DO    Anesthesia Type: MAC ASA Status: 4            Anesthesia Type: MAC    Lise Phase I: Lise Score: 9    Lise Phase II:      Anesthesia Post Evaluation    Patient location during evaluation: bedside  Patient participation: complete - patient participated  Level of consciousness: sleepy but conscious  Pain score: 0  Airway patency: patent  Nausea & Vomiting: no nausea and no vomiting  Cardiovascular status: hemodynamically stable  Respiratory status: acceptable  Hydration status: euvolemic  Pain management: adequate    There were no known notable events for this encounter.

## 2024-03-01 NOTE — PLAN OF CARE
Problem: Discharge Planning  Goal: Discharge to home or other facility with appropriate resources  Outcome: Not Progressing  Flowsheets (Taken 2/29/2024 0722 by Apollo Gray, RN)  Discharge to home or other facility with appropriate resources: Identify barriers to discharge with patient and caregiver     Problem: Safety - Adult  Goal: Free from fall injury  Outcome: Not Progressing     Problem: Chronic Conditions and Co-morbidities  Goal: Patient's chronic conditions and co-morbidity symptoms are monitored and maintained or improved  Outcome: Not Progressing  Flowsheets (Taken 2/29/2024 0722 by Apollo Gray, RN)  Care Plan - Patient's Chronic Conditions and Co-Morbidity Symptoms are Monitored and Maintained or Improved: Monitor and assess patient's chronic conditions and comorbid symptoms for stability, deterioration, or improvement     Problem: Skin/Tissue Integrity  Goal: Absence of new skin breakdown  Description: 1.  Monitor for areas of redness and/or skin breakdown  2.  Assess vascular access sites hourly  3.  Every 4-6 hours minimum:  Change oxygen saturation probe site  4.  Every 4-6 hours:  If on nasal continuous positive airway pressure, respiratory therapy assess nares and determine need for appliance change or resting period.  Outcome: Not Progressing     Problem: Pain  Goal: Verbalizes/displays adequate comfort level or baseline comfort level  Outcome: Not Progressing

## 2024-03-02 VITALS
HEIGHT: 66 IN | SYSTOLIC BLOOD PRESSURE: 126 MMHG | TEMPERATURE: 97.4 F | DIASTOLIC BLOOD PRESSURE: 67 MMHG | RESPIRATION RATE: 16 BRPM | BODY MASS INDEX: 23.21 KG/M2 | HEART RATE: 75 BPM | OXYGEN SATURATION: 93 % | WEIGHT: 144.4 LBS

## 2024-03-02 LAB
ANION GAP SERPL CALC-SCNC: 4 MMOL/L (ref 5–15)
BASOPHILS # BLD: 0 K/UL (ref 0–0.1)
BASOPHILS NFR BLD: 0 % (ref 0–1)
BUN SERPL-MCNC: 15 MG/DL (ref 6–20)
BUN/CREAT SERPL: 24 (ref 12–20)
CALCIUM SERPL-MCNC: 8.1 MG/DL (ref 8.5–10.1)
CHLORIDE SERPL-SCNC: 110 MMOL/L (ref 97–108)
CO2 SERPL-SCNC: 26 MMOL/L (ref 21–32)
CREAT SERPL-MCNC: 0.62 MG/DL (ref 0.55–1.02)
DIFFERENTIAL METHOD BLD: ABNORMAL
EOSINOPHIL # BLD: 0 K/UL (ref 0–0.4)
EOSINOPHIL NFR BLD: 0 % (ref 0–7)
ERYTHROCYTE [DISTWIDTH] IN BLOOD BY AUTOMATED COUNT: 14.6 % (ref 11.5–14.5)
GLUCOSE BLD STRIP.AUTO-MCNC: 138 MG/DL (ref 65–117)
GLUCOSE BLD STRIP.AUTO-MCNC: 138 MG/DL (ref 65–117)
GLUCOSE SERPL-MCNC: 156 MG/DL (ref 65–100)
HCT VFR BLD AUTO: 31.3 % (ref 35–47)
HGB BLD-MCNC: 10 G/DL (ref 11.5–16)
IMM GRANULOCYTES # BLD AUTO: 0.1 K/UL (ref 0–0.04)
IMM GRANULOCYTES NFR BLD AUTO: 1 % (ref 0–0.5)
LYMPHOCYTES # BLD: 2.1 K/UL (ref 0.8–3.5)
LYMPHOCYTES NFR BLD: 21 % (ref 12–49)
MCH RBC QN AUTO: 31.8 PG (ref 26–34)
MCHC RBC AUTO-ENTMCNC: 31.9 G/DL (ref 30–36.5)
MCV RBC AUTO: 99.7 FL (ref 80–99)
MONOCYTES # BLD: 0.8 K/UL (ref 0–1)
MONOCYTES NFR BLD: 8 % (ref 5–13)
NEUTS SEG # BLD: 6.9 K/UL (ref 1.8–8)
NEUTS SEG NFR BLD: 70 % (ref 32–75)
NRBC # BLD: 0 K/UL (ref 0–0.01)
NRBC BLD-RTO: 0 PER 100 WBC
PLATELET # BLD AUTO: 274 K/UL (ref 150–400)
PMV BLD AUTO: 10.8 FL (ref 8.9–12.9)
POTASSIUM SERPL-SCNC: 4.3 MMOL/L (ref 3.5–5.1)
RBC # BLD AUTO: 3.14 M/UL (ref 3.8–5.2)
SERVICE CMNT-IMP: ABNORMAL
SERVICE CMNT-IMP: ABNORMAL
SODIUM SERPL-SCNC: 140 MMOL/L (ref 136–145)
WBC # BLD AUTO: 9.9 K/UL (ref 3.6–11)

## 2024-03-02 PROCEDURE — 85025 COMPLETE CBC W/AUTO DIFF WBC: CPT

## 2024-03-02 PROCEDURE — 6370000000 HC RX 637 (ALT 250 FOR IP): Performed by: NURSE PRACTITIONER

## 2024-03-02 PROCEDURE — 6370000000 HC RX 637 (ALT 250 FOR IP): Performed by: INTERNAL MEDICINE

## 2024-03-02 PROCEDURE — 36415 COLL VENOUS BLD VENIPUNCTURE: CPT

## 2024-03-02 PROCEDURE — 82962 GLUCOSE BLOOD TEST: CPT

## 2024-03-02 PROCEDURE — 6370000000 HC RX 637 (ALT 250 FOR IP): Performed by: STUDENT IN AN ORGANIZED HEALTH CARE EDUCATION/TRAINING PROGRAM

## 2024-03-02 PROCEDURE — 80048 BASIC METABOLIC PNL TOTAL CA: CPT

## 2024-03-02 PROCEDURE — 2580000003 HC RX 258: Performed by: NURSE PRACTITIONER

## 2024-03-02 PROCEDURE — 97116 GAIT TRAINING THERAPY: CPT

## 2024-03-02 PROCEDURE — 97162 PT EVAL MOD COMPLEX 30 MIN: CPT

## 2024-03-02 RX ORDER — AMIODARONE HYDROCHLORIDE 200 MG/1
200 TABLET ORAL DAILY
Qty: 90 TABLET | Refills: 3 | Status: SHIPPED | OUTPATIENT
Start: 2024-03-03

## 2024-03-02 RX ORDER — DAPAGLIFLOZIN 10 MG/1
10 TABLET, FILM COATED ORAL EVERY MORNING
Qty: 90 TABLET | Refills: 0 | Status: SHIPPED
Start: 2024-03-02

## 2024-03-02 RX ORDER — ATORVASTATIN CALCIUM 80 MG/1
80 TABLET, FILM COATED ORAL DAILY
Qty: 90 TABLET | Refills: 3 | Status: SHIPPED | OUTPATIENT
Start: 2024-03-02

## 2024-03-02 RX ORDER — FUROSEMIDE 40 MG/1
40 TABLET ORAL DAILY
Qty: 60 TABLET | Refills: 3 | Status: SHIPPED | OUTPATIENT
Start: 2024-03-02

## 2024-03-02 RX ORDER — ASPIRIN 81 MG/1
81 TABLET, CHEWABLE ORAL DAILY
Qty: 30 TABLET | Refills: 3 | Status: SHIPPED | OUTPATIENT
Start: 2024-03-03

## 2024-03-02 RX ORDER — METOPROLOL SUCCINATE 25 MG/1
25 TABLET, EXTENDED RELEASE ORAL DAILY
Qty: 90 TABLET | Refills: 3 | Status: SHIPPED | OUTPATIENT
Start: 2024-03-03

## 2024-03-02 RX ADMIN — SODIUM CHLORIDE, PRESERVATIVE FREE 10 ML: 5 INJECTION INTRAVENOUS at 08:25

## 2024-03-02 RX ADMIN — APIXABAN 2.5 MG: 2.5 TABLET, FILM COATED ORAL at 13:31

## 2024-03-02 RX ADMIN — Medication: at 08:24

## 2024-03-02 RX ADMIN — CEPHALEXIN 500 MG: 250 CAPSULE ORAL at 06:15

## 2024-03-02 RX ADMIN — ASPIRIN 81 MG: 81 TABLET, CHEWABLE ORAL at 08:24

## 2024-03-02 RX ADMIN — AMIODARONE HYDROCHLORIDE 200 MG: 200 TABLET ORAL at 08:24

## 2024-03-02 RX ADMIN — METOPROLOL SUCCINATE 25 MG: 25 TABLET, EXTENDED RELEASE ORAL at 08:24

## 2024-03-02 NOTE — PROGRESS NOTES
I have reviewed Discharge Instructions with the patient and daughter. The patient verbalized understanding. Discharge medications reviewed with patient along with appropriate educational materials.  Opportunity for questions and clarification was provided. All lines removed without difficulty. Right groin Cath and Pacer sites are clean, dry, and intact. Patient's belongings gathered and with patient. Patient is ready for discharge.  Pt taken down to discharge area.

## 2024-03-02 NOTE — DISCHARGE INSTRUCTIONS
Resume home medications except   Hold Entresto until you see cardiology in 2 weeks     Amiodarone is a medication to help keep your heart rhythm normal    Take as directed till you see cardiology     The cardiologist changed your metoprolol to the XL form take as directed daily     Resume your Eliquis as before    We increased the dose of your cholesterol medicine Lipitor or atorvastatin to 80 mg daily    Continue Lasix 40 mg every day    Resume your home diabetes medicines as before    Resume your home eyedrops as before, if any drops are listed that you no longer take,   then do not resume them

## 2024-03-02 NOTE — CARE COORDINATION
CINTIA Note_ Talked with patient. She still feels she does not need PT OT when she goes home. She will have new orders for [facility] due to the pacemaker. I let her know we can initiate HH if she changes her mind.    English Samir WATSON CM   [id number]  
Transition of Care Plan:    RUR:   Prior Level of Functioning: independant  Disposition: home  If SNF or IPR: Date FOC offered:   Date FOC received:   Accepting facility:   Date authorization started with reference number:   Date authorization received and expires:   Follow up appointments: PCP and specialist  DME needed: has walker  Transportation at discharge: TBD  IM/IMM Medicare/ letter given: sign at DC  Is patient a  and connected with VA?    If yes, was [place] transfer form completed and VA notified?   Caregiver Contact:   Discharge Caregiver contacted prior to discharge?   Care Conference needed?   Barriers to discharge: clinical improvement  Patient has been on Amiodarone drip, follow up with Urology     English Samir WATSON CM   [phone]    
Transition of Care Plan:    RUR: 14%-\"Low Risk\"  Prior Level of Functioning: Independent in her ADLs and IADLs  Disposition: Home with follow-up appts, the patient has declined HH services   If SNF or IPR: Date FOC offered: N/A  Follow up appointments: PCP & Specialists as indicated   DME needed: The patient owns a cane   Transportation at discharge: The patient's daughter is at bedside and will be transporting the patient home  IM/IMM Medicare/ letter given: 2nd IM letter given & signed   Is patient a  and connected with VA? N/A   If yes, was  transfer form completed and VA notified?   Caregiver Contact: Deepali Prieto, Daughter, Phone: [phone]  Discharge Caregiver contacted prior to discharge? Yes, CM spoke to the patient's daughter at bedside   Care Conference needed? No  Barriers to discharge: N/A, patient is being discharged today    CM was alerted that the patient is being discharged today, 3/2/24. CM met with the patient at bedside and she has no questions/concerns for discharge. She does not feel as though she needs HH services and declined for CM to arrange HH services. Her daughter is at bedside and will be transporting her home. 2nd IM letter was given and signed.     From CM perspective, the patient can be discharged.     Caitlin Cortez, LCSNYDIA  [phone]    
Resuscitation)        Length of Voluntary ACP Conversation in minutes:  <16 minutes (Non-Billable)    JANI LOPES RN

## 2024-03-02 NOTE — PROGRESS NOTES
Predictive Model Details          23 (Normal)  Factor Value    Calculated 3/1/2024 23:58 60% Age 83 years old    Deterioration Index Model 19% Cardiac rhythm Atrial paced     5% Pulse oximetry 93 %     5% Systolic 130     4% WBC count 9.1 K/uL     3% Sodium 138 mmol/L     2% Potassium 3.6 mmol/L     2% Hematocrit abnormal (32.2 %)     0% Temperature 97.9 °F (36.6 °C)     0% Pulse 77     0% Respiratory rate 16

## 2024-03-02 NOTE — PROGRESS NOTES
[facility]  [address], [place] [address] [phone]  [email or web address]  CARDIOLOGY PROGRESS NOTE    3/2/2024 8:57 AM    Admit Date: 2/21/2024    Admit Diagnosis:   Atrial fibrillation with tachycardic ventricular rate (HCC) [I48.91]    Subjective:     Yesenia Aung twitching resolved after pacemaker setting adjustment by the [employer] rep.  No chest pain, shortness of breath or palpitations.  Rhythm paced on telemetry    /85   Pulse 77   Temp 97.4 °F (36.3 °C)   Resp 17   Ht 1.676 m (5' 6\")   Wt 65.5 kg (144 lb 6.4 oz)   SpO2 95%   BMI 23.31 kg/m²     Current Facility-Administered Medications   Medication Dose Route Frequency    0.9 % sodium chloride infusion   IntraVENous PRN    acetaminophen (TYLENOL) tablet 650 mg  650 mg Oral Q4H PRN    cephALEXin (KEFLEX) capsule 500 mg  500 mg Oral 4 times per day    insulin glargine (LANTUS) injection vial 15 Units  15 Units SubCUTAneous Nightly    glucose chewable tablet 16 g  4 tablet Oral PRN    dextrose bolus 10% 125 mL  125 mL IntraVENous PRN    Or    dextrose bolus 10% 250 mL  250 mL IntraVENous PRN    glucagon injection 1 mg  1 mg SubCUTAneous PRN    dextrose 10 % infusion   IntraVENous Continuous PRN    metoprolol succinate (TOPROL XL) extended release tablet 25 mg  25 mg Oral Daily    atorvastatin (LIPITOR) tablet 80 mg  80 mg Oral Nightly    balsum peru-castor oil (VENELEX) ointment   Topical BID    amiodarone (CORDARONE) tablet 200 mg  200 mg Oral Daily    aspirin chewable tablet 81 mg  81 mg Oral Daily    insulin lispro (HUMALOG) injection vial 0-4 Units  0-4 Units SubCUTAneous TID WC    insulin lispro (HUMALOG) injection vial 0-4 Units  0-4 Units SubCUTAneous Nightly    alcohol 62% (NOZIN) nasal  1 ampule  1 ampule Nasal Q12H    sodium chloride flush 0.9 % injection 5-40 mL  5-40 mL IntraVENous 2 times per day    sodium chloride flush 0.9 % injection 5-40 mL  5-40 mL IntraVENous PRN

## 2024-03-02 NOTE — PROGRESS NOTES
2030: Pt complains of \"twinging\" in L side, assessment revealed that pt side was twitching in unison with heartbeat.     Primary RN perfectserved [place] NP,     2045: NP at bedside, verbal order for stat cxr     2100: NP stated leads remain in place, paged on call cardiologist     2110: On call cardiologist said to call [employer] rep to interrogate pacemaker     2200: [employer] rep at bedside to interrogate pacemaker   [employer] rep adjusted settings on pacemaker, and twitching resolved (said likely phrenic nerve was being stimulated due to previous settings)     Resolved, no patient complaint of twitching since adjustment by rep

## 2024-03-02 NOTE — PLAN OF CARE
Problem: Physical Therapy - Adult  Goal: By Discharge: Performs mobility at highest level of function for planned discharge setting.  See evaluation for individualized goals.  Description: FUNCTIONAL STATUS PRIOR TO ADMISSION: Patient was independent and active without use of DME.    HOME SUPPORT PRIOR TO ADMISSION: The patient lived with her spouse, but did not require assistance.    Physical Therapy Goals  Initiated 3/2/2024  1.  Patient will move from supine to sit and sit to supine in bed with supervision/set-up within 7 day(s).    2.  Patient will perform sit to stand with supervision/set-up within 7 day(s).  3.  Patient will transfer from bed to chair and chair to bed with supervision/set-up using the least restrictive device within 7 day(s).  4.  Patient will ambulate with supervision/set-up for 150 feet with the least restrictive device within 7 day(s).   5.  Patient will ascend/descend 3-5 stairs with R/L handrail(s) with supervision/set-up within 7 day(s).    Outcome: Progressing   PHYSICAL THERAPY EVALUATION    Patient: Yesenia Horan (83 y.o. female)  Date: 3/2/2024  Primary Diagnosis: Atrial fibrillation with tachycardic ventricular rate (HCC) [I48.91]  Procedure(s) (LRB):  Ablation AV node (N/A)  Insert PPM biv multi (N/A) 1 Day Post-Op   Precautions: Restrictions/Precautions:  (falls, PPM,  sling)                      ASSESSMENT :   DEFICITS/IMPAIRMENTS:     Pt tolerated PT services well.  With increased time/effort, most tasks performed with cga including bed mobility, functional transfers, ambulation ~ 100ft with HHA x1. Pt was received in bedside chair with LUE sling in place 2/2 PPM protocol.  Pt confirms indep at baseline without DME and resides in a 1S with 4STE. Pt's spouse or other family will be available to provide support, prn. Good self rodrick care and hand hygiene s/p ADLs and toileting. When ready, Pt ambulated ~100ft with a slow unsteady reciprocal gait, decreased ALBINA, foot clearance.  cues;Verbal cues  Rolling: Stand-by assistance  Supine to Sit:  (ERIN. Pt received in bedside chair)  Sit to Supine: Contact-guard assistance  Scooting: Stand-by assistance  Transfers:     Transfer Training  Transfer Training: Yes  Overall Level of Assistance: Contact-guard assistance  Interventions: Demonstration;Manual cues;Safety awareness training;Tactile cues;Verbal cues  Sit to Stand: Contact-guard assistance  Stand to Sit: Contact-guard assistance  Bed to Chair: Contact-guard assistance  Toilet Transfer: Contact-guard assistance  Balance:               Balance  Sitting: Impaired  Sitting - Static: Good (unsupported)  Sitting - Dynamic:  (good minus)  Standing: Impaired  Standing - Static: Constant support;Good  Standing - Dynamic: Constant support (good minus/fair plus)  Ambulation/Gait Training:                       Gait  Gait Training: Yes  Overall Level of Assistance: Contact-guard assistance  Assistive Device: Gait belt (HHA x1)  Interventions: Demonstration;Manual cues;Safety awareness training;Tactile cues;Verbal cues  Gait Abnormalities:  (slow unsteady reciprocal gait; HHA provided, otherwise observed furniture touch)  Rail Use: Right  Number of Stairs Trained: 4 (simulated via march in place; good foot clearance)                                                                                                                                                                                                                                                          [facility] AM-PAC®      Basic Mobility Inpatient Short Form (6-Clicks) Version 2  How much HELP from another person do you currently need... (If the patient hasn't done an activity recently, how much help from another person do you think they would need if they tried?) Total A Lot A Little None   1.  Turning from your back to your side while in a flat bed without using bedrails? []  1 []  2 []  3  [x]  4   2.  Moving from lying on your back

## 2024-03-02 NOTE — PROGRESS NOTES
03/01/24 1013 139/79 97.7 °F (36.5 °C) -- 75 16 94 %   03/01/24 1002 115/80 -- -- 78 19 94 %   03/01/24 0957 (!) 144/72 -- -- 75 -- 93 %   03/01/24 0952 (!) 140/70 -- -- 75 14 92 %   03/01/24 0948 139/75 98.6 °F (37 °C) Oral 75 17 92 %   03/01/24 0946 139/75 -- -- 75 18 93 %   03/01/24 0658 118/71 97.8 °F (36.6 °C) Oral 73 16 --   03/01/24 0319 130/76 97.2 °F (36.2 °C) Oral 78 -- 94 %           Intake/Output Summary (Last 24 hours) at 3/1/2024 2252  Last data filed at 3/1/2024 0931  Gross per 24 hour   Intake 1050 ml   Output 20 ml   Net 1030 ml          I had a face to face encounter and independently examined this patient on 3/1/2024, as outlined below:  PHYSICAL EXAM:  General: Alert, cooperative  EENT:  Anicteric sclerae.  Resp:  CTA bilaterally, no wheezing or rales.  No accessory muscle use  CV:  Regular  rhythm,  No edema  GI:  Soft, Non distended, Non tender.  +Bowel sounds  Neurologic:  Alert and oriented X 3, normal speech,   Psych:   Good insight. Not anxious nor agitated  Skin:  No rashes.  No jaundice    Reviewed most current lab test results and cultures  YES  Reviewed most current radiology test results   YES  Review and summation of old records today    NO  Reviewed patient's current orders and MAR    YES  PMH/SH reviewed - no change compared to H&P    Procedures: see electronic medical records for all procedures/Xrays and details which were not copied into this note but were reviewed prior to creation of Plan.      LABS:  I reviewed today's most current labs and imaging studies.  Pertinent labs include:  Recent Labs     03/01/24  0306   WBC 9.1   HGB 10.2*   HCT 32.2*          Recent Labs     03/01/24  0306      K 3.6   *   CO2 26   GLUCOSE 121*   BUN 13   CREATININE 0.57   CALCIUM 8.4*   MG 2.0         Signed: Osbaldo Pizano Jr, MD

## 2024-03-02 NOTE — PROGRESS NOTES
Nursing contacted Nocturnist/cross cover provider and notified patient s/p PPM today, states cxr no complications s/p procedure, but reported now developed left sided twitching synchronized with heartbeat after movement, but states pt did not move her left arm in any extreme way. Rhythm on telemetry is reported AV paced. No other concerns reported. VSS. On bedside exam patient states that immediately after getting the PPM this AM when brought back to her room noticed while lying in bed that left abd is rhythmically moving/twitching, denies any pain with this, dyspnea, or any complaints, just noticeable. Reported when she sat up to the recliner today it stopped, then got back in bed for night and began again. No hematoma noted, is in arm sling presently. No complicating features to procedure site. Asked nurse to please notify cardiology and ordered stat cxr- results pending. Patient denies any further complaints or concerns. No acute distress reported. Nursing to notify Hospitalist for further/continued concerns. Will remain available overnight for further concerns if nursing/patient needs. Will defer further evaluation/management to the day shift primary care team.    Non-billable note.

## 2024-03-02 NOTE — DISCHARGE SUMMARY
Hospitalist Discharge Note    NAME:   Yesenia Horan   : 1940   MRN: [id number]     Date/Time: 3/2/2024 1:50 PM    Patient PCP: Sampson Roque MD    Admit date: 2024    Discharge date: 24    PCP: Sampson Roque MD    Discharge Diagnoses:    Discharge Medications:  Current Discharge Medication List        START taking these medications    Details   amiodarone (CORDARONE) 200 MG tablet Take 1 tablet by mouth daily  Qty: 90 tablet, Refills: 3      aspirin 81 MG chewable tablet Take 1 tablet by mouth daily  Qty: 30 tablet, Refills: 3      metoprolol succinate (TOPROL XL) 25 MG extended release tablet Take 1 tablet by mouth daily  Qty: 90 tablet, Refills: 3           CONTINUE these medications which have CHANGED    Details   atorvastatin (LIPITOR) 80 MG tablet Take 1 tablet by mouth daily  Qty: 90 tablet, Refills: 3      furosemide (LASIX) 40 MG tablet Take 1 tablet by mouth daily  Qty: 60 tablet, Refills: 3      dapagliflozin (FARXIGA) 10 MG tablet Take 1 tablet by mouth every morning  Qty: 90 tablet, Refills: 0           CONTINUE these medications which have NOT CHANGED    Details   prednisoLONE acetate (PRED FORTE) 1 % ophthalmic suspension Place 1 drop into the left eye daily      timolol (TIMOPTIC) 0.5 % ophthalmic solution Place 1 drop into both eyes every morning      dorzolamide (TRUSOPT) 2 % ophthalmic solution Place 2 drops into both eyes 2 times daily      latanoprost (XALATAN) 0.005 % ophthalmic solution Place 1 drop into both eyes nightly      senna (SENOKOT) 8.6 MG tablet Take 1 tablet by mouth daily      Multiple Vitamins-Minerals (MULTIVITAMIN ADULTS 50+) TABS Take 1 tablet by mouth daily      apixaban (ELIQUIS) 5 MG TABS tablet Take 1 tablet by mouth 2 times daily      acyclovir (ZOVIRAX) 400 MG tablet Take 1 tablet by mouth 2 times daily      metFORMIN (GLUCOPHAGE) 500 MG tablet Take 1 tablet by mouth 2 times daily (with meals)           STOP taking these medications        EXAM:  General: Alert, cooperative  EENT:  Anicteric sclerae.  Resp:  CTA bilaterally, no wheezing or rales.  No accessory muscle use  CV:  Regular  rhythm,  No edema  GI:  Soft, Non distended, Non tender.  +Bowel sounds  Neurologic:  Alert and oriented X 3, normal speech,   Psych:   Good insight. Not anxious nor agitated  Skin:  No rashes.  No jaundice    Reviewed most current lab test results and cultures  YES  Reviewed most current radiology test results   YES  Review and summation of old records today    NO  Reviewed patient's current orders and MAR    YES  PMH/SH reviewed - no change compared to H&P    Procedures: see electronic medical records for all procedures/Xrays and details which were not copied into this note but were reviewed prior to creation of Plan.      LABS:  I reviewed today's most current labs and imaging studies.  Pertinent labs include:  Recent Labs     03/01/24  0306 03/02/24  0255   WBC 9.1 9.9   HGB 10.2* 10.0*   HCT 32.2* 31.3*    274       Recent Labs     03/01/24  0306 03/02/24  0255    140   K 3.6 4.3   * 110*   CO2 26 26   GLUCOSE 121* 156*   BUN 13 15   CREATININE 0.57 0.62   CALCIUM 8.4* 8.1*   MG 2.0  --          Signed: Osbaldo Pizano Jr, MD

## 2024-03-03 ENCOUNTER — APPOINTMENT (OUTPATIENT)
Facility: HOSPITAL | Age: 84
End: 2024-03-03
Payer: MEDICARE

## 2024-03-03 ENCOUNTER — HOSPITAL ENCOUNTER (EMERGENCY)
Facility: HOSPITAL | Age: 84
Discharge: HOME | End: 2024-03-03
Attending: EMERGENCY MEDICINE
Payer: MEDICARE

## 2024-03-03 VITALS
SYSTOLIC BLOOD PRESSURE: 139 MMHG | WEIGHT: 171.96 LBS | RESPIRATION RATE: 15 BRPM | HEART RATE: 75 BPM | HEIGHT: 66 IN | DIASTOLIC BLOOD PRESSURE: 76 MMHG | TEMPERATURE: 98.4 F | OXYGEN SATURATION: 96 % | BODY MASS INDEX: 27.64 KG/M2

## 2024-03-03 DIAGNOSIS — E87.70 HYPERVOLEMIA, UNSPECIFIED HYPERVOLEMIA TYPE: ICD-10-CM

## 2024-03-03 DIAGNOSIS — R06.02 SHORTNESS OF BREATH: Primary | ICD-10-CM

## 2024-03-03 DIAGNOSIS — Z76.0 ISSUE OF REPEAT PRESCRIPTION FOR MEDICATION: ICD-10-CM

## 2024-03-03 LAB
ALBUMIN SERPL-MCNC: 2.6 G/DL (ref 3.5–5)
ALBUMIN/GLOB SERPL: 0.8 (ref 1.1–2.2)
ALP SERPL-CCNC: 96 U/L (ref 45–117)
ALT SERPL-CCNC: 36 U/L (ref 12–78)
ANION GAP SERPL CALC-SCNC: 1 MMOL/L (ref 5–15)
AST SERPL-CCNC: 27 U/L (ref 15–37)
BASOPHILS # BLD: 0 K/UL (ref 0–0.1)
BASOPHILS NFR BLD: 0 % (ref 0–1)
BILIRUB SERPL-MCNC: 0.4 MG/DL (ref 0.2–1)
BUN SERPL-MCNC: 11 MG/DL (ref 6–20)
BUN/CREAT SERPL: 17 (ref 12–20)
CALCIUM SERPL-MCNC: 8.6 MG/DL (ref 8.5–10.1)
CHLORIDE SERPL-SCNC: 108 MMOL/L (ref 97–108)
CO2 SERPL-SCNC: 30 MMOL/L (ref 21–32)
COMMENT:: NORMAL
CREAT SERPL-MCNC: 0.66 MG/DL (ref 0.55–1.02)
DIFFERENTIAL METHOD BLD: ABNORMAL
EOSINOPHIL # BLD: 0.1 K/UL (ref 0–0.4)
EOSINOPHIL NFR BLD: 1 % (ref 0–7)
ERYTHROCYTE [DISTWIDTH] IN BLOOD BY AUTOMATED COUNT: 14.9 % (ref 11.5–14.5)
GLOBULIN SER CALC-MCNC: 3.4 G/DL (ref 2–4)
GLUCOSE SERPL-MCNC: 152 MG/DL (ref 65–100)
HCT VFR BLD AUTO: 32.2 % (ref 35–47)
HGB BLD-MCNC: 9.9 G/DL (ref 11.5–16)
IMM GRANULOCYTES # BLD AUTO: 0.1 K/UL (ref 0–0.04)
IMM GRANULOCYTES NFR BLD AUTO: 1 % (ref 0–0.5)
LYMPHOCYTES # BLD: 2.5 K/UL (ref 0.8–3.5)
LYMPHOCYTES NFR BLD: 27 % (ref 12–49)
MAGNESIUM SERPL-MCNC: 2 MG/DL (ref 1.6–2.4)
MCH RBC QN AUTO: 31.2 PG (ref 26–34)
MCHC RBC AUTO-ENTMCNC: 30.7 G/DL (ref 30–36.5)
MCV RBC AUTO: 101.6 FL (ref 80–99)
MONOCYTES # BLD: 0.7 K/UL (ref 0–1)
MONOCYTES NFR BLD: 7 % (ref 5–13)
NEUTS SEG # BLD: 5.9 K/UL (ref 1.8–8)
NEUTS SEG NFR BLD: 64 % (ref 32–75)
NRBC # BLD: 0 K/UL (ref 0–0.01)
NRBC BLD-RTO: 0 PER 100 WBC
NT PRO BNP: 8810 PG/ML
PLATELET # BLD AUTO: 289 K/UL (ref 150–400)
PMV BLD AUTO: 10.5 FL (ref 8.9–12.9)
POTASSIUM SERPL-SCNC: 4.5 MMOL/L (ref 3.5–5.1)
PROT SERPL-MCNC: 6 G/DL (ref 6.4–8.2)
RBC # BLD AUTO: 3.17 M/UL (ref 3.8–5.2)
SODIUM SERPL-SCNC: 139 MMOL/L (ref 136–145)
SPECIMEN HOLD: NORMAL
TROPONIN I SERPL HS-MCNC: 37 NG/L (ref 0–51)
TROPONIN I SERPL HS-MCNC: 43 NG/L (ref 0–51)
WBC # BLD AUTO: 9.2 K/UL (ref 3.6–11)

## 2024-03-03 PROCEDURE — 80053 COMPREHEN METABOLIC PANEL: CPT

## 2024-03-03 PROCEDURE — 96374 THER/PROPH/DIAG INJ IV PUSH: CPT

## 2024-03-03 PROCEDURE — 84484 ASSAY OF TROPONIN QUANT: CPT

## 2024-03-03 PROCEDURE — 85025 COMPLETE CBC W/AUTO DIFF WBC: CPT

## 2024-03-03 PROCEDURE — 6360000002 HC RX W HCPCS: Performed by: EMERGENCY MEDICINE

## 2024-03-03 PROCEDURE — 93005 ELECTROCARDIOGRAM TRACING: CPT | Performed by: EMERGENCY MEDICINE

## 2024-03-03 PROCEDURE — 36415 COLL VENOUS BLD VENIPUNCTURE: CPT

## 2024-03-03 PROCEDURE — 99285 EMERGENCY DEPT VISIT HI MDM: CPT

## 2024-03-03 PROCEDURE — 71045 X-RAY EXAM CHEST 1 VIEW: CPT

## 2024-03-03 PROCEDURE — 83735 ASSAY OF MAGNESIUM: CPT

## 2024-03-03 PROCEDURE — 83880 ASSAY OF NATRIURETIC PEPTIDE: CPT

## 2024-03-03 RX ORDER — FUROSEMIDE 10 MG/ML
20 INJECTION INTRAMUSCULAR; INTRAVENOUS ONCE
Status: COMPLETED | OUTPATIENT
Start: 2024-03-03 | End: 2024-03-03

## 2024-03-03 RX ADMIN — FUROSEMIDE 20 MG: 10 INJECTION, SOLUTION INTRAMUSCULAR; INTRAVENOUS at 10:10

## 2024-03-03 ASSESSMENT — PAIN - FUNCTIONAL ASSESSMENT: PAIN_FUNCTIONAL_ASSESSMENT: NONE - DENIES PAIN

## 2024-03-03 NOTE — DISCHARGE INSTRUCTIONS
You were seen in the emergency department for your symptoms.  Please take the Lasix (furosemide) as well as the metoprolol ER 25 mg 1 pill that you have at home.  Please follow-up with your primary care doctor and return for new or worsening symptoms anytime.

## 2024-03-03 NOTE — ED PROVIDER NOTES
signs unremarkable, she is not hypoxic.  She does appear slightly fluid overloaded.  Will check EKG and troponin.  Check basic labs including BMP.  Check chest x-ray.  Patient is anticoagulated, doubt DVT/PE.  Doubt ACS clinically.  Likely CHF exacerbation in setting of not having home medications.    Will observe in the emergency department.  Give dose of Lasix here.  Will reassess.    Amount and/or Complexity of Data Reviewed  Independent Historian: caregiver and EMS  External Data Reviewed: notes.     Details: Discharge summary, cardiology consult  Labs: ordered. Decision-making details documented in ED Course.  Radiology: ordered and independent interpretation performed. Decision-making details documented in ED Course.  ECG/medicine tests: ordered and independent interpretation performed. Decision-making details documented in ED Course.    Risk  Prescription drug management.          CLINICAL MANAGEMENT TOOLS:        ED Course as of 03/03/24 1715   Sun Mar 03, 2024   0938 EKG interpreted by me.  Shows atrial-sensed and v-paced rhythm with a HR of 82.  LBBB morphology. [MB]   0956 Chest xray as interpreted by me shows mild interstitial edema. R sided pleural effusion. [MB]   1029 D/w patient and daughter. Has home Rx for furosemide, will have patient continue to take. Will also have patient take home metoprolol ER in lieu of metoprol XL prescribed. [MB]   1053 CBC with stable anemia, CMP unremarkable.  Troponin is 43, will repeat.  This is actually improved from prior.  BNP mildly elevated consistent with fluid overload. [MB]   1205 Troponin downtrending [MB]   1210 Reassessed, patient resting in bed with no chest pain.  Diuresing well.  Patient will be discharged with medication reconciliation, PCP and cardiology referrals.  Return precautions.  Patient and daughter agreeable to plan. [MB]      ED Course User Index  [MB] Ulysses Cohen MD     Updates as above, patient and family comfortable with plan to

## 2024-03-04 LAB
EKG ATRIAL RATE: 82 BPM
EKG DIAGNOSIS: NORMAL
EKG P AXIS: 113 DEGREES
EKG P-R INTERVAL: 188 MS
EKG Q-T INTERVAL: 440 MS
EKG QRS DURATION: 142 MS
EKG QTC CALCULATION (BAZETT): 514 MS
EKG R AXIS: 234 DEGREES
EKG T AXIS: 38 DEGREES
EKG VENTRICULAR RATE: 82 BPM

## 2024-03-14 ENCOUNTER — HOSPITAL ENCOUNTER (OUTPATIENT)
Facility: HOSPITAL | Age: 84
Discharge: HOME | End: 2024-03-14
Payer: MEDICARE

## 2024-03-14 ENCOUNTER — TRANSCRIBE ORDERS (OUTPATIENT)
Facility: HOSPITAL | Age: 84
End: 2024-03-14

## 2024-03-14 DIAGNOSIS — Z95.0 PRESENCE OF CARDIAC PACEMAKER: Primary | ICD-10-CM

## 2024-03-14 DIAGNOSIS — Z95.0 PRESENCE OF CARDIAC PACEMAKER: ICD-10-CM

## 2024-03-14 PROCEDURE — 71046 X-RAY EXAM CHEST 2 VIEWS: CPT

## 2024-03-21 ENCOUNTER — ANESTHESIA EVENT (OUTPATIENT)
Facility: HOSPITAL | Age: 84
End: 2024-03-21
Payer: MEDICARE

## 2024-03-22 ENCOUNTER — APPOINTMENT (OUTPATIENT)
Facility: HOSPITAL | Age: 84
End: 2024-03-22
Attending: INTERNAL MEDICINE
Payer: MEDICARE

## 2024-03-22 ENCOUNTER — HOSPITAL ENCOUNTER (OUTPATIENT)
Facility: HOSPITAL | Age: 84
Discharge: HOME | End: 2024-03-22
Attending: INTERNAL MEDICINE | Admitting: INTERNAL MEDICINE
Payer: MEDICARE

## 2024-03-22 ENCOUNTER — ANESTHESIA (OUTPATIENT)
Facility: HOSPITAL | Age: 84
End: 2024-03-22
Payer: MEDICARE

## 2024-03-22 VITALS
HEIGHT: 65 IN | WEIGHT: 125 LBS | OXYGEN SATURATION: 98 % | RESPIRATION RATE: 20 BRPM | BODY MASS INDEX: 20.83 KG/M2 | HEART RATE: 79 BPM | TEMPERATURE: 98.6 F | DIASTOLIC BLOOD PRESSURE: 60 MMHG | SYSTOLIC BLOOD PRESSURE: 135 MMHG

## 2024-03-22 DIAGNOSIS — I49.9 ABNORMAL HEART RHYTHM: ICD-10-CM

## 2024-03-22 DIAGNOSIS — I48.19 PERSISTENT ATRIAL FIBRILLATION (HCC): Primary | ICD-10-CM

## 2024-03-22 DIAGNOSIS — I49.5 SSS (SICK SINUS SYNDROME) (HCC): ICD-10-CM

## 2024-03-22 PROBLEM — T82.110A PACEMAKER LEAD MALFUNCTION, INITIAL ENCOUNTER: Status: ACTIVE | Noted: 2024-03-22

## 2024-03-22 LAB
ECHO BSA: 1.61 M2
GLUCOSE BLD STRIP.AUTO-MCNC: 163 MG/DL (ref 65–117)
SERVICE CMNT-IMP: ABNORMAL

## 2024-03-22 PROCEDURE — C1894 INTRO/SHEATH, NON-LASER: HCPCS | Performed by: INTERNAL MEDICINE

## 2024-03-22 PROCEDURE — A4217 STERILE WATER/SALINE, 500 ML: HCPCS | Performed by: INTERNAL MEDICINE

## 2024-03-22 PROCEDURE — 3700000001 HC ADD 15 MINUTES (ANESTHESIA): Performed by: INTERNAL MEDICINE

## 2024-03-22 PROCEDURE — 2500000003 HC RX 250 WO HCPCS: Performed by: NURSE ANESTHETIST, CERTIFIED REGISTERED

## 2024-03-22 PROCEDURE — 6360000002 HC RX W HCPCS: Performed by: NURSE ANESTHETIST, CERTIFIED REGISTERED

## 2024-03-22 PROCEDURE — C1713 ANCHOR/SCREW BN/BN,TIS/BN: HCPCS | Performed by: INTERNAL MEDICINE

## 2024-03-22 PROCEDURE — 82962 GLUCOSE BLOOD TEST: CPT

## 2024-03-22 PROCEDURE — 2500000003 HC RX 250 WO HCPCS: Performed by: INTERNAL MEDICINE

## 2024-03-22 PROCEDURE — C1769 GUIDE WIRE: HCPCS | Performed by: INTERNAL MEDICINE

## 2024-03-22 PROCEDURE — 2580000003 HC RX 258: Performed by: NURSE ANESTHETIST, CERTIFIED REGISTERED

## 2024-03-22 PROCEDURE — 2580000003 HC RX 258: Performed by: INTERNAL MEDICINE

## 2024-03-22 PROCEDURE — 33215 REPOSITION PACING-DEFIB LEAD: CPT | Performed by: INTERNAL MEDICINE

## 2024-03-22 PROCEDURE — 6360000002 HC RX W HCPCS: Performed by: INTERNAL MEDICINE

## 2024-03-22 PROCEDURE — 3700000000 HC ANESTHESIA ATTENDED CARE: Performed by: INTERNAL MEDICINE

## 2024-03-22 PROCEDURE — 2709999900 HC NON-CHARGEABLE SUPPLY: Performed by: INTERNAL MEDICINE

## 2024-03-22 PROCEDURE — 71045 X-RAY EXAM CHEST 1 VIEW: CPT

## 2024-03-22 RX ORDER — SODIUM CHLORIDE 0.9 % (FLUSH) 0.9 %
5-40 SYRINGE (ML) INJECTION PRN
Status: DISCONTINUED | OUTPATIENT
Start: 2024-03-22 | End: 2024-03-22 | Stop reason: HOSPADM

## 2024-03-22 RX ORDER — 0.9 % SODIUM CHLORIDE 0.9 %
INTRAVENOUS SOLUTION INTRAVENOUS CONTINUOUS PRN
Status: DISCONTINUED | OUTPATIENT
Start: 2024-03-22 | End: 2024-03-22 | Stop reason: SDUPTHER

## 2024-03-22 RX ORDER — SODIUM CHLORIDE 0.9 % (FLUSH) 0.9 %
5-40 SYRINGE (ML) INJECTION EVERY 12 HOURS SCHEDULED
Status: DISCONTINUED | OUTPATIENT
Start: 2024-03-22 | End: 2024-03-22 | Stop reason: HOSPADM

## 2024-03-22 RX ORDER — HEPARIN SODIUM 10000 [USP'U]/ML
INJECTION, SOLUTION INTRAVENOUS; SUBCUTANEOUS PRN
Status: DISCONTINUED | OUTPATIENT
Start: 2024-03-22 | End: 2024-03-22 | Stop reason: HOSPADM

## 2024-03-22 RX ORDER — ACETAMINOPHEN 325 MG/1
650 TABLET ORAL EVERY 4 HOURS PRN
Status: DISCONTINUED | OUTPATIENT
Start: 2024-03-22 | End: 2024-03-22 | Stop reason: HOSPADM

## 2024-03-22 RX ORDER — CEFAZOLIN SODIUM/WATER 2 G/20 ML
SYRINGE (ML) INTRAVENOUS PRN
Status: DISCONTINUED | OUTPATIENT
Start: 2024-03-22 | End: 2024-03-22 | Stop reason: SDUPTHER

## 2024-03-22 RX ORDER — PHENYLEPHRINE HCL IN 0.9% NACL 0.4MG/10ML
SYRINGE (ML) INTRAVENOUS PRN
Status: DISCONTINUED | OUTPATIENT
Start: 2024-03-22 | End: 2024-03-22 | Stop reason: SDUPTHER

## 2024-03-22 RX ORDER — LIDOCAINE HYDROCHLORIDE 20 MG/ML
INJECTION, SOLUTION EPIDURAL; INFILTRATION; INTRACAUDAL; PERINEURAL PRN
Status: DISCONTINUED | OUTPATIENT
Start: 2024-03-22 | End: 2024-03-22 | Stop reason: SDUPTHER

## 2024-03-22 RX ORDER — DEXMEDETOMIDINE HYDROCHLORIDE 100 UG/ML
INJECTION, SOLUTION INTRAVENOUS PRN
Status: DISCONTINUED | OUTPATIENT
Start: 2024-03-22 | End: 2024-03-22 | Stop reason: SDUPTHER

## 2024-03-22 RX ORDER — DEXAMETHASONE SODIUM PHOSPHATE 4 MG/ML
INJECTION, SOLUTION INTRA-ARTICULAR; INTRALESIONAL; INTRAMUSCULAR; INTRAVENOUS; SOFT TISSUE PRN
Status: DISCONTINUED | OUTPATIENT
Start: 2024-03-22 | End: 2024-03-22 | Stop reason: SDUPTHER

## 2024-03-22 RX ORDER — LIDOCAINE HYDROCHLORIDE 10 MG/ML
INJECTION, SOLUTION EPIDURAL; INFILTRATION; INTRACAUDAL; PERINEURAL PRN
Status: DISCONTINUED | OUTPATIENT
Start: 2024-03-22 | End: 2024-03-22 | Stop reason: HOSPADM

## 2024-03-22 RX ORDER — FENTANYL CITRATE 50 UG/ML
INJECTION, SOLUTION INTRAMUSCULAR; INTRAVENOUS PRN
Status: DISCONTINUED | OUTPATIENT
Start: 2024-03-22 | End: 2024-03-22 | Stop reason: SDUPTHER

## 2024-03-22 RX ORDER — SODIUM CHLORIDE 9 MG/ML
INJECTION, SOLUTION INTRAVENOUS PRN
Status: DISCONTINUED | OUTPATIENT
Start: 2024-03-22 | End: 2024-03-22 | Stop reason: HOSPADM

## 2024-03-22 RX ORDER — ONDANSETRON 2 MG/ML
INJECTION INTRAMUSCULAR; INTRAVENOUS PRN
Status: DISCONTINUED | OUTPATIENT
Start: 2024-03-22 | End: 2024-03-22 | Stop reason: SDUPTHER

## 2024-03-22 RX ADMIN — DEXAMETHASONE SODIUM PHOSPHATE 4 MG: 4 INJECTION, SOLUTION INTRAMUSCULAR; INTRAVENOUS at 08:42

## 2024-03-22 RX ADMIN — FENTANYL CITRATE 25 MCG: 50 INJECTION, SOLUTION INTRAMUSCULAR; INTRAVENOUS at 08:40

## 2024-03-22 RX ADMIN — LIDOCAINE HYDROCHLORIDE 80 MG: 20 INJECTION, SOLUTION EPIDURAL; INFILTRATION; INTRACAUDAL; PERINEURAL at 08:29

## 2024-03-22 RX ADMIN — DEXMEDETOMIDINE HYDROCHLORIDE 10 MCG: 100 INJECTION, SOLUTION, CONCENTRATE INTRAVENOUS at 08:32

## 2024-03-22 RX ADMIN — ONDANSETRON HYDROCHLORIDE 4 MG: 2 INJECTION, SOLUTION INTRAMUSCULAR; INTRAVENOUS at 08:42

## 2024-03-22 RX ADMIN — FENTANYL CITRATE 25 MCG: 50 INJECTION, SOLUTION INTRAMUSCULAR; INTRAVENOUS at 08:48

## 2024-03-22 RX ADMIN — Medication 2000 MCG: at 08:36

## 2024-03-22 RX ADMIN — SODIUM CHLORIDE 1 ML/KG/HR: 9 INJECTION, SOLUTION INTRAVENOUS at 08:21

## 2024-03-22 RX ADMIN — Medication 120 MCG: at 08:54

## 2024-03-22 RX ADMIN — PROPOFOL 100 MCG/KG/MIN: 10 INJECTION, EMULSION INTRAVENOUS at 08:29

## 2024-03-22 RX ADMIN — FENTANYL CITRATE 25 MCG: 50 INJECTION, SOLUTION INTRAMUSCULAR; INTRAVENOUS at 08:30

## 2024-03-22 RX ADMIN — FENTANYL CITRATE 25 MCG: 50 INJECTION, SOLUTION INTRAMUSCULAR; INTRAVENOUS at 08:35

## 2024-03-22 NOTE — DISCHARGE INSTRUCTIONS
[facility]  [address] [place] [address]  [phone]  [email or web address]     NEW PACEMAKER IMPLANT DISCHARGE INSTRUCTIONS    Patient ID:  Yesenia Horan  [id number]  83 y.o.  1940    Admit Date: 3/22/2024    Discharge Date: 3/22/2024     Admitting Physician: [unfilled]     Discharge Physician: [unfilled]    Admission Diagnoses:   Abnormal heart rhythm [I49.9]  Pacemaker lead malfunction, initial encounter [T82.110A]    Discharge Diagnoses:   [unfilled]    Discharge Condition: Good    Cardiology Procedures this Admission:  Pacemaker insertion.     Disposition: home    Reference discharge instructions provided by nursing for diet and activity.    Follow-up with device clinic in three weeks. Call [phone] to make an appointment.    Signed:  Sebastián Naik MD  3/22/2024  9:34 AM      DISCHARGE INSTRUCTIONS FOR PATIENTS WITH PACEMAKERS    1. Remember to call for an appointment for 3 weeks [phone] to check healing and implant programming.  2. Medic Alert Bracelets are available from your pharmacist to wear at all times if you choose to wear one.  3. Carry your ID card for pacemaker with you at all times.  This card will be given to you in the hospital or mailed to you.  4. The pacemaker will bulge slightly under your skin.  The bulge will decrease in size over the next few weeks.  Please notify the doctor's office if you notice any of the following around your site:   A.  A bruise that does not go away.   B.  Soreness or yellow, green, or brown drainage from the site.   C.  Any swelling from the site.   D.  If you have a fever of 100 degrees or higher that lasts for a few days.    INCISION CARE       1.  Leave the dressing over your site until your follow up visit.  2.  You may not shower until after follow up visit.   3.  For comfort, wear loose fitting clothing.  4.  Ice pack to affected shoulder for first 24 hours, wear your sling for 2 days.  5.  Report any

## 2024-03-22 NOTE — PROGRESS NOTES
Cardiac Cath Lab Recovery Arrival Note:      Yesenia Horan arrived to Cardiac Cath Lab, Recovery Area. Staff introduced to patient. Patient identifiers verified with NAME and DATE OF BIRTH. Procedure verified with patient. Consent forms reviewed and signed by patient or authorized representative and verified. Allergies verified.     Patient and family oriented to department. Patient and family informed of procedure and plan of care.     Questions answered with review. Patient prepped for procedure, per orders from physician, prior to arrival.    Patient on cardiac monitor, non-invasive blood pressure, SPO2 monitor. On RA. Patient is A&Ox 4. Patient reports NO PAIN.     Patient in stretcher, in low position, with side rails up, call bell within reach, patient instructed to call if assistance as needed.    Patient prep in: CCL Recovery Area, [place] [address].   Patient family has pager # N/A  Family in: .   Prep by: FABIO

## 2024-03-22 NOTE — ANESTHESIA POSTPROCEDURE EVALUATION
Department of Anesthesiology  Postprocedure Note    Patient: Yesenia Horan  MRN: [id number]  YOB: 1940  Date of evaluation: 3/22/2024    Procedure Summary       Date: 03/22/24 Room / Location: [facility] EP LAB / [facility] CARDI[facility] CATH LAB    Anesthesia Start: 0821 Anesthesia Stop: 0920    Procedures:       Lead reposition      Insert PPM dual Diagnosis: Persistent atrial fibrillation (HCC)    Providers: Sebastián Naik MD Responsible Provider: Ulysses Scott MD    Anesthesia Type: MAC ASA Status: 3            Anesthesia Type: MAC    Lise Phase I: Lise Score: 9    Lise Phase II:      Anesthesia Post Evaluation  Post-Anesthesia Evaluation and Assessment    Patient: Yesenia Horan MRN: [id number]  SSN: [id number]    YOB: 1940  Age: 83 y.o.  Sex: female      I have evaluated the patient and they are stable and ready for discharge from the PACU.     Cardiovascular Function/Vital Signs  Visit Vitals  /60   Pulse 75   Temp 98.6 °F (37 °C) (Tympanic)   Resp 18   Ht 1.651 m (5' 5\")   Wt 56.7 kg (125 lb)   SpO2 94%   BMI 20.80 kg/m²       Patient is status post Monitor Anesthesia Care anesthesia for Procedure(s):  Lead reposition  Insert PPM dual.    Nausea/Vomiting: None    Postoperative hydration reviewed and adequate.    Pain:      Managed    Neurological Status:       At baseline    Mental Status, Level of Consciousness: Alert and  oriented to person, place, and time    Pulmonary Status:       Adequate oxygenation and airway patent    Complications related to anesthesia: None    Post-anesthesia assessment completed. No concerns    Signed By: Ulysses Scott MD     March 22, 2024                There were no known notable events for this encounter.

## 2024-03-22 NOTE — ANESTHESIA PRE PROCEDURE
Department of Anesthesiology  Preprocedure Note       Name:  Yesenia Horan   Age:  83 y.o.  :  1940                                          MRN:  [id number]         Date:  3/22/2024      Surgeon: Surgeon(s):  Sebastián Naik MD    Procedure: Procedure(s):  Lead reposition    Medications prior to admission:   Prior to Admission medications    Medication Sig Start Date End Date Taking? Authorizing Provider   amiodarone (CORDARONE) 200 MG tablet Take 1 tablet by mouth daily 3/3/24   Osbaldo Pizano Jr., MD   aspirin 81 MG chewable tablet Take 1 tablet by mouth daily 3/3/24   Osbaldo Pizano Jr., MD   atorvastatin (LIPITOR) 80 MG tablet Take 1 tablet by mouth daily 3/2/24   Osbaldo Pizano Jr., MD   metoprolol succinate (TOPROL XL) 25 MG extended release tablet Take 1 tablet by mouth daily 3/3/24   Osbaldo Pizano Jr., MD   furosemide (LASIX) 40 MG tablet Take 1 tablet by mouth daily 3/2/24   Osbaldo Pizano Jr., MD   dapagliflozin (FARXIGA) 10 MG tablet Take 1 tablet by mouth every morning 3/2/24   Osbaldo Pizano Jr., MD   prednisoLONE acetate (PRED FORTE) 1 % ophthalmic suspension Place 1 drop into the left eye daily    Cathy Shabazz MD   timolol (TIMOPTIC) 0.5 % ophthalmic solution Place 1 drop into both eyes every morning    Cathy Shabazz MD   dorzolamide (TRUSOPT) 2 % ophthalmic solution Place 2 drops into both eyes 2 times daily    Cathy Shabazz MD   latanoprost (XALATAN) 0.005 % ophthalmic solution Place 1 drop into both eyes nightly    Cathy Shabazz MD   senna (SENOKOT) 8.6 MG tablet Take 1 tablet by mouth daily    Cathy Shabazz MD   Multiple Vitamins-Minerals (MULTIVITAMIN ADULTS 50+) TABS Take 1 tablet by mouth daily    Cathy Shabazz MD   apixaban (ELIQUIS) 5 MG TABS tablet Take 1 tablet by mouth 2 times daily    Cathy Shabazz MD   acyclovir (ZOVIRAX) 400 MG tablet Take 1 tablet by mouth 2 times daily    Cathy Shabazz MD

## 2024-03-22 NOTE — PROGRESS NOTES
TRANSFER - IN REPORT:    Verbal report received from RICKIE on Yesenia Horan  being received from EP for ROUTINE CARE. Report consisted of patient’s Situation, Background, Assessment and Recommendations(SBAR). Information from the following report(s) PROCEDURE LOG was reviewed with the receiving clinician. Opportunity for questions and clarification was provided. Assessment completed upon patient’s arrival to Cardiac Cath Lab RECOVERY AREA and care assumed.       Cardiac Cath Lab Recovery Arrival Note:    Yesenia Horan arrived to [facility] recovery area.  Patient procedure= LEAD REVISION. Patient on cardiac monitor, non-invasive blood pressure, SPO2 monitor. On 3L. Patient status doing well without problems. Patient is A&Ox 4. Patient reports NO PAIN.    PROCEDURE SITE CHECK:    Procedure site:without any bleeding and NO PAIN, NO pain/discomfort reported at procedure site.     No change in patient status. Continue to monitor patient and status.

## (undated) DEVICE — ADHESIVE SKIN CLSR 1ML TISS HI VISC EXOFIN

## (undated) DEVICE — SYR MED 10ML FIX M LT BLU -- MEDALLION

## (undated) DEVICE — SMOKE EVACUATION PENCIL: Brand: VALLEYLAB

## (undated) DEVICE — CABLE CATH L10FT RED PIN CONN 25-34 FOR NAVISTAR CARTO 3

## (undated) DEVICE — SUTURE ABSORBABLE WND CLOSURE 4-0 P-12 12 IN UD V-LOC

## (undated) DEVICE — KIT ACCS INTRO 4FR L10CM NDL 21GA L7CM GWIRE L40CM

## (undated) DEVICE — SPLINT WR POS F/ARTERIAL ACC -- BX/10

## (undated) DEVICE — FLOSEAL MATRIX IS INDICATED IN SURGICAL PROCEDURES (OTHER THAN IN OPHTHALMIC) AS AN ADJUNCT TO HEMOSTASIS WHEN CONTROL OF BLEEDING BY LIGATURE OR CONVENTIONALPROCEDURES IS INEFFECTIVE OR IMPRACTICAL.: Brand: FLOSEAL HEMOSTATIC MATRIX

## (undated) DEVICE — 3M™ IOBAN™ 2 ANTIMICROBIAL INCISE DRAPE 6651EZ: Brand: IOBAN™ 2

## (undated) DEVICE — TRAY,IRRIGATION,PISTON SYRINGE,60ML,STRL: Brand: MEDLINE

## (undated) DEVICE — GUIDEWIRE VASC L40CM DIA0018IN NDL 21GA L7CM Z S STL MAK

## (undated) DEVICE — SUTURE STRATAFIX SYMMETRIC SZ 1 L18IN ABSRB VLT CT1 L36CM SXPP1A404

## (undated) DEVICE — HEART CATH-MRMC: Brand: MEDLINE INDUSTRIES, INC.

## (undated) DEVICE — NDL SPNE QNCKE 18GX3.5IN LF --

## (undated) DEVICE — SUTURE V-LOC 180 SZ 2-0 L12IN ABSRB VLT GS-21 L37MM 1/2 CIR VLOCM0315

## (undated) DEVICE — TUBING PRESS MON M/FEM 6IN --

## (undated) DEVICE — SYR 50ML LR LCK 1ML GRAD NSAF --

## (undated) DEVICE — INTRODUCER SHTH 8FR L63CM 8FR DIL GWIRE L145CM DIA0.038IN

## (undated) DEVICE — INFECTION CONTROL KIT SYS

## (undated) DEVICE — PRECISION FALCON OSCILLATING TIP SAW CARTRIDGE: Brand: PRECISION FALCON

## (undated) DEVICE — INSTRUMENT BATTERY

## (undated) DEVICE — SUTURE ABSORBABLE MONOFILAMENT 2-0 WND CLOSURE GRN V-LOC 180 VLOCL0315

## (undated) DEVICE — SOLUTION IRRIG 1000ML H2O STRL BLT

## (undated) DEVICE — Device

## (undated) DEVICE — CATHETER ABLATN F-J CRV 1-7-4 MM 8 MM 7 FRX115 CM EZ STEER

## (undated) DEVICE — GUIDEWIRE VASC L260CM 0.035IN J TIP L3MM PTFE FIX COR NAMIC

## (undated) DEVICE — SC 3W HP RA OFF NB - PG: Brand: NAMIC

## (undated) DEVICE — MEDI-TRACE CADENCE ADULT, DEFIBRILLATION ELECTRODE -RTS  (10 PR/PK) - PHYSIO-CONTROL: Brand: MEDI-TRACE CADENCE

## (undated) DEVICE — 3M™ TEGADERM™ TRANSPARENT FILM DRESSING FRAME STYLE, 1626W, 4 IN X 4-3/4 IN (10 CM X 12 CM), 50/CT 4CT/CASE: Brand: 3M™ TEGADERM™

## (undated) DEVICE — (D)PREP SKN CHLRAPRP APPL 26ML -- CONVERT TO ITEM 371833

## (undated) DEVICE — RADIFOCUS OPTITORQUE ANGIOGRAPHIC CATHETER: Brand: OPTITORQUE

## (undated) DEVICE — WATERPROOF, BACTERIA PROOF DRESSING WITH ABSORBENT SEE THROUGH PAD: Brand: OPSITE POST-OP VISIBLE 20X10CM CTN 20

## (undated) DEVICE — TOWEL SURG W17XL27IN STD BLU COT NONFENESTRATED PREWASHED

## (undated) DEVICE — GUIDE COR SNUS L40CM DIA9FR 0.035IN STD CRV ADV UNIQUE

## (undated) DEVICE — GLIDESHEATH SLENDER ACCESS KIT: Brand: GLIDESHEATH SLENDER

## (undated) DEVICE — PATCH REF EXT FOR CARTO 3 SYS (EA = 6 PACKS)

## (undated) DEVICE — PIN EXT FIX SCHNZ 3 MM

## (undated) DEVICE — PACEMAKER PACK: Brand: MEDLINE INDUSTRIES, INC.

## (undated) DEVICE — SLIM BODY SKIN STAPLER: Brand: APPOSE ULC

## (undated) DEVICE — 3M™ IOBAN™ 2 ANTIMICROBIAL INCISE DRAPE 6650EZ: Brand: IOBAN™ 2

## (undated) DEVICE — Z CONVERTED USE 2107985 COVER FLROSCP W36XL28IN 4 SIDE ADH

## (undated) DEVICE — DRAPE XR C ARM 41X74IN LF --

## (undated) DEVICE — KENDALL SCD EXPRESS SLEEVES, KNEE LENGTH, MEDIUM: Brand: KENDALL SCD

## (undated) DEVICE — APPLICATOR MEDICATED 10.5 CC SOLUTION CLR STRL CHLORAPREP

## (undated) DEVICE — SOLUTION IV 1000ML 0.9% SOD CHL

## (undated) DEVICE — HI-TORQUE VERSACORE FLOPPY GUIDE WIRE SYSTEM 145 CM: Brand: HI-TORQUE VERSACORE

## (undated) DEVICE — ELECTRODE PT RET AD L9FT HI MOIST COND ADH HYDRGEL CORDED

## (undated) DEVICE — PACK PROCEDURE SURG HRT CATH

## (undated) DEVICE — Z DISCONTINUED USE 2717541 SUTURE STRATAFIX SZ 3-0 L30CM NONABSORBABLE UD L26MM FS 3/8

## (undated) DEVICE — DEVON™ KNEE AND BODY STRAP 60" X 3" (1.5 M X 7.6 CM): Brand: DEVON

## (undated) DEVICE — ELECTRODE BLDE L4IN NONINSULATED EDGE

## (undated) DEVICE — INTRODUCER SHTH L13CM OD7FR SH ORNG HUB SEAMLESS SAFSHTH

## (undated) DEVICE — PROVE COVER: Brand: UNBRANDED

## (undated) DEVICE — SYSTEM SKIN CLSR 22CM DERMBND PRINEO

## (undated) DEVICE — PINNACLE INTRODUCER SHEATH: Brand: PINNACLE

## (undated) DEVICE — PLASMABLADE PS200-001 NEEDLE: Brand: PLASMABLADE™

## (undated) DEVICE — STERILE POLYISOPRENE POWDER-FREE SURGICAL GLOVES: Brand: PROTEXIS

## (undated) DEVICE — SUTURE PERMAHAND SZ 0 L30IN NONABSORBABLE BLK L26MM SH 1/2 K834H

## (undated) DEVICE — 4-PORT MANIFOLD: Brand: NEPTUNE 2

## (undated) DEVICE — GUIDE CATHETER: Brand: ACUITY® PRO

## (undated) DEVICE — RADIFOCUS GLIDEWIRE: Brand: GLIDEWIRE

## (undated) DEVICE — HANDLE LT SNAP ON ULT DURABLE LENS FOR TRUMPF ALC DISPOSABLE

## (undated) DEVICE — TR BAND RADIAL ARTERY COMPRESSION DEVICE: Brand: TR BAND

## (undated) DEVICE — COPILOT BLEEDBACK CONTROL VALVE: Brand: COPILOT

## (undated) DEVICE — REM POLYHESIVE ADULT PATIENT RETURN ELECTRODE: Brand: VALLEYLAB

## (undated) DEVICE — STERILE POLYISOPRENE POWDER-FREE SURGICAL GLOVES WITH EMOLLIENT COATING: Brand: PROTEXIS

## (undated) DEVICE — DRAPE,REIN 53X77,STERILE: Brand: MEDLINE

## (undated) DEVICE — INTRODUCER SPLIT SHEATH PRELUDE SNAP 9FR X 13CM

## (undated) DEVICE — GUIDE WIRE WITH HYDROPHILIC COATING: Brand: ACUITY WHISPER VIEW™